# Patient Record
Sex: FEMALE | Race: WHITE | NOT HISPANIC OR LATINO | URBAN - METROPOLITAN AREA
[De-identification: names, ages, dates, MRNs, and addresses within clinical notes are randomized per-mention and may not be internally consistent; named-entity substitution may affect disease eponyms.]

---

## 2020-11-12 PROBLEM — Z00.00 ENCOUNTER FOR PREVENTIVE HEALTH EXAMINATION: Status: ACTIVE | Noted: 2020-11-12

## 2021-03-12 ENCOUNTER — EMERGENCY (EMERGENCY)
Facility: HOSPITAL | Age: 36
LOS: 1 days | Discharge: HOME | End: 2021-03-12
Attending: EMERGENCY MEDICINE | Admitting: EMERGENCY MEDICINE
Payer: MEDICAID

## 2021-03-12 VITALS
SYSTOLIC BLOOD PRESSURE: 136 MMHG | RESPIRATION RATE: 20 BRPM | OXYGEN SATURATION: 100 % | TEMPERATURE: 98 F | DIASTOLIC BLOOD PRESSURE: 90 MMHG | HEART RATE: 92 BPM

## 2021-03-12 VITALS
RESPIRATION RATE: 20 BRPM | HEART RATE: 78 BPM | DIASTOLIC BLOOD PRESSURE: 81 MMHG | OXYGEN SATURATION: 100 % | SYSTOLIC BLOOD PRESSURE: 122 MMHG | TEMPERATURE: 98 F

## 2021-03-12 LAB
ALBUMIN SERPL ELPH-MCNC: 5 G/DL — SIGNIFICANT CHANGE UP (ref 3.3–5)
ALP SERPL-CCNC: 53 U/L — SIGNIFICANT CHANGE UP (ref 40–120)
ALT FLD-CCNC: 14 U/L — SIGNIFICANT CHANGE UP (ref 4–33)
ANION GAP SERPL CALC-SCNC: 12 MMOL/L — SIGNIFICANT CHANGE UP (ref 7–14)
AST SERPL-CCNC: 20 U/L — SIGNIFICANT CHANGE UP (ref 4–32)
BASOPHILS # BLD AUTO: 0.03 K/UL — SIGNIFICANT CHANGE UP (ref 0–0.2)
BASOPHILS NFR BLD AUTO: 0.4 % — SIGNIFICANT CHANGE UP (ref 0–2)
BILIRUB SERPL-MCNC: 0.5 MG/DL — SIGNIFICANT CHANGE UP (ref 0.2–1.2)
BUN SERPL-MCNC: 16 MG/DL — SIGNIFICANT CHANGE UP (ref 7–23)
CALCIUM SERPL-MCNC: 9.2 MG/DL — SIGNIFICANT CHANGE UP (ref 8.4–10.5)
CHLORIDE SERPL-SCNC: 102 MMOL/L — SIGNIFICANT CHANGE UP (ref 98–107)
CO2 SERPL-SCNC: 27 MMOL/L — SIGNIFICANT CHANGE UP (ref 22–31)
CREAT SERPL-MCNC: 0.76 MG/DL — SIGNIFICANT CHANGE UP (ref 0.5–1.3)
EOSINOPHIL # BLD AUTO: 0.02 K/UL — SIGNIFICANT CHANGE UP (ref 0–0.5)
EOSINOPHIL NFR BLD AUTO: 0.2 % — SIGNIFICANT CHANGE UP (ref 0–6)
GLUCOSE SERPL-MCNC: 101 MG/DL — HIGH (ref 70–99)
HCT VFR BLD CALC: 37.6 % — SIGNIFICANT CHANGE UP (ref 34.5–45)
HGB BLD-MCNC: 12.2 G/DL — SIGNIFICANT CHANGE UP (ref 11.5–15.5)
IANC: 5.86 K/UL — SIGNIFICANT CHANGE UP (ref 1.5–8.5)
IMM GRANULOCYTES NFR BLD AUTO: 0.4 % — SIGNIFICANT CHANGE UP (ref 0–1.5)
LIDOCAIN IGE QN: 30 U/L — SIGNIFICANT CHANGE UP (ref 7–60)
LYMPHOCYTES # BLD AUTO: 2.12 K/UL — SIGNIFICANT CHANGE UP (ref 1–3.3)
LYMPHOCYTES # BLD AUTO: 25.5 % — SIGNIFICANT CHANGE UP (ref 13–44)
MCHC RBC-ENTMCNC: 29 PG — SIGNIFICANT CHANGE UP (ref 27–34)
MCHC RBC-ENTMCNC: 32.4 GM/DL — SIGNIFICANT CHANGE UP (ref 32–36)
MCV RBC AUTO: 89.5 FL — SIGNIFICANT CHANGE UP (ref 80–100)
MONOCYTES # BLD AUTO: 0.24 K/UL — SIGNIFICANT CHANGE UP (ref 0–0.9)
MONOCYTES NFR BLD AUTO: 2.9 % — SIGNIFICANT CHANGE UP (ref 2–14)
NEUTROPHILS # BLD AUTO: 5.86 K/UL — SIGNIFICANT CHANGE UP (ref 1.8–7.4)
NEUTROPHILS NFR BLD AUTO: 70.6 % — SIGNIFICANT CHANGE UP (ref 43–77)
NRBC # BLD: 0 /100 WBCS — SIGNIFICANT CHANGE UP
NRBC # FLD: 0 K/UL — SIGNIFICANT CHANGE UP
PLATELET # BLD AUTO: 350 K/UL — SIGNIFICANT CHANGE UP (ref 150–400)
POTASSIUM SERPL-MCNC: 4.3 MMOL/L — SIGNIFICANT CHANGE UP (ref 3.5–5.3)
POTASSIUM SERPL-SCNC: 4.3 MMOL/L — SIGNIFICANT CHANGE UP (ref 3.5–5.3)
PROT SERPL-MCNC: 7.9 G/DL — SIGNIFICANT CHANGE UP (ref 6–8.3)
RBC # BLD: 4.2 M/UL — SIGNIFICANT CHANGE UP (ref 3.8–5.2)
RBC # FLD: 11.5 % — SIGNIFICANT CHANGE UP (ref 10.3–14.5)
SODIUM SERPL-SCNC: 141 MMOL/L — SIGNIFICANT CHANGE UP (ref 135–145)
WBC # BLD: 8.3 K/UL — SIGNIFICANT CHANGE UP (ref 3.8–10.5)
WBC # FLD AUTO: 8.3 K/UL — SIGNIFICANT CHANGE UP (ref 3.8–10.5)

## 2021-03-12 PROCEDURE — 76830 TRANSVAGINAL US NON-OB: CPT | Mod: 26

## 2021-03-12 PROCEDURE — 99285 EMERGENCY DEPT VISIT HI MDM: CPT

## 2021-03-12 PROCEDURE — 93975 VASCULAR STUDY: CPT | Mod: 26

## 2021-03-12 RX ORDER — ONDANSETRON 8 MG/1
4 TABLET, FILM COATED ORAL ONCE
Refills: 0 | Status: COMPLETED | OUTPATIENT
Start: 2021-03-12 | End: 2021-03-12

## 2021-03-12 RX ORDER — ACETAMINOPHEN 500 MG
975 TABLET ORAL ONCE
Refills: 0 | Status: COMPLETED | OUTPATIENT
Start: 2021-03-12 | End: 2021-03-12

## 2021-03-12 RX ORDER — SODIUM CHLORIDE 9 MG/ML
1000 INJECTION INTRAMUSCULAR; INTRAVENOUS; SUBCUTANEOUS ONCE
Refills: 0 | Status: COMPLETED | OUTPATIENT
Start: 2021-03-12 | End: 2021-03-12

## 2021-03-12 RX ORDER — METOCLOPRAMIDE HCL 10 MG
10 TABLET ORAL ONCE
Refills: 0 | Status: COMPLETED | OUTPATIENT
Start: 2021-03-12 | End: 2021-03-12

## 2021-03-12 RX ADMIN — Medication 975 MILLIGRAM(S): at 21:39

## 2021-03-12 RX ADMIN — ONDANSETRON 4 MILLIGRAM(S): 8 TABLET, FILM COATED ORAL at 19:43

## 2021-03-12 RX ADMIN — SODIUM CHLORIDE 1000 MILLILITER(S): 9 INJECTION INTRAMUSCULAR; INTRAVENOUS; SUBCUTANEOUS at 21:39

## 2021-03-12 RX ADMIN — Medication 10 MILLIGRAM(S): at 22:59

## 2021-03-12 NOTE — ED PROVIDER NOTE - PROGRESS NOTE DETAILS
Bernard PGY2 - Reassessed pt., who is feeling improved.  Will dc w/ PCP f/u.  All other questions and concerns have been addressed. 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 stated she started having some migraines and abdominal pain.  Exam significant for LLQ tenderness.  States she had an ovarian cyst the size of a golf ball when she was 14.  Has not been sexually active for a few weeks.  Denies hx of STD/STI pregnancy.  Will obtain labs, TVUS.  Will administer analgesics. Bernard PGY2 - Reassessed pt., who is feeling improved.  Discussed all results w/ pt., who understands them.  All other questions and concerns have been addressed.

## 2021-03-12 NOTE — ED PROVIDER NOTE - NS ED ROS FT
General: denies fever, chills, weight loss/weight gain.  HENT: denies nasal congestion, sore throat, rhinorrhea, ear pain.  Eyes: denies visual changes, blurred vision, eye discharge, eye redness.  Neck: denies neck pain, neck swelling.  CV: denies chest pain, palpitations.  Resp: denies difficulty breathing, cough.  Abdominal: +nausea, vomiting; denies diarrhea, abdominal pain, blood in stool, dark stool.  MSK: denies muscle aches, bony pain, leg pain, leg swelling.  Neuro: denies headaches, numbness, tingling, dizziness, lightheadedness.  Skin: denies rashes, cuts, bruises.  Hematologic: denies unexplained bruises.

## 2021-03-12 NOTE — ED PROVIDER NOTE - OBJECTIVE STATEMENT
35F w/ no PMHx p/w vomiting after ingesting a [place]ak [place] bottle of unknown alcoholic beverage given to her by a co-worker.  Pt. had vomiting after and was sent by her employer to the ED.  States this happened once before.  Denies any concomitant smoking or drug ingestion.  Denies any CP, SOB, n/v/d/c, abd pain, f/c, sick contacts.  Denies prior hospitalization for etoh withdrawal.  Denies any SI/HI/AH/VH.  Pt. currently on her menstrual period.

## 2021-03-12 NOTE — ED ADULT TRIAGE NOTE - CHIEF COMPLAINT QUOTE
AS per EMS pt was at work and drink a mini bottle of alcohol while in the Br. pt started vomiting. pt is able to respond to questions, as per EMS this happen once before.

## 2021-03-12 NOTE — ED ADULT NURSE REASSESSMENT NOTE - NS ED NURSE REASSESS COMMENT FT1
received report from baldemar LYNNE. Pt is a/o x 3. Pt states she still feels slightly nauseous. no complaints of chest pain, headache, dizzniness, vomiting, SOB, fever, chills   verbalized.  Awaiting further orders. Will continue to monitor.

## 2021-03-12 NOTE — ED PROVIDER NOTE - PATIENT PORTAL LINK FT
You can access the FollowMyHealth Patient Portal offered by [facility] by registering at the following website: [email or web address]. By joining [email or web address]’s FollowMyHealth portal, you will also be able to view your health information using other applications (apps) compatible with our system. You can access the FollowMyHealth Patient Portal offered by [facility] by registering at the following website: [email or web address]. By joining [employer]’s FollowMyHealth portal, you will also be able to view your health information using other applications (apps) compatible with our system.

## 2021-03-12 NOTE — ED PROVIDER NOTE - ATTENDING CONTRIBUTION TO CARE
DR. BLOCH, ATTENDING MD-  I performed a face to face bedside interview with patient regarding history of present illness, review of symptoms and past medical history. I completed an independent physical exam.  I have discussed patient's plan of care with the resident.  Patient examined, well appearing, mild c/o nausea, heart s1s2, lungs clear, abd soft mild llq tenderness, extrem no edema, no rashes. pulses intact. neuro motor 5/5

## 2021-03-12 NOTE — ED PROVIDER NOTE - PHYSICAL EXAMINATION
GENERAL: Patient awake alert NAD.  HEENT: NC/AT, moist mucus membranes, no tongue fasciculations.  LUNGS: CTAB, no wheezes or crackles.  CARDIAC: RRR, no m/r/g.  ABDOMEN: Soft, NT, ND, No rebound, guarding.  EXT: No edema. No calf tenderness. CV 2+DP/PT bilaterally.  MSK: No pain with movement, no deformities.  NEURO: A&Ox3. Moving all extremities. CN 2-12 grossly intact, motor strength 5/5 in all four extremities, sensation intact.  SKIN: Warm and dry. No rash.  PSYCH: Normal affect.

## 2021-03-12 NOTE — ED ADULT NURSE NOTE - OBJECTIVE STATEMENT
patient received in exam room 26. alert and oriented x3, ambulates. Patient brought in by ems for etoh use and vomiting while at work. patient states a person brought in a bottle of clear alcohol for her which she states she drank in the bathroom. patient states her job sent her to the ER because this is the second "incident" where this has happened. Patient currently crying in exam room. Clothing removed and secured. Denies any pain at this time. will monitor.

## 2021-03-12 NOTE — ED PROVIDER NOTE - NSFOLLOWUPINSTRUCTIONS_ED_ALL_ED_FT
You were seen for nausea and vomiting.    This is likely due to consuming the alcoholic beverage.    You are stable enough to be discharged.  If you have any concerning symptoms, seek immediate medical attention.

## 2021-03-12 NOTE — ED ADULT NURSE NOTE - NSIMPLEMENTINTERV_GEN_ALL_ED
Implemented All Fall Risk Interventions:  [place] to call system. Call bell, personal items and telephone within reach. Instruct patient to call for assistance. Room bathroom lighting operational. Non-slip footwear when patient is off stretcher. Physically safe environment: no spills, clutter or unnecessary equipment. Stretcher in lowest position, wheels locked, appropriate side rails in place. Provide visual cue, wrist band, yellow gown, etc. Monitor gait and stability. Monitor for mental status changes and reorient to person, place, and time. Review medications for side effects contributing to fall risk. Reinforce activity limits and safety measures with patient and family.

## 2021-03-12 NOTE — ED PROVIDER NOTE - CLINICAL SUMMARY MEDICAL DECISION MAKING FREE TEXT BOX
35F p/w nausea and vomiting after ingestion an unknown alcoholic beverage.  Pt. appears well, slightly nauseated.  Ambulating well.  VSS.  No concern for etoh withdrawal.  Will provide symptomatic relief, PO challenge, reassess, and likely dc.

## 2021-07-27 ENCOUNTER — EMERGENCY (EMERGENCY)
Facility: HOSPITAL | Age: 36
LOS: 1 days | Discharge: HOME | End: 2021-07-27
Attending: STUDENT IN AN ORGANIZED HEALTH CARE EDUCATION/TRAINING PROGRAM
Payer: MEDICAID

## 2021-07-27 VITALS
WEIGHT: 130.07 LBS | OXYGEN SATURATION: 98 % | HEIGHT: 64 IN | RESPIRATION RATE: 15 BRPM | SYSTOLIC BLOOD PRESSURE: 132 MMHG | DIASTOLIC BLOOD PRESSURE: 100 MMHG | HEART RATE: 91 BPM | TEMPERATURE: 97 F

## 2021-07-27 LAB
ANION GAP SERPL CALC-SCNC: 13 MMOL/L — SIGNIFICANT CHANGE UP (ref 5–17)
APAP SERPL-MCNC: <15 UG/ML — SIGNIFICANT CHANGE UP (ref 10–30)
APPEARANCE UR: ABNORMAL
BACTERIA # UR AUTO: NEGATIVE — SIGNIFICANT CHANGE UP
BASOPHILS # BLD AUTO: 0.06 K/UL — SIGNIFICANT CHANGE UP (ref 0–0.2)
BASOPHILS NFR BLD AUTO: 0.6 % — SIGNIFICANT CHANGE UP (ref 0–2)
BILIRUB UR-MCNC: NEGATIVE — SIGNIFICANT CHANGE UP
BUN SERPL-MCNC: 14 MG/DL — SIGNIFICANT CHANGE UP (ref 7–23)
CALCIUM SERPL-MCNC: 10.3 MG/DL — SIGNIFICANT CHANGE UP (ref 8.4–10.5)
CHLORIDE SERPL-SCNC: 102 MMOL/L — SIGNIFICANT CHANGE UP (ref 96–108)
CO2 SERPL-SCNC: 23 MMOL/L — SIGNIFICANT CHANGE UP (ref 22–31)
COLOR SPEC: YELLOW — SIGNIFICANT CHANGE UP
COMMENT - URINE: SIGNIFICANT CHANGE UP
CREAT SERPL-MCNC: 0.91 MG/DL — SIGNIFICANT CHANGE UP (ref 0.5–1.3)
DIFF PNL FLD: ABNORMAL
EOSINOPHIL # BLD AUTO: 0.16 K/UL — SIGNIFICANT CHANGE UP (ref 0–0.5)
EOSINOPHIL NFR BLD AUTO: 1.6 % — SIGNIFICANT CHANGE UP (ref 0–6)
EPI CELLS # UR: 5 /HPF — SIGNIFICANT CHANGE UP
ETHANOL SERPL-MCNC: SIGNIFICANT CHANGE UP MG/DL (ref 0–10)
GLUCOSE SERPL-MCNC: 100 MG/DL — HIGH (ref 70–99)
GLUCOSE UR QL: NEGATIVE — SIGNIFICANT CHANGE UP
HCG SERPL-ACNC: <2 MIU/ML — SIGNIFICANT CHANGE UP
HCT VFR BLD CALC: 39.9 % — SIGNIFICANT CHANGE UP (ref 34.5–45)
HGB BLD-MCNC: 13.1 G/DL — SIGNIFICANT CHANGE UP (ref 11.5–15.5)
HYALINE CASTS # UR AUTO: 30 /LPF — HIGH (ref 0–2)
IMM GRANULOCYTES NFR BLD AUTO: 0.3 % — SIGNIFICANT CHANGE UP (ref 0–1.5)
KETONES UR-MCNC: NEGATIVE — SIGNIFICANT CHANGE UP
LEUKOCYTE ESTERASE UR-ACNC: ABNORMAL
LYMPHOCYTES # BLD AUTO: 3.09 K/UL — SIGNIFICANT CHANGE UP (ref 1–3.3)
LYMPHOCYTES # BLD AUTO: 30.5 % — SIGNIFICANT CHANGE UP (ref 13–44)
MCHC RBC-ENTMCNC: 28.7 PG — SIGNIFICANT CHANGE UP (ref 27–34)
MCHC RBC-ENTMCNC: 32.8 GM/DL — SIGNIFICANT CHANGE UP (ref 32–36)
MCV RBC AUTO: 87.3 FL — SIGNIFICANT CHANGE UP (ref 80–100)
MONOCYTES # BLD AUTO: 0.67 K/UL — SIGNIFICANT CHANGE UP (ref 0–0.9)
MONOCYTES NFR BLD AUTO: 6.6 % — SIGNIFICANT CHANGE UP (ref 2–14)
NEUTROPHILS # BLD AUTO: 6.13 K/UL — SIGNIFICANT CHANGE UP (ref 1.8–7.4)
NEUTROPHILS NFR BLD AUTO: 60.4 % — SIGNIFICANT CHANGE UP (ref 43–77)
NITRITE UR-MCNC: NEGATIVE — SIGNIFICANT CHANGE UP
NRBC # BLD: 0 /100 WBCS — SIGNIFICANT CHANGE UP (ref 0–0)
PH UR: 6 — SIGNIFICANT CHANGE UP (ref 5–8)
PLATELET # BLD AUTO: 349 K/UL — SIGNIFICANT CHANGE UP (ref 150–400)
POTASSIUM SERPL-MCNC: 4.3 MMOL/L — SIGNIFICANT CHANGE UP (ref 3.5–5.3)
POTASSIUM SERPL-SCNC: 4.3 MMOL/L — SIGNIFICANT CHANGE UP (ref 3.5–5.3)
PROT UR-MCNC: ABNORMAL
RBC # BLD: 4.57 M/UL — SIGNIFICANT CHANGE UP (ref 3.8–5.2)
RBC # FLD: 11.6 % — SIGNIFICANT CHANGE UP (ref 10.3–14.5)
RBC CASTS # UR COMP ASSIST: 1 /HPF — SIGNIFICANT CHANGE UP (ref 0–4)
SALICYLATES SERPL-MCNC: <2 MG/DL — LOW (ref 15–30)
SARS-COV-2 RNA SPEC QL NAA+PROBE: SIGNIFICANT CHANGE UP
SODIUM SERPL-SCNC: 138 MMOL/L — SIGNIFICANT CHANGE UP (ref 135–145)
SP GR SPEC: 1.03 — HIGH (ref 1.01–1.02)
UROBILINOGEN FLD QL: NEGATIVE — SIGNIFICANT CHANGE UP
WBC # BLD: 10.14 K/UL — SIGNIFICANT CHANGE UP (ref 3.8–10.5)
WBC # FLD AUTO: 10.14 K/UL — SIGNIFICANT CHANGE UP (ref 3.8–10.5)
WBC UR QL: 23 /HPF — HIGH (ref 0–5)

## 2021-07-27 PROCEDURE — 80307 DRUG TEST PRSMV CHEM ANLYZR: CPT

## 2021-07-27 PROCEDURE — 99285 EMERGENCY DEPT VISIT HI MDM: CPT | Mod: 25

## 2021-07-27 PROCEDURE — 80048 BASIC METABOLIC PNL TOTAL CA: CPT

## 2021-07-27 PROCEDURE — 84702 CHORIONIC GONADOTROPIN TEST: CPT

## 2021-07-27 PROCEDURE — 81001 URINALYSIS AUTO W/SCOPE: CPT

## 2021-07-27 PROCEDURE — 99285 EMERGENCY DEPT VISIT HI MDM: CPT

## 2021-07-27 PROCEDURE — 86769 SARS-COV-2 COVID-19 ANTIBODY: CPT

## 2021-07-27 PROCEDURE — U0003: CPT

## 2021-07-27 PROCEDURE — 93005 ELECTROCARDIOGRAM TRACING: CPT

## 2021-07-27 PROCEDURE — 85025 COMPLETE CBC W/AUTO DIFF WBC: CPT

## 2021-07-27 PROCEDURE — 93010 ELECTROCARDIOGRAM REPORT: CPT

## 2021-07-27 PROCEDURE — U0005: CPT

## 2021-07-27 RX ORDER — CEPHALEXIN 500 MG
500 CAPSULE ORAL ONCE
Refills: 0 | Status: COMPLETED | OUTPATIENT
Start: 2021-07-27 | End: 2021-07-27

## 2021-07-27 NOTE — ED BEHAVIORAL HEALTH ASSESSMENT NOTE - SAFETY PLAN ADDT'L DETAILS
Education provided regarding environmental safety / lethal means restriction/Provision of National Suicide Prevention Lifeline 8-794-759-TALK (0060)

## 2021-07-27 NOTE — ED ADULT NURSE REASSESSMENT NOTE - NS ED NURSE REASSESS COMMENT FT1
Patient handoff received from MAGED espinoza. Patient is AOx3, NAD at this time. safety maintained, awaiting results and dispo.

## 2021-07-27 NOTE — ED PROVIDER NOTE - NSFOLLOWUPINSTRUCTIONS_ED_ALL_ED_FT
You were seen for mental health evaluation.     Seek immediate medical assistance for any new or worsening symptoms.     Follow up with a psychiatrist.     Continue all prescribed medications.     Keflex was sent to your pharmacy for urinary tract infection.

## 2021-07-27 NOTE — ED ADULT NURSE NOTE - OBJECTIVE STATEMENT
36 y/o female coming to the ER BIBA with c/o anxiety. A&Ox4. Ambulatory. PMH anxiety and depression. Patient reports that her mom made her come in. Patient reports she stopped taking her antidepressants on her own about a month ago. Patient reports "feeling overwhelmed and anxious". Patient Denies SI, HI, self harm ideations. Patient states "I just need some help". Patient placed on 1:1 for safety. 34 y/o female coming to the ER BIBA with c/o anxiety. A&Ox4. Ambulatory. PMH anxiety and depression. Patient reports that her mom made her come in. Patient reports she stopped taking her antidepressants on her own about a month ago. Patient reports "feeling overwhelmed and anxious". Patient Denies SI, HI, self harm ideations. Patient states "I just need some help". Patient placed on 1:1 for safety. Belongings secured with security, phone and wallet in lock box in ER. Abdomen is soft, non distended, non tender. Capillary refill less than 2 seconds. Patient denies chest pain, SOB, fever, chills, n/v/d, urinary symptoms, abdominal pain. Safety measures maintained. Bed in the lowest position. MD at the bedside. No acute distress noted or further complaints at this time.

## 2021-07-27 NOTE — ED BEHAVIORAL HEALTH ASSESSMENT NOTE - REFERRAL / APPOINTMENT DETAILS
Patient given outpatient resources for outpatient mental health clinics including walk in [facility] Patient given outpatient resources for outpatient mental health clinics including  [facility]

## 2021-07-27 NOTE — ED BEHAVIORAL HEALTH ASSESSMENT NOTE - RISK ASSESSMENT
Low Acute Suicide Risk rf - no outpatient, noncompliance, anxiety, depression, decline in functioning,   pf - supportive family, future oriented, identifies reason for living, sobriety, no past SAs, denies si/hi, no past admissions, young, female, healthy    COVID Exposure Screen- Patient  1.	*Have you had a COVID-19 test in the last 90 days?  (  ) Yes   ( x ) No   (  ) Unknown- Reason: _____  IF YES PROCEED TO QUESTION #2. IF NO OR UNKNOWN, PLEASE SKIP TO QUESTION #3.  2.	Date of test(s) and result(s): ________  3.	*Have you tested positive for COVID-19 antibodies? (  ) Yes   (  ) No   (x  ) Unknown- Reason: _____  IF YES PROCEED TO QUESTION #4. IF NO or UNKNOWN, PLEASE SKIP TO QUESTION #5.  4.	Date of positive antibody test: ________  5.	*Have you received 2 doses of the COVID-19 vaccine? ( x ) Yes   ( ) No   (  ) Unknown- Reason: _____   IF YES PROCEED TO QUESTION #6. IF NO or UNKNOWN, PLEASE SKIP TO QUESTION #7.  6.	Date of second dose: ________  7.	*In the past 10 days, have you been around anyone with a positive COVID-19 test?* (  ) Yes   ( x ) No   ( ) Unknown- Reason: ____  IF YES PROCEED TO QUESTION #8. IF NO or UNKNOWN, PLEASE SKIP TO QUESTION #13.  8.	Were you within 6 feet of them for at least 15 minutes? (  ) Yes   (  ) No   (  ) Unknown- Reason: _____  9.	Have you provided care for them? (  ) Yes   (  ) No   (  ) Unknown- Reason: ______  10.	Have you had direct physical contact with them (touched, hugged, or kissed them)? (  ) Yes   (  ) No    (  ) Unknown- Reason: _____  11.	Have you shared eating or drinking utensils with them? (  ) Yes   (  ) No    (  ) Unknown- Reason: ____  12.	Have they sneezed, coughed, or somehow gotten respiratory droplets on you? (  ) Yes   (  ) No    (  ) Unknown- Reason: ______  13.	*Have you been out of [place] within the past 10 days?* (  ) Yes   ( x ) No   (  ) Unknown- Reason: _____  IF YES PLEASE ANSWER THE FOLLOWING QUESTIONS:  14.	Which state/country have you been to? ______  15.	Were you there over 24 hours? (  ) Yes   (  ) No    (  ) Unknown- Reason: ______  16.	Date of return to [facility]: ______

## 2021-07-27 NOTE — ED PROVIDER NOTE - PATIENT PORTAL LINK FT
You can access the FollowMyHealth Patient Portal offered by [facility] by registering at the following website: [email or web address]. By joining [email or web address]’s FollowMyHealth portal, you will also be able to view your health information using other applications (apps) compatible with our system.

## 2021-07-27 NOTE — ED BEHAVIORAL HEALTH ASSESSMENT NOTE - SUMMARY
Patient is a 34yo female, domiciled with mother, unemployed, with pph of depression, anxiety, no prior psych admissions, no current outpt tx, denies past SAs or self harm, no known hx of violence, denies drug or alcohol use, and no known pmh. She is BIBEMS from home activated by mother for worsening anxiety and depression.     Patient struggles with depression and anxiety that manifest as difficulty with decision making, poor sleep, decline in exercise, appetite, socialization. She attributes this to pandemic limitations and her baseline daily stressors. Her mother was concerned about pt's change in behavior but does not note any active SI/HI/AVH. Although patient appears anxious, her speech is spontaneous and she does not exhibit any acute s/sx of claudia, psychosis, or suicidality at this time. Mother does not express any other acute safety concerns and in agreement with discharge home with outpatient follow up. She is not interested in voluntary admission and does not require involuntary hospitalization at this time; patient given instructions for outpatient follow up and given resources.

## 2021-07-27 NOTE — ED BEHAVIORAL HEALTH ASSESSMENT NOTE - NSACTIVEVENT_PSY_ALL_CORE
Okay for 3 months with one refill since has appt
Â· Med Name & Dose: pantoprazole 40 mg  Â· Last refill was 8-13-18 Number of Refills sent: 0  Â· Quantity of medication given 90 day supply  Â· Last visit for that condition 8-24-18  Â· Date of next appt: 11/30/2018  Â· Date of any Lab results pertaining to medication:       Â· Med Name & Dose: gabapentin 400 mg  Â· Last refill was 8-13-18 Number of Refills sent: 0  Â· Quantity of medication given 90 day supply  Â· Last visit for that condition 8-24-18  Â· Date of next appt: 11/30/2018  Â· Date of any Lab results pertaining to medication:       Ok to refill?
Current or pending social isolation

## 2021-07-27 NOTE — ED PROVIDER NOTE - PROGRESS NOTE DETAILS
Vitor PGY3:  Discussed with mother Bria [phone].  Stopped taking meds for unknown period of time, she has been more withdrawn, unresponsive to questioning, and very tearful, lately.  Has not endorsed SI/HI to her but is not speaking to anyone at all so unclear. Sravanthi -psych cleared pt to go home. she is not a danger to self or others. will dc on ABx for UTI. Patient agrees to follow up with psychiatry.

## 2021-07-27 NOTE — ED PROVIDER NOTE - PHYSICAL EXAMINATION
General appearance: NAD, conversant, afebrile    Neck: Trachea midline; Full range of motion, supple   Pulm: CTA bl, normal respiratory effort and no intercostal retractions, normal work of breathing   CV: RRR, No murmurs, rubs, or gallops   Extremities: No peripheral edema    Skin: Dry, normal temperature, turgor and texture; no rash   Psych: Withdrawn, cooperative; alert and oriented to person, place and time, denies SI/HI, tearful General appearance: NAD, conversant, afebrile    Neck: Trachea midline; Full range of motion, supple   Pulm: CTA bl, normal respiratory effort and no intercostal retractions, normal work of breathing   CV: RRR, No murmurs, rubs, or gallops  Abdomen: Soft, NT/ND.    Extremities: No peripheral edema   Skin: Dry, normal temperature, turgor and texture; no rash   Psych: Withdrawn, cooperative; alert and oriented to person, place and time, denies SI/HI, tearful

## 2021-07-27 NOTE — ED PROVIDER NOTE - CLINICAL SUMMARY MEDICAL DECISION MAKING FREE TEXT BOX
36yo female with pmh depression bibems by mother's call to ems after for possible decompensating mental health.  Patient not forthcoming with information on exam and denies anything is wrong while she is actively crying.  Will get collateral from family and check psych screening labs, likely psych eval. 36yo female with pmh depression bibems by mother's call to ems after for possible decompensating mental health.  Patient not forthcoming with information on exam and denies anything is wrong while she is actively crying.  Will get collateral from family and check psych screening labs, likely psych eval.    Attending MD Garcia: Agree with above. Patient here tearful and reserved. Pleasant to speak with, but extremely reserved. States that "everyone goes through a hard time" but is unwilling to elaborate, and denies SI/HI. According to amie's mother, amie has been decompensating and not taking her meds. Will obtain labs and psych c/s as family concerned patient not caring for self.

## 2021-07-27 NOTE — ED BEHAVIORAL HEALTH ASSESSMENT NOTE - DETAILS
not indicated at this time; patient advised to return to ED or call 911 if symptoms worsen or thoughts to harm self or others occur. mother aware see hpi

## 2021-07-27 NOTE — ED PROVIDER NOTE - OBJECTIVE STATEMENT
34yo female with pmh depression bibems after mother called ems for patient being more withdrawn, crying.  Patient states she does not know why she is here and feels like her anxiety has been worsening lately.  She says her mom called ems because she was concerned about her but she has been doing well after stopping her meds over past 1-1.5 months but says over the past few days/ weeks she has been feeling worse and says "everyone goes through this."

## 2021-07-27 NOTE — ED BEHAVIORAL HEALTH ASSESSMENT NOTE - OTHER PAST PSYCHIATRIC HISTORY (INCLUDE DETAILS REGARDING ONSET, COURSE OF ILLNESS, INPATIENT/OUTPATIENT TREATMENT)
self reported hx of depression, anxiety, per psyckes dx of possible bipolar, neurocognitive disorders; no known past admissions, no current outpt tx, denies past SAs

## 2021-07-27 NOTE — ED BEHAVIORAL HEALTH ASSESSMENT NOTE - PAST PSYCHOTROPIC MEDICATION
celexa, wellbutrin, ativan, gabapentin, valium,  Vyvanse, lamotrigine celexa, wellbutrin, ativan, gabapentin, valium, Vyvanse, lamotrigine

## 2021-07-27 NOTE — ED PROVIDER NOTE - NSFOLLOWUPCLINICS_GEN_ALL_ED_FT
[facility]  Psychiatry  [id number] [place] [address]  Phone: [phone]  Fax:   Follow Up Time: Routine

## 2021-07-27 NOTE — ED BEHAVIORAL HEALTH ASSESSMENT NOTE - DESCRIPTION
denies see bh note    Vital Signs Last 24 Hrs  T(C): 36.6 (28 Jul 2021 00:31), Max: 36.6 (28 Jul 2021 00:31)  T(F): 97.9 (28 Jul 2021 00:31), Max: 97.9 (28 Jul 2021 00:31)  HR: 75 (28 Jul 2021 00:31) (75 - 91)  BP: 127/85 (28 Jul 2021 00:31) (127/85 - 132/100)  BP(mean): --  RR: 18 (28 Jul 2021 00:31) (15 - 18)  SpO2: 99% (28 Jul 2021 00:31) (98% - 99%) lives with parents; unemployed, no children

## 2021-07-27 NOTE — ED BEHAVIORAL HEALTH ASSESSMENT NOTE - HPI (INCLUDE ILLNESS QUALITY, SEVERITY, DURATION, TIMING, CONTEXT, MODIFYING FACTORS, ASSOCIATED SIGNS AND SYMPTOMS)
Patient is a 34yo female, domiciled with mother, unemployed, with pph of depression, anxiety, no prior psych admissions, no current outpt tx, denies past SAs or self harm, no known hx of violence, denies drug or alcohol use, Patient is a 34yo female, domiciled with mother, unemployed, with pph of depression, anxiety, no prior psych admissions, no current outpt tx, denies past SAs or self harm, no known hx of violence, denies drug or alcohol use, and no known pmh. She is BIBEMS from home activated by mother for worsening Patient is a 36yo female, domiciled with mother, unemployed, with pph of depression, anxiety, no prior psych admissions, no current outpt tx, denies past SAs or self harm, no known hx of violence, denies drug or alcohol use, and no known pmh. She is BIBEMS from home activated by mother for worsening anxiety and depression.     On exam, patient appears anxious, superficially cooperative, guarded at times, and linear process. She reports she has been stressed and anxious from her regular day to day issues. Patient is a 36yo female, domiciled with mother, unemployed, with pph of depression, anxiety, no prior psych admissions, no current outpt tx, denies past SAs or self harm, no known hx of violence, denies drug or alcohol use, and no known pmh. She is BIBEMS from home activated by mother for worsening anxiety and depression.     On exam, patient appears anxious, superficially cooperative, guarded at times, with spontaneous speech and linear process. She reports she has been stressed and anxious from her regular day to day issues. She states she isn't going out, eating, or exercising as much due to pandemic. She also notes feeling depressed, having difficulty with decision making, and trouble sleeping. Her mother is concerned because of her decline and called for EMS. She feels this was an overreaction and was reluctant to come to ER but is open to outpatient. She denies past or current SI/P/I, HI/P/I, current self harm. When asked about past SAs or self harm, patient is repeatedly vague, states she can't remember and not for many years, then later denies. Despite multiple attempts to explore any specific stressors, patient repeatedly indicates her normal daily stressors unchanged from her usual baseline. She reports taking meds in the recent past but not currently. Denies s/sx of psychosis or claudia including AVH, paranoia, delusions, elevated mood, increased energy, increased goal directed activities, decreased need for sleep, talkativeness, racing thoughts. Denies any substance abuse. She is eager for discharge and amenable to outpatient resources.     see  note for collateral info from mother obtained by [facility]

## 2021-07-28 VITALS
TEMPERATURE: 98 F | OXYGEN SATURATION: 99 % | DIASTOLIC BLOOD PRESSURE: 85 MMHG | HEART RATE: 75 BPM | SYSTOLIC BLOOD PRESSURE: 127 MMHG | RESPIRATION RATE: 18 BRPM

## 2021-07-28 DIAGNOSIS — F41.9 ANXIETY DISORDER, UNSPECIFIED: ICD-10-CM

## 2021-07-28 LAB
COVID-19 SPIKE DOMAIN AB INTERP: POSITIVE
COVID-19 SPIKE DOMAIN ANTIBODY RESULT: >250 U/ML — HIGH
SARS-COV-2 IGG+IGM SERPL QL IA: >250 U/ML — HIGH
SARS-COV-2 IGG+IGM SERPL QL IA: POSITIVE

## 2021-07-28 RX ORDER — CEPHALEXIN 500 MG
1 CAPSULE ORAL
Qty: 10 | Refills: 0
Start: 2021-07-28 | End: 2021-08-01

## 2021-07-28 NOTE — ED BEHAVIORAL HEALTH NOTE - BEHAVIORAL HEALTH NOTE
===================  PRE-HOSPITAL COURSE  ===================  SOURCE:  [facility] and secondhand ED documentation.  DETAILS:  Per chart, patient was BIB EMS activated by mother due to concern for patient being non-compliant with her medications, being more withdrawn, and worsening anxiety.    ============  ED COURSE   ============  SOURCE:  [facility] and secondhand ED documentation.  ARRIVAL:  Per chart and RN, patient arrived via EMS. Per RN, patient was calm upon arrival, and compliant with triage process.   BELONGINGS:  Per RN, patient arrived with clothing. All belongings were provided to hospital security, and patient is currently in a gown with a 1:1 staff member.  BEHAVIOR: RN described patient to be calm and cooperative, presenting with linear thought process and thought content WNL, is AAOx3, presenting with stable mood and appropriate affect, remains in good behavioral and impulse control, is not currently violent/aggressive. RN stated that the patient is denying SI/HI/A/VH. RN stated that there are no visible marks, bruises, or lacerations on the body. RN stated that the patient appears to be fairly-groomed, maintains good hygiene, and reports good ADLs, ambulates well and independently without assistance.   TREATMENT:  Per chart and RN, patient did not require PRN medications.  VISITORS:  None      ========================  FOR EACH COLLATERAL  ========================  NAME: Gulshan Delgado  NUMBER: [phone]  RELATIONSHIP: Mother  RELIABILITY: Collateral required re-direction from BTCM regarding interview, as she exhibited limited understanding of role of BTCM and the questions being asked.  COMMENTS:     ========================  PATIENT DEMOGRAPHICS: Patient is a 35F domiciled with mother and father, currently unemployed, single, without children, non-caregiver.  ========================    BTCM spoke with patient’s mother Gulshan to obtain third party collateral. Gulshan stated patient has a Dx/o bipolar disorder. Gulshan stated patient was found to be with blank affect, sitting still, unresponsive, and stated that she has not been taking her medications (Gulshan was unable to provide patient’s medication regiment). Gulshan stated that the patient appeared to be confused and was putting her head down into her lap, and continued to remain in that position for several hours, not responding to her questions. Gulshan stated that at baseline, patient appears to be depressed however is able to manage her symptoms independently. Collateral stated that the patient has also been presenting with poor recent memory recall, stated that the patient could not remember the password of her phone, and was paranoid that there was a microchip in her glasses. Collateral stated patient had been undergoing several changes in her medication regiment in a short period of time, though was unable to report any specific medications. Collateral stated patient has no legal hx, no access to firearms at home, no substance/ alcohol use, no family hx/o mental illness. Collateral denied patient expressed SI/HI/A/VH, reported one instance of cutting 2 yrs ago. Gulshan provided an unclear response regarding past psych admissions. [facility] informed Gulshan that patient will be discharged with outpatient resources, and inpatient admission is not recommended at this time. Gulshan did not express safety concerns of taking her home and stated she was in agreement with the plan. [facility] counseled Gulshan on safety, and instructed gulshan to contact EMS in the event patient shows signs of imminent safety concern.     COVID Exposure Screen- collateral (i.e. third-party, chart review, belongings, etc; include EMS and ED staff)  1.	*Has the patient had a COVID-19 test in the last 90 days?  (  ) Yes   ( x ) No   (  ) Unknown- Reason: _____  IF YES PROCEED TO QUESTION #2. IF NO OR UNKNOWN, PLEASE SKIP TO QUESTION #3.  2.	Date of test(s) and result(s): ________  3.	*Has the patient tested positive for COVID-19 antibodies? (  ) Yes   (  x) No   (  ) Unknown- Reason: _____  IF YES PROCEED TO QUESTION #4. IF NO or UNKNOWN, PLEASE SKIP TO QUESTION #5.  4.	Date of positive antibody test: ________  5.	*Has the patient received 2 doses of the COVID-19 vaccine? ( x ) Yes   (  ) No   (  ) Unknown- Reason: _____  IF YES PROCEED TO QUESTION #6. IF NO or UNKNOWN, PLEASE SKIP TO QUESTION #7.  6.	 Date of second dose: February 2021  7.	*In the past 10 days, has the patient been around anyone with a positive COVID-19 test?* (  ) Yes   (x ) No   (  ) Unknown- Reason: __  IF YES PROCEED TO QUESTION #8. IF NO or UNKNOWN, PLEASE SKIP TO QUESTION #13.  8.	Was the patient within 6 feet of them for at least 15 minutes? (  ) Yes   (  ) No   (  ) Unknown- Reason: _____  9.	Did the patient provide care for them? (  ) Yes   (  ) No   (  ) Unknown- Reason: ______  10.	Did the patient have direct physical contact with them (touched, hugged, or kissed them)? (  ) Yes   (  ) No    (  ) Unknown- Reason: __  11.	Did the patient share eating or drinking utensils with them? (  ) Yes   (  ) No    (  ) Unknown- Reason: ____  12.	Did they sneeze, cough, or somehow get respiratory droplets on the patient? (  ) Yes   (  ) No    (  ) Unknown- Reason: ______  13.	*Has the patient been out of [place] within the past 10 days?* (x  ) Yes   (  ) No   (  ) Unknown- Reason: _____  IF YES PLEASE ANSWER THE FOLLOWING QUESTIONS:  14.	Which state/country did they go to? [place]  15.	Were they there over 24 hours? (  ) Yes   (  x) No    (  ) Unknown- Reason: ______  16.	Date of return to [facility]:

## 2021-08-01 ENCOUNTER — OUTPATIENT (OUTPATIENT)
Dept: OUTPATIENT SERVICES | Facility: HOSPITAL | Age: 36
LOS: 1 days | End: 2021-08-01
Payer: MEDICAID

## 2021-08-02 ENCOUNTER — OUTPATIENT (OUTPATIENT)
Dept: OUTPATIENT SERVICES | Facility: HOSPITAL | Age: 36
LOS: 1 days | Discharge: STILL ADMITTED OR ADMINISTRATIVE | End: 2021-08-02
Payer: MEDICAID

## 2021-08-02 PROCEDURE — 99215 OFFICE O/P EST HI 40 MIN: CPT | Mod: 95

## 2021-08-26 ENCOUNTER — INPATIENT (INPATIENT)
Facility: HOSPITAL | Age: 36
LOS: 6 days | Discharge: PSYCHIATRIC OR SUBSTANCE USE FACILITY | DRG: 885 | End: 2021-09-02
Attending: PSYCHIATRY & NEUROLOGY | Admitting: PSYCHIATRY & NEUROLOGY
Payer: MEDICAID

## 2021-08-26 VITALS
DIASTOLIC BLOOD PRESSURE: 93 MMHG | HEART RATE: 89 BPM | OXYGEN SATURATION: 100 % | WEIGHT: 149.91 LBS | RESPIRATION RATE: 16 BRPM | SYSTOLIC BLOOD PRESSURE: 126 MMHG | TEMPERATURE: 99 F | HEIGHT: 64 IN

## 2021-08-26 DIAGNOSIS — F32.9 MAJOR DEPRESSIVE DISORDER, SINGLE EPISODE, UNSPECIFIED: ICD-10-CM

## 2021-08-26 LAB
ALBUMIN SERPL ELPH-MCNC: 4.9 G/DL — SIGNIFICANT CHANGE UP (ref 3.3–5)
ALP SERPL-CCNC: 60 U/L — SIGNIFICANT CHANGE UP (ref 40–120)
ALT FLD-CCNC: 10 U/L — SIGNIFICANT CHANGE UP (ref 10–45)
AMPHET UR-MCNC: NEGATIVE — SIGNIFICANT CHANGE UP
ANION GAP SERPL CALC-SCNC: 15 MMOL/L — SIGNIFICANT CHANGE UP (ref 5–17)
APAP SERPL-MCNC: <15 UG/ML — SIGNIFICANT CHANGE UP (ref 10–30)
APPEARANCE UR: ABNORMAL
AST SERPL-CCNC: 14 U/L — SIGNIFICANT CHANGE UP (ref 10–40)
BACTERIA # UR AUTO: ABNORMAL
BARBITURATES UR SCN-MCNC: NEGATIVE — SIGNIFICANT CHANGE UP
BASOPHILS # BLD AUTO: 0.06 K/UL — SIGNIFICANT CHANGE UP (ref 0–0.2)
BASOPHILS NFR BLD AUTO: 0.7 % — SIGNIFICANT CHANGE UP (ref 0–2)
BENZODIAZ UR-MCNC: POSITIVE
BILIRUB SERPL-MCNC: 0.8 MG/DL — SIGNIFICANT CHANGE UP (ref 0.2–1.2)
BILIRUB UR-MCNC: NEGATIVE — SIGNIFICANT CHANGE UP
BUN SERPL-MCNC: 10 MG/DL — SIGNIFICANT CHANGE UP (ref 7–23)
CALCIUM SERPL-MCNC: 10 MG/DL — SIGNIFICANT CHANGE UP (ref 8.4–10.5)
CHLORIDE SERPL-SCNC: 102 MMOL/L — SIGNIFICANT CHANGE UP (ref 96–108)
CO2 SERPL-SCNC: 22 MMOL/L — SIGNIFICANT CHANGE UP (ref 22–31)
COCAINE METAB.OTHER UR-MCNC: NEGATIVE — SIGNIFICANT CHANGE UP
COLOR SPEC: YELLOW — SIGNIFICANT CHANGE UP
CREAT SERPL-MCNC: 0.85 MG/DL — SIGNIFICANT CHANGE UP (ref 0.5–1.3)
DIFF PNL FLD: ABNORMAL
EOSINOPHIL # BLD AUTO: 0.15 K/UL — SIGNIFICANT CHANGE UP (ref 0–0.5)
EOSINOPHIL NFR BLD AUTO: 1.7 % — SIGNIFICANT CHANGE UP (ref 0–6)
EPI CELLS # UR: 27 /HPF — HIGH
ETHANOL SERPL-MCNC: SIGNIFICANT CHANGE UP MG/DL (ref 0–10)
GLUCOSE SERPL-MCNC: 101 MG/DL — HIGH (ref 70–99)
GLUCOSE UR QL: NEGATIVE — SIGNIFICANT CHANGE UP
HCG UR QL: NEGATIVE — SIGNIFICANT CHANGE UP
HCT VFR BLD CALC: 38.6 % — SIGNIFICANT CHANGE UP (ref 34.5–45)
HGB BLD-MCNC: 12.8 G/DL — SIGNIFICANT CHANGE UP (ref 11.5–15.5)
HYALINE CASTS # UR AUTO: 1 /LPF — SIGNIFICANT CHANGE UP (ref 0–7)
IMM GRANULOCYTES NFR BLD AUTO: 0.3 % — SIGNIFICANT CHANGE UP (ref 0–1.5)
KETONES UR-MCNC: NEGATIVE — SIGNIFICANT CHANGE UP
LEUKOCYTE ESTERASE UR-ACNC: ABNORMAL
LYMPHOCYTES # BLD AUTO: 2.39 K/UL — SIGNIFICANT CHANGE UP (ref 1–3.3)
LYMPHOCYTES # BLD AUTO: 27.7 % — SIGNIFICANT CHANGE UP (ref 13–44)
MCHC RBC-ENTMCNC: 28.5 PG — SIGNIFICANT CHANGE UP (ref 27–34)
MCHC RBC-ENTMCNC: 33.2 GM/DL — SIGNIFICANT CHANGE UP (ref 32–36)
MCV RBC AUTO: 86 FL — SIGNIFICANT CHANGE UP (ref 80–100)
METHADONE UR-MCNC: NEGATIVE — SIGNIFICANT CHANGE UP
MONOCYTES # BLD AUTO: 0.63 K/UL — SIGNIFICANT CHANGE UP (ref 0–0.9)
MONOCYTES NFR BLD AUTO: 7.3 % — SIGNIFICANT CHANGE UP (ref 2–14)
NEUTROPHILS # BLD AUTO: 5.37 K/UL — SIGNIFICANT CHANGE UP (ref 1.8–7.4)
NEUTROPHILS NFR BLD AUTO: 62.3 % — SIGNIFICANT CHANGE UP (ref 43–77)
NITRITE UR-MCNC: NEGATIVE — SIGNIFICANT CHANGE UP
NRBC # BLD: 0 /100 WBCS — SIGNIFICANT CHANGE UP (ref 0–0)
OPIATES UR-MCNC: NEGATIVE — SIGNIFICANT CHANGE UP
OXYCODONE UR-MCNC: NEGATIVE — SIGNIFICANT CHANGE UP
PCP SPEC-MCNC: SIGNIFICANT CHANGE UP
PCP UR-MCNC: NEGATIVE — SIGNIFICANT CHANGE UP
PH UR: 6.5 — SIGNIFICANT CHANGE UP (ref 5–8)
PLATELET # BLD AUTO: 392 K/UL — SIGNIFICANT CHANGE UP (ref 150–400)
POTASSIUM SERPL-MCNC: 4.1 MMOL/L — SIGNIFICANT CHANGE UP (ref 3.5–5.3)
POTASSIUM SERPL-SCNC: 4.1 MMOL/L — SIGNIFICANT CHANGE UP (ref 3.5–5.3)
PROT SERPL-MCNC: 7.9 G/DL — SIGNIFICANT CHANGE UP (ref 6–8.3)
PROT UR-MCNC: ABNORMAL
RBC # BLD: 4.49 M/UL — SIGNIFICANT CHANGE UP (ref 3.8–5.2)
RBC # FLD: 11.7 % — SIGNIFICANT CHANGE UP (ref 10.3–14.5)
RBC CASTS # UR COMP ASSIST: 12 /HPF — HIGH (ref 0–4)
SALICYLATES SERPL-MCNC: <2 MG/DL — LOW (ref 15–30)
SARS-COV-2 RNA SPEC QL NAA+PROBE: SIGNIFICANT CHANGE UP
SODIUM SERPL-SCNC: 139 MMOL/L — SIGNIFICANT CHANGE UP (ref 135–145)
SP GR SPEC: 1.03 — HIGH (ref 1.01–1.02)
THC UR QL: NEGATIVE — SIGNIFICANT CHANGE UP
UROBILINOGEN FLD QL: NEGATIVE — SIGNIFICANT CHANGE UP
WBC # BLD: 8.63 K/UL — SIGNIFICANT CHANGE UP (ref 3.8–10.5)
WBC # FLD AUTO: 8.63 K/UL — SIGNIFICANT CHANGE UP (ref 3.8–10.5)
WBC UR QL: 9 /HPF — HIGH (ref 0–5)

## 2021-08-26 PROCEDURE — 93010 ELECTROCARDIOGRAM REPORT: CPT | Mod: NC

## 2021-08-26 PROCEDURE — 99285 EMERGENCY DEPT VISIT HI MDM: CPT

## 2021-08-26 NOTE — ED ADULT NURSE NOTE - OBJECTIVE STATEMENT
35 year old female BIB EMS for psychiatric evaluation due to auditory hallucinations. Pt with history of Bipolar D/O, recentlt c/o hearing voices and seeing things. Pt did not give information events prior to arrival at the ER, pt 35 year old female BIB EMS for psychiatric evaluation due to auditory hallucinations. Pt with history of Bipolar D/O,  c/o hearing voices and seeing things. Pt did not give information events prior to arrival at the ER, pt is a thought blocked with disorganized thinking, giving irrelevant answers. . Pt is domiciled with mom and said that she is not on any medications at this time and has no psychiatrist. Pt is observed to have frequent intervals of thought blocking then a few minutes when she is able to engage well and answer questions appropriately. Pt denied acute medical complaints
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
## 2021-08-26 NOTE — H&P ADULT - ASSESSMENT
Assessment: 34 yo female with a PMHx of bipolar disorder (not taking medication) presents to the ED for psychiatric evaluation. Patient states she came to the ED for anxiety, depression, and confusion. She also states she is hearing things in her head that others cannot hear. Mother is concerned that the patient may be having seizures. She states she witnessed two events over the past two days. During these events the patient's eyes were "fluttering," which she further describes as eyelids closed but moving. The patient did not move or speak for at least 3-6 hours each time. The patient states she remembers both events, and states she was intensely frustrated during those times. Patient was evaluated by psychiatry in the ED, with consideration for inpatient psych admission once medically cleared. Admit to EMU for possible event capture and characterization.    Plan:  [] Admit to EMU.  [] vEEG.  [] No AEDs for now.  [] Seizure/Fall/Aspiration precautions.  [] Psychiatry following, f/u psych consult team recs.  [] c/w home dose Xanax PRN.  [] On Keflex for UTI (which can lower seizure threshold), will switch to Macrobid 100MG PO BID x5 days.  [] DVT PPx: Lovenox 40MG SC QD.  [] Diet: Regular.  [] Seizure Rescue: Ativan 1MG IVP x1 for convulsions/GTC lasting > 3 minutes. Vimpat 200MG IV x1 for convulsions/GTC refractory to Ativan.    Patient discussed with epilepsy fellow Dr. Hansen. Final recommendations regarding management to be confirmed upon review by epilepsy attending Dr. Esqueda. Assessment: 36 yo female with a PMHx of bipolar disorder (not taking medication) presents to the ED for psychiatric evaluation. Patient states she came to the ED for anxiety, depression, and confusion. She also states she is hearing things in her head that others cannot hear. Mother is concerned that the patient may be having seizures. She states she witnessed two events over the past two days. During these events the patient's eyes were "fluttering," which she further describes as eyelids closed but moving. The patient did not move or speak for at least 3-6 hours each time. The patient states she remembers both events, and states she was intensely frustrated during those times. Patient was evaluated by psychiatry in the ED, with consideration for inpatient psych admission once medically cleared. Admit to EMU for possible event capture and characterization.    Plan:  [] Admit to EMU.  [] 1:1.  [] vEEG.  [] No AEDs for now.  [] Seizure/Fall/Aspiration precautions.  [] Psychiatry following, f/u psych consult team recs.  [] c/w home dose Xanax PRN.  [] On Keflex for UTI (which can lower seizure threshold), will switch to Macrobid 100MG PO BID x5 days.  [] DVT PPx: Lovenox 40MG SC QD.  [] Diet: Regular.  [] Seizure Rescue: Ativan 1MG IVP x1 for convulsions/GTC lasting > 3 minutes. Vimpat 200MG IV x1 for convulsions/GTC refractory to Ativan.    Patient discussed with epilepsy fellow Dr. Hansen. Final recommendations regarding management to be confirmed upon review by epilepsy attending Dr. Esqueda. Assessment: 36 yo female with a PMHx of bipolar disorder (not taking medication) presents to the ED for psychiatric evaluation. Patient states she came to the ED for anxiety, depression, and confusion. She also states she is hearing things in her head that others cannot hear. Mother is concerned that the patient may be having seizures. She states she witnessed two events over the past two days. During these events the patient's eyes were "fluttering," which she further describes as eyelids closed but moving. The patient did not move or speak for at least 3-6 hours each time. The patient states she remembers both events, and states she was intensely frustrated during those times. Patient was evaluated by psychiatry in the ED, with consideration for inpatient psych admission once medically cleared. Admit to EMU for possible event capture and characterization.    Plan:  [] Admit to EMU.  [] 1:1.  [] vEEG.  [] No AEDs for now.  [] Seizure/Fall/Aspiration precautions.  [] Psychiatry following, f/u psych consult team recs.  [] c/w home dose Xanax PRN.  [] On Keflex for UTI (which can lower seizure threshold), will switch to Macrobid 100MG PO BID x5 days.  [x] UDS: +benzodiazepines.  [] DVT PPx: Lovenox 40MG SC QD.  [] Diet: Regular.  [] Seizure Rescue: Ativan 1MG IVP x1 for convulsions/GTC lasting > 3 minutes. Vimpat 200MG IV x1 for convulsions/GTC refractory to Ativan.    Patient discussed with epilepsy fellow Dr. Hansen. Final recommendations regarding management to be confirmed upon review by epilepsy attending Dr. Esqueda.

## 2021-08-26 NOTE — H&P ADULT - NSHPLABSRESULTS_GEN_ALL_CORE
LABS:                      12.8   8.63  )-----------( 392      ( 26 Aug 2021 16:51 )             38.6     08-26    139  |  102  |  10  ----------------------------<  101<H>  4.1   |  22  |  0.85    Ca    10.0      26 Aug 2021 16:51    TPro  7.9  /  Alb  4.9  /  TBili  0.8  /  DBili  x   /  AST  14  /  ALT  10  /  AlkPhos  60  08-26

## 2021-08-26 NOTE — ED BEHAVIORAL HEALTH ASSESSMENT NOTE - DIFFERENTIAL
anxiety, depression, bipolar disorder, substance intoxication or withdrawal, psychosis, neurocognitive disorder, seizures

## 2021-08-26 NOTE — H&P ADULT - NSHPPHYSICALEXAM_GEN_ALL_CORE
Vital Signs Last 24 Hrs  T(C): 36.7 (26 Aug 2021 20:27), Max: 37.1 (26 Aug 2021 15:47)  T(F): 98 (26 Aug 2021 20:27), Max: 98.7 (26 Aug 2021 15:47)  HR: 88 (26 Aug 2021 20:27) (87 - 89)  BP: 104/69 (26 Aug 2021 20:27) (104/69 - 126/93)  RR: 17 (26 Aug 2021 20:27) (16 - 17)  SpO2: 97% (26 Aug 2021 20:27) (97% - 100%)    General:  Constitutional: Sitting on bench, NAD but anxious-appearing.  Head: Normocephalic, atraumatic.  Extremities: No edema.    Neuro Exam:   MS: Alert, eyes open spontaneously. Oriented to self, age, location, month, year. Speech is fluent, not dysarthric. Latency of speech. Poor eye contact, appears to be interacting with internal stimuli. Follows commands.  CN: PERRL. VFF. EOMI, dysconjugate gaze with mild strabismus noted, especially with horizontal eye movements. V1-V3 intact. Face symmetric. Tongue midline.   Motor: All extremities antigravity without drift.   Sensory: Intact to LT throughout.  Reflexes: 2+ throughout.  Coordination: No dysmetria on FNF or on HTS bilaterally.  Gait: Normal gait, normal pivot.
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
## 2021-08-26 NOTE — ED BEHAVIORAL HEALTH ASSESSMENT NOTE - OTHER PAST PSYCHIATRIC HISTORY (INCLUDE DETAILS REGARDING ONSET, COURSE OF ILLNESS, INPATIENT/OUTPATIENT TREATMENT)
per [facility]:   Unspecified/Other Anxiety Disorder | Unspecified/Other Neurocognitve Disorders | Major Depressive Disorder | Unspecified/Other Bipolar | Alcohol related disorders | Generalized Anxiety Disorder | Tobacco  related disorder

## 2021-08-26 NOTE — ED PROVIDER NOTE - ATTENDING CONTRIBUTION TO CARE
------------ATTENDING NOTE------------   pt brought to ED by EMS after mother called concerned about pt's depression/activity, describing increased crying / hopelessness and describing vague auditory hallucinations, pt tearful on exam w/ depressed flat affect, no additional complaints, awaiting labs/imaging and close reassessments -->  - Garret Gatica MD   ---------------------------------------------- ------------ATTENDING NOTE------------   pt brought to ED by EMS after mother called concerned about pt's depression/activity, describing increased crying / hopelessness and describing vague auditory hallucinations, pt tearful on exam w/ depressed flat affect, no additional complaints, awaiting labs/imaging and close reassessments --> labs wnl, pt remained HD stable / nml VS, medically cleared, awaiting final Psych recs -- and Neuro consult per psych, no h/o seizure / abnml movements -- at ED Sign Out for disposition / treatment decision.  - Garret Gatica MD   ----------------------------------------------

## 2021-08-26 NOTE — ED BEHAVIORAL HEALTH ASSESSMENT NOTE - SUMMARY
The patient is a 35-year-old female; domiciled with mother; unemployed; hx of some college; PPHx of depression, anxiety, no prior psych admissions, hx SIB by cutting, no known hx of violence; denies drug or alcohol use; denies PMHx; BIB EMS activated by mother; psychiatry consulted for psychosis.  Pt seen ~1 month ago for similar symptoms.  Pt presents with inconsistent and vague responses, reporting anxiety, depression, poor functioning.  She is amenable to inpatient psych admission.  Pt's mother reports that pt has been paranoid about technology but she is primarily concerned about a medical condition as she feels pt appears to be having seizures.  Discussed case with ED provider to consider further medical/neuro evaluation.  Pt is NOT psychiatrically cleared for discharge at this time and will need psychiatry CL team to follow if admitted to medicine.

## 2021-08-26 NOTE — H&P ADULT - ATTENDING COMMENTS
patient who does not have capacity  the semiology of her events are not epileptic.    I rec: psychiatric evaluation and stabilization now and consider further neurological work up when stable.

## 2021-08-26 NOTE — ED ADULT NURSE NOTE - ED STAT RN HANDOFF DETAILS
pt is to be evaluated by psychiatry at [facility], pt is to be evaluated by psychiatry at [facility],. pt need urine tox and UA pt is to be evaluated by psychiatry at [facility],. pt need urine tox and UA to [facility]

## 2021-08-26 NOTE — ED BEHAVIORAL HEALTH ASSESSMENT NOTE - HPI (INCLUDE ILLNESS QUALITY, SEVERITY, DURATION, TIMING, CONTEXT, MODIFYING FACTORS, ASSOCIATED SIGNS AND SYMPTOMS)
The patient is a 35-year-old female; domiciled with mother; unemployed; hx of some college; PPHx of depression, anxiety, no prior psych admissions, hx SIB by cutting, no known hx of violence; denies drug or alcohol use; denies PMHx; BIB EMS activated by mother; psychiatry consulted for psychosis.  Pt reports having anxiety and depression "for a while."  She also reports that her mother sent her in due to "eye fluttering."  Pt complains of "a lot of confusion" and "my choices."  She states there are "certain things she doesn't know enough about" and was hoping to speak to a psychiatrist to alleviate difficulty focusing.  Pt states it is hard to get up in the morning, lies in bed throughout the day, and feels like symptoms are affecting her daily life.  She reports poor sleep, anhedonia, guilt, low energy, difficulty concentrating, decreased appetite.  Pt denies current SI/HI.  She initially denies AVH, then reports "a little, comes and goes" but describes them as her "own opinions/thoughts."  Pt reports paranoia but when asked to elaborate talks about anxiety about getting up and going outside.  Pt reports that she has been trying to take classes but it's "not working" due to confusion.  Pt reports she started therapy with someone in [place] named Arvin but is unable to provide additional details.    COVID Exposure Screen- Patient  1.	*Have you had a COVID-19 test in the last 90 days?  ( x ) Yes, reportedly negative, hx of testing positive ~1 year ago   (  ) No   (  ) Unknown- Reason: _____  2.	*Have you tested positive for COVID-19 antibodies? (  ) Yes   ( x ) No   (  ) Unknown- Reason: _____  3.	*Have you received 2 doses of the COVID-19 vaccine? ( x ) Yes, 2nd dose reportedly received ~4 months ago   (  ) No   (  ) Unknown- Reason: _____  4.	*In the past 10 days, have you been around anyone with a positive COVID-19 test?* (  ) Yes   ( x ) No   (  ) Unknown- Reason: ____  5.	*Have you been out of [place] within the past 10 days?* (  ) Yes   ( x ) No   (  ) Unknown- Reason: _____ The patient is a 35-year-old female; domiciled with mother; unemployed; hx of some college; PPHx of depression, anxiety, no prior psych admissions, hx SIB by cutting, no known hx of violence; denies drug or alcohol use; denies PMHx; BIB EMS activated by mother; psychiatry consulted for psychosis.  Pt reports having anxiety and depression "for a while."  She also reports that her mother sent her in due to "eye fluttering."  Pt complains of "a lot of confusion" and "my choices."  She states there are "certain things she doesn't know enough about" and was hoping to speak to a psychiatrist to alleviate difficulty focusing.  Pt states it is hard to get up in the morning, lies in bed throughout the day, and feels like symptoms are affecting her daily life.  She reports poor sleep, anhedonia, guilt, low energy, difficulty concentrating, decreased appetite.  Pt denies current SI/HI.  She initially denies AVH, then reports "a little, comes and goes" but describes them as her "own opinions/thoughts."  Pt reports paranoia but when asked to elaborate talks about anxiety about getting up and going outside.  Pt reports that she has been trying to take classes but it's "not working" due to confusion.  Pt reports she started therapy with someone in [place] named Arvin but is unable to provide additional details.    COVID Exposure Screen- Patient  1.	*Have you had a COVID-19 test in the last 90 days?  ( x ) Yes, reportedly negative, hx of testing positive ~1 year ago   (  ) No   (  ) Unknown- Reason: _____  2.	*Have you tested positive for COVID-19 antibodies? ( x ) Yes, pt denies but per chart review, positive Ab on 7/28/21   (  ) No   (  ) Unknown- Reason: _____  3.	*Have you received 2 doses of the COVID-19 vaccine? ( x ) Yes, 2nd dose reportedly received ~4 months ago   (  ) No   (  ) Unknown- Reason: _____  4.	*In the past 10 days, have you been around anyone with a positive COVID-19 test?* (  ) Yes   ( x ) No   (  ) Unknown- Reason: ____  5.	*Have you been out of [place] within the past 10 days?* (  ) Yes   ( x ) No   (  ) Unknown- Reason: _____

## 2021-08-26 NOTE — ED ADULT TRIAGE NOTE - BP NONINVASIVE SYSTOLIC (MM HG)
126 Breaths sounds equal and clear b/l. No increased WOB, tachypnea, hypoxia, or accessory mm use. Pt speaks in full sentences.

## 2021-08-26 NOTE — H&P ADULT - NSICDXFAMILYHX_GEN_ALL_CORE_FT
FAMILY HISTORY:  Grandparent  Still living? Unknown  Family history of epilepsy, Age at diagnosis: Age Unknown

## 2021-08-26 NOTE — ED BEHAVIORAL HEALTH ASSESSMENT NOTE - OTHER
mother normal - able to spell WORLD backwards external billing superficially cooperative paranoia; poverty of content as above unclear defer normal - immediate recall 3/3; delayed recall 3/3

## 2021-08-26 NOTE — ED PROVIDER NOTE - PHYSICAL EXAMINATION
GENERAL: no acute distress, non-toxic appearing  HEENT: normal conjunctiva, pupils 3mm bilaterally  CARD: regular rate/rhythm  PULM: no incr wob  GI: abdomen nondistended, soft, nontender  : no suprapubic tenderness  NEURO: alert and oriented x 3, moving all extremities without lateralization  MSK: no visible deformities, ambulatory with steady gait  SKIN: no visible rashes/lacs/abrasions/track marks, not diaphoretic or flushed  PSYCH: withdrawn, minimal eye contact, denies si/hi/hallucinations

## 2021-08-26 NOTE — ED BEHAVIORAL HEALTH ASSESSMENT NOTE - RISK ASSESSMENT
Low Acute Suicide Risk rf - psych dx, not engaged in outpatient psych med management, hx cutting, psychosis, unemployed    pf - supportive family, future oriented, denies past SAs, denies active SI/HI, no past admissions, female, denies access to guns/weapons

## 2021-08-26 NOTE — ED PROVIDER NOTE - PROGRESS NOTE DETAILS
Kimmie, PGY3: psych spoke with pt and got collateral from pts mother, pts mother states she's concerned that pts having seizure like activity at home with staring spells and noticed eyes fluttering and pt going limp. Psych recs for neuro to eval pt for seizures prior to any further psych eval  Spoke with neuro they will come see pt. Sonny PGY3: Spoke to neurology, Will admit to EMU under Dr. Gresham.
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
## 2021-08-26 NOTE — ED BEHAVIORAL HEALTH ASSESSMENT NOTE - PSYCHIATRIC ISSUES AND PLAN (INCLUDE STANDING AND PRN MEDICATION)
if medically admitted, psychiatry CL team to follow; consider inpatient psych admission once medically cleared if medically admitted, psychiatry CL team to follow; consider inpatient psych admission once medically cleared; for severe agitation: haldol 2.5 mg, ativan 1 mg PO/IM q8h PRN (monitor QTc and VS)

## 2021-08-27 DIAGNOSIS — F22 DELUSIONAL DISORDERS: ICD-10-CM

## 2021-08-27 DIAGNOSIS — F32.9 MAJOR DEPRESSIVE DISORDER, SINGLE EPISODE, UNSPECIFIED: ICD-10-CM

## 2021-08-27 DIAGNOSIS — F29 UNSPECIFIED PSYCHOSIS NOT DUE TO A SUBSTANCE OR KNOWN PHYSIOLOGICAL CONDITION: ICD-10-CM

## 2021-08-27 LAB
ANION GAP SERPL CALC-SCNC: 15 MMOL/L — SIGNIFICANT CHANGE UP (ref 5–17)
BUN SERPL-MCNC: 13 MG/DL — SIGNIFICANT CHANGE UP (ref 7–23)
CALCIUM SERPL-MCNC: 10.1 MG/DL — SIGNIFICANT CHANGE UP (ref 8.4–10.5)
CHLORIDE SERPL-SCNC: 102 MMOL/L — SIGNIFICANT CHANGE UP (ref 96–108)
CO2 SERPL-SCNC: 24 MMOL/L — SIGNIFICANT CHANGE UP (ref 22–31)
CREAT SERPL-MCNC: 0.81 MG/DL — SIGNIFICANT CHANGE UP (ref 0.5–1.3)
FOLATE SERPL-MCNC: 15 NG/ML — SIGNIFICANT CHANGE UP
GLUCOSE SERPL-MCNC: 94 MG/DL — SIGNIFICANT CHANGE UP (ref 70–99)
HCG SERPL-ACNC: <2 MIU/ML — SIGNIFICANT CHANGE UP
HCT VFR BLD CALC: 38.2 % — SIGNIFICANT CHANGE UP (ref 34.5–45)
HGB BLD-MCNC: 12.6 G/DL — SIGNIFICANT CHANGE UP (ref 11.5–15.5)
MAGNESIUM SERPL-MCNC: 2.3 MG/DL — SIGNIFICANT CHANGE UP (ref 1.6–2.6)
MCHC RBC-ENTMCNC: 28.8 PG — SIGNIFICANT CHANGE UP (ref 27–34)
MCHC RBC-ENTMCNC: 33 GM/DL — SIGNIFICANT CHANGE UP (ref 32–36)
MCV RBC AUTO: 87.4 FL — SIGNIFICANT CHANGE UP (ref 80–100)
NRBC # BLD: 0 /100 WBCS — SIGNIFICANT CHANGE UP (ref 0–0)
PHOSPHATE SERPL-MCNC: 3.7 MG/DL — SIGNIFICANT CHANGE UP (ref 2.5–4.5)
PLATELET # BLD AUTO: 390 K/UL — SIGNIFICANT CHANGE UP (ref 150–400)
POTASSIUM SERPL-MCNC: 4 MMOL/L — SIGNIFICANT CHANGE UP (ref 3.5–5.3)
POTASSIUM SERPL-SCNC: 4 MMOL/L — SIGNIFICANT CHANGE UP (ref 3.5–5.3)
RBC # BLD: 4.37 M/UL — SIGNIFICANT CHANGE UP (ref 3.8–5.2)
RBC # FLD: 11.9 % — SIGNIFICANT CHANGE UP (ref 10.3–14.5)
SODIUM SERPL-SCNC: 141 MMOL/L — SIGNIFICANT CHANGE UP (ref 135–145)
TSH SERPL-MCNC: 0.81 UIU/ML — SIGNIFICANT CHANGE UP (ref 0.27–4.2)
TSH SERPL-MCNC: 0.92 UIU/ML — SIGNIFICANT CHANGE UP (ref 0.27–4.2)
VIT B12 SERPL-MCNC: 778 PG/ML — SIGNIFICANT CHANGE UP (ref 232–1245)
WBC # BLD: 8.71 K/UL — SIGNIFICANT CHANGE UP (ref 3.8–10.5)
WBC # FLD AUTO: 8.71 K/UL — SIGNIFICANT CHANGE UP (ref 3.8–10.5)

## 2021-08-27 PROCEDURE — 99223 1ST HOSP IP/OBS HIGH 75: CPT

## 2021-08-27 PROCEDURE — 99233 SBSQ HOSP IP/OBS HIGH 50: CPT | Mod: GC

## 2021-08-27 RX ORDER — ALPRAZOLAM 0.25 MG
0.5 TABLET ORAL
Refills: 0 | Status: DISCONTINUED | OUTPATIENT
Start: 2021-08-27 | End: 2021-09-02

## 2021-08-27 RX ORDER — ENOXAPARIN SODIUM 100 MG/ML
40 INJECTION SUBCUTANEOUS DAILY
Refills: 0 | Status: DISCONTINUED | OUTPATIENT
Start: 2021-08-27 | End: 2021-08-30

## 2021-08-27 RX ORDER — LACOSAMIDE 50 MG/1
200 TABLET ORAL ONCE
Refills: 0 | Status: DISCONTINUED | OUTPATIENT
Start: 2021-08-27 | End: 2021-09-02

## 2021-08-27 RX ORDER — ACETAMINOPHEN 500 MG
650 TABLET ORAL EVERY 6 HOURS
Refills: 0 | Status: DISCONTINUED | OUTPATIENT
Start: 2021-08-27 | End: 2021-09-02

## 2021-08-27 RX ORDER — NITROFURANTOIN MACROCRYSTAL 50 MG
100 CAPSULE ORAL
Refills: 0 | Status: COMPLETED | OUTPATIENT
Start: 2021-08-27 | End: 2021-08-31

## 2021-08-27 RX ADMIN — Medication 0.5 MILLIGRAM(S): at 07:57

## 2021-08-27 RX ADMIN — Medication 100 MILLIGRAM(S): at 05:29

## 2021-08-27 RX ADMIN — Medication 0.5 MILLIGRAM(S): at 23:33

## 2021-08-27 RX ADMIN — ENOXAPARIN SODIUM 40 MILLIGRAM(S): 100 INJECTION SUBCUTANEOUS at 15:39

## 2021-08-27 RX ADMIN — Medication 100 MILLIGRAM(S): at 17:25

## 2021-08-27 NOTE — BH CONSULTATION LIAISON PROGRESS NOTE - NSBHFUPINTERVALHXFT_PSY_A_CORE
Patient states she has had a decline in "activities of daily living" over the last couple of months due to worsening anxiety. Patient would like to consider medication options but states her legs have also been tingling during this time. Patient noted to be thought blocking slightly during the assessment. Notes she has had some initial insomnia as well. Per primary team patient has been hesitant to obtain EEG or further neurological workup. Denies any SI/HI/AVH but has no plan or intent of self-harm.  Patient states she has had a decline in "activities of daily living" over the last couple of months due to worsening anxiety. Patient would like to consider medication options but states her legs have also been tingling during this time. Patient noted to be thought blocking slightly during the assessment. Notes she has had some initial insomnia as well. Per primary team patient has been hesitant to obtain EEG or further neurological workup. Denies any SI/HI/AVH but has no plan or intent of self-harm.     Patient gave permission to speak to her mother:  Per patient's mother patient has been concerned as patient has been experiencing periods of time where she is not responsive and seen staring off into space. Notes she has been depressed but she feels "it's more than that." Reports she has been on antidepressants in the past  (names unknown) but she is convinced something changed over the last 4 weeks. Mother speaks of patient's eyes fluttering and was unresponsive during these episodes. She had also shut off all the electronics because she was scared. Patient's mother does not think this is a psychiatric problem and does not want psychiatry to be involved and was very upset over the phone.  Patient states she has had a decline in "activities of daily living" over the last couple of months due to worsening anxiety. Patient would like to consider medication options but states her legs have also been tingling during this time. Patient noted to be thought blocking slightly during the assessment. Notes she has had some initial insomnia as well. Per primary team patient has been hesitant to obtain EEG or further neurological workup. Denies any SI/HI/AVH but has no plan or intent of self-harm.     Patient gave permission to speak to her mother:  Per patient's mother patient has been concerned as patient has been experiencing periods of time where she is not responsive and seen staring off into space. Notes she has been depressed but she feels "it's more than that." Reports she has been on antidepressants in the past  (names unknown) but she is convinced something changed over the last 4 weeks. Mother speaks of patient's eyes fluttering and was unresponsive during these episodes. She had also shut off all the electronics because she was scared. Patient's mother does not think this is a psychiatric problem and does not want psychiatry to be involved and was very upset over the phone.

## 2021-08-27 NOTE — BH CONSULTATION LIAISON PROGRESS NOTE - NSBHASSESSMENTFT_PSY_ALL_CORE
Patient is a 36yo female, domiciled with mother, unemployed, with pph of depression, anxiety, no prior psych admissions, no current outpt tx, denies past SAs or self harm, no known hx of violence, denies drug or alcohol use, and no known pmh. She is BIBEMS from home activated by mother for worsening anxiety and depression.     On exam, patient appears anxious, superficially cooperative, guarded at times, with spontaneous speech and linear process. She reports she has been stressed and anxious from her regular day to day issues. She states she isn't going out, eating, or exercising as much due to pandemic. She also notes feeling depressed, having difficulty with decision making, and trouble sleeping. Her mother is concerned because of her decline and called for EMS. She feels this was an overreaction and was reluctant to come to ER but is open to outpatient. She denies past or current SI/P/I, HI/P/I, current self harm. When asked about past SAs or self harm, patient is repeatedly vague, states she can't remember and not for many years, then later denies. Despite multiple attempts to explore any specific stressors, patient repeatedly indicates her normal daily stressors unchanged from her usual baseline. She reports taking meds in the recent past but not currently. Denies s/sx of psychosis or claudia including AVH, paranoia, delusions, elevated mood, increased energy, increased goal directed activities, decreased need for sleep, talkativeness, racing thoughts. Denies any substance abuse. She is eager for discharge and amenable to outpatient resources.      Patient is a 36yo female, domiciled with mother, unemployed, with pph of bipolar disorder, anxiety, no prior psych admissions, no current outpt tx, denies past SAs or self harm, no known hx of violence, denies drug or alcohol use, and no known pmh. She is BIBEMS from home activated by mother for worsening anxiety and depression.     On exam, patient appears anxious, superficially cooperative, guarded at times, with spontaneous speech and linear process. She reports she has been stressed and anxious from her regular day to day issues. She states she isn't going out, eating, or exercising as much due to pandemic. She also notes feeling depressed, having difficulty with decision making, and trouble sleeping. Her mother is concerned because of her decline and called for EMS. She denies past or current SI/P/I, HI/P/I, current self harm. When asked about past SAs or self harm, patient is repeatedly vague, states she can't remember and not for many years, then later denies. Despite multiple attempts to explore any specific stressors, patient repeatedly indicates her normal daily stressors unchanged from her usual baseline. She reports taking meds in the recent past but not currently.

## 2021-08-27 NOTE — BH CONSULTATION LIAISON PROGRESS NOTE - NSBHCONSULTRECOMMENDOTHER_PSY_A_CORE FT
PRN: Ativan 2mg PO/IV q6h PRN agitation/anxiety/catatonia    workup as per neuro    CL psych will f/u for further dispo
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
## 2021-08-28 PROCEDURE — 70551 MRI BRAIN STEM W/O DYE: CPT | Mod: 26

## 2021-08-28 PROCEDURE — 99233 SBSQ HOSP IP/OBS HIGH 50: CPT

## 2021-08-28 PROCEDURE — 95718 EEG PHYS/QHP 2-12 HR W/VEEG: CPT

## 2021-08-28 PROCEDURE — 99233 SBSQ HOSP IP/OBS HIGH 50: CPT | Mod: GC

## 2021-08-28 RX ORDER — ALPRAZOLAM 0.25 MG
0.5 TABLET ORAL ONCE
Refills: 0 | Status: DISCONTINUED | OUTPATIENT
Start: 2021-08-28 | End: 2021-08-28

## 2021-08-28 RX ADMIN — Medication 100 MILLIGRAM(S): at 21:02

## 2021-08-28 RX ADMIN — Medication 0.5 MILLIGRAM(S): at 21:02

## 2021-08-28 RX ADMIN — Medication 0.5 MILLIGRAM(S): at 11:11

## 2021-08-28 RX ADMIN — Medication 0.5 MILLIGRAM(S): at 03:31

## 2021-08-28 RX ADMIN — Medication 100 MILLIGRAM(S): at 05:06

## 2021-08-28 RX ADMIN — ENOXAPARIN SODIUM 40 MILLIGRAM(S): 100 INJECTION SUBCUTANEOUS at 21:02

## 2021-08-28 RX ADMIN — Medication 650 MILLIGRAM(S): at 14:17

## 2021-08-28 RX ADMIN — Medication 650 MILLIGRAM(S): at 13:47

## 2021-08-28 NOTE — BH CONSULTATION LIAISON PROGRESS NOTE - NSBHFUPINTERVALHXFT_PSY_A_CORE
Patient became amenable to video EEG last night and was started on monitoring. This morning, patient is fixated on the bandages on her head and repeatedly requesting to have them taken off. Patient intermittently speaks of needing psychiatric help for anxiety ("I feel like I'm being crushed") but also sometimes repeats, "I want to take of these bandages and refuse treatment and go home." Patient now is expressing interested in the head imaging she refused yesterday. Patient responds to most interview questions with, "Can you take my bandages off?" Patient noted to be thought blocking slightly during the assessment. Discussed with patient that we would like to admit her to inpatient psychiatry. Pt responded by staring off into distance in silence for a few minutes and then asked what was happening. Repeated explanation, pt appears mildly upset, wants to first discuss her medical workup with Neuro and call her mom. Also saying she needs a meal first and Xanax. Denies any SI/HI/AVH. Per neuro team, were planning on 24 hr video EEG, but can call techs to remove bandage and have attending read the video EEG and clear. Neuro team believes they have enough recording and currently have low suspicion for seizure activity. D/w neuro that if patient is amenable today she should get head MRI completed.

## 2021-08-28 NOTE — BH CONSULTATION LIAISON PROGRESS NOTE - NSBHCONSULTRECOMMENDOTHER_PSY_A_CORE FT
PRN: Ativan 2mg PO/IV q6h PRN agitation/anxiety/catatonia    workup as per neuro    CL psych will f/u for further dispo PRN: Ativan 2mg PO/IV q6h PRN agitation/anxiety. pt would benefit from standing antipsychotic once cleared.     workup as per neuro    CL psych will f/u for further dispo

## 2021-08-28 NOTE — BH CONSULTATION LIAISON PROGRESS NOTE - NSBHASSESSMENTFT_PSY_ALL_CORE
Patient is a 36yo female, domiciled with mother, unemployed, with PHH of bipolar disorder, anxiety, no prior psych admissions, no current outpt tx, denies past SAs or self harm, no known hx of violence, denies drug or alcohol use, and no known pmh. She is BIBEMS from home activated by mother for worsening anxiety and depression. On exam, patient appears anxious, superficially cooperative, guarded at times, with spontaneous speech and linear process. She reports she has been stressed and anxious from her regular day to day issues. She states she isn't going out, eating, or exercising as much due to pandemic. She also notes feeling depressed, having difficulty with decision making, and trouble sleeping. Her mother is concerned because of her decline and called for EMS. She denies past or current SI/P/I, HI/P/I, current self harm. When asked about past SAs or self harm, patient is repeatedly vague, states she can't remember and not for many years, then later denies. Despite multiple attempts to explore any specific stressors, patient repeatedly indicates her normal daily stressors unchanged from her usual baseline. She reports taking meds in the recent past but not currently. Today, pt is anxious and perseverating on video EEG bandages being too tight, starting to unwrap them during interview. Patient appears anxious and is requesting more Xanax. She is unable to express consistent wishes about her care, at times saying she needs psychiatric help, at times saying she needs further medical workup, at times refusing all workup and asking to go home. Denies any SI/HI/AVH. Per neuro team, were planning on 24 hr video EEG, but can call techs to remove bandage and have attending read the video EEG and clear. Neuro team believes they have enough recording and currently have low suspicion for seizure activity. D/w neuro team that pt should have MRI today if amenable to facilitate medical clearance for psych admission. Patient is unable to care for self at home and will benefit from psych admission after she is medically cleared.

## 2021-08-28 NOTE — BH CONSULTATION LIAISON PROGRESS NOTE - NSBHMSETHTPROC_PSY_A_CORE
Perseverative/Thought blocking/Impaired reasoning/Other Disorganized/Perseverative/Thought blocking/Impaired reasoning/Other

## 2021-08-28 NOTE — EEG REPORT - NS EEG TEXT BOX
EPILEPSY MONITORING UNIT REPORT   [facility]: [address], [place] [address], Ph#: [phone] [facility]: [phone] [place] [address], [place] [address], Ph#: [phone] Office: [address] [place] [address] Ph#: [phone]  Patient Name: Blanquita Delgado   Age: 35 year, : 1985 MRN #: MRN [id number], Starr: EMU 460A EMU 460A Referring Physician:     ER admit           Study Information:  EEG Recording Technique: The patient underwent continuous Video-EEG monitoring, using Telemetry System hardware on the XLTek Digital System. EEG and video data were stored on a computer hard drive with important events saved in digital archive files. The material was reviewed by a physician (electroencephalographer / epileptologist) on a daily basis. Artie and seizure detection algorithms were utilized and reviewed. An EEG Technician attended to the patient, and was available throughout daytime work hours.  The epilepsy center neurologist was available in person or on call 24-hours per day.  EEG Placement and Labeling of Electrodes: The EEG was performed utilizing 20 channel referential EEG connections (coronal over temporal over parasagittal montage) using all standard 10-20 electrode placements with EKG, with additional electrodes placed in the inferior temporal region using the modified 10-10 montage electrode placements for elective admissions, or if deemed necessary. Recording was at a sampling rate of 256 samples per second per channel. Time synchronized digital video recording was done simultaneously with EEG recording. A low light infrared camera was used for low light recording.   History: 34 yo F hx bipolar d/o p/w episodes of eye fluttering, non-responsiveness, auditory hallucinations.  Home Antiepileptic Medication and Device none  Interpretation:  Day 1 – 	Start: 	2021  03:51   	End: 	2021  08:00 	Duration: 3 hr  40 min  Daily EEG Visual Analysis  FINDINGS:  The background was continuous, spontaneously variable and reactive. During wakefulness, the posterior dominant rhythm consisted of symmetric, well-modulated 10 Hz activity, with amplitude to 30 uV, that attenuated to eye opening.  Low amplitude frontal beta was noted in wakefulness.  Background Slowing: No generalized background slowing was present.  Focal Slowing:  None were present.  Sleep Background: Drowsiness was characterized by fragmentation, attenuation, and slowing of the background activity.   Sleep was characterized by the presence of vertex waves, symmetric sleep spindles and K-complexes.  Other Non-Epileptiform Findings: Diffuse excess beta activity.  Activation Procedures:  Hyperventilation was not performed.   Photic stimulation was not performed.  Interictal Epileptiform Activity:  None were present.  Events: No events or seizures recorded.  Artifacts: Intermittent myogenic and movement artifacts were noted.  ECG: The heart rate on single channel ECG was predominantly between 80-90BPM.  AEDs:  none  EEG Summary:  Normal EEG in the awake, drowsy and asleep states.  Impression/Clinical Correlate:  This is a normal EEG record. No epileptiform pattern or seizures were seen. Excessive beta activity is typically associated with use of sedative medications.   Deven Hansen MD Fellow, [facility]    Carlyle Lowe MD, PhD Director, Epilepsy Division, [facility]   ------------------------------------ EEG Reading Room: [phone] On Call Service After Hours: [phone]       EPILEPSY MONITORING UNIT REPORT   [facility]: [address], [place] [address], Ph#: [phone] [facility]: [phone] [place] [address], [place] [address], Ph#: [phone] Office: [address] [place] [address] Ph#: [phone]  Patient Name: Blanquita Delgado   Age: 35 year, : 1985 MRN #: [id number] [id number], Starr: EMU 460A EMU 460A Referring Physician:     ER admit           Study Information:  EEG Recording Technique: The patient underwent continuous Video-EEG monitoring, using Telemetry System hardware on the XLTek Digital System. EEG and video data were stored on a computer hard drive with important events saved in digital archive files. The material was reviewed by a physician (electroencephalographer / epileptologist) on a daily basis. Artie and seizure detection algorithms were utilized and reviewed. An EEG Technician attended to the patient, and was available throughout daytime work hours.  The epilepsy center neurologist was available in person or on call 24-hours per day.  EEG Placement and Labeling of Electrodes: The EEG was performed utilizing 20 channel referential EEG connections (coronal over temporal over parasagittal montage) using all standard 10-20 electrode placements with EKG, with additional electrodes placed in the inferior temporal region using the modified 10-10 montage electrode placements for elective admissions, or if deemed necessary. Recording was at a sampling rate of 256 samples per second per channel. Time synchronized digital video recording was done simultaneously with EEG recording. A low light infrared camera was used for low light recording.   History: 36 yo F hx bipolar d/o p/w episodes of eye fluttering, non-responsiveness, auditory hallucinations.  Home Antiepileptic Medication and Device none  Interpretation:  Day 1 – 	Start: 	2021  03:51   	End: 	2021  10:27 	Duration: 6 hr  7 min  Daily EEG Visual Analysis  FINDINGS:  The background was continuous, spontaneously variable and reactive. During wakefulness, the posterior dominant rhythm consisted of symmetric, well-modulated 10 Hz activity, with amplitude to 30 uV, that attenuated to eye opening.  Low amplitude frontal beta was noted in wakefulness.  Background Slowing: No generalized background slowing was present.  Focal Slowing:  None were present.  Sleep Background: Drowsiness was characterized by fragmentation, attenuation, and slowing of the background activity.   Sleep was characterized by the presence of vertex waves, symmetric sleep spindles and K-complexes.  Other Non-Epileptiform Findings: Diffuse excess beta activity.  Activation Procedures:  Hyperventilation was not performed.   Photic stimulation was not performed.  Interictal Epileptiform Activity:  None were present.  Events: No events or seizures recorded.  Artifacts: Intermittent myogenic and movement artifacts were noted.  ECG: The heart rate on single channel ECG was predominantly between 80-90BPM.  AEDs:  none  EEG Summary:  Normal EEG in the awake, drowsy and asleep states.  Impression/Clinical Correlate:  This is a normal EEG record. No epileptiform pattern or seizures were seen. Excessive beta activity is typically associated with use of sedative medications.   Deven Hansen MD Fellow, [facility]    Carlyle Lowe MD, PhD Director, Epilepsy Division, [facility]   ------------------------------------ EEG Reading Room: [phone] On Call Service After Hours: [phone]

## 2021-08-28 NOTE — PROGRESS NOTE ADULT - SUBJECTIVE AND OBJECTIVE BOX
Interval History:  Patient seen and examined at the bedside. has not had feelings like this before. States shes here for depression. Refusing LP. Wants mother present for these decisions. Reports confusion, feeling a certain way then not. Reports it started 1 month ago.     MEDICATIONS  (STANDING):  enoxaparin Injectable 40 milliGRAM(s) SubCutaneous daily  nitrofurantoin monohydrate/macrocrystals (MACROBID) 100 milliGRAM(s) Oral two times a day    MEDICATIONS  (PRN):  acetaminophen   Tablet .. 650 milliGRAM(s) Oral every 6 hours PRN Temp greater or equal to 38C (100.4F), Mild Pain (1 - 3), Moderate Pain (4 - 6), Severe Pain (7 - 10)  ALPRAZolam 0.5 milliGRAM(s) Oral two times a day PRN Anxiety  lacosamide Injectable 200 milliGRAM(s) IV Push once PRN Seizure  LORazepam   Injectable 2 milliGRAM(s) IV Push once PRN Seizure    Allergies  No Known Allergies    Intolerances      ROS: Pertinent positives in HPI, all other ROS were reviewed and are negative.      Vital Signs Last 24 Hrs  T(C): 36.7 (28 Aug 2021 04:54), Max: 36.9 (27 Aug 2021 13:26)  T(F): 98 (28 Aug 2021 04:54), Max: 98.5 (27 Aug 2021 13:26)  HR: 68 (28 Aug 2021 04:54) (68 - 86)  BP: 111/72 (28 Aug 2021 04:54) (106/70 - 133/84)  BP(mean): --  RR: 18 (28 Aug 2021 04:54) (18 - 18)  SpO2: 98% (28 Aug 2021 04:54) (98% - 99%)    NEUROLOGICAL EXAM:  MS: AAOX3, fluent, attends b/l; normal attention, language and fund of knowledge.   CN: VFF, EOMI, PERRL, no LINDA, no APD,  V1-3 intact, no facial asymmetry,  Motor: Strength: antigravity 4x.  Sensation: Grossly intact   Gait: Deferred while on EEG      Labs:   cbc                      12.6   8.71  )-----------( 390      ( 27 Aug 2021 06:34 )             38.2     [id number]    141  |  102  |  13  ----------------------------<  94  4.0   |  24  |  0.81    Ca    10.1      27 Aug 2021 06:33  Phos  3.7       Mg     2.3         TPro  7.9  /  Alb  4.9  /  TBili  0.8  /  DBili  x   /  AST  14  /  ALT  10  /  AlkPhos  60      Coags  Lipids  A1C  CardiacMarkers    LFTsLIVER FUNCTIONS - ( 26 Aug 2021 16:51 )  Alb: 4.9 g/dL / Pro: 7.9 g/dL / ALK PHOS: 60 U/L / ALT: 10 U/L / AST: 14 U/L / GGT: x           UAUrinalysis Basic - ( 26 Aug 2021 19:26 )    Color: Yellow / Appearance: Slightly Turbid / S.027 / pH: x  Gluc: x / Ketone: Negative  / Bili: Negative / Urobili: Negative   Blood: x / Protein: 30 mg/dL / Nitrite: Negative   Leuk Esterase: Small / RBC: 12 /hpf / WBC 9 /HPF   Sq Epi: x / Non Sq Epi: 27 /hpf / Bacteria: Few

## 2021-08-28 NOTE — PROGRESS NOTE ADULT - ASSESSMENT
Assessment: 36 yo female with a PMHx of bipolar disorder (not taking medication) presents to the ED for psychiatric evaluation. Patient states she came to the ED for anxiety, depression, and confusion. She also states she is hearing things in her head that others cannot hear. Mother is concerned that the patient may be having seizures. She states she witnessed two events over the past two days. During these events the patient's eyes were "fluttering," which she further describes as eyelids closed but moving. The patient did not move or speak for at least 3-6 hours each time. The patient states she remembers both events, and states she was intensely frustrated during those times. Patient was evaluated by psychiatry in the ED, with consideration for inpatient psych admission once medically cleared. Admit to EMU for possible event capture and characterization. EEG overnight for 4hrs normal, will dc as patient unable to tolerate. Patient refusing LP. Psych recommending inpatient psych admission and per SW discussion, patient and mother seem amenable, likely Monday. Will attempt to r/o organic etiology w/ head imaging.     Plan:  [] MRI brain without contrast - scheduled for 3pm, patient agreeable   [] Continue 1:1.  [] vEEG normal in the 4 hrs attached. Can d/c  [] No AEDs for now.  [] Seizure/Fall/Aspiration precautions.  [] Psychiatry stating patient candidate for inpatient psych admission. Would like negative EEG report and MRI. [facility] does not accept admitted patients over the weekend, thus likely admission on Monday  [] c/w home dose Xanax PRN.  [] Continue Macrobid 100MG PO BID x5 days (end 9/1/21)  [x] UDS: +benzodiazepines.  [] DVT PPx: Lovenox 40MG SC QD.  [] Diet: Regular.  [] Seizure Rescue: Ativan 1MG IVP x1 for convulsions/GTC lasting > 3 minutes. Vimpat 200MG IV x1 for convulsions/GTC refractory to Ativan. Assessment: 36 yo female with a PMHx of bipolar disorder (not taking medication) presents to the ED for psychiatric evaluation. Patient states she came to the ED for anxiety, depression, and confusion. She also states she is hearing things in her head that others cannot hear. Mother is concerned that the patient may be having seizures. She states she witnessed two events over the past two days. During these events the patient's eyes were "fluttering," which she further describes as eyelids closed but moving. The patient did not move or speak for at least 3-6 hours each time. The patient states she remembers both events, and states she was intensely frustrated during those times. Patient was evaluated by psychiatry in the ED, with consideration for inpatient psych admission once medically cleared. Admit to EMU for possible event capture and characterization. EEG overnight for 4hrs normal, will dc as patient unable to tolerate. Patient refusing LP. Psych recommending inpatient psych admission and per SW discussion, patient and mother seem amenable, likely Monday. Will attempt to r/o organic etiology w/ head imaging. On discussion with mother at bedside it seems patient was functional and had 2 prior jobs at which she was able to perform well. Her last job is when she started having issues with confusion and difficulty focusing suggesting a more acute decline in the behavioral status.     Plan:  [] MRI brain without contrast - scheduled for 3pm, patient agreeable   [] LP - under anesthesia (Patient and mother refusing bedside) - Will need to be coordinated on monday  [] Transvaginal US to r/o teratoma   [] Autoimmune encephalitis panel in blood   [] Continue 1:1.  [] vEEG normal in the 4 hrs attached. Can d/c  [] No AEDs for now.  [] Seizure/Fall/Aspiration precautions.  [] Psychiatry stating patient candidate for inpatient psych admission. Would like negative EEG report and MRI. [facility] does not accept admitted patients over the weekend, thus likely admission on Monday  [] c/w home dose Xanax PRN.  [] Continue Macrobid 100MG PO BID x5 days (end 9/1/21)  [x] UDS: +benzodiazepines.  [] DVT PPx: Lovenox 40MG SC QD.  [] Diet: Regular.  [] Seizure Rescue: Ativan 1MG IVP x1 for convulsions/GTC lasting > 3 minutes. Vimpat 200MG IV x1 for convulsions/GTC refractory to Ativan.

## 2021-08-29 LAB
ANION GAP SERPL CALC-SCNC: 14 MMOL/L — SIGNIFICANT CHANGE UP (ref 5–17)
BUN SERPL-MCNC: 14 MG/DL — SIGNIFICANT CHANGE UP (ref 7–23)
CALCIUM SERPL-MCNC: 10 MG/DL — SIGNIFICANT CHANGE UP (ref 8.4–10.5)
CHLORIDE SERPL-SCNC: 104 MMOL/L — SIGNIFICANT CHANGE UP (ref 96–108)
CO2 SERPL-SCNC: 25 MMOL/L — SIGNIFICANT CHANGE UP (ref 22–31)
CREAT SERPL-MCNC: 0.91 MG/DL — SIGNIFICANT CHANGE UP (ref 0.5–1.3)
GLUCOSE SERPL-MCNC: 95 MG/DL — SIGNIFICANT CHANGE UP (ref 70–99)
HCT VFR BLD CALC: 39.9 % — SIGNIFICANT CHANGE UP (ref 34.5–45)
HGB BLD-MCNC: 13.2 G/DL — SIGNIFICANT CHANGE UP (ref 11.5–15.5)
MAGNESIUM SERPL-MCNC: 2.2 MG/DL — SIGNIFICANT CHANGE UP (ref 1.6–2.6)
MCHC RBC-ENTMCNC: 28.6 PG — SIGNIFICANT CHANGE UP (ref 27–34)
MCHC RBC-ENTMCNC: 33.1 GM/DL — SIGNIFICANT CHANGE UP (ref 32–36)
MCV RBC AUTO: 86.6 FL — SIGNIFICANT CHANGE UP (ref 80–100)
NRBC # BLD: 0 /100 WBCS — SIGNIFICANT CHANGE UP (ref 0–0)
PHOSPHATE SERPL-MCNC: 3.8 MG/DL — SIGNIFICANT CHANGE UP (ref 2.5–4.5)
PLATELET # BLD AUTO: 337 K/UL — SIGNIFICANT CHANGE UP (ref 150–400)
POTASSIUM SERPL-MCNC: 3.9 MMOL/L — SIGNIFICANT CHANGE UP (ref 3.5–5.3)
POTASSIUM SERPL-SCNC: 3.9 MMOL/L — SIGNIFICANT CHANGE UP (ref 3.5–5.3)
RBC # BLD: 4.61 M/UL — SIGNIFICANT CHANGE UP (ref 3.8–5.2)
RBC # FLD: 11.8 % — SIGNIFICANT CHANGE UP (ref 10.3–14.5)
SODIUM SERPL-SCNC: 143 MMOL/L — SIGNIFICANT CHANGE UP (ref 135–145)
WBC # BLD: 7.44 K/UL — SIGNIFICANT CHANGE UP (ref 3.8–10.5)
WBC # FLD AUTO: 7.44 K/UL — SIGNIFICANT CHANGE UP (ref 3.8–10.5)

## 2021-08-29 PROCEDURE — 99233 SBSQ HOSP IP/OBS HIGH 50: CPT | Mod: GC

## 2021-08-29 PROCEDURE — 99231 SBSQ HOSP IP/OBS SF/LOW 25: CPT

## 2021-08-29 RX ORDER — HYDROCORTISONE 1 %
1 OINTMENT (GRAM) TOPICAL
Refills: 0 | Status: DISCONTINUED | OUTPATIENT
Start: 2021-08-29 | End: 2021-09-02

## 2021-08-29 RX ADMIN — ENOXAPARIN SODIUM 40 MILLIGRAM(S): 100 INJECTION SUBCUTANEOUS at 11:43

## 2021-08-29 RX ADMIN — Medication 100 MILLIGRAM(S): at 06:10

## 2021-08-29 RX ADMIN — Medication 0.5 MILLIGRAM(S): at 16:58

## 2021-08-29 RX ADMIN — Medication 0.5 MILLIGRAM(S): at 22:52

## 2021-08-29 RX ADMIN — Medication 650 MILLIGRAM(S): at 21:18

## 2021-08-29 RX ADMIN — Medication 1 APPLICATION(S): at 17:21

## 2021-08-29 RX ADMIN — Medication 100 MILLIGRAM(S): at 17:21

## 2021-08-29 NOTE — BH CONSULTATION LIAISON PROGRESS NOTE - NSBHFUPINTERVALHXFT_PSY_A_CORE
MRI of head done - normal study. EEG completed - read as normal study. Transvaginal ultrasound ordered and pending. Patient has remained calm and did ot need any STAT IM/IVPs for agitation; uses PRN Xanax for anxiety.  MRI of head done - normal study. EEG completed - read as normal study. Transvaginal ultrasound ordered and pending. Patient has remained calm and did ot need any STAT IM/IVPs for agitation; uses PRN Xanax for anxiety. EXAM: lying in bed, awake, + latency of speech, +processing speed delay; staring ahead at times when asked questions taking some time to answer. Patient did not eat any breakfast, reports low appetite and that her body feels heavy and she needs to lie down. Later on asked if she could get up and walk around. Patient at the end states "I want to refuse medical care" because "it is not working." Not able to verbalize a logical thought process / weigh risks v benefits or articulate her medical issues and current treatment. NOTE: staff reported that Pt's mother was here very early at 8am and was disruptive on the Unit. Spent most time at the nurses station, behaved in an irritable manner with high emotional expressivity, and focused on non-care issues like where Patient's purse was.

## 2021-08-29 NOTE — PROGRESS NOTE ADULT - ATTENDING COMMENTS
patient does not have capacity, semiology of described events are not suggestive of epilepsy. However, history concerning for subacute decline - now significant confusion over last month.  Reasonable to r/o autoimmune encephalitis and then psychiatric stabilization pending return of [place]ara panels (if initial CSF study are unrevealing)    plan  1. transvaginal US  2. LP under sedation for [place] autoimmune encephalitis panel on CSF  3. [place] AB panel on serum should be sent.  4. off EEG

## 2021-08-29 NOTE — BH CONSULTATION LIAISON PROGRESS NOTE - NSBHCONSULTRECOMMENDOTHER_PSY_A_CORE FT
PRN: Ativan 2mg PO q6hrs PRN for anxiety; Ativan 2mg IVP q6hrs PRN for agitation. Pt would benefit from standing antipsychotic once cleared.   - trazodone 25mg PO qhs PRN for sleep  PRN: Ativan 2mg PO q6hrs PRN for anxiety; Ativan 2mg IVP q6hrs PRN for agitation. Pt would benefit from standing antipsychotic once cleared.   - trazodone 25mg PO qhs PRN for sleep   Patient at this time has NO capacity to refuse necessary medical treatment; CANNOT leave AMA

## 2021-08-29 NOTE — BH CONSULTATION LIAISON PROGRESS NOTE - NSBHASSESSMENTFT_PSY_ALL_CORE
Patient is a 34yo female, domiciled with mother, unemployed, with PHH of bipolar disorder, anxiety, no prior psych admissions, no current outpt tx, denies past SAs or self harm, no known hx of violence, denies drug or alcohol use, and no known pmh. She is BIBEMS from home activated by mother for worsening anxiety and depression. On exam, patient appears anxious, superficially cooperative, guarded at times, with spontaneous speech and linear process. She reports she has been stressed and anxious from her regular day to day issues. She states she isn't going out, eating, or exercising as much due to pandemic. She also notes feeling depressed, having difficulty with decision making, and trouble sleeping. Her mother is concerned because of her decline and called for EMS. She denies past or current SI/P/I, HI/P/I, current self harm. When asked about past SAs or self harm, patient is repeatedly vague, states she can't remember and not for many years, then later denies. Despite multiple attempts to explore any specific stressors, patient repeatedly indicates her normal daily stressors unchanged from her usual baseline. She reports taking meds in the recent past but not currently. Today, pt is anxious and perseverating on video EEG bandages being too tight, starting to unwrap them during interview. Patient appears anxious and is requesting more Xanax. She is unable to express consistent wishes about her care, at times saying she needs psychiatric help, at times saying she needs further medical workup, at times refusing all workup and asking to go home. Denies any SI/HI/AVH. MRI of head and EEG both normal studied. Patient at this time is not is unable to care for self at home and will benefit from psych admission after she is medically cleared.

## 2021-08-29 NOTE — PROGRESS NOTE ADULT - ASSESSMENT
Assessment: 36 yo female with a PMHx of bipolar disorder (not taking medication) presents to the ED for psychiatric evaluation. Patient states she came to the ED for anxiety, depression, and confusion. She also states she is hearing things in her head that others cannot hear. Mother is concerned that the patient may be having seizures. She states she witnessed two events over the past two days. During these events the patient's eyes were "fluttering," which she further describes as eyelids closed but moving. The patient did not move or speak for at least 3-6 hours each time. The patient states she remembers both events, and states she was intensely frustrated during those times. Patient was evaluated by psychiatry in the ED, with consideration for inpatient psych admission once medically cleared. Admit to EMU for possible event capture and characterization. EEG overnight for 4hrs normal, will dc as patient unable to tolerate. Patient refusing LP. Psych recommending inpatient psych admission and per SW discussion, patient and mother seem amenable, likely Monday. Will attempt to r/o organic etiology w/ head imaging. On discussion with mother at bedside it seems patient was functional and had 2 prior jobs at which she was able to perform well. Her last job is when she started having issues with confusion and difficulty focusing suggesting a more acute decline in the behavioral status.     Plan:  [] MRI brain without contrast - read pending  [] LP - under anesthesia (Patient and mother refusing bedside) - Will need to be coordinated on monday  [] Transvaginal US to r/o teratoma   [] Autoimmune encephalitis panel in blood   [] Continue 1:1.  [] vEEG normal in the 4 hrs attached. Can d/c  [] No AEDs for now.  [] Seizure/Fall/Aspiration precautions.  [] Psychiatry stating patient candidate for inpatient psych admission. Would like negative EEG report and MRI. [facility] does not accept admitted patients over the weekend, thus likely admission on Monday  [] c/w home dose Xanax PRN.  [] Continue Macrobid 100MG PO BID x5 days (end 9/1/21)  [x] UDS: +benzodiazepines.  [] DVT PPx: Lovenox 40MG SC QD.  [] Diet: Regular.  [] Seizure Rescue: Ativan 1MG IVP x1 for convulsions/GTC lasting > 3 minutes. Vimpat 200MG IV x1 for convulsions/GTC refractory to Ativan. Assessment: 36 yo female with a PMHx of bipolar disorder (not taking medication) presents to the ED for psychiatric evaluation. Patient states she came to the ED for anxiety, depression, and confusion. She also states she is hearing things in her head that others cannot hear. Mother is concerned that the patient may be having seizures. She states she witnessed two events over the past two days. During these events the patient's eyes were "fluttering," which she further describes as eyelids closed but moving. The patient did not move or speak for at least 3-6 hours each time. The patient states she remembers both events, and states she was intensely frustrated during those times. Patient was evaluated by psychiatry in the ED, with consideration for inpatient psych admission once medically cleared. Admit to EMU for possible event capture and characterization. EEG overnight for 4hrs normal, will dc as patient unable to tolerate. Patient refusing LP. Psych recommending inpatient psych admission and per SW discussion, patient and mother seem amenable, likely Monday. Will attempt to r/o organic etiology w/ head imaging. On discussion with mother at bedside it seems patient was functional and had 2 prior jobs at which she was able to perform well. Her last job is when she started having issues with confusion and difficulty focusing suggesting a more acute decline in the behavioral status.     Plan:  [] MRI brain without contrast - neg  [] LP - under anesthesia (Patient and mother refusing bedside) - Will need to be coordinated on monday. Patient refusing all treatment. Mother refusing to let patient go home.  [] Transvaginal US to r/o teratoma   [] Autoimmune encephalitis panel in blood   [] Continue 1:1.  [] vEEG normal in the 4 hrs attached. Can d/c  [] No AEDs for now.  [] Seizure/Fall/Aspiration precautions.  [] Psychiatry stating patient candidate for inpatient psych admission. Would like negative EEG report and MRI. [facility] does not accept admitted patients over the weekend, thus likely admission on Monday. Psych stating patient has no capacity and thus unable to leave AMA.   [] c/w home dose Xanax PRN.  [] Continue Macrobid 100MG PO BID x5 days (end 9/1/21)  [x] UDS: +benzodiazepines.  [] DVT PPx: Lovenox 40MG SC QD.  [] Diet: Regular.  [] Seizure Rescue: Ativan 1MG IVP x1 for convulsions/GTC lasting > 3 minutes. Vimpat 200MG IV x1 for convulsions/GTC refractory to Ativan.

## 2021-08-29 NOTE — PROGRESS NOTE ADULT - SUBJECTIVE AND OBJECTIVE BOX
Interval History:  Patient seen and examined at the bedside. No acute events overnight.     MEDICATIONS  (STANDING):  enoxaparin Injectable 40 milliGRAM(s) SubCutaneous daily  nitrofurantoin monohydrate/macrocrystals (MACROBID) 100 milliGRAM(s) Oral two times a day    MEDICATIONS  (PRN):  acetaminophen   Tablet .. 650 milliGRAM(s) Oral every 6 hours PRN Temp greater or equal to 38C (100.4F), Mild Pain (1 - 3), Moderate Pain (4 - 6), Severe Pain (7 - 10)  ALPRAZolam 0.5 milliGRAM(s) Oral two times a day PRN Anxiety  lacosamide Injectable 200 milliGRAM(s) IV Push once PRN Seizure  LORazepam   Injectable 2 milliGRAM(s) IV Push once PRN Seizure    Allergies  No Known Allergies    Intolerances      ROS: Pertinent positives in HPI, all other ROS were reviewed and are negative.      Vital Signs Last 24 Hrs  T(C): 36.8 (29 Aug 2021 04:23), Max: 37 (28 Aug 2021 15:45)  T(F): 98.2 (29 Aug 2021 04:23), Max: 98.6 (28 Aug 2021 15:45)  HR: 81 (29 Aug 2021 04:23) (77 - 82)  BP: 122/85 (29 Aug 2021 04:23) (105/70 - 122/85)  BP(mean): --  RR: 18 (29 Aug 2021 04:23) (18 - 18)  SpO2: 98% (29 Aug 2021 04:23) (98% - 98%)    NEUROLOGICAL EXAM:  MS: AAOX3, fluent, attends b/l; normal attention, language and fund of knowledge.   CN: VFF, EOMI, PERRL, no facial asymmetry,  Motor: Strength: antigravity 4x.  Sensation: Grossly intact   Gait: Deferred while on EEG      Labs:                         13.2   7.44  )-----------( 337      ( 29 Aug 2021 06:04 )             39.9       [id number] 08-29    143  |  104  |  14  ----------------------------<  95  3.9   |  25  |  0.91    Ca    10.0      29 Aug 2021 06:04  Phos  3.8     08-29  Mg     2.2     08-29       Interval History:  Patient seen and examined at the bedside. No acute events overnight. Patient refusing all treatment today and insisting to go home. Mother refusing to let patient go home.     MEDICATIONS  (STANDING):  enoxaparin Injectable 40 milliGRAM(s) SubCutaneous daily  nitrofurantoin monohydrate/macrocrystals (MACROBID) 100 milliGRAM(s) Oral two times a day    MEDICATIONS  (PRN):  acetaminophen   Tablet .. 650 milliGRAM(s) Oral every 6 hours PRN Temp greater or equal to 38C (100.4F), Mild Pain (1 - 3), Moderate Pain (4 - 6), Severe Pain (7 - 10)  ALPRAZolam 0.5 milliGRAM(s) Oral two times a day PRN Anxiety  lacosamide Injectable 200 milliGRAM(s) IV Push once PRN Seizure  LORazepam   Injectable 2 milliGRAM(s) IV Push once PRN Seizure    Allergies  No Known Allergies    Intolerances      ROS: Pertinent positives in HPI, all other ROS were reviewed and are negative.      Vital Signs Last 24 Hrs  T(C): 36.8 (29 Aug 2021 04:23), Max: 37 (28 Aug 2021 15:45)  T(F): 98.2 (29 Aug 2021 04:23), Max: 98.6 (28 Aug 2021 15:45)  HR: 81 (29 Aug 2021 04:23) (77 - 82)  BP: 122/85 (29 Aug 2021 04:23) (105/70 - 122/85)  BP(mean): --  RR: 18 (29 Aug 2021 04:23) (18 - 18)  SpO2: 98% (29 Aug 2021 04:23) (98% - 98%)    NEUROLOGICAL EXAM:  MS: AAOX3, fluent, attends b/l; normal attention, language  CN: VFF, EOMI, PERRL, no facial asymmetry,  Motor: Strength: antigravity 4x.  Sensation: Grossly intact   Gait: Deferred       Labs:                         13.2   7.44  )-----------( 337      ( 29 Aug 2021 06:04 )             39.9       [id number]    08-29    143  |  104  |  14  ----------------------------<  95  3.9   |  25  |  0.91    Ca    10.0      29 Aug 2021 06:04  Phos  3.8     08-29  Mg     2.2     08-29    MRI brain w/ w/o: Normal study

## 2021-08-29 NOTE — BH CONSULTATION LIAISON PROGRESS NOTE - NSBHMSETHTPROC_PSY_A_CORE
Disorganized/Perseverative/Thought blocking/Impaired reasoning/Other Perseverative/Impaired reasoning/Other

## 2021-08-29 NOTE — BH CONSULTATION LIAISON PROGRESS NOTE - OTHER
Responds to most questions with statements about EEG head wrapping low reactivity  maintains with slight effort  limited  low end of fair  deferred at this time  "I feel heavy"  solemn superficially cooperative  concrete

## 2021-08-30 LAB
ALBUMIN SERPL ELPH-MCNC: 4.4 G/DL — SIGNIFICANT CHANGE UP (ref 3.3–5)
ALP SERPL-CCNC: 58 U/L — SIGNIFICANT CHANGE UP (ref 40–120)
ALT FLD-CCNC: 8 U/L — LOW (ref 10–45)
ANION GAP SERPL CALC-SCNC: 11 MMOL/L — SIGNIFICANT CHANGE UP (ref 5–17)
APTT BLD: 38.3 SEC — HIGH (ref 27.5–35.5)
AST SERPL-CCNC: 12 U/L — SIGNIFICANT CHANGE UP (ref 10–40)
BASOPHILS # BLD AUTO: 0.05 K/UL — SIGNIFICANT CHANGE UP (ref 0–0.2)
BASOPHILS NFR BLD AUTO: 0.8 % — SIGNIFICANT CHANGE UP (ref 0–2)
BILIRUB SERPL-MCNC: 0.6 MG/DL — SIGNIFICANT CHANGE UP (ref 0.2–1.2)
BUN SERPL-MCNC: 13 MG/DL — SIGNIFICANT CHANGE UP (ref 7–23)
CALCIUM SERPL-MCNC: 9.5 MG/DL — SIGNIFICANT CHANGE UP (ref 8.4–10.5)
CHLORIDE SERPL-SCNC: 102 MMOL/L — SIGNIFICANT CHANGE UP (ref 96–108)
CO2 SERPL-SCNC: 26 MMOL/L — SIGNIFICANT CHANGE UP (ref 22–31)
CREAT SERPL-MCNC: 0.87 MG/DL — SIGNIFICANT CHANGE UP (ref 0.5–1.3)
EOSINOPHIL # BLD AUTO: 0.18 K/UL — SIGNIFICANT CHANGE UP (ref 0–0.5)
EOSINOPHIL NFR BLD AUTO: 2.8 % — SIGNIFICANT CHANGE UP (ref 0–6)
GLUCOSE SERPL-MCNC: 91 MG/DL — SIGNIFICANT CHANGE UP (ref 70–99)
HCT VFR BLD CALC: 37.8 % — SIGNIFICANT CHANGE UP (ref 34.5–45)
HGB BLD-MCNC: 12.6 G/DL — SIGNIFICANT CHANGE UP (ref 11.5–15.5)
IMM GRANULOCYTES NFR BLD AUTO: 0.3 % — SIGNIFICANT CHANGE UP (ref 0–1.5)
INR BLD: 1.05 RATIO — SIGNIFICANT CHANGE UP (ref 0.88–1.16)
LYMPHOCYTES # BLD AUTO: 2.68 K/UL — SIGNIFICANT CHANGE UP (ref 1–3.3)
LYMPHOCYTES # BLD AUTO: 41.2 % — SIGNIFICANT CHANGE UP (ref 13–44)
MCHC RBC-ENTMCNC: 28.9 PG — SIGNIFICANT CHANGE UP (ref 27–34)
MCHC RBC-ENTMCNC: 33.3 GM/DL — SIGNIFICANT CHANGE UP (ref 32–36)
MCV RBC AUTO: 86.7 FL — SIGNIFICANT CHANGE UP (ref 80–100)
MONOCYTES # BLD AUTO: 0.59 K/UL — SIGNIFICANT CHANGE UP (ref 0–0.9)
MONOCYTES NFR BLD AUTO: 9.1 % — SIGNIFICANT CHANGE UP (ref 2–14)
NEUTROPHILS # BLD AUTO: 2.98 K/UL — SIGNIFICANT CHANGE UP (ref 1.8–7.4)
NEUTROPHILS NFR BLD AUTO: 45.8 % — SIGNIFICANT CHANGE UP (ref 43–77)
NRBC # BLD: 0 /100 WBCS — SIGNIFICANT CHANGE UP (ref 0–0)
PLATELET # BLD AUTO: 316 K/UL — SIGNIFICANT CHANGE UP (ref 150–400)
POTASSIUM SERPL-MCNC: 4.1 MMOL/L — SIGNIFICANT CHANGE UP (ref 3.5–5.3)
POTASSIUM SERPL-SCNC: 4.1 MMOL/L — SIGNIFICANT CHANGE UP (ref 3.5–5.3)
PROT SERPL-MCNC: 7.5 G/DL — SIGNIFICANT CHANGE UP (ref 6–8.3)
PROTHROM AB SERPL-ACNC: 12.6 SEC — SIGNIFICANT CHANGE UP (ref 10.6–13.6)
RBC # BLD: 4.36 M/UL — SIGNIFICANT CHANGE UP (ref 3.8–5.2)
RBC # FLD: 11.7 % — SIGNIFICANT CHANGE UP (ref 10.3–14.5)
SODIUM SERPL-SCNC: 139 MMOL/L — SIGNIFICANT CHANGE UP (ref 135–145)
WBC # BLD: 6.5 K/UL — SIGNIFICANT CHANGE UP (ref 3.8–10.5)
WBC # FLD AUTO: 6.5 K/UL — SIGNIFICANT CHANGE UP (ref 3.8–10.5)

## 2021-08-30 PROCEDURE — 99232 SBSQ HOSP IP/OBS MODERATE 35: CPT | Mod: GC

## 2021-08-30 PROCEDURE — 99233 SBSQ HOSP IP/OBS HIGH 50: CPT

## 2021-08-30 RX ORDER — OLANZAPINE 15 MG/1
5 TABLET, FILM COATED ORAL DAILY
Refills: 0 | Status: DISCONTINUED | OUTPATIENT
Start: 2021-08-30 | End: 2021-09-02

## 2021-08-30 RX ADMIN — Medication 650 MILLIGRAM(S): at 15:35

## 2021-08-30 RX ADMIN — Medication 0.5 MILLIGRAM(S): at 18:31

## 2021-08-30 RX ADMIN — Medication 1 APPLICATION(S): at 05:42

## 2021-08-30 RX ADMIN — Medication 650 MILLIGRAM(S): at 15:04

## 2021-08-30 RX ADMIN — Medication 100 MILLIGRAM(S): at 17:00

## 2021-08-30 RX ADMIN — OLANZAPINE 5 MILLIGRAM(S): 15 TABLET, FILM COATED ORAL at 13:19

## 2021-08-30 RX ADMIN — Medication 100 MILLIGRAM(S): at 05:42

## 2021-08-30 RX ADMIN — Medication 1 APPLICATION(S): at 17:01

## 2021-08-30 NOTE — BH CONSULTATION LIAISON PROGRESS NOTE - OTHER
Poor  dysphoric "Ok" maintains with slight effort  superficially cooperative; very guarded limited  low reactivity  concrete

## 2021-08-30 NOTE — BH CONSULTATION LIAISON PROGRESS NOTE - NSBHCONSULTRECOMMENDOTHER_PSY_A_CORE FT
Consider initiation of  Ativan as shown below.   PRN: Ativan 2mg PO q6hrs PRN for anxiety; Ativan 2mg IVP q6hrs PRN for agitation. Pt would benefit from standing antipsychotic once cleared. Trazodone 25mg PO qhs PRN for sleep Once patient is medically cleared ( pending autoimmune encephalitis panel and LP per primary team), she will need ongoing psychiatric evaluation and an inpatient hospitalization. Consider initiation of  Ativan as shown below. Consider Zyprexa 5 mg PO HS for psychosis, if QtC < 500.   PRN: Ativan 2mg PO q6hrs PRN for anxiety; Ativan 2mg IVP q6hrs PRN for agitation. Pt would benefit from standing antipsychotic once cleared. Trazodone 25mg PO qhs PRN for sleep Once patient is medically cleared ( pending autoimmune encephalitis panel and LP per primary team), she will need ongoing psychiatric evaluation and an inpatient hospitalization. Consider initiation of  Ativan as shown below. Primary team initiated Zyprexa 5 mg PO HS for psychosis; please monitor if QtC < 500.   PRN: Ativan 2mg PO q6hrs PRN for anxiety; Ativan 2mg IVP q6hrs PRN for agitation. Pt would benefit from standing antipsychotic once cleared. Trazodone 25mg PO qhs PRN for sleep Once patient is medically cleared ( pending autoimmune encephalitis panel, US and LP per primary team), she will need ongoing psychiatric evaluation and an inpatient hospitalization.

## 2021-08-30 NOTE — BH CONSULTATION LIAISON PROGRESS NOTE - NSBHASSESSMENTFT_PSY_ALL_CORE
Patient is a 34yo female, domiciled with mother, unemployed, with PHH of bipolar disorder, anxiety, no prior psych admissions, no current outpt tx, denies past SAs or self harm, no known hx of violence, denies drug or alcohol use, and no known pmh. She is BIBEMS from home activated by mother for worsening anxiety and depression. On exam, patient appears anxious, superficially cooperative, guarded at times, with spontaneous speech and linear process. She reports she has been stressed and anxious from her regular day to day issues. She states she isn't going out, eating, or exercising as much due to pandemic. She also notes feeling depressed, having difficulty with decision making, and trouble sleeping. Her mother is concerned because of her decline and called for EMS. She denies past or current SI/P/I, HI/P/I, current self harm. When asked about past SAs or self harm, patient is repeatedly vague, states she can't remember and not for many years, then later denies. Despite multiple attempts to explore any specific stressors, patient repeatedly indicates her normal daily stressors unchanged from her usual baseline. She reports taking meds in the recent past but not currently. Denies any SI/HI/AVH. MRI of head and EEG both normal studied. Patient at this time is unable to care for self at home and will benefit from psych admission after she is medically cleared.   ---    Patient continues to exhibit speech latency, thought blocking and is very guarded during this assessment; no acute events reported overnight.

## 2021-08-30 NOTE — PROGRESS NOTE ADULT - ATTENDING COMMENTS
her MS continue to fluctuate  possibly organic cause of her symptoms    plan: Lp under sedation tomorrow  start Zyprexa 5 mg daily today    IV Solumedrol 1000 daily for 5 days after Lp for diagnostic reason  Pelvic US done and normal  brain MR8I normal

## 2021-08-30 NOTE — BH CONSULTATION LIAISON PROGRESS NOTE - OTHER
dysphoric superficially cooperative; very guarded limited  Poor  concrete  deferred at this time  low reactivity  maintains with slight effort  "Ok"

## 2021-08-30 NOTE — BH CONSULTATION LIAISON PROGRESS NOTE - NSBHFUPINTERVALHXFT_PSY_A_CORE
Patient seen at bedside and notes she "is the same" and reports intermittent episodes of anxiety. No acute events were reported overnight. Patient has been placed on Xanax PRN for anxiety and is s/p MRI & EEG which was noted to be unremarkable. Patient continues to display thought blocking and some latency of speech today and is extremely guarded. Patient denies any SI/HI but is unable to elaborate on presence of auditory/visual hallucinations.

## 2021-08-30 NOTE — PROGRESS NOTE ADULT - SUBJECTIVE AND OBJECTIVE BOX
Interval History:  Patient seen and examined at the bedside. No acute events overnight.     MEDICATIONS  (STANDING):  enoxaparin Injectable 40 milliGRAM(s) SubCutaneous daily  nitrofurantoin monohydrate/macrocrystals (MACROBID) 100 milliGRAM(s) Oral two times a day    MEDICATIONS  (PRN):  acetaminophen   Tablet .. 650 milliGRAM(s) Oral every 6 hours PRN Temp greater or equal to 38C (100.4F), Mild Pain (1 - 3), Moderate Pain (4 - 6), Severe Pain (7 - 10)  ALPRAZolam 0.5 milliGRAM(s) Oral two times a day PRN Anxiety  lacosamide Injectable 200 milliGRAM(s) IV Push once PRN Seizure  LORazepam   Injectable 2 milliGRAM(s) IV Push once PRN Seizure    Allergies  No Known Allergies    Intolerances      ROS: Pertinent positives in HPI, all other ROS were reviewed and are negative.      Vital Signs Last 24 Hrs  T(C): 36.8 (30 Aug 2021 07:24), Max: 37.1 (29 Aug 2021 16:30)  T(F): 98.3 (30 Aug 2021 07:24), Max: 98.7 (29 Aug 2021 16:30)  HR: 76 (30 Aug 2021 07:24) (76 - 84)  BP: 110/73 (30 Aug 2021 07:24) (102/84 - 119/75)  BP(mean): --  RR: 18 (30 Aug 2021 07:24) (18 - 18)  SpO2: 97% (30 Aug 2021 07:24) (96% - 98%)    NEUROLOGICAL EXAM:  MS: AAOX3, fluent, attends b/l; normal attention, language  CN: VFF, EOMI, PERRL, no facial asymmetry,  Motor: Strength: antigravity 4x.  Sensation: Grossly intact   Gait: Deferred       Labs:              CBC                        12.6   6.50  )-----------( 316      ( 30 Aug 2021 05:42 )             37.8         08-30    139  |  102  |  13  ----------------------------<  91  4.1   |  26  |  0.87    Ca    9.5      30 Aug 2021 05:42  Phos  3.8     08-29  Mg     2.2     08-29    TPro  7.5  /  Alb  4.4  /  TBili  0.6  /  DBili  x   /  AST  12  /  ALT  8<L>  /  AlkPhos  58  08-30      MRI brain w/ w/o: Normal study

## 2021-08-30 NOTE — PROGRESS NOTE ADULT - ASSESSMENT
Assessment: 36 yo female with a PMHx of bipolar disorder (not taking medication) presents to the ED for psychiatric evaluation. Patient states she came to the ED for anxiety, depression, and confusion. She also states she is hearing things in her head that others cannot hear. Mother is concerned that the patient may be having seizures. She states she witnessed two events over the past two days. During these events the patient's eyes were "fluttering," which she further describes as eyelids closed but moving. The patient did not move or speak for at least 3-6 hours each time. The patient states she remembers both events, and states she was intensely frustrated during those times. Patient was evaluated by psychiatry in the ED, with consideration for inpatient psych admission once medically cleared. Admit to EMU for possible event capture and characterization. EEG overnight for 4hrs normal, will dc as patient unable to tolerate. Patient refusing LP. Psych recommending inpatient psych admission and per SW discussion, patient and mother seem amenable, likely Monday. Will attempt to r/o organic etiology w/ head imaging. On discussion with mother at bedside it seems patient was functional and had 2 prior jobs at which she was able to perform well. Her last job is when she started having issues with confusion and difficulty focusing suggesting a more acute decline in the behavioral status.     Plan:  [] MRI brain without contrast - neg  [] LP - under anesthesia (Patient and mother refusing bedside) - Will need to be coordinated today. Patient refusing all treatment. Mother refusing to let patient go home.  [] Transvaginal US to r/o teratoma   [] Autoimmune encephalitis panel in blood   [] Continue 1:1.  [] vEEG normal in the 4 hrs attached. Can d/c  [] No AEDs for now.  [] Seizure/Fall/Aspiration precautions.  [] Psychiatry stating patient candidate for inpatient psych admission. Would like negative EEG report and MRI. [facility] does not accept admitted patients over the weekend, thus likely admission on Monday. Psych stating patient has no capacity and thus unable to leave AMA.   [] c/w home dose Xanax PRN.  [] Continue Macrobid 100MG PO BID x5 days (end 9/1/21)  [x] UDS: +benzodiazepines.  [] DVT PPx: Lovenox 40MG SC QD.  [] Diet: Regular.  [] Seizure Rescue: Ativan 1MG IVP x1 for convulsions/GTC lasting > 3 minutes. Vimpat 200MG IV x1 for convulsions/GTC refractory to Ativan. Assessment: 34 yo female with a PMHx of bipolar disorder (not taking medication) presents to the ED for psychiatric evaluation. Patient states she came to the ED for anxiety, depression, and confusion. She also states she is hearing things in her head that others cannot hear. Mother is concerned that the patient may be having seizures. She states she witnessed two events over the past two days. During these events the patient's eyes were "fluttering," which she further describes as eyelids closed but moving. The patient did not move or speak for at least 3-6 hours each time. The patient states she remembers both events, and states she was intensely frustrated during those times. Patient was evaluated by psychiatry in the ED, with consideration for inpatient psych admission once medically cleared. Admit to EMU for possible event capture and characterization. EEG overnight for 4hrs normal, will dc as patient unable to tolerate. Patient refusing LP. Psych recommending inpatient psych admission and per SW discussion, patient and mother seem amenable, likely Monday. Will attempt to r/o organic etiology w/ head imaging. On discussion with mother at bedside it seems patient was functional and had 2 prior jobs at which she was able to perform well. Her last job is when she started having issues with confusion and difficulty focusing suggesting a more acute decline in the behavioral status.     Plan:  [] MRI brain without contrast - neg  [] LP - under anesthesia (Patient and mother refusing bedside) - Will need to be coordinated today. Patient refusing all treatment. Mother refusing to let patient go home.  [] Transvaginal US to r/o teratoma   [] Autoimmune encephalitis panel in blood   [] Continue 1:1.  [] vEEG normal in the 4 hrs attached. Can d/c  [] No AEDs for now.  [] Seizure/Fall/Aspiration precautions.  [] Psychiatry stating patient candidate for inpatient psych admission. Would like negative EEG report and MRI. [facility] does not accept admitted patients over the weekend, thus likely admission on Monday. Psych stating patient has no capacity and thus unable to leave AMA.   [] c/w home dose Xanax PRN.  [] Continue Macrobid 100MG PO BID x5 days (end 9/1/21)  [x] UDS: +benzodiazepines.  [] DVT PPx: Lovenox 40MG SC QD.  [] Diet: Regular.  [] Seizure Rescue: Ativan 1MG IVP x1 for convulsions/GTC lasting > 3 minutes. Vimpat 200MG IV x1 for convulsions/GTC refractory to Ativan.      Case to be discussed with neurology attending Dr. Esqueda

## 2021-08-30 NOTE — PRE-ANESTHESIA EVALUATION ADULT - BSA (M2)
1.73 Additional Notes: Pt. self treats at home does not want any acne treatment at this time. Detail Level: Simple

## 2021-08-30 NOTE — BH CONSULTATION LIAISON PROGRESS NOTE - NSBHASSESSMENTFT_PSY_ALL_CORE
Patient is a 36yo female, domiciled with mother, unemployed, with PHH of bipolar disorder, anxiety, no prior psych admissions, no current outpt tx, denies past SAs or self harm, no known hx of violence, denies drug or alcohol use, and no known pmh. She is BIBEMS from home activated by mother for worsening anxiety and depression. On exam, patient appears anxious, superficially cooperative, guarded at times, with spontaneous speech and linear process. She reports she has been stressed and anxious from her regular day to day issues. She states she isn't going out, eating, or exercising as much due to pandemic. She also notes feeling depressed, having difficulty with decision making, and trouble sleeping. Her mother is concerned because of her decline and called for EMS. She denies past or current SI/P/I, HI/P/I, current self harm. When asked about past SAs or self harm, patient is repeatedly vague, states she can't remember and not for many years, then later denies. Despite multiple attempts to explore any specific stressors, patient repeatedly indicates her normal daily stressors unchanged from her usual baseline. She reports taking meds in the recent past but not currently. Denies any SI/HI/AVH. MRI of head and EEG both normal studied. Patient at this time is not is unable to care for self at home and will benefit from psych admission after she is medically cleared.   ---    Patient continues to exhibit speech latency, thought blocking and is very guarded during this assessment; no acute events reported overnight.

## 2021-08-30 NOTE — BH CONSULTATION LIAISON PROGRESS NOTE - ORIENTATION
continues to need cuing for exact situation 
continues to need cuing for exact situation 
needs cuing for exact situation

## 2021-08-30 NOTE — BH CONSULTATION LIAISON PROGRESS NOTE - NSBHCONSULTRECOMMENDOTHER_PSY_A_CORE FT
Consider initiation of  Ativan as shown below. Primary team initiated Zyprexa 5 mg PO HS for psychosis; please monitor if QtC < 500.   PRN: Ativan 2mg PO q6hrs PRN for anxiety; Ativan 2mg IVP q6hrs PRN for agitation. Pt would benefit from standing antipsychotic once cleared. Trazodone 25mg PO qhs PRN for sleep Once patient is medically cleared ( pending autoimmune encephalitis panel, US and LP per primary team), she will need ongoing psychiatric evaluation and an inpatient hospitalization.

## 2021-08-31 ENCOUNTER — RESULT REVIEW (OUTPATIENT)
Age: 36
End: 2021-08-31

## 2021-08-31 LAB
APPEARANCE CSF: CLEAR — SIGNIFICANT CHANGE UP
APPEARANCE SPUN FLD: COLORLESS — SIGNIFICANT CHANGE UP
COLOR CSF: SIGNIFICANT CHANGE UP
GLUCOSE CSF-MCNC: 62 MG/DL — SIGNIFICANT CHANGE UP (ref 40–70)
GRAM STN FLD: SIGNIFICANT CHANGE UP
LDH CSF L TO P-CCNC: <15 U/L — SIGNIFICANT CHANGE UP
LDH FLD-CCNC: <15 U/L — SIGNIFICANT CHANGE UP
NEUTROPHILS # CSF: SIGNIFICANT CHANGE UP (ref 0–6)
NIGHT BLUE STAIN TISS: SIGNIFICANT CHANGE UP
NRBC NFR CSF: <1 — SIGNIFICANT CHANGE UP (ref 0–5)
PROT CSF-MCNC: 25 MG/DL — SIGNIFICANT CHANGE UP (ref 15–45)
RBC # CSF: 4 /UL — HIGH (ref 0–0)
SPECIMEN SOURCE: SIGNIFICANT CHANGE UP
SPECIMEN SOURCE: SIGNIFICANT CHANGE UP
TUBE TYPE: SIGNIFICANT CHANGE UP

## 2021-08-31 PROCEDURE — 71260 CT THORAX DX C+: CPT | Mod: 26

## 2021-08-31 PROCEDURE — 99233 SBSQ HOSP IP/OBS HIGH 50: CPT

## 2021-08-31 PROCEDURE — 99232 SBSQ HOSP IP/OBS MODERATE 35: CPT | Mod: GC

## 2021-08-31 PROCEDURE — 88108 CYTOPATH CONCENTRATE TECH: CPT | Mod: 26

## 2021-08-31 PROCEDURE — 62328 DX LMBR SPI PNXR W/FLUOR/CT: CPT

## 2021-08-31 PROCEDURE — 74177 CT ABD & PELVIS W/CONTRAST: CPT | Mod: 26

## 2021-08-31 RX ORDER — PANTOPRAZOLE SODIUM 20 MG/1
40 TABLET, DELAYED RELEASE ORAL
Refills: 0 | Status: DISCONTINUED | OUTPATIENT
Start: 2021-08-31 | End: 2021-09-02

## 2021-08-31 RX ORDER — ACETAMINOPHEN 500 MG
650 TABLET ORAL EVERY 6 HOURS
Refills: 0 | Status: DISCONTINUED | OUTPATIENT
Start: 2021-08-31 | End: 2021-08-31

## 2021-08-31 RX ORDER — ONDANSETRON 8 MG/1
4 TABLET, FILM COATED ORAL ONCE
Refills: 0 | Status: COMPLETED | OUTPATIENT
Start: 2021-08-31 | End: 2021-08-31

## 2021-08-31 RX ORDER — HALOPERIDOL DECANOATE 100 MG/ML
5 INJECTION INTRAMUSCULAR ONCE
Refills: 0 | Status: DISCONTINUED | OUTPATIENT
Start: 2021-08-31 | End: 2021-09-02

## 2021-08-31 RX ADMIN — Medication 0.5 MILLIGRAM(S): at 22:22

## 2021-08-31 RX ADMIN — Medication 0.5 MILLIGRAM(S): at 18:00

## 2021-08-31 RX ADMIN — Medication 2 MILLIGRAM(S): at 14:46

## 2021-08-31 RX ADMIN — ONDANSETRON 4 MILLIGRAM(S): 8 TABLET, FILM COATED ORAL at 14:03

## 2021-08-31 RX ADMIN — Medication 650 MILLIGRAM(S): at 20:29

## 2021-08-31 RX ADMIN — Medication 650 MILLIGRAM(S): at 12:08

## 2021-08-31 RX ADMIN — Medication 58 MILLIGRAM(S): at 12:08

## 2021-08-31 RX ADMIN — Medication 100 MILLIGRAM(S): at 12:08

## 2021-08-31 RX ADMIN — Medication 650 MILLIGRAM(S): at 21:00

## 2021-08-31 RX ADMIN — Medication 100 MILLIGRAM(S): at 18:22

## 2021-08-31 RX ADMIN — OLANZAPINE 5 MILLIGRAM(S): 15 TABLET, FILM COATED ORAL at 12:08

## 2021-08-31 NOTE — PROGRESS NOTE ADULT - SUBJECTIVE AND OBJECTIVE BOX
Status post lumbar puncture at the L3/L4  level utilizing a 20g spinal needle.      25cc of clear  cerebral spinal fluid was obtained.    No immediate complications.

## 2021-08-31 NOTE — PROVIDER CONTACT NOTE (OTHER) - ASSESSMENT
Patient has rash on left hip. Patient states that rash is itchy. Vital signs are stable. MD assessed patient at bedside and assessed the rash
Pt is a&ox4, VSS. Pt is staring into space. Pt endorses anxiety about being in the hospital and wants to leave. Pt refusing to cooperate.
pt anxious

## 2021-08-31 NOTE — CHART NOTE - NSCHARTNOTEFT_GEN_A_CORE
Notified by the nurses that the patient was agitated and wanting to leave the hospital. Patient put on her clothes and sneakers and was attempting to leave via the elevator. Code Jorgito was called. Patient stating "I want to leave and I am going to go." She made multiple attempts to get to the elevator and leave the hospital. Patient was escorted back into her room and given 2 mg of Ativan IV for agitation. Patient has a history of bipolar disorder and was evaluated by psychiatry and determined she does not have capacity to leave AMA. Patient being evaluated in EMU for organic causes of her functional decline with a plan for inpatient psychiatry once medically cleared.
Patient evaluated at bedside as she is refusing IV and EEG. States she wants to leave. I explained and stressed the importance of the EEG to evaluate for seizures which can be life threatening. Patient states she would be prefer to wait untill the AM to speak to the Epilepsy team and psychiatry team. I spoke to overnight telepsych who stated that she will be seen in AM by psych team. Rec haldol 2.5 IV and 1mg ativan for severe agitation. She is not severely agitated at this time so will hold off.

## 2021-08-31 NOTE — PROGRESS NOTE ADULT - ASSESSMENT
Assessment: 34 yo female with a PMHx of bipolar disorder (not taking medication) presents to the ED for psychiatric evaluation. Patient states she came to the ED for anxiety, depression, and confusion. She also states she is hearing things in her head that others cannot hear. Mother is concerned that the patient may be having seizures. She states she witnessed two events over the past two days. During these events the patient's eyes were "fluttering," which she further describes as eyelids closed but moving. The patient did not move or speak for at least 3-6 hours each time. The patient states she remembers both events, and states she was intensely frustrated during those times. Patient was evaluated by psychiatry in the ED, with consideration for inpatient psych admission once medically cleared. Admit to EMU for possible event capture and characterization. EEG overnight for 4hrs normal, will dc as patient unable to tolerate. Patient refusing LP. Psych recommending inpatient psych admission and per SW discussion, patient and mother seem amenable, likely Monday. Will attempt to r/o organic etiology w/ head imaging. On discussion with mother at bedside it seems patient was functional and had 2 prior jobs at which she was able to perform well. Her last job is when she started having issues with confusion and difficulty focusing suggesting a more acute decline in the behavioral status.     Plan:  [] MRI brain without contrast - neg  [] S/p LP - under anesthesia.  [] Start Solumedrol today 1gm x 5days (8/31-9/4)  [] Transvaginal US to r/o teratoma   [] Autoimmune encephalitis panel in blood   [] Continue 1:1.  [] No AEDs for now.  [] Seizure/Fall/Aspiration precautions.  [] Psychiatry stating patient candidate for inpatient psych admission. Would like negative EEG report and MRI. [facility] does not accept admitted patients over the weekend, thus likely admission on Monday. Psych stating patient has no capacity and thus unable to leave AMA.   [] c/w home dose Xanax PRN.  [] Continue Macrobid 100MG PO BID x5 days (end 9/1/21)  [x] UDS: +benzodiazepines.  [] DVT PPx: Lovenox 40MG SC QD.  [] Diet: Regular.  [] Seizure Rescue: Ativan 1MG IVP x1 for convulsions/GTC lasting > 3 minutes. Vimpat 200MG IV x1 for convulsions/GTC refractory to Ativan.      Case to be discussed with neurology attending Dr. Esqueda

## 2021-08-31 NOTE — BH CONSULTATION LIAISON PROGRESS NOTE - NSBHASSESSMENTFT_PSY_ALL_CORE
Patient is a 34yo female, domiciled with mother, unemployed, with PHH of bipolar disorder, anxiety, no prior psych admissions, no current outpt tx, denies past SAs or self harm, no known hx of violence, denies drug or alcohol use, and no known pmh. She is BIBEMS from home activated by mother for worsening anxiety and depression. On exam, patient appears anxious, superficially cooperative, guarded at times, with spontaneous speech and linear process. She reports she has been stressed and anxious from her regular day to day issues. She states she isn't going out, eating, or exercising as much due to pandemic. She also notes feeling depressed, having difficulty with decision making, and trouble sleeping. Her mother is concerned because of her decline and called for EMS. She denies past or current SI/P/I, HI/P/I, current self harm. When asked about past SAs or self harm, patient is repeatedly vague, states she can't remember and not for many years, then later denies. Despite multiple attempts to explore any specific stressors, patient repeatedly indicates her normal daily stressors unchanged from her usual baseline. She reports taking meds in the recent past but not currently. Denies any SI/HI/AVH. MRI of head and EEG both normal studied. Patient at this time is unable to care for self at home and will benefit from psych admission after she is medically cleared.   ---    Patient is s/p LP; somnolent during this assessment.

## 2021-08-31 NOTE — PROGRESS NOTE ADULT - SUBJECTIVE AND OBJECTIVE BOX
Interval History:  Patient seen and examined at the bedside. No acute events overnight. Tolerated LP under anesthesia well this am.     MEDICATIONS  (STANDING):  enoxaparin Injectable 40 milliGRAM(s) SubCutaneous daily  nitrofurantoin monohydrate/macrocrystals (MACROBID) 100 milliGRAM(s) Oral two times a day    MEDICATIONS  (PRN):  acetaminophen   Tablet .. 650 milliGRAM(s) Oral every 6 hours PRN Temp greater or equal to 38C (100.4F), Mild Pain (1 - 3), Moderate Pain (4 - 6), Severe Pain (7 - 10)  ALPRAZolam 0.5 milliGRAM(s) Oral two times a day PRN Anxiety  lacosamide Injectable 200 milliGRAM(s) IV Push once PRN Seizure  LORazepam   Injectable 2 milliGRAM(s) IV Push once PRN Seizure    Allergies  No Known Allergies    Intolerances      ROS: Pertinent positives in HPI, all other ROS were reviewed and are negative.      Vital Signs Last 24 Hrs  T(C): 36.8 (31 Aug 2021 10:18), Max: 36.9 (30 Aug 2021 15:11)  T(F): 98.3 (31 Aug 2021 10:18), Max: 98.4 (30 Aug 2021 15:11)  HR: 78 (31 Aug 2021 10:18) (72 - 80)  BP: 111/77 (31 Aug 2021 10:18) (100/66 - 157/89)  BP(mean): --  RR: 18 (31 Aug 2021 10:18) (14 - 18)  SpO2: 98% (31 Aug 2021 10:18) (97% - 100%)    NEUROLOGICAL EXAM:  MS: AAOX3, fluent, attends b/l; normal attention, language  CN: VFF, EOMI, PERRL, no facial asymmetry,  Motor: Strength: antigravity 4x.  Sensation: Grossly intact   Gait: Deferred       Labs:              CBC                                   12.6   6.50  )-----------( 316      ( 30 Aug 2021 05:42 )             37.8       08-30    139  |  102  |  13  ----------------------------<  91  4.1   |  26  |  0.87    Ca    9.5      30 Aug 2021 05:42    TPro  7.5  /  Alb  4.4  /  TBili  0.6  /  DBili  x   /  AST  12  /  ALT  8<L>  /  AlkPhos  58  08-30      TPro  7.5  /  Alb  4.4  /  TBili  0.6  /  DBili  x   /  AST  12  /  ALT  8<L>  /  AlkPhos  58  08-30      MRI brain w/ w/o: Normal study

## 2021-08-31 NOTE — BH CONSULTATION LIAISON PROGRESS NOTE - NSBHCONSULTRECOMMENDOTHER_PSY_A_CORE FT
Primary team initiated Zyprexa 5 mg PO HS for psychosis; please monitor if QtC < 500.   PRN: Consider Ativan 2mg PO q6hrs PRN for anxiety; Ativan 2mg IVP q6hrs PRN for agitation. Trazodone 25mg PO qhs PRN for sleep Once patient is medically cleared ( pending  further diagnostic testing; US), she will need ongoing psychiatric evaluation and an inpatient hospitalization.

## 2021-08-31 NOTE — PROVIDER CONTACT NOTE (OTHER) - BACKGROUND
Pt was admitted for possible seizure activity.
awaiting to be transferred to [facility]
delusional disorder. Encephalitis work up

## 2021-08-31 NOTE — PROVIDER CONTACT NOTE (OTHER) - ACTION/TREATMENT ORDERED:
pt assisted back to room and continue to monitor.
Okay for pt to refuse IV at this time. Pt does not need to be connected to VEEG or sign paperwork at this time. Psych notified to come see pt at bedside.
Will apply hydrocortisone cream as ordered

## 2021-08-31 NOTE — PROVIDER CONTACT NOTE (OTHER) - SITUATION
Pt is refusing to sign paperwork, to be connected to VEEG, answer questions for pt profile, reufsing IV insertion and wants to leave the hospital.
Patient has rash on left hip that is itchy
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

## 2021-09-01 DIAGNOSIS — Z71.89 OTHER SPECIFIED COUNSELING: ICD-10-CM

## 2021-09-01 LAB
ALBUMIN CSF-MCNC: 14.8 MG/DL — SIGNIFICANT CHANGE UP (ref 14–25)
ALBUMIN SERPL ELPH-MCNC: 4974 MG/DL — SIGNIFICANT CHANGE UP (ref 3500–5200)
CSF PCR RESULT: SIGNIFICANT CHANGE UP
IGG CSF-MCNC: 1.9 MG/DL — SIGNIFICANT CHANGE UP
IGG FLD-MCNC: 1398 MG/DL — SIGNIFICANT CHANGE UP (ref 610–1660)
IGG SYNTH RATE SER+CSF CALC-MRATE: -5.3 MG/DAY — SIGNIFICANT CHANGE UP
IGG/ALB CLEAR SER+CSF-RTO: 0.5 — SIGNIFICANT CHANGE UP
IGG/ALB CSF: 0.13 RATIO — SIGNIFICANT CHANGE UP
IGG/ALB SER: 0.28 RATIO — SIGNIFICANT CHANGE UP
NON-GYNECOLOGICAL CYTOLOGY STUDY: SIGNIFICANT CHANGE UP
TM INTERPRETATION: SIGNIFICANT CHANGE UP

## 2021-09-01 PROCEDURE — 99233 SBSQ HOSP IP/OBS HIGH 50: CPT

## 2021-09-01 PROCEDURE — 99232 SBSQ HOSP IP/OBS MODERATE 35: CPT | Mod: GC

## 2021-09-01 RX ORDER — ENOXAPARIN SODIUM 100 MG/ML
40 INJECTION SUBCUTANEOUS DAILY
Refills: 0 | Status: DISCONTINUED | OUTPATIENT
Start: 2021-09-01 | End: 2021-09-02

## 2021-09-01 RX ORDER — LANOLIN ALCOHOL/MO/W.PET/CERES
5 CREAM (GRAM) TOPICAL ONCE
Refills: 0 | Status: COMPLETED | OUTPATIENT
Start: 2021-09-01 | End: 2021-09-01

## 2021-09-01 RX ADMIN — Medication 650 MILLIGRAM(S): at 11:00

## 2021-09-01 RX ADMIN — Medication 650 MILLIGRAM(S): at 10:18

## 2021-09-01 RX ADMIN — ENOXAPARIN SODIUM 40 MILLIGRAM(S): 100 INJECTION SUBCUTANEOUS at 17:58

## 2021-09-01 RX ADMIN — PANTOPRAZOLE SODIUM 40 MILLIGRAM(S): 20 TABLET, DELAYED RELEASE ORAL at 06:11

## 2021-09-01 RX ADMIN — Medication 0.5 MILLIGRAM(S): at 18:57

## 2021-09-01 RX ADMIN — Medication 0.5 MILLIGRAM(S): at 14:07

## 2021-09-01 RX ADMIN — OLANZAPINE 5 MILLIGRAM(S): 15 TABLET, FILM COATED ORAL at 12:18

## 2021-09-01 RX ADMIN — Medication 58 MILLIGRAM(S): at 12:18

## 2021-09-01 RX ADMIN — Medication 5 MILLIGRAM(S): at 22:19

## 2021-09-01 RX ADMIN — Medication 1 APPLICATION(S): at 17:58

## 2021-09-01 NOTE — BH CONSULTATION LIAISON PROGRESS NOTE - NSBHFUPINTERVALHXFT_PSY_A_CORE
Patient seen at bedside with mother who she wishes be present during this assessment. Per primary team patient was irritable in the evening yesterday and requesting to leave; required PRN Ativan. Patient notes she had just spoken to Neurology prior to this assessment and had requested psychoeducation about xanax and Zyprexa. Patient's mother did state SW has been in touch with them about disposition planning. Mood is noted as "ok" with no presence of SI/HI/AVH.

## 2021-09-01 NOTE — PROGRESS NOTE ADULT - SUBJECTIVE AND OBJECTIVE BOX
Interval History:  Patient seen and examined at the bedside. No acute events overnight.     MEDICATIONS  (STANDING):  enoxaparin Injectable 40 milliGRAM(s) SubCutaneous daily  nitrofurantoin monohydrate/macrocrystals (MACROBID) 100 milliGRAM(s) Oral two times a day    MEDICATIONS  (PRN):  acetaminophen   Tablet .. 650 milliGRAM(s) Oral every 6 hours PRN Temp greater or equal to 38C (100.4F), Mild Pain (1 - 3), Moderate Pain (4 - 6), Severe Pain (7 - 10)  ALPRAZolam 0.5 milliGRAM(s) Oral two times a day PRN Anxiety  lacosamide Injectable 200 milliGRAM(s) IV Push once PRN Seizure  LORazepam   Injectable 2 milliGRAM(s) IV Push once PRN Seizure    Allergies  No Known Allergies    Intolerances      ROS: Pertinent positives in HPI, all other ROS were reviewed and are negative.      Vital Signs Last 24 Hrs  T(C): 36.8 (31 Aug 2021 10:18), Max: 36.9 (30 Aug 2021 15:11)  T(F): 98.3 (31 Aug 2021 10:18), Max: 98.4 (30 Aug 2021 15:11)  HR: 78 (31 Aug 2021 10:18) (72 - 80)  BP: 111/77 (31 Aug 2021 10:18) (100/66 - 157/89)  BP(mean): --  RR: 18 (31 Aug 2021 10:18) (14 - 18)  SpO2: 98% (31 Aug 2021 10:18) (97% - 100%)    NEUROLOGICAL EXAM:  MS: AAOX3, fluent, attends b/l; normal attention, language  CN: VFF, EOMI, PERRL, no facial asymmetry,  Motor: Strength: antigravity 4x.  Sensation: Grossly intact   Gait: Deferred       Labs:                  TPro  x   /  Alb  [id number]  /  TBili  x   /  DBili  x   /  AST  x   /  ALT  x   /  AlkPhos  x   09-01        MRI brain w/ w/o: Normal study

## 2021-09-01 NOTE — BH CONSULTATION LIAISON PROGRESS NOTE - NSBHASSESSMENTFT_PSY_ALL_CORE
Patient is a 34yo female, domiciled with mother, unemployed, with PHH of bipolar disorder, anxiety, no prior psych admissions, no current outpt tx, denies past SAs or self harm, no known hx of violence, denies drug or alcohol use, and no known pmh. She is BIBEMS from home activated by mother for worsening anxiety and depression. On exam, patient appears anxious, superficially cooperative, guarded at times, with spontaneous speech and linear process. She reports she has been stressed and anxious from her regular day to day issues. She states she isn't going out, eating, or exercising as much due to pandemic. She also notes feeling depressed, having difficulty with decision making, and trouble sleeping. Her mother is concerned because of her decline and called for EMS. She denies past or current SI/P/I, HI/P/I, current self harm. When asked about past SAs or self harm, patient is repeatedly vague, states she can't remember and not for many years, then later denies. Despite multiple attempts to explore any specific stressors, patient repeatedly indicates her normal daily stressors unchanged from her usual baseline. She reports taking meds in the recent past but not currently. Denies any SI/HI/AVH. MRI of head and EEG both normal studied. Patient at this time is unable to care for self at home and will benefit from psych admission after she is medically cleared.   ---    Patient seen at bedside today has notes mood as "ok continues to have somewhat concrete thought process with some speech latency. Less thought blocking noted today.

## 2021-09-01 NOTE — BH CONSULTATION LIAISON PROGRESS NOTE - NSBHCONSULTRECOMMENDOTHER_PSY_A_CORE FT
Primary team initiated Zyprexa 5 mg PO HS for psychosis; please monitor if QtC < 500. Psychiatry will continue to follow. Needs psychiatric evaluation once medically cleared.

## 2021-09-01 NOTE — PROGRESS NOTE ADULT - ASSESSMENT
Assessment: 34 yo female with a PMHx of bipolar disorder (not taking medication) presents to the ED for psychiatric evaluation. Patient states she came to the ED for anxiety, depression, and confusion. She also states she is hearing things in her head that others cannot hear. Mother is concerned that the patient may be having seizures. She states she witnessed two events over the past two days. During these events the patient's eyes were "fluttering," which she further describes as eyelids closed but moving. The patient did not move or speak for at least 3-6 hours each time. The patient states she remembers both events, and states she was intensely frustrated during those times. Patient was evaluated by psychiatry in the ED, with consideration for inpatient psych admission once medically cleared. Admit to EMU for possible event capture and characterization. EEG overnight for 4hrs normal, will dc as patient unable to tolerate. Patient refusing LP. Psych recommending inpatient psych admission and per SW discussion, patient and mother seem amenable, likely Monday. Will attempt to r/o organic etiology w/ head imaging. On discussion with mother at bedside it seems patient was functional and had 2 prior jobs at which she was able to perform well. Her last job is when she started having issues with confusion and difficulty focusing suggesting a more acute decline in the behavioral status.     Plan:  [] MRI brain without contrast - neg  [] S/p LP - under anesthesia.  [] Start Solumedrol today 1gm x 5days (8/31-9/4)  [] Transvaginal US to r/o teratoma   [] Autoimmune encephalitis panel in blood   [] Continue 1:1.  [] No AEDs for now.  [] Haldol 5mg IV PRN for agitation  [] Seizure/Fall/Aspiration precautions.  [] Psychiatry stating patient candidate for inpatient psych admission.   [] c/w home dose Xanax PRN.  [] Continue Macrobid 100MG PO BID x5 days (end 9/1/21)  [x] UDS: +benzodiazepines.  [] DVT PPx: Lovenox 40MG SC QD.  [] Diet: Regular.  [] Anticipate transfer to psych inpatient after IV steroids completed  [] Seizure Rescue: Ativan 1MG IVP x1 for convulsions/GTC lasting > 3 minutes. Vimpat 200MG IV x1 for convulsions/GTC refractory to Ativan.      Case discussed with neurology attending Dr. Esqueda Assessment: 36 yo female with a PMHx of bipolar disorder (not taking medication) presents to the ED for psychiatric evaluation. Patient states she came to the ED for anxiety, depression, and confusion. She also states she is hearing things in her head that others cannot hear. Mother is concerned that the patient may be having seizures. She states she witnessed two events over the past two days. During these events the patient's eyes were "fluttering," which she further describes as eyelids closed but moving. The patient did not move or speak for at least 3-6 hours each time. The patient states she remembers both events, and states she was intensely frustrated during those times. Patient was evaluated by psychiatry in the ED, with consideration for inpatient psych admission once medically cleared. Admit to EMU for possible event capture and characterization. EEG overnight for 4hrs normal, will dc as patient unable to tolerate. Patient refusing LP. Psych recommending inpatient psych admission and per SW discussion, patient and mother seem amenable, likely Monday. Will attempt to r/o organic etiology w/ head imaging. On discussion with mother at bedside it seems patient was functional and had 2 prior jobs at which she was able to perform well. Her last job is when she started having issues with confusion and difficulty focusing suggesting a more acute decline in the behavioral status.     Plan:  [] MRI brain without contrast - neg  [] S/p LP - under anesthesia.  [] Start Solumedrol today 1gm x 3days (8/31-9/2)  [] Transvaginal US to r/o teratoma   [] Autoimmune encephalitis panel in blood   [] Continue 1:1.  [] No AEDs for now.  [] Haldol 5mg IV PRN for agitation  [] Seizure/Fall/Aspiration precautions.  [] Psychiatry stating patient candidate for inpatient psych admission.   [] c/w home dose Xanax PRN.  [] Continue Macrobid 100MG PO BID x5 days (end 9/1/21)  [x] UDS: +benzodiazepines.  [] DVT PPx: Lovenox 40MG SC QD.  [] Diet: Regular.  [] Anticipate transfer to psych inpatient after IV steroids 3 days completed  [] Seizure Rescue: Ativan 1MG IVP x1 for convulsions/GTC lasting > 3 minutes. Vimpat 200MG IV x1 for convulsions/GTC refractory to Ativan.      Case discussed with neurology attending Dr. Esqueda

## 2021-09-02 ENCOUNTER — TRANSCRIPTION ENCOUNTER (OUTPATIENT)
Age: 36
End: 2021-09-02

## 2021-09-02 ENCOUNTER — INPATIENT (INPATIENT)
Facility: HOSPITAL | Age: 36
LOS: 26 days | Discharge: HOME | End: 2021-09-29
Attending: STUDENT IN AN ORGANIZED HEALTH CARE EDUCATION/TRAINING PROGRAM | Admitting: PSYCHIATRY & NEUROLOGY
Payer: MEDICAID

## 2021-09-02 VITALS
OXYGEN SATURATION: 98 % | SYSTOLIC BLOOD PRESSURE: 101 MMHG | TEMPERATURE: 98 F | DIASTOLIC BLOOD PRESSURE: 62 MMHG | RESPIRATION RATE: 18 BRPM | HEART RATE: 85 BPM

## 2021-09-02 VITALS — HEIGHT: 64 IN | WEIGHT: 126.1 LBS | TEMPERATURE: 98 F

## 2021-09-02 DIAGNOSIS — F33.9 MAJOR DEPRESSIVE DISORDER, RECURRENT, UNSPECIFIED: ICD-10-CM

## 2021-09-02 PROBLEM — F31.9 BIPOLAR DISORDER, UNSPECIFIED: Chronic | Status: ACTIVE | Noted: 2021-08-26

## 2021-09-02 PROBLEM — F32.9 MAJOR DEPRESSIVE DISORDER, SINGLE EPISODE, UNSPECIFIED: Chronic | Status: ACTIVE | Noted: 2021-08-26

## 2021-09-02 PROBLEM — F41.9 ANXIETY DISORDER, UNSPECIFIED: Chronic | Status: ACTIVE | Noted: 2021-08-26

## 2021-09-02 LAB
PROT CSF-MCNC: 25 MG/DL — SIGNIFICANT CHANGE UP (ref 15–45)
SARS-COV-2 RNA SPEC QL NAA+PROBE: SIGNIFICANT CHANGE UP

## 2021-09-02 PROCEDURE — 95700 EEG CONT REC W/VID EEG TECH: CPT

## 2021-09-02 PROCEDURE — 70551 MRI BRAIN STEM W/O DYE: CPT

## 2021-09-02 PROCEDURE — 86341 ISLET CELL ANTIBODY: CPT

## 2021-09-02 PROCEDURE — 80053 COMPREHEN METABOLIC PANEL: CPT

## 2021-09-02 PROCEDURE — 83519 RIA NONANTIBODY: CPT

## 2021-09-02 PROCEDURE — 89051 BODY FLUID CELL COUNT: CPT

## 2021-09-02 PROCEDURE — 82945 GLUCOSE OTHER FLUID: CPT

## 2021-09-02 PROCEDURE — 85025 COMPLETE CBC W/AUTO DIFF WBC: CPT

## 2021-09-02 PROCEDURE — 95712 VEEG 2-12 HR INTMT MNTR: CPT

## 2021-09-02 PROCEDURE — 87483 CNS DNA AMP PROBE TYPE 12-25: CPT

## 2021-09-02 PROCEDURE — 84443 ASSAY THYROID STIM HORMONE: CPT

## 2021-09-02 PROCEDURE — 84100 ASSAY OF PHOSPHORUS: CPT

## 2021-09-02 PROCEDURE — 83615 LACTATE (LD) (LDH) ENZYME: CPT

## 2021-09-02 PROCEDURE — 85027 COMPLETE CBC AUTOMATED: CPT

## 2021-09-02 PROCEDURE — 81025 URINE PREGNANCY TEST: CPT

## 2021-09-02 PROCEDURE — 84157 ASSAY OF PROTEIN OTHER: CPT

## 2021-09-02 PROCEDURE — 36415 COLL VENOUS BLD VENIPUNCTURE: CPT

## 2021-09-02 PROCEDURE — 83916 OLIGOCLONAL BANDS: CPT

## 2021-09-02 PROCEDURE — 87205 SMEAR GRAM STAIN: CPT

## 2021-09-02 PROCEDURE — 86769 SARS-COV-2 COVID-19 ANTIBODY: CPT

## 2021-09-02 PROCEDURE — 84166 PROTEIN E-PHORESIS/URINE/CSF: CPT

## 2021-09-02 PROCEDURE — 84702 CHORIONIC GONADOTROPIN TEST: CPT

## 2021-09-02 PROCEDURE — 87116 MYCOBACTERIA CULTURE: CPT

## 2021-09-02 PROCEDURE — 71260 CT THORAX DX C+: CPT

## 2021-09-02 PROCEDURE — 80048 BASIC METABOLIC PNL TOTAL CA: CPT

## 2021-09-02 PROCEDURE — 74177 CT ABD & PELVIS W/CONTRAST: CPT

## 2021-09-02 PROCEDURE — 85610 PROTHROMBIN TIME: CPT

## 2021-09-02 PROCEDURE — 87070 CULTURE OTHR SPECIMN AEROBIC: CPT

## 2021-09-02 PROCEDURE — U0005: CPT

## 2021-09-02 PROCEDURE — 93005 ELECTROCARDIOGRAM TRACING: CPT

## 2021-09-02 PROCEDURE — 62328 DX LMBR SPI PNXR W/FLUOR/CT: CPT

## 2021-09-02 PROCEDURE — 83735 ASSAY OF MAGNESIUM: CPT

## 2021-09-02 PROCEDURE — 85730 THROMBOPLASTIN TIME PARTIAL: CPT

## 2021-09-02 PROCEDURE — 99232 SBSQ HOSP IP/OBS MODERATE 35: CPT | Mod: GC

## 2021-09-02 PROCEDURE — 99222 1ST HOSP IP/OBS MODERATE 55: CPT

## 2021-09-02 PROCEDURE — 99238 HOSP IP/OBS DSCHRG MGMT 30/<: CPT

## 2021-09-02 PROCEDURE — 80307 DRUG TEST PRSMV CHEM ANLYZR: CPT

## 2021-09-02 PROCEDURE — 82746 ASSAY OF FOLIC ACID SERUM: CPT

## 2021-09-02 PROCEDURE — 88108 CYTOPATH CONCENTRATE TECH: CPT

## 2021-09-02 PROCEDURE — 86255 FLUORESCENT ANTIBODY SCREEN: CPT

## 2021-09-02 PROCEDURE — 83873 ASSAY OF CSF PROTEIN: CPT

## 2021-09-02 PROCEDURE — 82607 VITAMIN B-12: CPT

## 2021-09-02 PROCEDURE — 99285 EMERGENCY DEPT VISIT HI MDM: CPT | Mod: 25

## 2021-09-02 PROCEDURE — 81001 URINALYSIS AUTO W/SCOPE: CPT

## 2021-09-02 PROCEDURE — U0003: CPT

## 2021-09-02 RX ORDER — OLANZAPINE 15 MG/1
2.5 TABLET, FILM COATED ORAL ONCE
Refills: 0 | Status: COMPLETED | OUTPATIENT
Start: 2021-09-02 | End: 2021-09-02

## 2021-09-02 RX ORDER — ALPRAZOLAM 0.25 MG
0.25 TABLET ORAL ONCE
Refills: 0 | Status: DISCONTINUED | OUTPATIENT
Start: 2021-09-02 | End: 2021-09-02

## 2021-09-02 RX ORDER — OLANZAPINE 15 MG/1
5 TABLET, FILM COATED ORAL ONCE
Refills: 0 | Status: DISCONTINUED | OUTPATIENT
Start: 2021-09-02 | End: 2021-09-03

## 2021-09-02 RX ORDER — OLANZAPINE 15 MG/1
7.5 TABLET, FILM COATED ORAL AT BEDTIME
Refills: 0 | Status: DISCONTINUED | OUTPATIENT
Start: 2021-09-02 | End: 2021-09-03

## 2021-09-02 RX ORDER — OLANZAPINE 15 MG/1
5 TABLET, FILM COATED ORAL EVERY 6 HOURS
Refills: 0 | Status: DISCONTINUED | OUTPATIENT
Start: 2021-09-02 | End: 2021-09-03

## 2021-09-02 RX ORDER — OLANZAPINE 15 MG/1
1 TABLET, FILM COATED ORAL
Qty: 0 | Refills: 0 | DISCHARGE
Start: 2021-09-02

## 2021-09-02 RX ADMIN — Medication 0.5 MILLIGRAM(S): at 21:38

## 2021-09-02 RX ADMIN — OLANZAPINE 7.5 MILLIGRAM(S): 15 TABLET, FILM COATED ORAL at 21:38

## 2021-09-02 RX ADMIN — Medication 0.5 MILLIGRAM(S): at 00:14

## 2021-09-02 RX ADMIN — Medication 0.25 MILLIGRAM(S): at 12:13

## 2021-09-02 RX ADMIN — OLANZAPINE 5 MILLIGRAM(S): 15 TABLET, FILM COATED ORAL at 18:25

## 2021-09-02 RX ADMIN — PANTOPRAZOLE SODIUM 40 MILLIGRAM(S): 20 TABLET, DELAYED RELEASE ORAL at 06:12

## 2021-09-02 RX ADMIN — ENOXAPARIN SODIUM 40 MILLIGRAM(S): 100 INJECTION SUBCUTANEOUS at 11:51

## 2021-09-02 RX ADMIN — OLANZAPINE 5 MILLIGRAM(S): 15 TABLET, FILM COATED ORAL at 11:51

## 2021-09-02 RX ADMIN — Medication 0.25 MILLIGRAM(S): at 10:05

## 2021-09-02 RX ADMIN — Medication 58 MILLIGRAM(S): at 06:13

## 2021-09-02 RX ADMIN — OLANZAPINE 2.5 MILLIGRAM(S): 15 TABLET, FILM COATED ORAL at 13:25

## 2021-09-02 RX ADMIN — OLANZAPINE 2.5 MILLIGRAM(S): 15 TABLET, FILM COATED ORAL at 14:00

## 2021-09-02 NOTE — BH CONSULTATION LIAISON PROGRESS NOTE - NSBHROSSYSTEMS_PSY_ALL_CORE
Psychiatric
Psychiatric
Constitutional Symptoms.../Eyes.../Ears/Nose/Throat/Mouth.../Cardiovascular.../Respiratory.../Gastrointestinal.../Genitourinary.../Musculoskeletal.../Skin.../Neurological.../Psychiatric/Endocrine.../Hematologic/Lymphatic.../Allergic/Immunologic...
Psychiatric

## 2021-09-02 NOTE — BH INPATIENT PSYCHIATRY ASSESSMENT NOTE - CURRENT MEDICATION
MEDICATIONS  (STANDING):  OLANZapine 7.5 milliGRAM(s) Oral at bedtime    MEDICATIONS  (PRN):  OLANZapine 5 milliGRAM(s) Oral every 6 hours PRN agitation  OLANZapine Injectable 5 milliGRAM(s) IntraMuscular once PRN agitation

## 2021-09-02 NOTE — BH CONSULTATION LIAISON PROGRESS NOTE - NSBHCHARTREVIEWVS_PSY_A_CORE FT
Vital Signs Last 24 Hrs  T(C): 36.9 (27 Aug 2021 13:26), Max: 37.1 (26 Aug 2021 15:47)  T(F): 98.5 (27 Aug 2021 13:26), Max: 98.7 (26 Aug 2021 15:47)  HR: 86 (27 Aug 2021 13:26) (77 - 89)  BP: 106/70 (27 Aug 2021 13:26) (104/69 - 131/91)  BP(mean): --  RR: 18 (27 Aug 2021 13:26) (16 - 20)  SpO2: 99% (27 Aug 2021 13:26) (97% - 100%)
Vital Signs Last 24 Hrs  T(C): 36.7 (28 Aug 2021 04:54), Max: 36.9 (27 Aug 2021 13:26)  T(F): 98 (28 Aug 2021 04:54), Max: 98.5 (27 Aug 2021 13:26)  HR: 68 (28 Aug 2021 04:54) (68 - 86)  BP: 111/72 (28 Aug 2021 04:54) (106/70 - 133/84)  BP(mean): --  RR: 18 (28 Aug 2021 04:54) (18 - 18)  SpO2: 98% (28 Aug 2021 04:54) (98% - 99%)
Vital Signs Last 24 Hrs  T(C): 37 (01 Sep 2021 09:00), Max: 37 (01 Sep 2021 09:00)  T(F): 98.6 (01 Sep 2021 09:00), Max: 98.6 (01 Sep 2021 09:00)  HR: 87 (01 Sep 2021 09:00) (74 - 109)  BP: 107/71 (01 Sep 2021 09:00) (105/70 - 119/77)  BP(mean): --  RR: 18 (01 Sep 2021 09:00) (18 - 18)  SpO2: 97% (01 Sep 2021 09:00) (96% - 98%)
Vital Signs Last 24 Hrs  T(C): 36.9 (30 Aug 2021 15:11), Max: 36.9 (30 Aug 2021 11:00)  T(F): 98.4 (30 Aug 2021 15:11), Max: 98.4 (30 Aug 2021 11:00)  HR: 78 (30 Aug 2021 15:11) (76 - 83)  BP: 107/70 (30 Aug 2021 15:11) (107/70 - 119/75)  BP(mean): --  RR: 18 (30 Aug 2021 15:11) (18 - 20)  SpO2: 97% (30 Aug 2021 15:11) (96% - 98%)
Vital Signs Last 24 Hrs  T(C): 36.8 (29 Aug 2021 04:23), Max: 37 (28 Aug 2021 15:45)  T(F): 98.2 (29 Aug 2021 04:23), Max: 98.6 (28 Aug 2021 15:45)  HR: 81 (29 Aug 2021 04:23) (77 - 82)  BP: 122/85 (29 Aug 2021 04:23) (105/70 - 122/85)  BP(mean): --  RR: 18 (29 Aug 2021 04:23) (18 - 18)  SpO2: 98% (29 Aug 2021 04:23) (98% - 98%)
Vital Signs Last 24 Hrs  T(C): 36.8 (31 Aug 2021 10:18), Max: 36.9 (30 Aug 2021 15:11)  T(F): 98.3 (31 Aug 2021 10:18), Max: 98.4 (30 Aug 2021 15:11)  HR: 78 (31 Aug 2021 10:18) (72 - 80)  BP: 111/77 (31 Aug 2021 10:18) (100/66 - 157/89)  BP(mean): --  RR: 18 (31 Aug 2021 10:18) (14 - 18)  SpO2: 98% (31 Aug 2021 10:18) (97% - 100%)
Vital Signs Last 24 Hrs  T(C): 36.8 (30 Aug 2021 07:24), Max: 37.1 (29 Aug 2021 16:30)  T(F): 98.3 (30 Aug 2021 07:24), Max: 98.7 (29 Aug 2021 16:30)  HR: 76 (30 Aug 2021 07:24) (76 - 84)  BP: 110/73 (30 Aug 2021 07:24) (102/84 - 119/75)  BP(mean): --  RR: 18 (30 Aug 2021 07:24) (18 - 18)  SpO2: 97% (30 Aug 2021 07:24) (96% - 98%)
Vital Signs Last 24 Hrs  T(C): 36.8 (02 Sep 2021 05:17), Max: 36.9 (01 Sep 2021 18:35)  T(F): 98.2 (02 Sep 2021 05:17), Max: 98.4 (01 Sep 2021 18:35)  HR: 87 (02 Sep 2021 05:17) (84 - 87)  BP: 105/70 (02 Sep 2021 05:17) (100/64 - 105/74)  BP(mean): --  RR: 18 (02 Sep 2021 05:17) (18 - 18)  SpO2: 97% (02 Sep 2021 05:17) (96% - 97%)

## 2021-09-02 NOTE — BH CONSULTATION LIAISON PROGRESS NOTE - CURRENT MEDICATION
MEDICATIONS  (STANDING):  enoxaparin Injectable 40 milliGRAM(s) SubCutaneous daily  hydrocortisone 1% Ointment 1 Application(s) Topical two times a day  nitrofurantoin monohydrate/macrocrystals (MACROBID) 100 milliGRAM(s) Oral two times a day    MEDICATIONS  (PRN):  acetaminophen   Tablet .. 650 milliGRAM(s) Oral every 6 hours PRN Temp greater or equal to 38C (100.4F), Mild Pain (1 - 3), Moderate Pain (4 - 6), Severe Pain (7 - 10)  ALPRAZolam 0.5 milliGRAM(s) Oral two times a day PRN Anxiety  lacosamide Injectable 200 milliGRAM(s) IV Push once PRN Seizure  LORazepam   Injectable 2 milliGRAM(s) IV Push once PRN Seizure  
MEDICATIONS  (STANDING):  enoxaparin Injectable 40 milliGRAM(s) SubCutaneous daily  nitrofurantoin monohydrate/macrocrystals (MACROBID) 100 milliGRAM(s) Oral two times a day    MEDICATIONS  (PRN):  acetaminophen   Tablet .. 650 milliGRAM(s) Oral every 6 hours PRN Temp greater or equal to 38C (100.4F), Mild Pain (1 - 3), Moderate Pain (4 - 6), Severe Pain (7 - 10)  ALPRAZolam 0.5 milliGRAM(s) Oral two times a day PRN Anxiety  lacosamide Injectable 200 milliGRAM(s) IV Push once PRN Seizure  LORazepam   Injectable 2 milliGRAM(s) IV Push once PRN Seizure  
MEDICATIONS  (STANDING):  enoxaparin Injectable 40 milliGRAM(s) SubCutaneous daily  nitrofurantoin monohydrate/macrocrystals (MACROBID) 100 milliGRAM(s) Oral two times a day    MEDICATIONS  (PRN):  acetaminophen   Tablet .. 650 milliGRAM(s) Oral every 6 hours PRN Temp greater or equal to 38C (100.4F), Mild Pain (1 - 3), Moderate Pain (4 - 6), Severe Pain (7 - 10)  ALPRAZolam 0.5 milliGRAM(s) Oral two times a day PRN Anxiety  lacosamide Injectable 200 milliGRAM(s) IV Push once PRN Seizure  LORazepam   Injectable 2 milliGRAM(s) IV Push once PRN Seizure  
MEDICATIONS  (STANDING):  hydrocortisone 1% Ointment 1 Application(s) Topical two times a day  nitrofurantoin monohydrate/macrocrystals (MACROBID) 100 milliGRAM(s) Oral two times a day  OLANZapine 5 milliGRAM(s) Oral daily    MEDICATIONS  (PRN):  acetaminophen   Tablet .. 650 milliGRAM(s) Oral every 6 hours PRN Temp greater or equal to 38C (100.4F), Mild Pain (1 - 3), Moderate Pain (4 - 6), Severe Pain (7 - 10)  ALPRAZolam 0.5 milliGRAM(s) Oral two times a day PRN Anxiety  lacosamide Injectable 200 milliGRAM(s) IV Push once PRN Seizure  LORazepam   Injectable 2 milliGRAM(s) IV Push once PRN Seizure  
MEDICATIONS  (STANDING):  enoxaparin Injectable 40 milliGRAM(s) SubCutaneous daily  nitrofurantoin monohydrate/macrocrystals (MACROBID) 100 milliGRAM(s) Oral two times a day    MEDICATIONS  (PRN):  ALPRAZolam 0.5 milliGRAM(s) Oral two times a day PRN Anxiety  lacosamide Injectable 200 milliGRAM(s) IV Push once PRN Seizure  LORazepam   Injectable 2 milliGRAM(s) IV Push once PRN Seizure  
MEDICATIONS  (STANDING):  hydrocortisone 1% Ointment 1 Application(s) Topical two times a day  methylPREDNISolone sodium succinate IVPB 1000 milliGRAM(s) IV Intermittent daily  nitrofurantoin monohydrate/macrocrystals (MACROBID) 100 milliGRAM(s) Oral two times a day  OLANZapine 5 milliGRAM(s) Oral daily  pantoprazole    Tablet 40 milliGRAM(s) Oral before breakfast    MEDICATIONS  (PRN):  acetaminophen   Tablet .. 650 milliGRAM(s) Oral every 6 hours PRN Temp greater or equal to 38C (100.4F), Mild Pain (1 - 3), Moderate Pain (4 - 6), Severe Pain (7 - 10)  ALPRAZolam 0.5 milliGRAM(s) Oral two times a day PRN Anxiety  lacosamide Injectable 200 milliGRAM(s) IV Push once PRN Seizure  LORazepam   Injectable 2 milliGRAM(s) IV Push once PRN Seizure  
MEDICATIONS  (STANDING):  hydrocortisone 1% Ointment 1 Application(s) Topical two times a day  methylPREDNISolone sodium succinate IVPB 1000 milliGRAM(s) IV Intermittent daily  OLANZapine 5 milliGRAM(s) Oral daily  pantoprazole    Tablet 40 milliGRAM(s) Oral before breakfast    MEDICATIONS  (PRN):  acetaminophen   Tablet .. 650 milliGRAM(s) Oral every 6 hours PRN Temp greater or equal to 38C (100.4F), Mild Pain (1 - 3), Moderate Pain (4 - 6), Severe Pain (7 - 10)  ALPRAZolam 0.5 milliGRAM(s) Oral two times a day PRN Anxiety  haloperidol    Injectable 5 milliGRAM(s) IV Push once PRN Agitation  lacosamide Injectable 200 milliGRAM(s) IV Push once PRN Seizure  
MEDICATIONS  (STANDING):  enoxaparin Injectable 40 milliGRAM(s) SubCutaneous daily  hydrocortisone 1% Ointment 1 Application(s) Topical two times a day  methylPREDNISolone sodium succinate IVPB 1000 milliGRAM(s) IV Intermittent <User Schedule>  OLANZapine 5 milliGRAM(s) Oral daily  pantoprazole    Tablet 40 milliGRAM(s) Oral before breakfast    MEDICATIONS  (PRN):  acetaminophen   Tablet .. 650 milliGRAM(s) Oral every 6 hours PRN Temp greater or equal to 38C (100.4F), Mild Pain (1 - 3), Moderate Pain (4 - 6), Severe Pain (7 - 10)  ALPRAZolam 0.5 milliGRAM(s) Oral two times a day PRN Anxiety  haloperidol    Injectable 5 milliGRAM(s) IV Push once PRN Agitation  lacosamide Injectable 200 milliGRAM(s) IV Push once PRN Seizure

## 2021-09-02 NOTE — PROGRESS NOTE ADULT - ASSESSMENT
Assessment: 36 yo female with a PMHx of bipolar disorder (not taking medication) presents to the ED for psychiatric evaluation. Patient states she came to the ED for anxiety, depression, and confusion. She also states she is hearing things in her head that others cannot hear. Mother is concerned that the patient may be having seizures. She states she witnessed two events over the past two days. During these events the patient's eyes were "fluttering," which she further describes as eyelids closed but moving. The patient did not move or speak for at least 3-6 hours each time. The patient states she remembers both events, and states she was intensely frustrated during those times. Patient was evaluated by psychiatry in the ED, with consideration for inpatient psych admission once medically cleared. Admit to EMU for possible event capture and characterization. EEG overnight for 4hrs normal, will dc as patient unable to tolerate. Patient refusing LP. Psych recommending inpatient psych admission and per SW discussion, patient and mother seem amenable, likely Monday. Will attempt to r/o organic etiology w/ head imaging. On discussion with mother at bedside it seems patient was functional and had 2 prior jobs at which she was able to perform well. Her last job is when she started having issues with confusion and difficulty focusing suggesting a more acute decline in the behavioral status.     Plan:  [] MRI brain without contrast - neg  [] S/p LP - under anesthesia.  [] Start Solumedrol today 1gm x 3days (8/31-9/2)  [] Transvaginal US to r/o teratoma   [] Autoimmune encephalitis panel in blood   [] Continue 1:1.  [] No AEDs for now.  [] Haldol 5mg IV PRN for agitation  [] Seizure/Fall/Aspiration precautions.  [] Psychiatry stating patient candidate for inpatient psych admission.   [] c/w home dose Xanax PRN.  [] Continue Macrobid 100MG PO BID x5 days (end 9/1/21)  [x] UDS: +benzodiazepines.  [] DVT PPx: Lovenox 40MG SC QD.  [] Diet: Regular.  [] Anticipate transfer to psych inpatient after IV steroids 3 days completed  [] Seizure Rescue: Ativan 1MG IVP x1 for convulsions/GTC lasting > 3 minutes. Vimpat 200MG IV x1 for convulsions/GTC refractory to Ativan.      Case to be discussed with neurology attending Dr. Esqueda

## 2021-09-02 NOTE — BH PATIENT PROFILE - HAS THE PATIENT EXPERIENCED ANY OF THE FOLLOWING WITHIN THE WEEK PRIOR TO ADMISSION?
Medical Necessity Information: It is in your best interest to select a reason for this procedure from the list below. All of these items fulfill various CMS LCD requirements except lesion extends to a margin. Include Z78.9 (Other Specified Conditions Influencing Health Status) As An Associated Diagnosis?: No Medical Necessity Clause: This procedure was medically necessary because the lesion that was treated was: Lab: [id number] Lab Facility: [id number] Body Location Override (Optional - Billing Will Still Be Based On Selected Body Map Location If Applicable): left upper posterior arm Surgeon (Optional): Dr. Yoon Size Of Lesion In Cm: 6 Size Of Margin In Cm: 0 Anesthesia Volume In Cc: 15 Excision Method: Elliptical Repair Type: Intermediate Suturegard Retention Suture: 2-0 Nylon Retention Suture Bite Size: 3 mm Length To Time In Minutes Device Was In Place: 10 Number Of Hemigard Strips Per Side: 1 Intermediate / Complex Repair - Final Wound Length In Cm: 3 Undermining Type: Entire Wound Debridement Text: The wound edges were debrided prior to proceeding with the closure to facilitate wound healing. Helical Rim Text: The closure involved the helical rim. Vermilion Border Text: The closure involved the vermilion border. Nostril Rim Text: The closure involved the nostril rim. Retention Suture Text: Retention sutures were placed to support the closure and prevent dehiscence. Epidermal Closure Graft Donor Site (Optional): simple interrupted Graft Donor Site Bandage (Optional-Leave Blank If You Don't Want In Note): Steri-strips and a pressure bandage were applied to the donor site. Detail Level: Detailed Excision Depth: adipose tissue Scalpel Size: 15 blade Anesthesia Type: 1% lidocaine with epinephrine Hemostasis: Electrocautery Estimated Blood Loss (Cc): minimal Deep Sutures: 5-0 Vicryl Epidermal Sutures: 5-0 Fast Absorbing Gut Epidermal Closure: running Wound Care: Mupirocin Dressing: dry sterile dressing Suturegard Intro: Intraoperative tissue expansion was performed, utilizing the SUTUREGARD device, in order to reduce wound tension. no Suturegard Body: The suture ends were repeatedly re-tightened and re-clamped to achieve the desired tissue expansion. Hemigard Intro: Due to skin fragility and wound tension, it was decided to use HEMIGARD adhesive retention suture devices to permit a linear closure. The skin was cleaned and dried for a 6cm distance away from the wound. Excessive hair, if present, was removed to allow for adhesion. Hemigard Postcare Instructions: The HEMIGARD strips are to remain completely dry for at least 5-7 days. Complex Repair Preamble Text (Leave Blank If You Do Not Want): Extensive wide undermining was performed. Intermediate Repair Preamble Text (Leave Blank If You Do Not Want): Undermining was performed with blunt dissection. Fusiform Excision Additional Text (Leave Blank If You Do Not Want): The margin was drawn around the clinically apparent lesion.  A fusiform shape was then drawn on the skin incorporating the lesion and margins.  Incisions were then made along these lines to the appropriate tissue plane and the lesion was extirpated. Eliptical Excision Additional Text (Leave Blank If You Do Not Want): The margin was drawn around the clinically apparent lesion.  An elliptical shape was then drawn on the skin incorporating the lesion and margins.  Incisions were then made along these lines to the appropriate tissue plane and the lesion was extirpated. Saucerization Excision Additional Text (Leave Blank If You Do Not Want): The margin was drawn around the clinically apparent lesion.  Incisions were then made along these lines, in a tangential fashion, to the appropriate tissue plane and the lesion was extirpated. Slit Excision Additional Text (Leave Blank If You Do Not Want): A linear line was drawn on the skin overlying the lesion. An incision was made slowly until the lesion was visualized.  Once visualized, the lesion was removed with blunt dissection. Excisional Biopsy Additional Text (Leave Blank If You Do Not Want): The margin was drawn around the clinically apparent lesion. An elliptical shape was then drawn on the skin incorporating the lesion and margins.  Incisions were then made along these lines to the appropriate tissue plane and the lesion was extirpated. Perilesional Excision Additional Text (Leave Blank If You Do Not Want): The margin was drawn around the clinically apparent lesion. Incisions were then made along these lines to the appropriate tissue plane and the lesion was extirpated. Repair Performed By Another Provider Text (Leave Blank If You Do Not Want): After the tissue was excised the defect was repaired by another provider. No Repair - Repaired With Adjacent Surgical Defect Text (Leave Blank If You Do Not Want): After the excision the defect was repaired concurrently with another surgical defect which was in close approximation. Advancement Flap (Single) Text: The defect edges were debeveled with a #15 scalpel blade.  Given the location of the defect and the proximity to free margins a single advancement flap was deemed most appropriate.  Using a sterile surgical marker, an appropriate advancement flap was drawn incorporating the defect and placing the expected incisions within the relaxed skin tension lines where possible.    The area thus outlined was incised deep to adipose tissue with a #15 scalpel blade.  The skin margins were undermined to an appropriate distance in all directions utilizing iris scissors. Advancement Flap (Double) Text: The defect edges were debeveled with a #15 scalpel blade.  Given the location of the defect and the proximity to free margins a double advancement flap was deemed most appropriate.  Using a sterile surgical marker, the appropriate advancement flaps were drawn incorporating the defect and placing the expected incisions within the relaxed skin tension lines where possible.    The area thus outlined was incised deep to adipose tissue with a #15 scalpel blade.  The skin margins were undermined to an appropriate distance in all directions utilizing iris scissors. Burow's Advancement Flap Text: The defect edges were debeveled with a #15 scalpel blade.  Given the location of the defect and the proximity to free margins a Burow's advancement flap was deemed most appropriate.  Using a sterile surgical marker, the appropriate advancement flap was drawn incorporating the defect and placing the expected incisions within the relaxed skin tension lines where possible.    The area thus outlined was incised deep to adipose tissue with a #15 scalpel blade.  The skin margins were undermined to an appropriate distance in all directions utilizing iris scissors. Chonodrocutaneous Helical Advancement Flap Text: The defect edges were debeveled with a #15 scalpel blade.  Given the location of the defect and the proximity to free margins a chondrocutaneous helical advancement flap was deemed most appropriate.  Using a sterile surgical marker, the appropriate advancement flap was drawn incorporating the defect and placing the expected incisions within the relaxed skin tension lines where possible.    The area thus outlined was incised deep to adipose tissue with a #15 scalpel blade.  The skin margins were undermined to an appropriate distance in all directions utilizing iris scissors. Crescentic Advancement Flap Text: The defect edges were debeveled with a #15 scalpel blade.  Given the location of the defect and the proximity to free margins a crescentic advancement flap was deemed most appropriate.  Using a sterile surgical marker, the appropriate advancement flap was drawn incorporating the defect and placing the expected incisions within the relaxed skin tension lines where possible.    The area thus outlined was incised deep to adipose tissue with a #15 scalpel blade.  The skin margins were undermined to an appropriate distance in all directions utilizing iris scissors. A-T Advancement Flap Text: The defect edges were debeveled with a #15 scalpel blade.  Given the location of the defect, shape of the defect and the proximity to free margins an A-T advancement flap was deemed most appropriate.  Using a sterile surgical marker, an appropriate advancement flap was drawn incorporating the defect and placing the expected incisions within the relaxed skin tension lines where possible.    The area thus outlined was incised deep to adipose tissue with a #15 scalpel blade.  The skin margins were undermined to an appropriate distance in all directions utilizing iris scissors. O-T Advancement Flap Text: The defect edges were debeveled with a #15 scalpel blade.  Given the location of the defect, shape of the defect and the proximity to free margins an O-T advancement flap was deemed most appropriate.  Using a sterile surgical marker, an appropriate advancement flap was drawn incorporating the defect and placing the expected incisions within the relaxed skin tension lines where possible.    The area thus outlined was incised deep to adipose tissue with a #15 scalpel blade.  The skin margins were undermined to an appropriate distance in all directions utilizing iris scissors. O-L Flap Text: The defect edges were debeveled with a #15 scalpel blade.  Given the location of the defect, shape of the defect and the proximity to free margins an O-L flap was deemed most appropriate.  Using a sterile surgical marker, an appropriate advancement flap was drawn incorporating the defect and placing the expected incisions within the relaxed skin tension lines where possible.    The area thus outlined was incised deep to adipose tissue with a #15 scalpel blade.  The skin margins were undermined to an appropriate distance in all directions utilizing iris scissors. O-Z Flap Text: The defect edges were debeveled with a #15 scalpel blade.  Given the location of the defect, shape of the defect and the proximity to free margins an O-Z flap was deemed most appropriate.  Using a sterile surgical marker, an appropriate transposition flap was drawn incorporating the defect and placing the expected incisions within the relaxed skin tension lines where possible. The area thus outlined was incised deep to adipose tissue with a #15 scalpel blade.  The skin margins were undermined to an appropriate distance in all directions utilizing iris scissors. Double O-Z Flap Text: The defect edges were debeveled with a #15 scalpel blade.  Given the location of the defect, shape of the defect and the proximity to free margins a Double O-Z flap was deemed most appropriate.  Using a sterile surgical marker, an appropriate transposition flap was drawn incorporating the defect and placing the expected incisions within the relaxed skin tension lines where possible. The area thus outlined was incised deep to adipose tissue with a #15 scalpel blade.  The skin margins were undermined to an appropriate distance in all directions utilizing iris scissors. V-Y Flap Text: The defect edges were debeveled with a #15 scalpel blade.  Given the location of the defect, shape of the defect and the proximity to free margins a V-Y flap was deemed most appropriate.  Using a sterile surgical marker, an appropriate advancement flap was drawn incorporating the defect and placing the expected incisions within the relaxed skin tension lines where possible.    The area thus outlined was incised deep to adipose tissue with a #15 scalpel blade.  The skin margins were undermined to an appropriate distance in all directions utilizing iris scissors. Mercedes Flap Text: The defect edges were debeveled with a #15 scalpel blade.  Given the location of the defect, shape of the defect and the proximity to free margins a Mercedes flap was deemed most appropriate.  Using a sterile surgical marker, an appropriate advancement flap was drawn incorporating the defect and placing the expected incisions within the relaxed skin tension lines where possible. The area thus outlined was incised deep to adipose tissue with a #15 scalpel blade.  The skin margins were undermined to an appropriate distance in all directions utilizing iris scissors. Modified Advancement Flap Text: The defect edges were debeveled with a #15 scalpel blade.  Given the location of the defect, shape of the defect and the proximity to free margins a modified advancement flap was deemed most appropriate.  Using a sterile surgical marker, an appropriate advancement flap was drawn incorporating the defect and placing the expected incisions within the relaxed skin tension lines where possible.    The area thus outlined was incised deep to adipose tissue with a #15 scalpel blade.  The skin margins were undermined to an appropriate distance in all directions utilizing iris scissors. Mucosal Advancement Flap Text: Given the location of the defect, shape of the defect and the proximity to free margins a mucosal advancement flap was deemed most appropriate. Incisions were made with a 15 blade scalpel in the appropriate fashion along the cutaneous vermillion border and the mucosal lip. The remaining actinically damaged mucosal tissue was excised.  The mucosal advancement flap was then elevated to the gingival sulcus with care taken to preserve the neurovascular structures and advanced into the primary defect. Care was taken to ensure that precise realignment of the vermilion border was achieved. Hatchet Flap Text: The defect edges were debeveled with a #15 scalpel blade.  Given the location of the defect, shape of the defect and the proximity to free margins a hatchet flap was deemed most appropriate.  Using a sterile surgical marker, an appropriate hatchet flap was drawn incorporating the defect and placing the expected incisions within the relaxed skin tension lines where possible.    The area thus outlined was incised deep to adipose tissue with a #15 scalpel blade.  The skin margins were undermined to an appropriate distance in all directions utilizing iris scissors. Rotation Flap Text: The defect edges were debeveled with a #15 scalpel blade.  Given the location of the defect, shape of the defect and the proximity to free margins a rotation flap was deemed most appropriate.  Using a sterile surgical marker, an appropriate rotation flap was drawn incorporating the defect and placing the expected incisions within the relaxed skin tension lines where possible.    The area thus outlined was incised deep to adipose tissue with a #15 scalpel blade.  The skin margins were undermined to an appropriate distance in all directions utilizing iris scissors. Spiral Flap Text: The defect edges were debeveled with a #15 scalpel blade.  Given the location of the defect, shape of the defect and the proximity to free margins a spiral flap was deemed most appropriate.  Using a sterile surgical marker, an appropriate rotation flap was drawn incorporating the defect and placing the expected incisions within the relaxed skin tension lines where possible. The area thus outlined was incised deep to adipose tissue with a #15 scalpel blade.  The skin margins were undermined to an appropriate distance in all directions utilizing iris scissors. Star Wedge Flap Text: The defect edges were debeveled with a #15 scalpel blade.  Given the location of the defect, shape of the defect and the proximity to free margins a star wedge flap was deemed most appropriate.  Using a sterile surgical marker, an appropriate rotation flap was drawn incorporating the defect and placing the expected incisions within the relaxed skin tension lines where possible. The area thus outlined was incised deep to adipose tissue with a #15 scalpel blade.  The skin margins were undermined to an appropriate distance in all directions utilizing iris scissors. Transposition Flap Text: The defect edges were debeveled with a #15 scalpel blade.  Given the location of the defect and the proximity to free margins a transposition flap was deemed most appropriate.  Using a sterile surgical marker, an appropriate transposition flap was drawn incorporating the defect.    The area thus outlined was incised deep to adipose tissue with a #15 scalpel blade.  The skin margins were undermined to an appropriate distance in all directions utilizing iris scissors. Muscle Hinge Flap Text: The defect edges were debeveled with a #15 scalpel blade.  Given the size, depth and location of the defect and the proximity to free margins a muscle hinge flap was deemed most appropriate.  Using a sterile surgical marker, an appropriate hinge flap was drawn incorporating the defect. The area thus outlined was incised with a #15 scalpel blade.  The skin margins were undermined to an appropriate distance in all directions utilizing iris scissors. Nasal Turnover Hinge Flap Text: The defect edges were debeveled with a #15 scalpel blade.  Given the size, depth, location of the defect and the defect being full thickness a nasal turnover hinge flap was deemed most appropriate.  Using a sterile surgical marker, an appropriate hinge flap was drawn incorporating the defect. The area thus outlined was incised with a #15 scalpel blade. The flap was designed to recreate the nasal mucosal lining and the alar rim. The skin margins were undermined to an appropriate distance in all directions utilizing iris scissors. Nasalis-Muscle-Based Myocutaneous Island Pedicle Flap Text: Using a #15 blade, an incision was made around the donor flap to the level of the nasalis muscle. Wide lateral undermining was then performed in both the subcutaneous plane above the nasalis muscle, and in a submuscular plane just above periosteum. This allowed the formation of a free nasalis muscle axial pedicle (based on the angular artery) which was still attached to the actual cutaneous flap, increasing its mobility and vascular viability. Hemostasis was obtained with pinpoint electrocoagulation. The flap was mobilized into position and the pivotal anchor points positioned and stabilized with buried interrupted sutures. Subcutaneous and dermal tissues were closed in a multilayered fashion with sutures. Tissue redundancies were excised, and the epidermal edges were apposed without significant tension and sutured with sutures. Orbicularis Oris Muscle Flap Text: The defect edges were debeveled with a #15 scalpel blade.  Given that the defect affected the competency of the oral sphincter an orbicularis oris muscle flap was deemed most appropriate to restore this competency and normal muscle function.  Using a sterile surgical marker, an appropriate flap was drawn incorporating the defect. The area thus outlined was incised with a #15 scalpel blade. Melolabial Transposition Flap Text: The defect edges were debeveled with a #15 scalpel blade.  Given the location of the defect and the proximity to free margins a melolabial flap was deemed most appropriate.  Using a sterile surgical marker, an appropriate melolabial transposition flap was drawn incorporating the defect.    The area thus outlined was incised deep to adipose tissue with a #15 scalpel blade.  The skin margins were undermined to an appropriate distance in all directions utilizing iris scissors. Rhombic Flap Text: The defect edges were debeveled with a #15 scalpel blade.  Given the location of the defect and the proximity to free margins a rhombic flap was deemed most appropriate.  Using a sterile surgical marker, an appropriate rhombic flap was drawn incorporating the defect.    The area thus outlined was incised deep to adipose tissue with a #15 scalpel blade.  The skin margins were undermined to an appropriate distance in all directions utilizing iris scissors. Rhomboid Transposition Flap Text: The defect edges were debeveled with a #15 scalpel blade.  Given the location of the defect and the proximity to free margins a rhomboid transposition flap was deemed most appropriate.  Using a sterile surgical marker, an appropriate rhomboid flap was drawn incorporating the defect.    The area thus outlined was incised deep to adipose tissue with a #15 scalpel blade.  The skin margins were undermined to an appropriate distance in all directions utilizing iris scissors. Bi-Rhombic Flap Text: The defect edges were debeveled with a #15 scalpel blade.  Given the location of the defect and the proximity to free margins a bi-rhombic flap was deemed most appropriate.  Using a sterile surgical marker, an appropriate rhombic flap was drawn incorporating the defect. The area thus outlined was incised deep to adipose tissue with a #15 scalpel blade.  The skin margins were undermined to an appropriate distance in all directions utilizing iris scissors. Helical Rim Advancement Flap Text: The defect edges were debeveled with a #15 blade scalpel.  Given the location of the defect and the proximity to free margins (helical rim) a double helical rim advancement flap was deemed most appropriate.  Using a sterile surgical marker, the appropriate advancement flaps were drawn incorporating the defect and placing the expected incisions between the helical rim and antihelix where possible.  The area thus outlined was incised through and through with a #15 scalpel blade.  With a skin hook and iris scissors, the flaps were gently and sharply undermined and freed up. Bilateral Helical Rim Advancement Flap Text: The defect edges were debeveled with a #15 blade scalpel.  Given the location of the defect and the proximity to free margins (helical rim) a bilateral helical rim advancement flap was deemed most appropriate.  Using a sterile surgical marker, the appropriate advancement flaps were drawn incorporating the defect and placing the expected incisions between the helical rim and antihelix where possible.  The area thus outlined was incised through and through with a #15 scalpel blade.  With a skin hook and iris scissors, the flaps were gently and sharply undermined and freed up. Ear Star Wedge Flap Text: The defect edges were debeveled with a #15 blade scalpel.  Given the location of the defect and the proximity to free margins (helical rim) an ear star wedge flap was deemed most appropriate.  Using a sterile surgical marker, the appropriate flap was drawn incorporating the defect and placing the expected incisions between the helical rim and antihelix where possible.  The area thus outlined was incised through and through with a #15 scalpel blade. Banner Transposition Flap Text: The defect edges were debeveled with a #15 scalpel blade.  Given the location of the defect and the proximity to free margins a Banner transposition flap was deemed most appropriate.  Using a sterile surgical marker, an appropriate flap drawn around the defect. The area thus outlined was incised deep to adipose tissue with a #15 scalpel blade.  The skin margins were undermined to an appropriate distance in all directions utilizing iris scissors. Bilobed Flap Text: The defect edges were debeveled with a #15 scalpel blade.  Given the location of the defect and the proximity to free margins a bilobe flap was deemed most appropriate.  Using a sterile surgical marker, an appropriate bilobe flap drawn around the defect.    The area thus outlined was incised deep to adipose tissue with a #15 scalpel blade.  The skin margins were undermined to an appropriate distance in all directions utilizing iris scissors. Bilobed Transposition Flap Text: The defect edges were debeveled with a #15 scalpel blade.  Given the location of the defect and the proximity to free margins a bilobed transposition flap was deemed most appropriate.  Using a sterile surgical marker, an appropriate bilobe flap drawn around the defect.    The area thus outlined was incised deep to adipose tissue with a #15 scalpel blade.  The skin margins were undermined to an appropriate distance in all directions utilizing iris scissors. Trilobed Flap Text: The defect edges were debeveled with a #15 scalpel blade.  Given the location of the defect and the proximity to free margins a trilobed flap was deemed most appropriate.  Using a sterile surgical marker, an appropriate trilobed flap drawn around the defect.    The area thus outlined was incised deep to adipose tissue with a #15 scalpel blade.  The skin margins were undermined to an appropriate distance in all directions utilizing iris scissors. Dorsal Nasal Flap Text: The defect edges were debeveled with a #15 scalpel blade.  Given the location of the defect and the proximity to free margins a dorsal nasal flap was deemed most appropriate.  Using a sterile surgical marker, an appropriate dorsal nasal flap was drawn around the defect.    The area thus outlined was incised deep to adipose tissue with a #15 scalpel blade.  The skin margins were undermined to an appropriate distance in all directions utilizing iris scissors. Island Pedicle Flap Text: The defect edges were debeveled with a #15 scalpel blade.  Given the location of the defect, shape of the defect and the proximity to free margins an island pedicle advancement flap was deemed most appropriate.  Using a sterile surgical marker, an appropriate advancement flap was drawn incorporating the defect, outlining the appropriate donor tissue and placing the expected incisions within the relaxed skin tension lines where possible.    The area thus outlined was incised deep to adipose tissue with a #15 scalpel blade.  The skin margins were undermined to an appropriate distance in all directions around the primary defect and laterally outward around the island pedicle utilizing iris scissors.  There was minimal undermining beneath the pedicle flap. Island Pedicle Flap With Canthal Suspension Text: The defect edges were debeveled with a #15 scalpel blade.  Given the location of the defect, shape of the defect and the proximity to free margins an island pedicle advancement flap was deemed most appropriate.  Using a sterile surgical marker, an appropriate advancement flap was drawn incorporating the defect, outlining the appropriate donor tissue and placing the expected incisions within the relaxed skin tension lines where possible. The area thus outlined was incised deep to adipose tissue with a #15 scalpel blade.  The skin margins were undermined to an appropriate distance in all directions around the primary defect and laterally outward around the island pedicle utilizing iris scissors.  There was minimal undermining beneath the pedicle flap. A suspension suture was placed in the canthal tendon to prevent tension and prevent ectropion. Alar Island Pedicle Flap Text: The defect edges were debeveled with a #15 scalpel blade.  Given the location of the defect, shape of the defect and the proximity to the alar rim an island pedicle advancement flap was deemed most appropriate.  Using a sterile surgical marker, an appropriate advancement flap was drawn incorporating the defect, outlining the appropriate donor tissue and placing the expected incisions within the nasal ala running parallel to the alar rim. The area thus outlined was incised with a #15 scalpel blade.  The skin margins were undermined minimally to an appropriate distance in all directions around the primary defect and laterally outward around the island pedicle utilizing iris scissors.  There was minimal undermining beneath the pedicle flap. Double Island Pedicle Flap Text: The defect edges were debeveled with a #15 scalpel blade.  Given the location of the defect, shape of the defect and the proximity to free margins a double island pedicle advancement flap was deemed most appropriate.  Using a sterile surgical marker, an appropriate advancement flap was drawn incorporating the defect, outlining the appropriate donor tissue and placing the expected incisions within the relaxed skin tension lines where possible.    The area thus outlined was incised deep to adipose tissue with a #15 scalpel blade.  The skin margins were undermined to an appropriate distance in all directions around the primary defect and laterally outward around the island pedicle utilizing iris scissors.  There was minimal undermining beneath the pedicle flap. Island Pedicle Flap-Requiring Vessel Identification Text: The defect edges were debeveled with a #15 scalpel blade.  Given the location of the defect, shape of the defect and the proximity to free margins an island pedicle advancement flap was deemed most appropriate.  Using a sterile surgical marker, an appropriate advancement flap was drawn, based on the axial vessel mentioned above, incorporating the defect, outlining the appropriate donor tissue and placing the expected incisions within the relaxed skin tension lines where possible.    The area thus outlined was incised deep to adipose tissue with a #15 scalpel blade.  The skin margins were undermined to an appropriate distance in all directions around the primary defect and laterally outward around the island pedicle utilizing iris scissors.  There was minimal undermining beneath the pedicle flap. Keystone Flap Text: The defect edges were debeveled with a #15 scalpel blade.  Given the location of the defect, shape of the defect a keystone flap was deemed most appropriate.  Using a sterile surgical marker, an appropriate keystone flap was drawn incorporating the defect, outlining the appropriate donor tissue and placing the expected incisions within the relaxed skin tension lines where possible. The area thus outlined was incised deep to adipose tissue with a #15 scalpel blade.  The skin margins were undermined to an appropriate distance in all directions around the primary defect and laterally outward around the flap utilizing iris scissors. O-T Plasty Text: The defect edges were debeveled with a #15 scalpel blade.  Given the location of the defect, shape of the defect and the proximity to free margins an O-T plasty was deemed most appropriate.  Using a sterile surgical marker, an appropriate O-T plasty was drawn incorporating the defect and placing the expected incisions within the relaxed skin tension lines where possible.    The area thus outlined was incised deep to adipose tissue with a #15 scalpel blade.  The skin margins were undermined to an appropriate distance in all directions utilizing iris scissors. O-Z Plasty Text: The defect edges were debeveled with a #15 scalpel blade.  Given the location of the defect, shape of the defect and the proximity to free margins an O-Z plasty (double transposition flap) was deemed most appropriate.  Using a sterile surgical marker, the appropriate transposition flaps were drawn incorporating the defect and placing the expected incisions within the relaxed skin tension lines where possible.    The area thus outlined was incised deep to adipose tissue with a #15 scalpel blade.  The skin margins were undermined to an appropriate distance in all directions utilizing iris scissors.  Hemostasis was achieved with electrocautery.  The flaps were then transposed into place, one clockwise and the other counterclockwise, and anchored with interrupted buried subcutaneous sutures. Double O-Z Plasty Text: The defect edges were debeveled with a #15 scalpel blade.  Given the location of the defect, shape of the defect and the proximity to free margins a Double O-Z plasty (double transposition flap) was deemed most appropriate.  Using a sterile surgical marker, the appropriate transposition flaps were drawn incorporating the defect and placing the expected incisions within the relaxed skin tension lines where possible. The area thus outlined was incised deep to adipose tissue with a #15 scalpel blade.  The skin margins were undermined to an appropriate distance in all directions utilizing iris scissors.  Hemostasis was achieved with electrocautery.  The flaps were then transposed into place, one clockwise and the other counterclockwise, and anchored with interrupted buried subcutaneous sutures. V-Y Plasty Text: The defect edges were debeveled with a #15 scalpel blade.  Given the location of the defect, shape of the defect and the proximity to free margins an V-Y advancement flap was deemed most appropriate.  Using a sterile surgical marker, an appropriate advancement flap was drawn incorporating the defect and placing the expected incisions within the relaxed skin tension lines where possible.    The area thus outlined was incised deep to adipose tissue with a #15 scalpel blade.  The skin margins were undermined to an appropriate distance in all directions utilizing iris scissors. H Plasty Text: Given the location of the defect, shape of the defect and the proximity to free margins a H-plasty was deemed most appropriate for repair.  Using a sterile surgical marker, the appropriate advancement arms of the H-plasty were drawn incorporating the defect and placing the expected incisions within the relaxed skin tension lines where possible. The area thus outlined was incised deep to adipose tissue with a #15 scalpel blade. The skin margins were undermined to an appropriate distance in all directions utilizing iris scissors.  The opposing advancement arms were then advanced into place in opposite direction and anchored with interrupted buried subcutaneous sutures. W Plasty Text: The lesion was extirpated to the level of the fat with a #15 scalpel blade.  Given the location of the defect, shape of the defect and the proximity to free margins a W-plasty was deemed most appropriate for repair.  Using a sterile surgical marker, the appropriate transposition arms of the W-plasty were drawn incorporating the defect and placing the expected incisions within the relaxed skin tension lines where possible.    The area thus outlined was incised deep to adipose tissue with a #15 scalpel blade.  The skin margins were undermined to an appropriate distance in all directions utilizing iris scissors.  The opposing transposition arms were then transposed into place in opposite direction and anchored with interrupted buried subcutaneous sutures. Z Plasty Text: The lesion was extirpated to the level of the fat with a #15 scalpel blade.  Given the location of the defect, shape of the defect and the proximity to free margins a Z-plasty was deemed most appropriate for repair.  Using a sterile surgical marker, the appropriate transposition arms of the Z-plasty were drawn incorporating the defect and placing the expected incisions within the relaxed skin tension lines where possible.    The area thus outlined was incised deep to adipose tissue with a #15 scalpel blade.  The skin margins were undermined to an appropriate distance in all directions utilizing iris scissors.  The opposing transposition arms were then transposed into place in opposite direction and anchored with interrupted buried subcutaneous sutures. Zygomaticofacial Flap Text: Given the location of the defect, shape of the defect and the proximity to free margins a zygomaticofacial flap was deemed most appropriate for repair.  Using a sterile surgical marker, the appropriate flap was drawn incorporating the defect and placing the expected incisions within the relaxed skin tension lines where possible. The area thus outlined was incised deep to adipose tissue with a #15 scalpel blade with preservation of a vascular pedicle.  The skin margins were undermined to an appropriate distance in all directions utilizing iris scissors.  The flap was then placed into the defect and anchored with interrupted buried subcutaneous sutures. Cheek Interpolation Flap Text: A decision was made to reconstruct the defect utilizing an interpolation axial flap and a staged reconstruction.  A telfa template was made of the defect.  This telfa template was then used to outline the Cheek Interpolation flap.  The donor area for the pedicle flap was then injected with anesthesia.  The flap was excised through the skin and subcutaneous tissue down to the layer of the underlying musculature.  The interpolation flap was carefully excised within this deep plane to maintain its blood supply.  The edges of the donor site were undermined.   The donor site was closed in a primary fashion.  The pedicle was then rotated into position and sutured.  Once the tube was sutured into place, adequate blood supply was confirmed with blanching and refill.  The pedicle was then wrapped with xeroform gauze and dressed appropriately with a telfa and gauze bandage to ensure continued blood supply and protect the attached pedicle. Cheek-To-Nose Interpolation Flap Text: A decision was made to reconstruct the defect utilizing an interpolation axial flap and a staged reconstruction.  A telfa template was made of the defect.  This telfa template was then used to outline the Cheek-To-Nose Interpolation flap.  The donor area for the pedicle flap was then injected with anesthesia.  The flap was excised through the skin and subcutaneous tissue down to the layer of the underlying musculature.  The interpolation flap was carefully excised within this deep plane to maintain its blood supply.  The edges of the donor site were undermined.   The donor site was closed in a primary fashion.  The pedicle was then rotated into position and sutured.  Once the tube was sutured into place, adequate blood supply was confirmed with blanching and refill.  The pedicle was then wrapped with xeroform gauze and dressed appropriately with a telfa and gauze bandage to ensure continued blood supply and protect the attached pedicle. Interpolation Flap Text: A decision was made to reconstruct the defect utilizing an interpolation axial flap and a staged reconstruction.  A telfa template was made of the defect.  This telfa template was then used to outline the interpolation flap.  The donor area for the pedicle flap was then injected with anesthesia.  The flap was excised through the skin and subcutaneous tissue down to the layer of the underlying musculature.  The interpolation flap was carefully excised within this deep plane to maintain its blood supply.  The edges of the donor site were undermined.   The donor site was closed in a primary fashion.  The pedicle was then rotated into position and sutured.  Once the tube was sutured into place, adequate blood supply was confirmed with blanching and refill.  The pedicle was then wrapped with xeroform gauze and dressed appropriately with a telfa and gauze bandage to ensure continued blood supply and protect the attached pedicle. Melolabial Interpolation Flap Text: A decision was made to reconstruct the defect utilizing an interpolation axial flap and a staged reconstruction.  A telfa template was made of the defect.  This telfa template was then used to outline the melolabial interpolation flap.  The donor area for the pedicle flap was then injected with anesthesia.  The flap was excised through the skin and subcutaneous tissue down to the layer of the underlying musculature.  The pedicle flap was carefully excised within this deep plane to maintain its blood supply.  The edges of the donor site were undermined.   The donor site was closed in a primary fashion.  The pedicle was then rotated into position and sutured.  Once the tube was sutured into place, adequate blood supply was confirmed with blanching and refill.  The pedicle was then wrapped with xeroform gauze and dressed appropriately with a telfa and gauze bandage to ensure continued blood supply and protect the attached pedicle. Mastoid Interpolation Flap Text: A decision was made to reconstruct the defect utilizing an interpolation axial flap and a staged reconstruction.  A telfa template was made of the defect.  This telfa template was then used to outline the mastoid interpolation flap.  The donor area for the pedicle flap was then injected with anesthesia.  The flap was excised through the skin and subcutaneous tissue down to the layer of the underlying musculature.  The pedicle flap was carefully excised within this deep plane to maintain its blood supply.  The edges of the donor site were undermined.   The donor site was closed in a primary fashion.  The pedicle was then rotated into position and sutured.  Once the tube was sutured into place, adequate blood supply was confirmed with blanching and refill.  The pedicle was then wrapped with xeroform gauze and dressed appropriately with a telfa and gauze bandage to ensure continued blood supply and protect the attached pedicle. Posterior Auricular Interpolation Flap Text: A decision was made to reconstruct the defect utilizing an interpolation axial flap and a staged reconstruction.  A telfa template was made of the defect.  This telfa template was then used to outline the posterior auricular interpolation flap.  The donor area for the pedicle flap was then injected with anesthesia.  The flap was excised through the skin and subcutaneous tissue down to the layer of the underlying musculature.  The pedicle flap was carefully excised within this deep plane to maintain its blood supply.  The edges of the donor site were undermined.   The donor site was closed in a primary fashion.  The pedicle was then rotated into position and sutured.  Once the tube was sutured into place, adequate blood supply was confirmed with blanching and refill.  The pedicle was then wrapped with xeroform gauze and dressed appropriately with a telfa and gauze bandage to ensure continued blood supply and protect the attached pedicle. Paramedian Forehead Flap Text: A decision was made to reconstruct the defect utilizing an interpolation axial flap and a staged reconstruction.  A telfa template was made of the defect.  This telfa template was then used to outline the paramedian forehead pedicle flap.  The donor area for the pedicle flap was then injected with anesthesia.  The flap was excised through the skin and subcutaneous tissue down to the layer of the underlying musculature.  The pedicle flap was carefully excised within this deep plane to maintain its blood supply.  The edges of the donor site were undermined.   The donor site was closed in a primary fashion.  The pedicle was then rotated into position and sutured.  Once the tube was sutured into place, adequate blood supply was confirmed with blanching and refill.  The pedicle was then wrapped with xeroform gauze and dressed appropriately with a telfa and gauze bandage to ensure continued blood supply and protect the attached pedicle. Lip Wedge Excision Repair Text: Given the location of the defect and the proximity to free margins a full thickness wedge repair was deemed most appropriate.  Using a sterile surgical marker, the appropriate repair was drawn incorporating the defect and placing the expected incisions perpendicular to the vermilion border.  The vermilion border was also meticulously outlined to ensure appropriate reapproximation during the repair.  The area thus outlined was incised through and through with a #15 scalpel blade.  The muscularis and dermis were reaproximated with deep sutures following hemostasis. Care was taken to realign the vermilion border before proceeding with the superficial closure.  Once the vermilion was realigned the superfical and mucosal closure was finished. Ftsg Text: The defect edges were debeveled with a #15 scalpel blade.  Given the location of the defect, shape of the defect and the proximity to free margins a full thickness skin graft was deemed most appropriate.  Using a sterile surgical marker, the primary defect shape was transferred to the donor site. The area thus outlined was incised deep to adipose tissue with a #15 scalpel blade.  The harvested graft was then trimmed of adipose tissue until only dermis and epidermis was left.  The skin margins of the secondary defect were undermined to an appropriate distance in all directions utilizing iris scissors.  The secondary defect was closed with interrupted buried subcutaneous sutures.  The skin edges were then re-apposed with running  sutures.  The skin graft was then placed in the primary defect and oriented appropriately. Split-Thickness Skin Graft Text: The defect edges were debeveled with a #15 scalpel blade.  Given the location of the defect, shape of the defect and the proximity to free margins a split thickness skin graft was deemed most appropriate.  Using a sterile surgical marker, the primary defect shape was transferred to the donor site. The split thickness graft was then harvested.  The skin graft was then placed in the primary defect and oriented appropriately. Burow's Graft Text: The defect edges were debeveled with a #15 scalpel blade.  Given the location of the defect, shape of the defect, the proximity to free margins and the presence of a standing cone deformity a Burow's skin graft was deemed most appropriate. The standing cone was removed and this tissue was then trimmed to the shape of the primary defect. The adipose tissue was also removed until only dermis and epidermis were left.  The skin margins of the secondary defect were undermined to an appropriate distance in all directions utilizing iris scissors.  The secondary defect was closed with interrupted buried subcutaneous sutures.  The skin edges were then re-apposed with running  sutures.  The skin graft was then placed in the primary defect and oriented appropriately. Cartilage Graft Text: The defect edges were debeveled with a #15 scalpel blade.  Given the location of the defect, shape of the defect, the fact the defect involved a full thickness cartilage defect a cartilage graft was deemed most appropriate.  An appropriate donor site was identified, cleansed, and anesthetized. The cartilage graft was then harvested and transferred to the recipient site, oriented appropriately and then sutured into place.  The secondary defect was then repaired using a primary closure. Composite Graft Text: The defect edges were debeveled with a #15 scalpel blade.  Given the location of the defect, shape of the defect, the proximity to free margins and the fact the defect was full thickness a composite graft was deemed most appropriate.  The defect was outline and then transferred to the donor site.  A full thickness graft was then excised from the donor site. The graft was then placed in the primary defect, oriented appropriately and then sutured into place.  The secondary defect was then repaired using a primary closure. Epidermal Autograft Text: The defect edges were debeveled with a #15 scalpel blade.  Given the location of the defect, shape of the defect and the proximity to free margins an epidermal autograft was deemed most appropriate.  Using a sterile surgical marker, the primary defect shape was transferred to the donor site. The epidermal graft was then harvested.  The skin graft was then placed in the primary defect and oriented appropriately. Dermal Autograft Text: The defect edges were debeveled with a #15 scalpel blade.  Given the location of the defect, shape of the defect and the proximity to free margins a dermal autograft was deemed most appropriate.  Using a sterile surgical marker, the primary defect shape was transferred to the donor site. The area thus outlined was incised deep to adipose tissue with a #15 scalpel blade.  The harvested graft was then trimmed of adipose and epidermal tissue until only dermis was left.  The skin graft was then placed in the primary defect and oriented appropriately. Skin Substitute Text: The defect edges were debeveled with a #15 scalpel blade.  Given the location of the defect, shape of the defect and the proximity to free margins a skin substitute graft was deemed most appropriate.  The graft material was trimmed to fit the size of the defect. The graft was then placed in the primary defect and oriented appropriately. Tissue Cultured Epidermal Autograft Text: The defect edges were debeveled with a #15 scalpel blade.  Given the location of the defect, shape of the defect and the proximity to free margins a tissue cultured epidermal autograft was deemed most appropriate.  The graft was then trimmed to fit the size of the defect.  The graft was then placed in the primary defect and oriented appropriately. Xenograft Text: The defect edges were debeveled with a #15 scalpel blade.  Given the location of the defect, shape of the defect and the proximity to free margins a xenograft was deemed most appropriate.  The graft was then trimmed to fit the size of the defect.  The graft was then placed in the primary defect and oriented appropriately. Purse String (Intermediate) Text: Given the location of the defect and the characteristics of the surrounding skin a purse string intermediate closure was deemed most appropriate.  Undermining was performed circumferentially around the surgical defect.  A purse string suture was then placed and tightened. Purse String (Simple) Text: Given the location of the defect and the characteristics of the surrounding skin a purse string simple closure was deemed most appropriate.  Undermining was performed circumferentially around the surgical defect.  A purse string suture was then placed and tightened. Complex Repair And Single Advancement Flap Text: The defect edges were debeveled with a #15 scalpel blade.  The primary defect was closed partially with a complex linear closure.  Given the location of the remaining defect, shape of the defect and the proximity to free margins a single advancement flap was deemed most appropriate for complete closure of the defect.  Using a sterile surgical marker, an appropriate advancement flap was drawn incorporating the defect and placing the expected incisions within the relaxed skin tension lines where possible.    The area thus outlined was incised deep to adipose tissue with a #15 scalpel blade.  The skin margins were undermined to an appropriate distance in all directions utilizing iris scissors. Complex Repair And Double Advancement Flap Text: The defect edges were debeveled with a #15 scalpel blade.  The primary defect was closed partially with a complex linear closure.  Given the location of the remaining defect, shape of the defect and the proximity to free margins a double advancement flap was deemed most appropriate for complete closure of the defect.  Using a sterile surgical marker, an appropriate advancement flap was drawn incorporating the defect and placing the expected incisions within the relaxed skin tension lines where possible.    The area thus outlined was incised deep to adipose tissue with a #15 scalpel blade.  The skin margins were undermined to an appropriate distance in all directions utilizing iris scissors. Complex Repair And Modified Advancement Flap Text: The defect edges were debeveled with a #15 scalpel blade.  The primary defect was closed partially with a complex linear closure.  Given the location of the remaining defect, shape of the defect and the proximity to free margins a modified advancement flap was deemed most appropriate for complete closure of the defect.  Using a sterile surgical marker, an appropriate advancement flap was drawn incorporating the defect and placing the expected incisions within the relaxed skin tension lines where possible.    The area thus outlined was incised deep to adipose tissue with a #15 scalpel blade.  The skin margins were undermined to an appropriate distance in all directions utilizing iris scissors. Complex Repair And A-T Advancement Flap Text: The defect edges were debeveled with a #15 scalpel blade.  The primary defect was closed partially with a complex linear closure.  Given the location of the remaining defect, shape of the defect and the proximity to free margins an A-T advancement flap was deemed most appropriate for complete closure of the defect.  Using a sterile surgical marker, an appropriate advancement flap was drawn incorporating the defect and placing the expected incisions within the relaxed skin tension lines where possible.    The area thus outlined was incised deep to adipose tissue with a #15 scalpel blade.  The skin margins were undermined to an appropriate distance in all directions utilizing iris scissors. Complex Repair And O-T Advancement Flap Text: The defect edges were debeveled with a #15 scalpel blade.  The primary defect was closed partially with a complex linear closure.  Given the location of the remaining defect, shape of the defect and the proximity to free margins an O-T advancement flap was deemed most appropriate for complete closure of the defect.  Using a sterile surgical marker, an appropriate advancement flap was drawn incorporating the defect and placing the expected incisions within the relaxed skin tension lines where possible.    The area thus outlined was incised deep to adipose tissue with a #15 scalpel blade.  The skin margins were undermined to an appropriate distance in all directions utilizing iris scissors. Complex Repair And O-L Flap Text: The defect edges were debeveled with a #15 scalpel blade.  The primary defect was closed partially with a complex linear closure.  Given the location of the remaining defect, shape of the defect and the proximity to free margins an O-L flap was deemed most appropriate for complete closure of the defect.  Using a sterile surgical marker, an appropriate flap was drawn incorporating the defect and placing the expected incisions within the relaxed skin tension lines where possible.    The area thus outlined was incised deep to adipose tissue with a #15 scalpel blade.  The skin margins were undermined to an appropriate distance in all directions utilizing iris scissors. Complex Repair And Bilobe Flap Text: The defect edges were debeveled with a #15 scalpel blade.  The primary defect was closed partially with a complex linear closure.  Given the location of the remaining defect, shape of the defect and the proximity to free margins a bilobe flap was deemed most appropriate for complete closure of the defect.  Using a sterile surgical marker, an appropriate advancement flap was drawn incorporating the defect and placing the expected incisions within the relaxed skin tension lines where possible.    The area thus outlined was incised deep to adipose tissue with a #15 scalpel blade.  The skin margins were undermined to an appropriate distance in all directions utilizing iris scissors. Complex Repair And Melolabial Flap Text: The defect edges were debeveled with a #15 scalpel blade.  The primary defect was closed partially with a complex linear closure.  Given the location of the remaining defect, shape of the defect and the proximity to free margins a melolabial flap was deemed most appropriate for complete closure of the defect.  Using a sterile surgical marker, an appropriate advancement flap was drawn incorporating the defect and placing the expected incisions within the relaxed skin tension lines where possible.    The area thus outlined was incised deep to adipose tissue with a #15 scalpel blade.  The skin margins were undermined to an appropriate distance in all directions utilizing iris scissors. Complex Repair And Rotation Flap Text: The defect edges were debeveled with a #15 scalpel blade.  The primary defect was closed partially with a complex linear closure.  Given the location of the remaining defect, shape of the defect and the proximity to free margins a rotation flap was deemed most appropriate for complete closure of the defect.  Using a sterile surgical marker, an appropriate advancement flap was drawn incorporating the defect and placing the expected incisions within the relaxed skin tension lines where possible.    The area thus outlined was incised deep to adipose tissue with a #15 scalpel blade.  The skin margins were undermined to an appropriate distance in all directions utilizing iris scissors. Complex Repair And Rhombic Flap Text: The defect edges were debeveled with a #15 scalpel blade.  The primary defect was closed partially with a complex linear closure.  Given the location of the remaining defect, shape of the defect and the proximity to free margins a rhombic flap was deemed most appropriate for complete closure of the defect.  Using a sterile surgical marker, an appropriate advancement flap was drawn incorporating the defect and placing the expected incisions within the relaxed skin tension lines where possible.    The area thus outlined was incised deep to adipose tissue with a #15 scalpel blade.  The skin margins were undermined to an appropriate distance in all directions utilizing iris scissors. Complex Repair And Transposition Flap Text: The defect edges were debeveled with a #15 scalpel blade.  The primary defect was closed partially with a complex linear closure.  Given the location of the remaining defect, shape of the defect and the proximity to free margins a transposition flap was deemed most appropriate for complete closure of the defect.  Using a sterile surgical marker, an appropriate advancement flap was drawn incorporating the defect and placing the expected incisions within the relaxed skin tension lines where possible.    The area thus outlined was incised deep to adipose tissue with a #15 scalpel blade.  The skin margins were undermined to an appropriate distance in all directions utilizing iris scissors. Complex Repair And V-Y Plasty Text: The defect edges were debeveled with a #15 scalpel blade.  The primary defect was closed partially with a complex linear closure.  Given the location of the remaining defect, shape of the defect and the proximity to free margins a V-Y plasty was deemed most appropriate for complete closure of the defect.  Using a sterile surgical marker, an appropriate advancement flap was drawn incorporating the defect and placing the expected incisions within the relaxed skin tension lines where possible.    The area thus outlined was incised deep to adipose tissue with a #15 scalpel blade.  The skin margins were undermined to an appropriate distance in all directions utilizing iris scissors. Complex Repair And M Plasty Text: The defect edges were debeveled with a #15 scalpel blade.  The primary defect was closed partially with a complex linear closure.  Given the location of the remaining defect, shape of the defect and the proximity to free margins an M plasty was deemed most appropriate for complete closure of the defect.  Using a sterile surgical marker, an appropriate advancement flap was drawn incorporating the defect and placing the expected incisions within the relaxed skin tension lines where possible.    The area thus outlined was incised deep to adipose tissue with a #15 scalpel blade.  The skin margins were undermined to an appropriate distance in all directions utilizing iris scissors. Complex Repair And Double M Plasty Text: The defect edges were debeveled with a #15 scalpel blade.  The primary defect was closed partially with a complex linear closure.  Given the location of the remaining defect, shape of the defect and the proximity to free margins a double M plasty was deemed most appropriate for complete closure of the defect.  Using a sterile surgical marker, an appropriate advancement flap was drawn incorporating the defect and placing the expected incisions within the relaxed skin tension lines where possible.    The area thus outlined was incised deep to adipose tissue with a #15 scalpel blade.  The skin margins were undermined to an appropriate distance in all directions utilizing iris scissors. Complex Repair And W Plasty Text: The defect edges were debeveled with a #15 scalpel blade.  The primary defect was closed partially with a complex linear closure.  Given the location of the remaining defect, shape of the defect and the proximity to free margins a W plasty was deemed most appropriate for complete closure of the defect.  Using a sterile surgical marker, an appropriate advancement flap was drawn incorporating the defect and placing the expected incisions within the relaxed skin tension lines where possible.    The area thus outlined was incised deep to adipose tissue with a #15 scalpel blade.  The skin margins were undermined to an appropriate distance in all directions utilizing iris scissors. Complex Repair And Z Plasty Text: The defect edges were debeveled with a #15 scalpel blade.  The primary defect was closed partially with a complex linear closure.  Given the location of the remaining defect, shape of the defect and the proximity to free margins a Z plasty was deemed most appropriate for complete closure of the defect.  Using a sterile surgical marker, an appropriate advancement flap was drawn incorporating the defect and placing the expected incisions within the relaxed skin tension lines where possible.    The area thus outlined was incised deep to adipose tissue with a #15 scalpel blade.  The skin margins were undermined to an appropriate distance in all directions utilizing iris scissors. Complex Repair And Dorsal Nasal Flap Text: The defect edges were debeveled with a #15 scalpel blade.  The primary defect was closed partially with a complex linear closure.  Given the location of the remaining defect, shape of the defect and the proximity to free margins a dorsal nasal flap was deemed most appropriate for complete closure of the defect.  Using a sterile surgical marker, an appropriate flap was drawn incorporating the defect and placing the expected incisions within the relaxed skin tension lines where possible.    The area thus outlined was incised deep to adipose tissue with a #15 scalpel blade.  The skin margins were undermined to an appropriate distance in all directions utilizing iris scissors. Complex Repair And Ftsg Text: The defect edges were debeveled with a #15 scalpel blade.  The primary defect was closed partially with a complex linear closure.  Given the location of the defect, shape of the defect and the proximity to free margins a full thickness skin graft was deemed most appropriate to repair the remaining defect.  The graft was trimmed to fit the size of the remaining defect.  The graft was then placed in the primary defect, oriented appropriately, and sutured into place. Complex Repair And Burow's Graft Text: The defect edges were debeveled with a #15 scalpel blade.  The primary defect was closed partially with a complex linear closure.  Given the location of the defect, shape of the defect, the proximity to free margins and the presence of a standing cone deformity a Burow's graft was deemed most appropriate to repair the remaining defect.  The graft was trimmed to fit the size of the remaining defect.  The graft was then placed in the primary defect, oriented appropriately, and sutured into place. Complex Repair And Split-Thickness Skin Graft Text: The defect edges were debeveled with a #15 scalpel blade.  The primary defect was closed partially with a complex linear closure.  Given the location of the defect, shape of the defect and the proximity to free margins a split thickness skin graft was deemed most appropriate to repair the remaining defect.  The graft was trimmed to fit the size of the remaining defect.  The graft was then placed in the primary defect, oriented appropriately, and sutured into place. Complex Repair And Epidermal Autograft Text: The defect edges were debeveled with a #15 scalpel blade.  The primary defect was closed partially with a complex linear closure.  Given the location of the defect, shape of the defect and the proximity to free margins an epidermal autograft was deemed most appropriate to repair the remaining defect.  The graft was trimmed to fit the size of the remaining defect.  The graft was then placed in the primary defect, oriented appropriately, and sutured into place. Complex Repair And Dermal Autograft Text: The defect edges were debeveled with a #15 scalpel blade.  The primary defect was closed partially with a complex linear closure.  Given the location of the defect, shape of the defect and the proximity to free margins an dermal autograft was deemed most appropriate to repair the remaining defect.  The graft was trimmed to fit the size of the remaining defect.  The graft was then placed in the primary defect, oriented appropriately, and sutured into place. Complex Repair And Tissue Cultured Epidermal Autograft Text: The defect edges were debeveled with a #15 scalpel blade.  The primary defect was closed partially with a complex linear closure.  Given the location of the defect, shape of the defect and the proximity to free margins an tissue cultured epidermal autograft was deemed most appropriate to repair the remaining defect.  The graft was trimmed to fit the size of the remaining defect.  The graft was then placed in the primary defect, oriented appropriately, and sutured into place. Complex Repair And Xenograft Text: The defect edges were debeveled with a #15 scalpel blade.  The primary defect was closed partially with a complex linear closure.  Given the location of the defect, shape of the defect and the proximity to free margins a xenograft was deemed most appropriate to repair the remaining defect.  The graft was trimmed to fit the size of the remaining defect.  The graft was then placed in the primary defect, oriented appropriately, and sutured into place. Complex Repair And Skin Substitute Graft Text: The defect edges were debeveled with a #15 scalpel blade.  The primary defect was closed partially with a complex linear closure.  Given the location of the remaining defect, shape of the defect and the proximity to free margins a skin substitute graft was deemed most appropriate to repair the remaining defect.  The graft was trimmed to fit the size of the remaining defect.  The graft was then placed in the primary defect, oriented appropriately, and sutured into place. Consent was obtained from the patient. The risks and benefits to therapy were discussed in detail. Specifically, the risks of infection, scarring, bleeding, prolonged wound healing, incomplete removal, allergy to anesthesia, nerve injury and recurrence were addressed. Prior to the procedure, the treatment site was clearly identified and confirmed by the patient. All components of Universal Protocol/PAUSE Rule completed. Render Post-Care Instructions In Note?: yes Post-Care Instructions: I reviewed with the patient in detail post-care instructions. Patient is not to engage in any heavy lifting, exercise, or swimming for the next 14 days. Should the patient develop any fevers, chills, bleeding, severe pain patient will contact the office immediately. Home Suture Removal Text: Patient was provided a home suture removal kit and will remove their sutures at home.  If they have any questions or difficulties they will call the office. Where Do You Want The Question To Include Opioid Counseling Located?: Case Summary Tab Billing Type: Third-Party Bill Information: Selecting Yes will display possible errors in your note based on the variables you have selected. This validation is only offered as a suggestion for you. PLEASE NOTE THAT THE VALIDATION TEXT WILL BE REMOVED WHEN YOU FINALIZE YOUR NOTE. IF YOU WANT TO FAX A PRELIMINARY NOTE YOU WILL NEED TO TOGGLE THIS TO 'NO' IF YOU DO NOT WANT IT IN YOUR FAXED NOTE. Body Location Override (Optional - Billing Will Still Be Based On Selected Body Map Location If Applicable): left mid arm Size Of Lesion In Cm: 2 Anesthesia Volume In Cc: 6.7 Intermediate / Complex Repair - Final Wound Length In Cm: 1.5 Dressing: pressure dressing with telfa

## 2021-09-02 NOTE — PROGRESS NOTE ADULT - REASON FOR ADMISSION
r/o Seizures

## 2021-09-02 NOTE — DISCHARGE NOTE NURSING/CASE MANAGEMENT/SOCIAL WORK - PATIENT PORTAL LINK FT
You can access the FollowMyHealth Patient Portal offered by [facility] by registering at the following website: [email or web address]. By joining [employer]’s FollowMyHealth portal, you will also be able to view your health information using other applications (apps) compatible with our system.

## 2021-09-02 NOTE — BH CONSULTATION LIAISON PROGRESS NOTE - NSBHFUPINTERVALHXFT_PSY_A_CORE
Patient seen at bedside with attending physician, Dr. Ferguson. Patient notes she was quite anxious last night and her legs felt "weird." Patient hesitant to discuss presence of auditory hallucinations but states mood is "anxious and paranoid" alluding to " the unexpected" when discussing about the plan for ongoing inpatient psychiatric hospitalization. Spoke to patient about the plan moving forward and given her severe deterioration in function with heightened paranoia and anxiety Psychiatry recommends ongoing evaluation. Patient denies any SI/HI today and does not have any plan/intent of self-harm.      Patient seen at bedside with attending physician, Dr. Ferguson. Patient notes she was quite anxious last night and her legs felt "weird." Patient hesitant to discuss presence of auditory hallucinations but states mood is "anxious and paranoid" alluding to " the unexpected" when discussing about the plan for ongoing inpatient psychiatric hospitalization. Spoke to patient about the plan moving forward and given her severe deterioration in function with heightened paranoia and anxiety as well as impulsive behavior (code gray this week for attempted elopement).  Patient denies SI/HI.
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
## 2021-09-02 NOTE — BH INPATIENT PSYCHIATRY ASSESSMENT NOTE - NSBHCHARTREVIEWLAB_PSY_A_CORE FT
TPro  x   /  Alb  4974  /  TBili  x   /  DBili  x   /  AST  x   /  ALT  x   /  AlkPhos  x   09-01    Complete Blood Count + Automated Diff in AM (08.30.21 @ 05:42)   WBC Count: 6.50 K/uL   RBC Count: 4.36 M/uL   Hemoglobin: 12.6 g/dL   Hematocrit: 37.8 %   Mean Cell Volume: 86.7 fl   Mean Cell Hemoglobin: 28.9 pg   Mean Cell Hemoglobin Conc: 33.3 gm/dL   Red Cell Distrib Width: 11.7 %   Platelet Count - Automated: 316 K/uL   Auto Neutrophil #: 2.98 K/uL   Auto Lymphocyte #: 2.68 K/uL   Auto Monocyte #: 0.59 K/uL   Auto Eosinophil #: 0.18 K/uL   Auto Basophil #: 0.05 K/uL   Auto Neutrophil %: 45.8: Differential percentages must be correlated with absolute numbers for   clinical significance. %   Auto Lymphocyte %: 41.2 %   Auto Monocyte %: 9.1 %   Auto Eosinophil %: 2.8 %   Auto Basophil %: 0.8 %   Auto Immature Granulocyte %: 0.3: (Includes meta, myelo and promyelocytes) %   Nucleated RBC: 0 /100 WBCs Comprehensive Metabolic Panel in AM (08.30.21 @ 05:42)   Sodium, Serum: 139 mmol/L   Potassium, Serum: 4.1 mmol/L   Chloride, Serum: 102 mmol/L   Carbon Dioxide, Serum: 26 mmol/L   Anion Gap, Serum: 11 mmol/L   Blood Urea Nitrogen, Serum: 13 mg/dL   Creatinine, Serum: 0.87 mg/dL   Glucose, Serum: 91 mg/dL   Calcium, Total Serum: 9.5 mg/dL   Protein Total, Serum: 7.5 g/dL   Albumin, Serum: 4.4 g/dL   Bilirubin Total, Serum: 0.6 mg/dL   Alkaline Phosphatase, Serum: 58 U/L   Aspartate Aminotransferase (AST/SGOT): 12 U/L   Alanine Aminotransferase (ALT/SGPT): 8 U/L Thyroid Stimulating Hormone, Serum: 0.81 uIU/mL (08.27.21 @ 09:57) Thyroid Stimulating Hormone, Serum: 0.81 uIU/mL (08.27.21 @ 09:57)   Thyroid Stimulating Hormone, Serum: 0.92 uIU/mL (08.27.21 @ 00:24)

## 2021-09-02 NOTE — BH INPATIENT PSYCHIATRY ASSESSMENT NOTE - NSICDXBHTERTIARYDX_PSY_ALL_CORE
R/O Bipolar I disorder   F31.9  R/O Catatonia   F06.1  R/O MDD (major depressive disorder), recurrent, severe, with psychosis   F33.3

## 2021-09-02 NOTE — BH INPATIENT PSYCHIATRY ASSESSMENT NOTE - RISK ASSESSMENT
rf - psych dx, not engaged in outpatient psych med management, hx cutting, psychosis, unemployed    pf - supportive family, future oriented, denies past SAs, denies active SI/HI, no past admissions, female, denies access to guns/weapons

## 2021-09-02 NOTE — PROGRESS NOTE ADULT - SUBJECTIVE AND OBJECTIVE BOX
Interval History:  Patient seen and examined at the bedside. No acute events overnight.     MEDICATIONS  (STANDING):  enoxaparin Injectable 40 milliGRAM(s) SubCutaneous daily  nitrofurantoin monohydrate/macrocrystals (MACROBID) 100 milliGRAM(s) Oral two times a day    MEDICATIONS  (PRN):  acetaminophen   Tablet .. 650 milliGRAM(s) Oral every 6 hours PRN Temp greater or equal to 38C (100.4F), Mild Pain (1 - 3), Moderate Pain (4 - 6), Severe Pain (7 - 10)  ALPRAZolam 0.5 milliGRAM(s) Oral two times a day PRN Anxiety  lacosamide Injectable 200 milliGRAM(s) IV Push once PRN Seizure  LORazepam   Injectable 2 milliGRAM(s) IV Push once PRN Seizure    Allergies  No Known Allergies    Intolerances      ROS: Pertinent positives in HPI, all other ROS were reviewed and are negative.      Vital Signs Last 24 Hrs  T(C): 36.8 (02 Sep 2021 05:17), Max: 36.9 (01 Sep 2021 18:35)  T(F): 98.2 (02 Sep 2021 05:17), Max: 98.4 (01 Sep 2021 18:35)  HR: 87 (02 Sep 2021 05:17) (84 - 87)  BP: 105/70 (02 Sep 2021 05:17) (100/64 - 105/74)  BP(mean): --  RR: 18 (02 Sep 2021 05:17) (18 - 18)  SpO2: 97% (02 Sep 2021 05:17) (96% - 97%)    NEUROLOGICAL EXAM:  MS: AAOX3, fluent, attends b/l; normal attention, language  CN: VFF, EOMI, PERRL, no facial asymmetry,  Motor: Strength: antigravity 4x.  Sensation: Grossly intact   Gait: Deferred       Labs:                  TPro  x   /  Alb  4974  /  TBili  x   /  DBili  x   /  AST  x   /  ALT  x   /  AlkPhos  x   09-01        MRI brain w/ w/o: Normal study

## 2021-09-02 NOTE — BH PATIENT PROFILE - HOME MEDICATIONS
predniSONE 10 mg oral tablet , Steroid Taper:  Starting 9/3: Take 6 tablets once daily for 7 days  Starting 9/10: Take 4 tablets once daily for 7 days  Starting 9/17: Take 2 tablets once daily for 7 days  Starting 9/24: Take 1 tablet once daily for 7   days    Last dose 9/30  OLANZapine 5 mg oral tablet , 1 tab(s) orally once a day  Xanax 0.5 mg oral tablet , 1 tab(s) orally 2 times a day, As Needed

## 2021-09-02 NOTE — BH CONSULTATION LIAISON PROGRESS NOTE - NSBHMSEKNOWHOW_PSY_ALL_CORE
Current Events

## 2021-09-02 NOTE — BH INPATIENT PSYCHIATRY ASSESSMENT NOTE - NSBHCHARTREVIEWVS_PSY_A_CORE FT
Vital Signs Last 24 Hrs  T(C): 36.9 (09-02-21 @ 14:49), Max: 36.9 (09-01-21 @ 18:35)  T(F): 98.5 (09-02-21 @ 14:49), Max: 98.5 (09-02-21 @ 14:49)  HR: 85 (09-02-21 @ 11:59) (84 - 87)  BP: 101/62 (09-02-21 @ 11:59) (100/64 - 105/70)  BP(mean): --  RR: 18 (09-02-21 @ 11:59) (18 - 18)  SpO2: 98% (09-02-21 @ 11:59) (96% - 98%)    Orthostatic VS  09-02-21 @ 14:49  Lying BP: --/-- HR: --  Sitting BP: 112/80 HR: 85  Standing BP: 113/75 HR: 85  Site: --  Mode: --
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
## 2021-09-02 NOTE — DISCHARGE NOTE PROVIDER - NSDCCPCAREPLAN_GEN_ALL_CORE_FT
PRINCIPAL DISCHARGE DIAGNOSIS  Diagnosis: Bipolar 1 disorder  Assessment and Plan of Treatment: Continue taking all medications.  Will have inpatient workup at [facility].      SECONDARY DISCHARGE DIAGNOSES  Diagnosis: Depression  Assessment and Plan of Treatment:

## 2021-09-02 NOTE — DISCHARGE NOTE PROVIDER - CARE PROVIDER_API CALL
Carlyle Lowe (MD)  Clinical Neurophysiology; EEGEpilepsy; Neurology  [address]  [place] [address]  Phone: [phone]  Fax: [phone]  Follow Up Time: Routine

## 2021-09-02 NOTE — BH INPATIENT PSYCHIATRY ASSESSMENT NOTE - NSBHASSESSSUMMFT_PSY_ALL_CORE
35-year-old female; domiciled with mother; unemployed; highest level of education some college; PPHx of depression, anxiety, prior bipolar diagnosis per records and per patient, 1 prior psych admission she thinks at [facility] vs stay overnight in [facility] (unsure) for depression, hx SIB by cutting, no known hx of violence; denies alcohol use; past use of marijuana and cocaine (last 6mos ago); denies PMHx; admitted from [facility] after medical admission.  BIB EMS activated by mother for what mother believed was seizure-like activity.  Neuro cleared patient.  Episode sounds like it could have been episode of catatonia from description of being frozen and ambivalent about moving.  Diagnosis remains unclear, MDD with psychosis vs bipolar disorder vs less likely primary psychotic disorder.  She is guarded and has FTD with very vague speech, concrete thought process, tangential.      927 status, routine checks  olanzapine 7.5mg qHS  olanzapine 5mg PO/IM PRN for agitation  Ativan 0.5mg BID for anxiety

## 2021-09-02 NOTE — BH CONSULTATION LIAISON PROGRESS NOTE - NSBHASSESSMENTFT_PSY_ALL_CORE
Patient is a 36yo female, domiciled with mother, unemployed, with PHH of bipolar disorder, anxiety, no prior psych admissions, no current outpt tx, denies past SAs or self harm, no known hx of violence, denies drug or alcohol use, and no known pmh. She is BIBEMS from home activated by mother for worsening anxiety and depression. On exam, patient appears anxious, superficially cooperative, guarded at times, with spontaneous speech and linear process. She reports she has been stressed and anxious from her regular day to day issues. She states she isn't going out, eating, or exercising as much due to pandemic. She also notes feeling depressed, having difficulty with decision making, and trouble sleeping. Her mother is concerned because of her decline and called for EMS. She denies past or current SI/P/I, HI/P/I, current self harm. When asked about past SAs or self harm, patient is repeatedly vague, states she can't remember and not for many years, then later denies. Despite multiple attempts to explore any specific stressors, patient repeatedly indicates her normal daily stressors unchanged from her usual baseline. She reports taking meds in the recent past but not currently. Denies any SI/HI/AVH. MRI of head and EEG both normal studied. Patient at this time is unable to care for self at home and will benefit from psych admission after she is medically cleared.   ---  Patient notably anxious and paranoid during this assessment. Plan discussed about ongoing psychiatric hospitalization. Patient continues to exhibit speech latency and periods of thought blocking today.
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
## 2021-09-02 NOTE — BH CONSULTATION LIAISON PROGRESS NOTE - NSBHINDICATION_PSY_ALL_CORE
safety/psychosis

## 2021-09-02 NOTE — BH CONSULTATION LIAISON PROGRESS NOTE - NSBHCONSULTRECOMMENDOTHER_PSY_A_CORE FT
Continue Zyprexa 5 mg PO HS for psychosis, not to be administered in conjunction with PRN ativan/xanax; please monitor if Qtc < 500. Spoke to SW, primary team and patient about plan to transfer her to inpatient psychiatry; due to severe deterioration in function and psychosis. 2 PC in the chart.

## 2021-09-02 NOTE — BH CONSULTATION LIAISON PROGRESS NOTE - NSBHCHARTREVIEWINVESTIGATE_PSY_A_CORE FT
MR Head No Cont (08.28.21) Cortical volume and white matter signal are preserved. Medial temporal lobes are symmetric in volume and signal. There is no evidence of acute infarct or previous parenchymal hemorrhage. The orbital and sellar contents and cerebellar tonsils are within normal limits.        
Cerebrospinal Fluid Cell Count-1 (08.31.21 @ 09:40)   Tube Type: Tube 4   RBC Count - Spinal Fluid: 4 /uL   CSF Color: No Color   CSF Appearance: Clear   CSF Segmented Neutrophils: See Note: Mononuclear cells only present on review of cytospin.   Appearance Spun: Colorless   Total Nucleated Cell Count, CSF: <1 Glucose, CSF: 62 mg/dL Protein, CSF: 25 mg/dL (08.31.21 @ 09:41)   Protein, CSF: 25 mg/dL (08.31.21 @ 09:41)   Glucose, CSF: 62 mg/dL (08.31.21 @ 09:41)   Lactate Dehydrogenase, CSF: <15: TEST REPEATED.         MR Head No Cont (08.28.21) Cortical volume and white matter signal are preserved. Medial temporal lobes are symmetric in volume and signal. There is no evidence of acute infarct or previous parenchymal hemorrhage. The orbital and sellar contents and cerebellar tonsils are within normal limits.    EEG ( 8/28/21)  This is a normal EEG record. No epileptiform pattern or seizures were seen. Excessive beta activity is typically associated with use of sedative medications.      
MR Head No Cont (08.28.21) Cortical volume and white matter signal are preserved. Medial temporal lobes are symmetric in volume and signal. There is no evidence of acute infarct or previous parenchymal hemorrhage. The orbital and sellar contents and cerebellar tonsils are within normal limits.    EEG ( 8/28/21)  This is a normal EEG record. No epileptiform pattern or seizures were seen. Excessive beta activity is typically associated with use of sedative medications.        
Cerebrospinal Fluid Cell Count-1 (08.31.21 @ 09:40)   Tube Type: Tube 4   RBC Count - Spinal Fluid: 4 /uL   CSF Color: No Color   CSF Appearance: Clear   CSF Segmented Neutrophils: See Note: Mononuclear cells only present on review of cytospin.   Appearance Spun: Colorless   Total Nucleated Cell Count, CSF: <1 Glucose, CSF: 62 mg/dL Protein, CSF: 25 mg/dL (08.31.21 @ 09:41)   Protein, CSF: 25 mg/dL (08.31.21 @ 09:41)   Glucose, CSF: 62 mg/dL (08.31.21 @ 09:41)   Lactate Dehydrogenase, CSF: <15: TEST REPEATED.         MR Head No Cont (08.28.21) Cortical volume and white matter signal are preserved. Medial temporal lobes are symmetric in volume and signal. There is no evidence of acute infarct or previous parenchymal hemorrhage. The orbital and sellar contents and cerebellar tonsils are within normal limits.    EEG ( 8/28/21)  This is a normal EEG record. No epileptiform pattern or seizures were seen. Excessive beta activity is typically associated with use of sedative medications.      
MR Head No Cont (08.28.21) Cortical volume and white matter signal are preserved. Medial temporal lobes are symmetric in volume and signal. There is no evidence of acute infarct or previous parenchymal hemorrhage. The orbital and sellar contents and cerebellar tonsils are within normal limits.    EEG ( 8/28/21)  This is a normal EEG record. No epileptiform pattern or seizures were seen. Excessive beta activity is typically associated with use of sedative medications.        
Cerebrospinal Fluid Cell Count-1 (08.31.21 @ 09:40)   Tube Type: Tube 4   RBC Count - Spinal Fluid: 4 /uL   CSF Color: No Color   CSF Appearance: Clear   CSF Segmented Neutrophils: See Note: Mononuclear cells only present on review of cytospin.   Appearance Spun: Colorless   Total Nucleated Cell Count, CSF: <1 Glucose, CSF: 62 mg/dL Protein, CSF: 25 mg/dL (08.31.21 @ 09:41)   Protein, CSF: 25 mg/dL (08.31.21 @ 09:41)   Glucose, CSF: 62 mg/dL (08.31.21 @ 09:41)   Lactate Dehydrogenase, CSF: <15: TEST REPEATED.         MR Head No Cont (08.28.21) Cortical volume and white matter signal are preserved. Medial temporal lobes are symmetric in volume and signal. There is no evidence of acute infarct or previous parenchymal hemorrhage. The orbital and sellar contents and cerebellar tonsils are within normal limits.    EEG ( 8/28/21)  This is a normal EEG record. No epileptiform pattern or seizures were seen. Excessive beta activity is typically associated with use of sedative medications.      
< from: 12 Lead ECG (08.26.21 @ 16:01) >      Ventricular Rate 80 BPM    Atrial Rate 80 BPM    P-R Interval 130 ms    QRS Duration 86 ms    Q-T Interval 380 ms    QTC Calculation(Bazett) 438 ms    P Axis 82 degrees    R Axis 88 degrees    T Axis 86 degrees    Diagnosis Line NORMAL SINUS RHYTHM  NORMAL ECG  WHEN COMPARED WITH ECG OF 27-JUL-2021 18:35,  NO SIGNIFICANT CHANGE WAS FOUND  Confirmed by MD ANTHONY, CHRISTOPHER ([id number]) on 8/27/2021 4:35:45 PM    < end of copied text >    
< from: 12 Lead ECG (08.26.21 @ 16:01) >      Ventricular Rate 80 BPM    Atrial Rate 80 BPM    P-R Interval 130 ms    QRS Duration 86 ms    Q-T Interval 380 ms    QTC Calculation(Bazett) 438 ms    P Axis 82 degrees    R Axis 88 degrees    T Axis 86 degrees    Diagnosis Line NORMAL SINUS RHYTHM  NORMAL ECG  WHEN COMPARED WITH ECG OF 27-JUL-2021 18:35,  NO SIGNIFICANT CHANGE WAS FOUND  Confirmed by MD ANTHONY, CHRISTOPHER ([id number]) on 8/27/2021 4:35:45 PM    < end of copied text >

## 2021-09-02 NOTE — BH CONSULTATION LIAISON PROGRESS NOTE - MODIFICATIONS
Modifications as above and below
see below

## 2021-09-02 NOTE — BH CONSULTATION LIAISON PROGRESS NOTE - PRN MEDS
PO PRN Xanax 
PO PRN Xanax 
MEDICATIONS  (PRN):  acetaminophen   Tablet .. 650 milliGRAM(s) Oral every 6 hours PRN Temp greater or equal to 38C (100.4F), Mild Pain (1 - 3), Moderate Pain (4 - 6), Severe Pain (7 - 10)  ALPRAZolam 0.5 milliGRAM(s) Oral two times a day PRN Anxiety  haloperidol    Injectable 5 milliGRAM(s) IV Push once PRN Agitation  lacosamide Injectable 200 milliGRAM(s) IV Push once PRN Seizure    
MEDICATIONS  (PRN):  acetaminophen   Tablet .. 650 milliGRAM(s) Oral every 6 hours PRN Temp greater or equal to 38C (100.4F), Mild Pain (1 - 3), Moderate Pain (4 - 6), Severe Pain (7 - 10)  ALPRAZolam 0.5 milliGRAM(s) Oral two times a day PRN Anxiety  lacosamide Injectable 200 milliGRAM(s) IV Push once PRN Seizure  LORazepam   Injectable 2 milliGRAM(s) IV Push once PRN Seizure  
PO PRN Xanax 
MEDICATIONS  (PRN):  acetaminophen   Tablet .. 650 milliGRAM(s) Oral every 6 hours PRN Temp greater or equal to 38C (100.4F), Mild Pain (1 - 3), Moderate Pain (4 - 6), Severe Pain (7 - 10)  ALPRAZolam 0.5 milliGRAM(s) Oral two times a day PRN Anxiety  lacosamide Injectable 200 milliGRAM(s) IV Push once PRN Seizure  LORazepam   Injectable 2 milliGRAM(s) IV Push once PRN Seizure  
Xanax 0.5mg at 3:30AM

## 2021-09-02 NOTE — BH CONSULTATION LIAISON PROGRESS NOTE - NSBHCONSULTPRIMARYDISCUSSYES_PSY_A_CORE FT
Case d/w neuro resident [place] [id number]
Spoke to Neurology resident with above plan 
case d/w Dr. Esqueda and neurology resident [id number]

## 2021-09-02 NOTE — BH CONSULTATION LIAISON PROGRESS NOTE - NSBHPTASSESSDT_PSY_A_CORE
31-Aug-2021 11:16
27-Aug-2021 13:56
30-Aug-2021 10:40
01-Sep-2021 12:19
29-Aug-2021 08:52
28-Aug-2021 09:55
30-Aug-2021 16:51
02-Sep-2021 09:10

## 2021-09-02 NOTE — BH CONSULTATION LIAISON PROGRESS NOTE - NSBHATTESTSEENBY_PSY_A_CORE
Attending Psychiatrist supervising NP/Trainee, meeting pt...
attending Psychiatrist without NP/Trainee
Attending Psychiatrist supervising NP/Trainee, meeting pt...

## 2021-09-02 NOTE — BH INPATIENT PSYCHIATRY ASSESSMENT NOTE - MSE UNSTRUCTURED FT
Woman appearing stated age with good eye contact, cooperative, good hygiene and grooming, dressed in scrub pant and sweatshirt.  Guarded.  No PMA/R.  Oddly related.  Speech fluent with normal rate and volume.  Mood is "I'm not sure".  Affect is neutral, blunted, inappropriate lack of reaction at times.  Thought process is marked by vagueness, tangentiality, looseness of association, concreteness.  Thought content notable for vague suspicions but no overt delusions endorsed.  Denies AH, not observed responding to internal stimuli.  Insight and judgment are poor.  Impulse control appropriate to setting in hospital but she tried to elope from [facility] before being transported here and required medication.  She is alert and oriented in all spheres but did not know location of this hospital.  Attention is grossly intact.

## 2021-09-02 NOTE — BH CONSULTATION LIAISON PROGRESS NOTE - NSBHCHARTREVIEWLAB_PSY_A_CORE FT
TPro  x   /  Alb  4974  /  TBili  x   /  DBili  x   /  AST  x   /  ALT  x   /  AlkPhos  x   09-01  
                 12.6   8.71  )-----------( 390      ( 27 Aug 2021 06:34 )             38.2     08-27    141  |  102  |  13  ----------------------------<  94  4.0   |  24  |  0.81    Ca    10.1      27 Aug 2021 06:33  Phos  3.7     08-27  Mg     2.3     08-27    TPro  7.9  /  Alb  4.9  /  TBili  0.8  /  DBili  x   /  AST  14  /  ALT  10  /  AlkPhos  60  08-26  
                      12.6   6.50  )-----------( 316      ( 30 Aug 2021 05:42 )             37.8     08-30    139  |  102  |  13  ----------------------------<  91  4.1   |  26  |  0.87    Ca    9.5      30 Aug 2021 05:42  Phos  3.8     08-29  Mg     2.2     08-29    TPro  7.5  /  Alb  4.4  /  TBili  0.6  /  DBili  x   /  AST  12  /  ALT  8<L>  /  AlkPhos  58  08-30    
TPro  x   /  Alb  4974  /  TBili  x   /  DBili  x   /  AST  x   /  ALT  x   /  AlkPhos  x   09-01  
                      12.6   6.50  )-----------( 316      ( 30 Aug 2021 05:42 )             37.8     08-30    139  |  102  |  13  ----------------------------<  91  4.1   |  26  |  0.87    Ca    9.5      30 Aug 2021 05:42    TPro  7.5  /  Alb  4.4  /  TBili  0.6  /  DBili  x   /  AST  12  /  ALT  8<L>  /  AlkPhos  58  08-30      
                      12.6   8.71  )-----------( 390      ( 27 Aug 2021 06:34 )             38.2   08-27    141  |  102  |  13  ----------------------------<  94  4.0   |  24  |  0.81    Ca    10.1      27 Aug 2021 06:33  Phos  3.7     08-27  Mg     2.3     08-27    TPro  7.9  /  Alb  4.9  /  TBili  0.8  /  DBili  x   /  AST  14  /  ALT  10  /  AlkPhos  60  08-26  
    08-29    143  |  104  |  14  ----------------------------<  95  3.9   |  25  |  0.91    Ca    10.0      29 Aug 2021 06:04  Phos  3.8     08-29  Mg     2.2     08-29    
                      12.6   6.50  )-----------( 316      ( 30 Aug 2021 05:42 )             37.8     08-30    139  |  102  |  13  ----------------------------<  91  4.1   |  26  |  0.87    Ca    9.5      30 Aug 2021 05:42  Phos  3.8     08-29  Mg     2.2     08-29    TPro  7.5  /  Alb  4.4  /  TBili  0.6  /  DBili  x   /  AST  12  /  ALT  8<L>  /  AlkPhos  58  08-30

## 2021-09-02 NOTE — BH CONSULTATION LIAISON PROGRESS NOTE - NSBHCONSFOLLOWNEEDS_PSY_ALL_CORE
Needs further psych safety assessment prior to discharge

## 2021-09-02 NOTE — DISCHARGE NOTE PROVIDER - NSFOLLOWUPCLINICS_GEN_ALL_ED_FT
[facility]  Psychiatry  [address] [place] [address]  Phone: [phone]  Fax:

## 2021-09-02 NOTE — BH INPATIENT PSYCHIATRY ASSESSMENT NOTE - NSBHMETABOLIC_PSY_ALL_CORE_FT
BMI: BMI (kg/m2): 25.7 (09-02-21 @ 14:49)  HbA1c:   Glucose: POCT Blood Glucose.: 102 mg/dL (03-12-21 @ 20:02)    BP: --  Lipid Panel:

## 2021-09-02 NOTE — DISCHARGE NOTE PROVIDER - NSDCMRMEDTOKEN_GEN_ALL_CORE_FT
OLANZapine 5 mg oral tablet: 1 tab(s) orally once a day  Xanax 0.5 mg oral tablet: 1 tab(s) orally 2 times a day, As Needed   OLANZapine 5 mg oral tablet: 1 tab(s) orally once a day  predniSONE 10 mg oral tablet: Steroid Taper:  Starting 9/3: Take 6 tablets once daily for 7 days  Starting 9/10: Take 4 tablets once daily for 7 days  Starting 9/17: Take 2 tablets once daily for 7 days  Starting 9/24: Take 1 tablet once daily for 7   days    Last dose 9/30  Xanax 0.5 mg oral tablet: 1 tab(s) orally 2 times a day, As Needed

## 2021-09-02 NOTE — DISCHARGE NOTE NURSING/CASE MANAGEMENT/SOCIAL WORK - NSDCPEFALRISK_GEN_ALL_CORE
For information on Fall & injury Prevention, visit [email or web address]

## 2021-09-03 LAB
CULTURE RESULTS: NO GROWTH — SIGNIFICANT CHANGE UP
MBP CSF-MCNC: 1.9 NG/ML — SIGNIFICANT CHANGE UP (ref 0–3.7)
SPECIMEN SOURCE: SIGNIFICANT CHANGE UP

## 2021-09-03 PROCEDURE — 99223 1ST HOSP IP/OBS HIGH 75: CPT

## 2021-09-03 PROCEDURE — 99232 SBSQ HOSP IP/OBS MODERATE 35: CPT

## 2021-09-03 RX ORDER — OLANZAPINE 15 MG/1
7.5 TABLET, FILM COATED ORAL ONCE
Refills: 0 | Status: DISCONTINUED | OUTPATIENT
Start: 2021-09-03 | End: 2021-09-29

## 2021-09-03 RX ORDER — OLANZAPINE 15 MG/1
7.5 TABLET, FILM COATED ORAL EVERY 6 HOURS
Refills: 0 | Status: DISCONTINUED | OUTPATIENT
Start: 2021-09-03 | End: 2021-09-29

## 2021-09-03 RX ORDER — ACETAMINOPHEN 500 MG
650 TABLET ORAL ONCE
Refills: 0 | Status: COMPLETED | OUTPATIENT
Start: 2021-09-03 | End: 2021-09-03

## 2021-09-03 RX ORDER — OLANZAPINE 15 MG/1
10 TABLET, FILM COATED ORAL AT BEDTIME
Refills: 0 | Status: DISCONTINUED | OUTPATIENT
Start: 2021-09-03 | End: 2021-09-06

## 2021-09-03 RX ADMIN — Medication 0.5 MILLIGRAM(S): at 09:29

## 2021-09-03 RX ADMIN — OLANZAPINE 10 MILLIGRAM(S): 15 TABLET, FILM COATED ORAL at 21:13

## 2021-09-03 RX ADMIN — Medication 0.5 MILLIGRAM(S): at 21:14

## 2021-09-03 RX ADMIN — Medication 60 MILLIGRAM(S): at 09:28

## 2021-09-03 RX ADMIN — Medication 650 MILLIGRAM(S): at 23:15

## 2021-09-03 NOTE — BH INPATIENT PSYCHIATRY PROGRESS NOTE - NSBHFUPINTERVALHXFT_PSY_A_CORE
Overnight patient became agitated during visitation from mother, requiring PRN PO medications. No other overnight events. Patient seen and evaluated, case discussed with treatment team. Patient continues to be reluctant to answer questions, repeatedly responding "I decline all treatment and would like to go home" and  "These are things I would like to discuss with ." Patient was again provided with mental health hygiene contact number and resources. Patient is vague with responses, frequently saying "I don't know," "I don't remember," or "I can't speak for her." She reports that she was social, elevated, and "manic" starting 4 months ago. At that time she stopped hanging out with friends. She also reports she started hearing voices at that time telling her to do things, like taking her sunglasses off and on. She does not see any connection between these experiences and social withdrawal. She mentioned COVID regarding social withdrawal, asking for a mask, but then immediately taking it off and not using it throughout the interview. She mentioned the police and the FBI, and when asked why she says "I don't know... well actually I know everything" but would not elaborate further. Mid interview she asks the treatment team "so is this the treatment?" She reports "ok" sleep and appetite, referring to bed as "seat" and sleeping as "sitting not moving."     Patient's mother contacted treatment team to provided collateral information. Patient did not meredith consent for communication. Patient's mother reiterated hospital course with no information provided nor confirmation of hospitalization of patient.  Overnight patient became agitated during visitation from mother, requiring PRN PO medications. No other overnight events. Patient seen and evaluated, case discussed with treatment team. Patient continues to be reluctant to answer questions, repeatedly responding "I decline all treatment and would like to go home" and  "These are things I would like to discuss with ." Patient was again provided with mental health hygiene contact number and resources. Patient is vague with responses, frequently saying "I don't know," "I don't remember," or "I can't speak for her." She reports that she was social, elevated, and "manic" starting 4 months ago. At that time she stopped hanging out with friends. She also reports she started hearing voices at that time telling her to do things, like taking her sunglasses off and on. She does not see any connection between these experiences and social withdrawal. She mentioned COVID regarding social withdrawal, asking for a mask, but then immediately taking it off and not using it throughout the interview. She mentioned the police and the FBI, and when asked why she says "I don't know... well actually I know everything" but would not elaborate further. Mid interview she asks the treatment team "so is this the treatment?" She reports "ok" sleep and appetite, referring to bed as "seat" and sleeping as "sitting not moving."     Patient's mother contacted treatment team to provide collateral information. Patient did not meredith consent for communication. Patient's mother reiterated hospital course and we provided no information nor confirmation of hospitalization of patient.

## 2021-09-03 NOTE — BH INPATIENT PSYCHIATRY PROGRESS NOTE - NSBHASSESSSUMMFT_PSY_ALL_CORE
35-year-old female; domiciled with mother; unemployed; highest level of education some college; PPHx of depression, anxiety, prior bipolar diagnosis per records and per patient, 1 prior psych admission she thinks at [facility] vs stay overnight in [facility] (unsure) for depression, hx SIB by cutting, no known hx of violence; denies alcohol use; past use of marijuana and cocaine (last 6mos ago); denies PMHx; admitted from [facility] after medical admission.  BIB EMS activated by mother for what mother believed was seizure-like activity.  Neuro cleared patient.  Episode sounds like it could have been episode of catatonia from description of being frozen and ambivalent about moving.      Diagnosis remains unclear, MDD with psychosis vs bipolar disorder vs primary psychotic disorder.  She is guarded and has FTD with very vague speech, concrete thought process, loosening of associations. She refuses any contact with mother for collateral information. Currently refusing all treatment, continues to be guarded.     Legal: 927 status  Safety: routine checks  Psychiatric:  - olanzapine 7.5mg qHS  - olanzapine 7.5mg PO/IM PRN for agitation  - Ativan 0.5mg BID for anxiety  Medical:  - continue steroid taper  - f/u autoimmune encephalitis panel  Dispo: likely home, pending improvement 35-year-old female; domiciled with mother; unemployed; highest level of education some college; PPHx of depression, anxiety, prior bipolar diagnosis per records and per patient, 1 prior psych admission she thinks at [facility] vs stay overnight in [facility] (unsure) for depression, hx SIB by cutting, no known hx of violence; denies alcohol use; past use of marijuana and cocaine (last 6mos ago); denies PMHx; admitted from [facility] after medical admission.  BIB EMS activated by mother for what mother believed was seizure-like activity.  Neuro cleared patient.  Episode sounds like it could have been episode of catatonia from description of being frozen and ambivalent about moving.      Diagnosis remains unclear, MDD with psychosis vs bipolar disorder vs primary psychotic disorder.  She is guarded and has FTD with very vague speech, concrete thought process, loosening of associations. She refuses any contact with mother for collateral information. Currently refusing all treatment, continues to be guarded.     Legal: 927 status  Safety: routine checks  Psychiatric:  - olanzapine 10mg qHS  - olanzapine 7.5mg PO/IM PRN for agitation  - Ativan 0.5mg BID for anxiety  Medical:  - continue steroid taper  - f/u autoimmune encephalitis panel  Dispo: likely home, pending improvement

## 2021-09-03 NOTE — CHART NOTE - NSCHARTNOTEFT_GEN_A_CORE
Nutrition note:  Consult ordered d/t patient request.  Patient admitted to [facility] with depression, anxiety, paranoia, decrease in functioning level.   Diet: Regular  Medications include Zyprexa  Labs: 8/30 Cbc and Cmp wnl.  Ht: 64" Wt: 126# BMI: 21.6  Patient laying in bed at time of visit. Patient appeared disorganized. States appetite is "not good". Breakfast tray on table next to patient with 100% completion. Unable to give weight hx. Denied any GI distress. Unable to verbalize food preferences.  Plan: Maintain diet as ordered.  Encourage po intake.  RDN remains available prn.  Lexii Moffett, MS RDN  Pager #[phone]

## 2021-09-03 NOTE — BH INPATIENT PSYCHIATRY PROGRESS NOTE - MSE UNSTRUCTURED FT
Woman appearing stated age with good eye contact, cooperative, good hygiene and grooming, dressed in scrubs.  Guarded.  No PMA/R.  Oddly related.  Speech fluent with normal rate and volume.  Mood is "I would like to decline treatment".  Affect is neutral, blunted, mildly constrictive but stable.  Thought process is marked by vagueness, tangentiality, looseness of association, concreteness. She has semantic paraphasic speech. Thought content notable for likely delusions of paranoia, but no concrete delusions elcited.  Denies AH, not observed responding to internal stimuli.  Insight and judgment are poor.  Impulse control appropriate to setting in interview but has multiple elopement and agitated episodes. Attention is grossly intact.   Woman appearing stated age with good eye contact, cooperative, good hygiene and grooming, dressed in scrubs.  Guarded.  No PMA/R.  Oddly related.  Speech fluent with normal rate and volume.  Mood is "I would like to decline treatment".  Affect is neutral, blunted, mildly constrictive but stable.  Thought process is marked by vagueness, tangentiality, looseness of association, concreteness. She has semantic paraphasic speech. Thought content notable for likely delusions of paranoia, but no concrete delusions elicited.  Denies AH, not observed responding to internal stimuli.  Insight and judgment are poor.  Impulse control appropriate to setting in interview but has multiple elopement and agitated episodes. Attention is grossly intact.

## 2021-09-03 NOTE — CONSULT NOTE ADULT - SUBJECTIVE AND OBJECTIVE BOX
HPI:     The patient is a 34 yo woman with PMH of bipolar disorder presented to [facility] ED on 8/26 for psychiatric evaluation, pt had anxiety, depression, confusion. The patient suffered from worsening paranoia at home and possible seizure activity. She was admitted to the Epilepsy Monitoring Unit at [facility]. During hospital course organic workup negative including normal TSH, B12, and folate. CSF negative. MR brain with normal study.     PAST MEDICAL & SURGICAL HISTORY:  Bipolar disorder    Depression    Anxiety    No significant past surgical history        Review of Systems:   CONSTITUTIONAL: No fever, weight loss, or fatigue  EYES: No eye pain, visual disturbances, or discharge  ENMT:  No difficulty hearing, tinnitus, vertigo; No sinus or throat pain  NECK: No pain or stiffness  RESPIRATORY: No cough, wheezing, chills or hemoptysis; No shortness of breath  CARDIOVASCULAR: No chest pain, palpitations, dizziness, or leg swelling  GASTROINTESTINAL: No abdominal or epigastric pain. No nausea, vomiting, or hematemesis; No diarrhea or constipation. No melena or hematochezia.  GENITOURINARY: No dysuria, frequency, hematuria, or incontinence  NEUROLOGICAL: No headaches, memory loss, loss of strength, numbness, or tremors  SKIN: No itching, burning, rashes, or lesions   LYMPH NODES: No enlarged glands  ENDOCRINE: No heat or cold intolerance; No hair loss  MUSCULOSKELETAL: No joint pain or swelling; No muscle, , or extremity pain, + back pain  HEME/LYMPH: No easy bruising, or bleeding gums  ALLERY AND IMMUNOLOGIC: No hives or eczema    Allergies    No Known Allergies    Intolerances        Social History: No hx of smoking, EtoH use    FAMILY HISTORY:  Family history of epilepsy (Grandparent)        MEDICATIONS  (STANDING):  LORazepam     Tablet 0.5 milliGRAM(s) Oral two times a day  OLANZapine 7.5 milliGRAM(s) Oral at bedtime  predniSONE   Tablet 60 milliGRAM(s) Oral daily    MEDICATIONS  (PRN):  OLANZapine 7.5 milliGRAM(s) Oral every 6 hours PRN agitation  OLANZapine Injectable 7.5 milliGRAM(s) IntraMuscular once PRN agitation      Vital Signs Last 24 Hrs  T(C): 37.2 (03 Sep 2021 08:38), Max: 37.2 (03 Sep 2021 08:38)  T(F): 99 (03 Sep 2021 08:38), Max: 99 (03 Sep 2021 08:38)  HR: --  BP: --  BP(mean): --  RR: --  SpO2: --  CAPILLARY BLOOD GLUCOSE            PHYSICAL EXAM:  GENERAL: NAD, well-developed  HEAD:  Atraumatic, Normocephalic  EYES: EOMI, conjunctiva and sclera clear  NECK: Supple, No JVD  CHEST/LUNG: Clear to auscultation bilaterally; No wheeze  HEART: Regular rate and rhythm; No murmurs, rubs, or gallops  ABDOMEN: Soft, Nontender, Nondistended; Bowel sounds present  EXTREMITIES:  2+ Peripheral Pulses, No clubbing, cyanosis, or edema  NEUROLOGY: non-focal  SKIN: No rashes or lesions    LABS:      Complete Blood Count + Automated Diff in AM (08.30.21 @ 05:42)    WBC Count: 6.50 K/uL    RBC Count: 4.36 M/uL    Hemoglobin: 12.6 g/dL    Hematocrit: 37.8 %    Mean Cell Volume: 86.7 fl    Mean Cell Hemoglobin: 28.9 pg    Mean Cell Hemoglobin Conc: 33.3 gm/dL    Red Cell Distrib Width: 11.7 %    Platelet Count - Automated: 316 K/uL    Auto Neutrophil #: 2.98 K/uL    Auto Lymphocyte #: 2.68 K/uL    Auto Monocyte #: 0.59 K/uL    Auto Eosinophil #: 0.18 K/uL    Auto Basophil #: 0.05 K/uL    Auto Neutrophil %: 45.8: Differential percentages must be correlated with absolute numbers for  clinical significance. %    Auto Lymphocyte %: 41.2 %    Auto Monocyte %: 9.1 %    Auto Eosinophil %: 2.8 %    Auto Basophil %: 0.8 %    Auto Immature Granulocyte %: 0.3: (Includes meta, myelo and promyelocytes) %    Nucleated RBC: 0 /100 WBCs                  EKG(personally reviewed):    RADIOLOGY & ADDITIONAL TESTS:  Comprehensive Metabolic Panel in AM (08.30.21 @ 05:42)    Sodium, Serum: 139 mmol/L    Potassium, Serum: 4.1 mmol/L    Chloride, Serum: 102 mmol/L    Carbon Dioxide, Serum: 26 mmol/L    Anion Gap, Serum: 11 mmol/L    Blood Urea Nitrogen, Serum: 13 mg/dL    Creatinine, Serum: 0.87 mg/dL    Glucose, Serum: 91 mg/dL    Calcium, Total Serum: 9.5 mg/dL    Protein Total, Serum: 7.5 g/dL    Albumin, Serum: 4.4 g/dL    Bilirubin Total, Serum: 0.6 mg/dL    Alkaline Phosphatase, Serum: 58 U/L    Aspartate Aminotransferase (AST/SGOT): 12 U/L    Alanine Aminotransferase (ALT/SGPT): 8 U/L    eGFR if Non : 86: Interpretative comment  The units for eGFR are mL/min/1.73M2 (normalized body surface area). The  eGFR is calculated from a serum creatinine using the CKD-EPI equation.  Other variables required for calculation are race, age and sex. Among  patients with chronic kidney disease (CKD), the eGFR is useful in  determining the stage of disease according to KDOQI CKD classification.  All eGFR results are reported numerically with the following  interpretation.          GFR                    With                 Without     (ml/min/1.73 m2)    Kidney Damage       Kidney Damage        >= 90                    Stage 1                     Normal        60-89                    Stage 2                     Decreased GFR        30-59     Stage 3                     Stage 3        15-29                    Stage 4                     Stage 4        < 15                      Stage 5                     Stage 5  Each stage of CKD assumes that the associated GFR level has been in  effect for at least 3 months. Determination of stages one and two (with  eGFR > 59 ml/min/m2) requires estimation of kidney damage for at least 3  months as defined by structural or functional abnormalities.  Limitations: All estimates of GFR will be less accurate for patients at  extremes of muscle mass (including but not limited to frail elderly,  critically ill, or cancer patients), those with unusual diets, and those  with conditions associated with reduced secretion or extrarenal  elimination of creatinine. The eGFR equation is not recommended for use  in patients with unstable creatinine levels. mL/min/1.73M2    eGFR if African American: 99 mL/min/1.73M2      Imaging Personally Reviewed:    Consultant(s) Notes Reviewed:      Care Discussed with Consultants/Other Providers:

## 2021-09-03 NOTE — CONSULT NOTE ADULT - ASSESSMENT
36 yo woman with PMH of bipolar disorder presented to [facility] ED on 8/26 for psychiatric evaluation, pt had anxiety, depression, confusion 2/2 primary psychosis vs autoimmune encephalitis currently on prednisone taper.    #Encephalopathy  - unclear etiology, less likely toxic/metabolic origin  - MR brain and EEG negative  - started on prednsione taper per neurology after receiving solumedrol pulse x 3 days, Prednisone steroid taper starting 9/3 (Starting 9/3 60 mg x 7 days, Starting 9/10 40 mg x 7 days, starting 9/17 20 mg x 7days, starting 9/24 10 mg  x7 days). Unclear if indication for taper is a result of pulse dose steroids or treatment, would f/u w/ neurology as patients current presentation not consistent w/ autoimmune encephalitis  - will need to follow up w/ result of autoimmune encephalopathy panel, currently specimen received    #Pyschosis  - started on zyprexa/ativan c/w at this time

## 2021-09-03 NOTE — PSYCHIATRIC REHAB INITIAL EVALUATION - NSBHPRRECOMMEND_PSY_ALL_CORE
Writer met with patient to orient to unit and introduce self, psychiatric rehabilitation staff and department functions. Patient was provided a copy of the unit schedule. On interview, patient was polite and cooperative although irritable and discharge focused. Patient presented with fair ADL’s and eye contact. Patient demonstrated poor insight and judgement into her symptoms and treatment at this time. Writer encouraged patient to attend psychiatric rehabilitation groups and engage in treatment. Writer and patient were able to establish a collaborative psychiatric rehabilitation goal. Psychiatric Rehabilitation staff will continue to engage patient daily in order to develop therapeutic rapport.

## 2021-09-03 NOTE — BH INPATIENT PSYCHIATRY PROGRESS NOTE - PRN MEDS
MEDICATIONS  (PRN):  OLANZapine 5 milliGRAM(s) Oral every 6 hours PRN agitation  OLANZapine Injectable 5 milliGRAM(s) IntraMuscular once PRN agitation  OLANZapine Injectable 5 milliGRAM(s) IntraMuscular once PRN agitation   MEDICATIONS  (PRN):  OLANZapine 7.5 milliGRAM(s) Oral every 6 hours PRN agitation  OLANZapine Injectable 7.5 milliGRAM(s) IntraMuscular once PRN agitation

## 2021-09-03 NOTE — BH INPATIENT PSYCHIATRY PROGRESS NOTE - CURRENT MEDICATION
MEDICATIONS  (STANDING):  LORazepam     Tablet 0.5 milliGRAM(s) Oral two times a day  OLANZapine 7.5 milliGRAM(s) Oral at bedtime  predniSONE   Tablet 60 milliGRAM(s) Oral daily    MEDICATIONS  (PRN):  OLANZapine 5 milliGRAM(s) Oral every 6 hours PRN agitation  OLANZapine Injectable 5 milliGRAM(s) IntraMuscular once PRN agitation  OLANZapine Injectable 5 milliGRAM(s) IntraMuscular once PRN agitation   MEDICATIONS  (STANDING):  LORazepam     Tablet 0.5 milliGRAM(s) Oral two times a day  OLANZapine 7.5 milliGRAM(s) Oral at bedtime  predniSONE   Tablet 60 milliGRAM(s) Oral daily    MEDICATIONS  (PRN):  OLANZapine 7.5 milliGRAM(s) Oral every 6 hours PRN agitation  OLANZapine Injectable 7.5 milliGRAM(s) IntraMuscular once PRN agitation

## 2021-09-03 NOTE — BH INPATIENT PSYCHIATRY PROGRESS NOTE - NSBHCHARTREVIEWVS_PSY_A_CORE FT
Vital Signs Last 24 Hrs  T(C): 37.2 (09-03-21 @ 08:38), Max: 37.2 (09-03-21 @ 08:38)  T(F): 99 (09-03-21 @ 08:38), Max: 99 (09-03-21 @ 08:38)  HR: --  BP: --  BP(mean): --  RR: --  SpO2: --    Orthostatic VS  09-03-21 @ 08:38  Lying BP: --/-- HR: --  Sitting BP: 116/71 HR: 78  Standing BP: 101/63 HR: 86  Site: upper right arm  Mode: electronic  Orthostatic VS  09-02-21 @ 14:49  Lying BP: --/-- HR: --  Sitting BP: 112/80 HR: 85  Standing BP: 113/75 HR: 85  Site: --  Mode: --

## 2021-09-04 PROCEDURE — 99232 SBSQ HOSP IP/OBS MODERATE 35: CPT

## 2021-09-04 RX ORDER — HYDROXYZINE HCL 10 MG
25 TABLET ORAL EVERY 6 HOURS
Refills: 0 | Status: DISCONTINUED | OUTPATIENT
Start: 2021-09-04 | End: 2021-09-29

## 2021-09-04 RX ORDER — LANOLIN ALCOHOL/MO/W.PET/CERES
3 CREAM (GRAM) TOPICAL AT BEDTIME
Refills: 0 | Status: DISCONTINUED | OUTPATIENT
Start: 2021-09-04 | End: 2021-09-29

## 2021-09-04 RX ORDER — LANOLIN ALCOHOL/MO/W.PET/CERES
3 CREAM (GRAM) TOPICAL ONCE
Refills: 0 | Status: DISCONTINUED | OUTPATIENT
Start: 2021-09-04 | End: 2021-09-08

## 2021-09-04 RX ADMIN — Medication 650 MILLIGRAM(S): at 00:47

## 2021-09-04 RX ADMIN — Medication 60 MILLIGRAM(S): at 08:52

## 2021-09-04 RX ADMIN — Medication 3 MILLIGRAM(S): at 21:10

## 2021-09-04 RX ADMIN — Medication 0.5 MILLIGRAM(S): at 21:10

## 2021-09-04 RX ADMIN — Medication 0.5 MILLIGRAM(S): at 08:52

## 2021-09-04 RX ADMIN — OLANZAPINE 10 MILLIGRAM(S): 15 TABLET, FILM COATED ORAL at 21:10

## 2021-09-04 NOTE — BH INPATIENT PSYCHIATRY PROGRESS NOTE - CURRENT MEDICATION
MEDICATIONS  (STANDING):  LORazepam     Tablet 0.5 milliGRAM(s) Oral two times a day  melatonin. 3 milliGRAM(s) Oral once  OLANZapine 10 milliGRAM(s) Oral at bedtime  predniSONE   Tablet 60 milliGRAM(s) Oral daily    MEDICATIONS  (PRN):  hydrOXYzine hydrochloride 25 milliGRAM(s) Oral every 6 hours PRN Anxiety  melatonin. 3 milliGRAM(s) Oral at bedtime PRN Insomnia  OLANZapine 7.5 milliGRAM(s) Oral every 6 hours PRN agitation  OLANZapine Injectable 7.5 milliGRAM(s) IntraMuscular once PRN agitation

## 2021-09-04 NOTE — BH INPATIENT PSYCHIATRY PROGRESS NOTE - NSBHCHARTREVIEWVS_PSY_A_CORE FT
Vital Signs Last 24 Hrs  T(C): 36.9 (09-04-21 @ 07:42), Max: 36.9 (09-04-21 @ 07:42)  T(F): 98.5 (09-04-21 @ 07:42), Max: 98.5 (09-04-21 @ 07:42)  HR: --  BP: --  BP(mean): --  RR: --  SpO2: --    Orthostatic VS  09-04-21 @ 07:42  Lying BP: --/-- HR: --  Sitting BP: 130/87 HR: 92  Standing BP: 126/78 HR: 100  Site: upper right arm  Mode: electronic  Orthostatic VS  09-03-21 @ 19:57  Lying BP: --/-- HR: --  Sitting BP: 114/73 HR: 72  Standing BP: 104/71 HR: 81  Site: upper right arm  Mode: electronic  Orthostatic VS  09-03-21 @ 08:38  Lying BP: --/-- HR: --  Sitting BP: 116/71 HR: 78  Standing BP: 101/63 HR: 86  Site: upper right arm  Mode: electronic  Orthostatic VS  09-02-21 @ 14:49  Lying BP: --/-- HR: --  Sitting BP: 112/80 HR: 85  Standing BP: 113/75 HR: 85  Site: --  Mode: --

## 2021-09-04 NOTE — BH INPATIENT PSYCHIATRY PROGRESS NOTE - PRN MEDS
MEDICATIONS  (PRN):  hydrOXYzine hydrochloride 25 milliGRAM(s) Oral every 6 hours PRN Anxiety  melatonin. 3 milliGRAM(s) Oral at bedtime PRN Insomnia  OLANZapine 7.5 milliGRAM(s) Oral every 6 hours PRN agitation  OLANZapine Injectable 7.5 milliGRAM(s) IntraMuscular once PRN agitation

## 2021-09-04 NOTE — BH INPATIENT PSYCHIATRY PROGRESS NOTE - NSICDXBHPRIMARYDX_PSY_ALL_CORE
84 Jennie Ramos Patient Status:  Inpatient   Age/Gender 67year old male MRN [id number]   [facility] 6NE-A Attending Jasson Browning MD   Hosp Day # 0 PCP Black Foy MD       Anesthesia Post-op Note    Procedure(s):  SHERIF Psychosis   F29

## 2021-09-04 NOTE — BH INPATIENT PSYCHIATRY PROGRESS NOTE - NSBHFUPINTERVALHXFT_PSY_A_CORE
Chart reviewed and case discussed with treatment team. No events reported overnight. Sleep and appetite is fair.  Patient is compliant with medications, no adverse effects reported.   Chart reviewed and case discussed with treatment team. No events reported overnight. Sleep and appetite is fair. Patient stated she feels "down" and was tearful. She also appears internally preoccupied and paranoia suspected. She denies any AH. Patient is guarded and minimizing her symptoms. Patient is compliant with medications, no adverse effects reported.

## 2021-09-05 PROCEDURE — 99232 SBSQ HOSP IP/OBS MODERATE 35: CPT

## 2021-09-05 RX ORDER — ACETAMINOPHEN 500 MG
650 TABLET ORAL EVERY 6 HOURS
Refills: 0 | Status: DISCONTINUED | OUTPATIENT
Start: 2021-09-05 | End: 2021-09-29

## 2021-09-05 RX ADMIN — Medication 3 MILLIGRAM(S): at 22:40

## 2021-09-05 RX ADMIN — Medication 650 MILLIGRAM(S): at 18:31

## 2021-09-05 RX ADMIN — OLANZAPINE 10 MILLIGRAM(S): 15 TABLET, FILM COATED ORAL at 20:50

## 2021-09-05 RX ADMIN — Medication 60 MILLIGRAM(S): at 08:37

## 2021-09-05 RX ADMIN — Medication 0.5 MILLIGRAM(S): at 20:50

## 2021-09-05 RX ADMIN — Medication 25 MILLIGRAM(S): at 11:54

## 2021-09-05 RX ADMIN — Medication 0.5 MILLIGRAM(S): at 08:37

## 2021-09-05 NOTE — BH INPATIENT PSYCHIATRY PROGRESS NOTE - NSBHASSESSSUMMFT_PSY_ALL_CORE
Patient is guarded and minimizing her symptoms, paranoia suspected. She denies any AH or SI.    c/w Zyprexa titration

## 2021-09-05 NOTE — BH INPATIENT PSYCHIATRY PROGRESS NOTE - NSBHCHARTREVIEWVS_PSY_A_CORE FT
Vital Signs Last 24 Hrs  T(C): 36.9 (09-05-21 @ 08:12), Max: 36.9 (09-05-21 @ 08:12)  T(F): 98.5 (09-05-21 @ 08:12), Max: 98.5 (09-05-21 @ 08:12)  HR: --  BP: --  BP(mean): --  RR: --  SpO2: --    Orthostatic VS  09-05-21 @ 08:12  Lying BP: --/-- HR: --  Sitting BP: 130/91 HR: 83  Standing BP: --/-- HR: --  Site: --  Mode: --  Orthostatic VS  09-04-21 @ 19:25  Lying BP: --/-- HR: --  Sitting BP: 122/91 HR: 76  Standing BP: 123/90 HR: 79  Site: --  Mode: --  Orthostatic VS  09-04-21 @ 07:42  Lying BP: --/-- HR: --  Sitting BP: 130/87 HR: 92  Standing BP: 126/78 HR: 100  Site: upper right arm  Mode: electronic  Orthostatic VS  09-03-21 @ 19:57  Lying BP: --/-- HR: --  Sitting BP: 114/73 HR: 72  Standing BP: 104/71 HR: 81  Site: upper right arm  Mode: electronic

## 2021-09-05 NOTE — BH INPATIENT PSYCHIATRY PROGRESS NOTE - NSBHFUPINTERVALHXFT_PSY_A_CORE
Chart reviewed and case discussed with treatment team. No events reported overnight. Sleep and appetite is fair. Patient remains guarded and is keeping to herself but is out on the unit. She denies any SI or AVH. Patient is compliant with medications, no adverse effects reported.

## 2021-09-06 PROCEDURE — 99232 SBSQ HOSP IP/OBS MODERATE 35: CPT

## 2021-09-06 RX ORDER — OLANZAPINE 15 MG/1
15 TABLET, FILM COATED ORAL AT BEDTIME
Refills: 0 | Status: DISCONTINUED | OUTPATIENT
Start: 2021-09-06 | End: 2021-09-08

## 2021-09-06 RX ADMIN — OLANZAPINE 7.5 MILLIGRAM(S): 15 TABLET, FILM COATED ORAL at 10:10

## 2021-09-06 RX ADMIN — Medication 0.5 MILLIGRAM(S): at 20:14

## 2021-09-06 RX ADMIN — Medication 25 MILLIGRAM(S): at 16:55

## 2021-09-06 RX ADMIN — Medication 3 MILLIGRAM(S): at 23:45

## 2021-09-06 RX ADMIN — OLANZAPINE 15 MILLIGRAM(S): 15 TABLET, FILM COATED ORAL at 20:15

## 2021-09-06 RX ADMIN — Medication 25 MILLIGRAM(S): at 22:45

## 2021-09-06 RX ADMIN — Medication 3 MILLIGRAM(S): at 21:35

## 2021-09-06 RX ADMIN — Medication 25 MILLIGRAM(S): at 10:10

## 2021-09-06 RX ADMIN — Medication 60 MILLIGRAM(S): at 08:49

## 2021-09-06 RX ADMIN — Medication 650 MILLIGRAM(S): at 20:16

## 2021-09-06 RX ADMIN — Medication 0.5 MILLIGRAM(S): at 08:49

## 2021-09-06 RX ADMIN — Medication 650 MILLIGRAM(S): at 21:10

## 2021-09-06 NOTE — BH INPATIENT PSYCHIATRY PROGRESS NOTE - PRN MEDS
MEDICATIONS  (PRN):  acetaminophen   Tablet .. 650 milliGRAM(s) Oral every 6 hours PRN Mild Pain (1 - 3), Moderate Pain (4 - 6), Severe Pain (7 - 10)  hydrOXYzine hydrochloride 25 milliGRAM(s) Oral every 6 hours PRN Anxiety  melatonin. 3 milliGRAM(s) Oral at bedtime PRN Insomnia  OLANZapine 7.5 milliGRAM(s) Oral every 6 hours PRN agitation  OLANZapine Injectable 7.5 milliGRAM(s) IntraMuscular once PRN agitation

## 2021-09-06 NOTE — BH INPATIENT PSYCHIATRY PROGRESS NOTE - NSBHCHARTREVIEWVS_PSY_A_CORE FT
Vital Signs Last 24 Hrs  T(C): 35.9 (09-06-21 @ 07:02), Max: 37 (09-05-21 @ 17:08)  T(F): 96.6 (09-06-21 @ 07:02), Max: 98.6 (09-05-21 @ 17:08)  HR: --  BP: --  BP(mean): --  RR: --  SpO2: --    Orthostatic VS  09-06-21 @ 07:02  Lying BP: --/-- HR: --  Sitting BP: 122/67 HR: 84  Standing BP: --/-- HR: --  Site: --  Mode: --  Orthostatic VS  09-05-21 @ 08:12  Lying BP: --/-- HR: --  Sitting BP: 130/91 HR: 83  Standing BP: --/-- HR: --  Site: --  Mode: --  Orthostatic VS  09-04-21 @ 19:25  Lying BP: --/-- HR: --  Sitting BP: 122/91 HR: 76  Standing BP: 123/90 HR: 79  Site: --  Mode: --

## 2021-09-06 NOTE — BH INPATIENT PSYCHIATRY PROGRESS NOTE - CURRENT MEDICATION
MEDICATIONS  (STANDING):  LORazepam     Tablet 0.5 milliGRAM(s) Oral two times a day  melatonin. 3 milliGRAM(s) Oral once  OLANZapine 15 milliGRAM(s) Oral at bedtime    MEDICATIONS  (PRN):  acetaminophen   Tablet .. 650 milliGRAM(s) Oral every 6 hours PRN Mild Pain (1 - 3), Moderate Pain (4 - 6), Severe Pain (7 - 10)  hydrOXYzine hydrochloride 25 milliGRAM(s) Oral every 6 hours PRN Anxiety  melatonin. 3 milliGRAM(s) Oral at bedtime PRN Insomnia  OLANZapine 7.5 milliGRAM(s) Oral every 6 hours PRN agitation  OLANZapine Injectable 7.5 milliGRAM(s) IntraMuscular once PRN agitation

## 2021-09-06 NOTE — BH INPATIENT PSYCHIATRY PROGRESS NOTE - NSBHFUPINTERVALHXFT_PSY_A_CORE
Chart reviewed and case discussed with treatment team. No events reported overnight. Sleep and appetite is fair. Patient remains guarded and is keeping to herself. Minimal interaction noted with peers though she is out on the unit and was outside for fresh air. She appears paranoid, hesitant to share information. She stated her thoughts are racing and is tearful at times but would not further elaborate. She denies any SI or AVH. Patient is compliant with medications, no adverse effects reported.

## 2021-09-07 LAB
OLIGOCLONAL BANDS CSF ELPH-IMP: SIGNIFICANT CHANGE UP

## 2021-09-07 PROCEDURE — 99231 SBSQ HOSP IP/OBS SF/LOW 25: CPT

## 2021-09-07 RX ORDER — HYDROXYZINE HCL 10 MG
25 TABLET ORAL ONCE
Refills: 0 | Status: COMPLETED | OUTPATIENT
Start: 2021-09-07 | End: 2021-09-07

## 2021-09-07 RX ORDER — DIPHENHYDRAMINE HCL 50 MG
50 CAPSULE ORAL ONCE
Refills: 0 | Status: COMPLETED | OUTPATIENT
Start: 2021-09-07 | End: 2021-09-07

## 2021-09-07 RX ADMIN — Medication 3 MILLIGRAM(S): at 21:17

## 2021-09-07 RX ADMIN — Medication 25 MILLIGRAM(S): at 22:50

## 2021-09-07 RX ADMIN — OLANZAPINE 7.5 MILLIGRAM(S): 15 TABLET, FILM COATED ORAL at 19:08

## 2021-09-07 RX ADMIN — Medication 60 MILLIGRAM(S): at 09:33

## 2021-09-07 RX ADMIN — Medication 1 MILLIGRAM(S): at 18:51

## 2021-09-07 RX ADMIN — Medication 25 MILLIGRAM(S): at 17:36

## 2021-09-07 RX ADMIN — Medication 0.5 MILLIGRAM(S): at 09:31

## 2021-09-07 RX ADMIN — Medication 25 MILLIGRAM(S): at 13:15

## 2021-09-07 RX ADMIN — OLANZAPINE 15 MILLIGRAM(S): 15 TABLET, FILM COATED ORAL at 18:51

## 2021-09-07 RX ADMIN — Medication 50 MILLIGRAM(S): at 21:40

## 2021-09-07 NOTE — BH INPATIENT PSYCHIATRY PROGRESS NOTE - NSBHASSESSSUMMFT_PSY_ALL_CORE
June 18, 2021       Rahel Toribio MD  [address] [facility] [address]  Via In Basket      Patient: Ella Rawls   YOB: 1961   Date of Visit: 6/17/2021       Dear Dr. Toribio:    I saw your patient, Ella Rawls, for an evaluation. Below are my notes for this visit with him.    If you have questions, please do not hesitate to call me.      Sincerely,        Osbaldo Loyd MD        CC: No Recipients  Osbaldo Loyd MD  6/18/2021  3:34 PM  Signed  .  DATE: 11/19/2020  PATIENT: Ella Rawls  Referred By: Rahel Toribio MD      Diagnosis: Clear cell renal cancer  Stage: III  pT3a NX M0       CHIEF COMPLAINT:  Ella Rawls is here in follow up for the management of kidney cancer    HISTORY OF PRESENT ILLNESS:    59 year old male who was diagnosed with Stage pT3a NX M0 right clear-cell renal cell carcinoma.  The patient had a right radical nephrectomy performed on August 6, 2019 that showed invasion into the renal pelvic sinus but the ureteral, hilar vascular and perinephric fat surgical resection margins were negative.  I recommended that since he has negative resection margins at the time of radical nephrectomy that no additional therapy was necessary.     When he followed up on 11/20/2020 he had undergone a CT chest abd pelvis on 11/14/2020 which showed new small lung nodules as well as growth of some pre-existing nodules, one of which increased from 1 cm to 2.1 cm.  He was recommended to have a biopsy of a lung lesion but the patient was never called to schedule the appointment.  He ultimately underwent a biopsy of the lung lesion last week which revealed evidence for metastatic clear-cell carcinoma of the kidney.     Because he was felt to have good risk disease he was recommended treatment with pembrolizumab and axitinib.  He has completed 4 cycles and is tolerating it very well.  He only complains of some joint aches.  When he returned for his fourth cycle his blood pressure  was significantly elevated.  Treatment was not given.  Axitinib was held and he returned back to his primary care physician for blood pressure medication adjustment.  His treatment was resumed 3 weeks ago.  His blood pressure has been better controlled especially since his nifedipine was increased to 90 mg.    Patient's medications, allergies, past medical, surgical, social and family histories were reviewed and updated as appropriate.        Pain Scale: 0 of 10  Current Pain Management:n/a        REVIEW OF SYSTEMS:    Joint aches    A ten point review of systems was reviewed and otherwise negative unless stated above.         Vitals:  .  Visit Vitals  /76 (BP Location: LUE - Left upper extremity, Patient Position: Sitting, Cuff Size: Large Adult)   Pulse 74   Temp 98 °F (36.7 °C) (Temporal)   Resp 16   Wt (!) 136.4 kg (300 lb 9.6 oz)   SpO2 98%   BMI 40.77 kg/m²         ECOG Performance Status: 0      Physical Exam:  .Physical Exam  HENT:      Head: Normocephalic and atraumatic.   Eyes:      Pupils: Pupils are equal, round, and reactive to light.   Cardiovascular:      Rate and Rhythm: Normal rate and regular rhythm.   Pulmonary:      Effort: Pulmonary effort is normal.      Breath sounds: Normal breath sounds.   Abdominal:      General: Bowel sounds are normal.      Palpations: Abdomen is soft.   Musculoskeletal:         General: Normal range of motion.      Cervical back: Normal range of motion and neck supple.   Neurological:      Mental Status: He is alert and oriented to person, place, and time.           Psychosocial assessment was obtained. Intervention was not necessary.    LABS:          RADIOLOGY:  EXAMINATION: CT CHEST ABDOMEN PELVIS WO CONTRAST     CLINICAL INDICATION: Cancer of kidney.     COMPARISON: CT scan of the chest from 08/12/2020.  CT scan of the abdomen and pelvis from 05/16/2020.     TECHNIQUE: Multiple contiguous axial sections of the chest, abdomen and pelvis were acquired without the  use of intravenous contrast.  Coronal and sagittal reformatted images were also provided for interpretation.     RADIATION MODULATION: Adjustment of the mA and/or kV was done according to the patient's size.     FINDINGS:    The central airways are patent.  0.6 cm irregular nodule in the right middle lobe is stable.  Previously seen 0.2 cm nodule in the right middle lobe has increased in size and now measures 0.4 cm (series 4, image 77).  Newly conspicuous adjacent   micronodule in the right middle lobe.  One or two additional micronodules in the right middle lobe are overall stable.  Small calcified nodule in the right upper lobe.  Stable subpleural linear opacity in the lateral right lower lobe.  Interval increase   in size of the pleural-based nodule in the superior segment of the left lower lobe.  This measures 2.1 cm compared with 1.0 cm.  An adjacent micronodule is stable.  In the medial left lower lobe, fairly stable additional subpleural nodule.  An additional   new slightly irregular nodule is seen at the medial left lung base and measures 0.7 cm (series 4, image 110).  In the lateral left upper lobe, there is a new subpleural nodule which measures 0.4 cm (series 4, image 43).  No pleural effusion or   pneumothorax.        Heart size is normal.  No pericardial effusion.  The thoracic aorta and main pulmonary artery are stable.  Stable probable lymph node in the anterior mediastinum adjacent to the main pulmonary artery.  A hypodense probable lymph node in the left   infrahilar region measures approximately 1.5 cm in short axis and is stable (series 4, image 80).  No enlarged axillary lymph nodes.  Evaluation for hilar lymph node enlargement is limited by the lack of intravenous contrast.  Hypodense nodule in the   right thyroid lobe containing internal calcifications is again seen.  Advise correlation with prior thyroid ultrasound.  No fluid collection or mass is seen in the chest wall.      The liver is  normal in size.  Slightly hypodense appearance of the liver suggests hepatic steatosis.  A couple of subcentimeter hypodensities in the liver are too small to accurately characterize.  The gallbladder is unremarkable.  No biliary ductal   dilatation.  Stable mild thickening and nodularity of the adrenal glands.  The spleen and pancreas are unremarkable.      The right kidney is surgically absent.  No residual or recurrent soft tissue in the right renal fossa is seen.  Small stone in the upper pole of the left kidney is stable.  Nonspecific left perinephric fat stranding.  Parapelvic cysts versus caliectasis   in the left kidney.  No convincing left hydronephrosis or hydroureter.  The urinary bladder is unremarkable.  The prostate gland is present.     Enteric contrast was administered.  No bowel obstruction.  Normal appendix.  No pneumatosis or free air.      No ascites.  No enlarged abdominal or pelvic lymph nodes.  The abdominal aorta is nonaneurysmal.  Atherosclerosis.  Small bilateral fat-containing inguinal hernias.  The abdominal wall is unremarkable.     Degenerative thoracolumbar spondylosis.  No destructive bone lesion.     IMPRESSION:     Enlarging pulmonary nodules are again concerning for metastatic disease.  Abdomen and pelvis study is stable without evidence for metastatic disease.  Additional stable an incidental findings are detailed above.     PATHOLOGY:    Cytology, Fine Needle Aspirate: [id number]  Order: [id number]  Collected:  1/26/2021 09:58 Status:  Final result   Visible to patient:  No (not released) Dx:  Secondary malignancy of left lower lo...  Component  Resulting Agency   Cytologic Interpretation   Left lower lobe of lung, biopsy:   -Positive for malignancy  -Metastatic clear-cell renal cell carcinoma    Note: AE1/3 and PAX 8 stains are positive, and TTF-1/Napsin is negative, supporting the interpretation.  Case subjected to pathology staff review.         Electronically signed by  Arely Santiago MD on 1/28/2021 at 1205   Clinical Information  [facility]   C78.02 - Secondary malignancy of left lower lobe of lung (CMS/HCC) [ICD-10-CM]                     Gross Description  Nena VALDEZ  Two air dried slides are received and stained with Diff-Quik stain labeled A1. Core biopsy tissue fragments are received in formalin and consist of multiple pieces of yellow-red to red-tan elongated soft tissue restriction formalin.  The additional specimens measures from 0.1 cm to 0.2 cm in greatest dimension and measuring in aggregate 2.7 x 2.5 x 0.1 cm.  The additional specimens are wrapped in lens paper and divided into 2 cassettes and submitted labeled A2 and A3.     Sam Roy 01/26/21 12:41 PM               ASSESSMENT/PLAN:     Renal clear cell carcinoma, Stage III, now recurrent with lung metastases.  - s/p nephrectomy with no adjuvant therapy.  Recent CT 11/14/2020 showed growth of some nodules, the largest which increased from 1 cm to 2.1 cm, as well as development of new small nodules.  He was recommended to undergo IR guided biopsy, however this was only performed last week.  Pathology reveals metastatic clear-cell renal cell adenocarcinoma.  Patient has good risk disease since this recurrence has occurred more than 1 year after his original diagnosis.  He does not have anemia, hypercalcemia, poor performance status.  He will have an LDH drawn today.  Assuming he has good risk disease he was recommended to be treated treated with pembrolizumab every 3 weeks and axitinib twice daily per NCCN guidelines.  He has tolerated it very well with exception of mild joint aches.  When he returned for cycle 4 his blood pressure was significantly elevated.  I sent him to his primary care physician for medication adjustment.  Once his blood pressure was better controlled he resumed treatment.  He returns today for cycle #6.  His diastolic blood pressure is elevated.  I plan to send him to the treatment  area for treatment if his diastolic blood pressure is lower than 100 using a large blood pressure cuff.  If his blood pressure remains elevated his dose of nifedipine will be increased and he will return tomorrow.      Plan:   1.  Proceed with cycle #7 of treatment with pembrolizumab 200 mg IV every 3 weeks and axitinib 5 mg p.o. twice daily   2. I recommended he try Tylenol as needed for joint aches  3. CT scan will be repeated after this cycle.    FOLLOW UP:  3 weeks    Thank you for allowing me to participate in your patient's healthcare management. Please feel free to contact me with any questions.    Sincerely,    Osbaldo Loyd MD                  35-year-old female; domiciled with mother; unemployed; highest level of education some college; PPHx of depression, anxiety, prior bipolar diagnosis per records and per patient, 1 prior psych admission she thinks at [facility] vs stay overnight in [facility] (unsure) for depression, hx SIB by cutting, no known hx of violence; denies alcohol use; past use of marijuana and cocaine (last 6mos ago); denies PMHx; admitted from [facility] after medical admission.  BIB EMS activated by mother for what mother believed was seizure-like activity.  Neuro cleared patient.  Episode sounds like it could have been episode of catatonia from description of being frozen and ambivalent about moving.      Diagnosis remains unclear, MDD with psychosis vs bipolar disorder vs primary psychotic disorder.  She is guarded and has FTD with vague speech, concrete thought process, loosening of associations. She refuses any contact with mother for collateral information. She is compliant with medications and in behavioral control. She continues to be anxious and guarded with blunted/constricted affect.     Legal: 927 status  Safety: routine checks  Psychiatric:  - olanzapine 10mg qHS  - olanzapine 7.5mg PO/IM PRN for agitation  - Ativan 0.5mg BID for anxiety  Medical:  - continue steroid taper  - f/u autoimmune encephalitis panel  Dispo: likely home, pending improvement 35-year-old female; domiciled with mother; unemployed; highest level of education some college; PPHx of depression, anxiety, prior bipolar diagnosis per records and per patient, 1 prior psych admission she thinks at [facility] vs stay overnight in [facility] (unsure) for depression, hx SIB by cutting, no known hx of violence; denies alcohol use; past use of marijuana and cocaine (last 6mos ago); denies PMHx; admitted from [facility] after medical admission.  BIB EMS activated by mother for what mother believed was seizure-like activity.  Neuro cleared patient.  Episode sounds like it could have been episode of catatonia from description of being frozen and ambivalent about moving.      Diagnosis remains unclear, MDD with psychosis vs bipolar disorder vs primary psychotic disorder.  She is guarded and has FTD with vague speech, concrete thought process, loosening of associations. She refuses any contact with mother for collateral information. She is compliant with medications and in behavioral control. She continues to be anxious and guarded with blunted/constricted affect.     Legal: 927 status  Safety: routine checks  Psychiatric:  - olanzapine 15mg qHS  - olanzapine 7.5mg PO/IM PRN for agitation  - Ativan 0.5mg BID for anxiety  Medical:  - continue steroid taper  - f/u autoimmune encephalitis panel  Dispo: likely home, pending improvement

## 2021-09-07 NOTE — BH INPATIENT PSYCHIATRY PROGRESS NOTE - CURRENT MEDICATION
MEDICATIONS  (STANDING):  LORazepam     Tablet 0.5 milliGRAM(s) Oral two times a day  melatonin. 3 milliGRAM(s) Oral once  OLANZapine 15 milliGRAM(s) Oral at bedtime  predniSONE   Tablet 60 milliGRAM(s) Oral <User Schedule>    MEDICATIONS  (PRN):  acetaminophen   Tablet .. 650 milliGRAM(s) Oral every 6 hours PRN Mild Pain (1 - 3), Moderate Pain (4 - 6), Severe Pain (7 - 10)  hydrOXYzine hydrochloride 25 milliGRAM(s) Oral every 6 hours PRN Anxiety  melatonin. 3 milliGRAM(s) Oral at bedtime PRN Insomnia  OLANZapine 7.5 milliGRAM(s) Oral every 6 hours PRN agitation  OLANZapine Injectable 7.5 milliGRAM(s) IntraMuscular once PRN agitation

## 2021-09-07 NOTE — BH INPATIENT PSYCHIATRY PROGRESS NOTE - NSBHFUPINTERVALHXFT_PSY_A_CORE
No overnight events. Patient seen and evaluated, case discussed with treatment team. Per staff patient has continued to be anxious throughout the weekend. the patient reports participating in groups and feeling more comfortable in the hospital. She denies any safety concerns at this time. She reports that the medications "may be helping," but that it was too soon to tell. She says that she needs help with her mood being "unstable," noting that it has always been this way. She reports a history of hearing voices, but denies any currently. She denies any SI/HI at this time. She reports improved sleep and appetite. She notes that talking about emotions is triggering for her, as well as getting out of bed.  No overnight events. Patient seen and evaluated, case discussed with treatment team. Per staff patient has continued to be anxious throughout the weekend. the patient reports participating in groups and feeling more comfortable in the hospital. She denies any safety concerns at this time. She reports that the medications "may be helping," but that it was too soon to tell. Reports trouble sleeping last night, but overall sleep improved.  Felt restless.  Has chronic restlessness but reports it was worse then usual.  She says that she needs help with her mood being "unstable," noting that it has always been this way. She reports a history of hearing voices, but denies any currently. She denies any SI/HI at this time. She reports improved appetite. She notes that talking about emotions is triggering for her, as well as getting out of bed. Discussed possible plan to attend PHP after d/c.

## 2021-09-07 NOTE — BH INPATIENT PSYCHIATRY PROGRESS NOTE - NSBHCHARTREVIEWVS_PSY_A_CORE FT
Vital Signs Last 24 Hrs  T(C): 36.5 (09-07-21 @ 06:40), Max: 36.8 (09-06-21 @ 16:56)  T(F): 97.7 (09-07-21 @ 06:40), Max: 98.2 (09-06-21 @ 16:56)  HR: 74 (09-07-21 @ 06:40) (74 - 74)  BP: 126/81 (09-07-21 @ 06:40) (126/81 - 126/81)  BP(mean): --  RR: --  SpO2: --    Orthostatic VS  09-06-21 @ 07:02  Lying BP: --/-- HR: --  Sitting BP: 122/67 HR: 84  Standing BP: --/-- HR: --  Site: --  Mode: --

## 2021-09-07 NOTE — BH INPATIENT PSYCHIATRY PROGRESS NOTE - MSE UNSTRUCTURED FT
Woman appearing stated age with good eye contact, cooperative, good hygiene and grooming, dressed in scrubs.  Guarded.  No PMA/R.  Oddly related.  Speech fluent with normal rate and volume.  Mood is "alright".  Affect is neutral, blunted, mildly constrictive but stable.  Thought process is marked by less vagueness, but still with tangentiality, looseness of association, concreteness. She has had semantic paraphasic speech, but none on this interview. Thought content notable for likely delusions of paranoia, but no concrete delusions elicited.  Denies current AH, not observed responding to internal stimuli.  Insight and judgment are poor.  Impulse control appropriate to setting in interview but has multiple elopement and agitated episodes. Attention is grossly intact.   Woman appearing stated age with good eye contact, cooperative, good hygiene and grooming, dressed in scrubs.  Guarded.  No PMA/R.  Oddly related.  Speech fluent with normal rate and volume.  Mood is "alright".  Affect is neutral, blunted, mildly constrictive but stable.  Thought process is marked by less vagueness, but still with tangentiality, looseness of association, concreteness. She has had semantic paraphasic errors in speech and perseveration on the word "partial". Thought content notable for likely delusions of paranoia, but no concrete delusions elicited.  Denies current AH, not observed responding to internal stimuli.  Insight and judgment are poor.  Impulse control appropriate to setting in interview but has multiple elopement and agitated episodes. Attention is grossly intact.

## 2021-09-08 DIAGNOSIS — F41.9 ANXIETY DISORDER, UNSPECIFIED: ICD-10-CM

## 2021-09-08 PROCEDURE — 99233 SBSQ HOSP IP/OBS HIGH 50: CPT

## 2021-09-08 RX ORDER — VENLAFAXINE HCL 75 MG
37.5 CAPSULE, EXT RELEASE 24 HR ORAL DAILY
Refills: 0 | Status: DISCONTINUED | OUTPATIENT
Start: 2021-09-09 | End: 2021-09-09

## 2021-09-08 RX ORDER — LURASIDONE HYDROCHLORIDE 40 MG/1
20 TABLET ORAL
Refills: 0 | Status: DISCONTINUED | OUTPATIENT
Start: 2021-09-08 | End: 2021-09-09

## 2021-09-08 RX ORDER — TRAZODONE HCL 50 MG
50 TABLET ORAL AT BEDTIME
Refills: 0 | Status: DISCONTINUED | OUTPATIENT
Start: 2021-09-08 | End: 2021-09-29

## 2021-09-08 RX ADMIN — Medication 1 MILLIGRAM(S): at 20:17

## 2021-09-08 RX ADMIN — LURASIDONE HYDROCHLORIDE 20 MILLIGRAM(S): 40 TABLET ORAL at 17:02

## 2021-09-08 RX ADMIN — Medication 0.5 MILLIGRAM(S): at 08:51

## 2021-09-08 RX ADMIN — Medication 50 MILLIGRAM(S): at 21:37

## 2021-09-08 RX ADMIN — Medication 60 MILLIGRAM(S): at 08:52

## 2021-09-08 NOTE — BH INPATIENT PSYCHIATRY PROGRESS NOTE - NSBHASSESSSUMMFT_PSY_ALL_CORE
35-year-old female; domiciled with mother; unemployed; highest level of education some college; PPHx of depression, anxiety, prior bipolar diagnosis per records and per patient, 1 prior psych admission she thinks at [facility] vs stay overnight in [facility] (unsure) for depression, hx SIB by cutting, no known hx of violence; denies alcohol use; past use of marijuana and cocaine (last 6mos ago); denies PMHx; admitted from [facility] after medical admission.  BIB EMS activated by mother for what mother believed was seizure-like activity.  Neuro cleared patient.  Episode sounds like it could have been episode of catatonia from description of being frozen and ambivalent about moving.      Diagnosis remains unclear, MDD with psychosis vs bipolar disorder vs primary psychotic disorder. Patient's reported sx today with associated history point towards MDD w/ psychosis vs bipolar depression w/ psychosis as possible etiology. She is guarded and has FTD with vague speech, concrete thought process, loosening of associations. She refuses any contact with mother for collateral information. She is compliant with medications and in behavioral control. She continues to be anxious and guarded with blunted/constricted affect. Requests change of medication from zyprexa given intolerable side effects, requesting antidepressants. Given that patient meets criteria for depressed mood episode with suspicion of MDD w/ psychosis vs BP depression, will initiate antidepressant treatment.     Legal: 927 status  Safety: routine checks  Psychiatric:  - d/c zyprexa  - olanzapine 7.5mg PO/IM PRN for agitation  - start effexor 37.5mg daily  - Ativan 0.5mg BID for anxiety  Medical:  - d/c steroid taper  - f/u autoimmune encephalitis panel  Dispo: likely home, pending improvement 35-year-old female; domiciled with mother; unemployed; highest level of education some college; PPHx of depression, anxiety, prior bipolar diagnosis per records and per patient, 1 prior psych admission she thinks at [facility] vs stay overnight in [facility] (unsure) for depression, hx SIB by cutting, no known hx of violence; denies alcohol use; past use of marijuana and cocaine (last 6mos ago); denies PMHx; admitted from [facility] after medical admission.  BIB EMS activated by mother for what mother believed was seizure-like activity.  Neuro cleared patient.  Episode sounds like it could have been episode of catatonia from description of being frozen and ambivalent about moving.      Diagnosis remains unclear, MDD with psychosis vs bipolar disorder vs primary psychotic disorder. Patient's reported sx today with associated history point towards MDD w/ psychosis vs bipolar depression w/ psychosis as possible etiology. She is guarded and has FTD with vague speech, concrete thought process, loosening of associations. She refuses any contact with mother for collateral information. She is compliant with medications and in behavioral control. She continues to be anxious and guarded with blunted/constricted affect. Requests change of medication from zyprexa given intolerable side effects, requesting antidepressants. Will switch antipsychotic to Latuda. Given that patient meets criteria for depressed mood episode with suspicion of MDD w/ psychosis vs BP depression, will initiate antidepressant treatment.    Legal: 927 status  Safety: routine checks  Psychiatric:  - d/c zyprexa  - start Latuda 20mg nightly w/ meal  - start effexor 37.5mg daily  - Ativan 0.5mg BID for anxiety  Medical:  - d/c steroid taper  - f/u autoimmune encephalitis panel  Dispo: likely home, pending improvement 35-year-old female; domiciled with mother; unemployed; highest level of education some college; PPHx of depression, anxiety, prior bipolar diagnosis per records and per patient, 1 prior psych admission she thinks at [facility] vs stay overnight in [facility] (unsure) for depression, hx SIB by cutting, no known hx of violence; denies alcohol use; past use of marijuana and cocaine (last 6mos ago); denies PMHx; admitted from [facility] after medical admission.  BIB EMS activated by mother for what mother believed was seizure-like activity.  Neuro cleared patient.  Episode sounds like it could have been episode of catatonia from description of being frozen and ambivalent about moving.      Diagnosis remains unclear, MDD with psychosis vs bipolar disorder vs primary psychotic disorder. Patient's reported sx today with associated history point towards MDD w/ psychosis vs bipolar depression w/ psychosis as possible etiology. She is guarded and has FTD with vague speech, concrete thought process, loosening of associations. She refuses any contact with mother for collateral information. She is compliant with medications and in behavioral control. She continues to be anxious and guarded with blunted/constricted affect. Requests change of medication from zyprexa given intolerable side effects, requesting antidepressants. Will switch antipsychotic to Latuda. Given that patient meets criteria for depressed mood episode with suspicion of MDD w/ psychosis vs BP depression, will initiate antidepressant treatment.    Legal: 927 status  Safety: routine checks  Psychiatric:  - d/c zyprexa  - start Latuda 20mg nightly w/ meal  - start effexor 37.5mg daily  - Ativan 0.5mg daily and 1mg qHS for anxiety  - trazodone 50mg qHS PRN for insomnia  Medical:  - d/c steroid taper  - f/u autoimmune encephalitis panel  Dispo: likely home, pending improvement

## 2021-09-08 NOTE — BH INPATIENT PSYCHIATRY PROGRESS NOTE - PRN MEDS
MEDICATIONS  (PRN):  acetaminophen   Tablet .. 650 milliGRAM(s) Oral every 6 hours PRN Mild Pain (1 - 3), Moderate Pain (4 - 6), Severe Pain (7 - 10)  hydrOXYzine hydrochloride 25 milliGRAM(s) Oral every 6 hours PRN Anxiety  melatonin. 3 milliGRAM(s) Oral at bedtime PRN Insomnia  OLANZapine 7.5 milliGRAM(s) Oral every 6 hours PRN agitation  OLANZapine Injectable 7.5 milliGRAM(s) IntraMuscular once PRN agitation   MEDICATIONS  (PRN):  acetaminophen   Tablet .. 650 milliGRAM(s) Oral every 6 hours PRN Mild Pain (1 - 3), Moderate Pain (4 - 6), Severe Pain (7 - 10)  hydrOXYzine hydrochloride 25 milliGRAM(s) Oral every 6 hours PRN Anxiety  melatonin. 3 milliGRAM(s) Oral at bedtime PRN Insomnia  OLANZapine 7.5 milliGRAM(s) Oral every 6 hours PRN agitation  OLANZapine Injectable 7.5 milliGRAM(s) IntraMuscular once PRN agitation  traZODone 50 milliGRAM(s) Oral at bedtime PRN insomnia

## 2021-09-08 NOTE — BH INPATIENT PSYCHIATRY PROGRESS NOTE - CURRENT MEDICATION
MEDICATIONS  (STANDING):  LORazepam     Tablet 0.5 milliGRAM(s) Oral daily  LORazepam     Tablet 1 milliGRAM(s) Oral at bedtime  melatonin. 3 milliGRAM(s) Oral once  OLANZapine 15 milliGRAM(s) Oral at bedtime  predniSONE   Tablet 60 milliGRAM(s) Oral <User Schedule>    MEDICATIONS  (PRN):  acetaminophen   Tablet .. 650 milliGRAM(s) Oral every 6 hours PRN Mild Pain (1 - 3), Moderate Pain (4 - 6), Severe Pain (7 - 10)  hydrOXYzine hydrochloride 25 milliGRAM(s) Oral every 6 hours PRN Anxiety  melatonin. 3 milliGRAM(s) Oral at bedtime PRN Insomnia  OLANZapine 7.5 milliGRAM(s) Oral every 6 hours PRN agitation  OLANZapine Injectable 7.5 milliGRAM(s) IntraMuscular once PRN agitation   MEDICATIONS  (STANDING):  LORazepam     Tablet 0.5 milliGRAM(s) Oral daily  LORazepam     Tablet 1 milliGRAM(s) Oral at bedtime  lurasidone 20 milliGRAM(s) Oral <User Schedule>    MEDICATIONS  (PRN):  acetaminophen   Tablet .. 650 milliGRAM(s) Oral every 6 hours PRN Mild Pain (1 - 3), Moderate Pain (4 - 6), Severe Pain (7 - 10)  hydrOXYzine hydrochloride 25 milliGRAM(s) Oral every 6 hours PRN Anxiety  melatonin. 3 milliGRAM(s) Oral at bedtime PRN Insomnia  OLANZapine 7.5 milliGRAM(s) Oral every 6 hours PRN agitation  OLANZapine Injectable 7.5 milliGRAM(s) IntraMuscular once PRN agitation   MEDICATIONS  (STANDING):  LORazepam     Tablet 0.5 milliGRAM(s) Oral daily  LORazepam     Tablet 1 milliGRAM(s) Oral at bedtime  lurasidone 20 milliGRAM(s) Oral <User Schedule>    MEDICATIONS  (PRN):  acetaminophen   Tablet .. 650 milliGRAM(s) Oral every 6 hours PRN Mild Pain (1 - 3), Moderate Pain (4 - 6), Severe Pain (7 - 10)  hydrOXYzine hydrochloride 25 milliGRAM(s) Oral every 6 hours PRN Anxiety  melatonin. 3 milliGRAM(s) Oral at bedtime PRN Insomnia  OLANZapine 7.5 milliGRAM(s) Oral every 6 hours PRN agitation  OLANZapine Injectable 7.5 milliGRAM(s) IntraMuscular once PRN agitation  traZODone 50 milliGRAM(s) Oral at bedtime PRN insomnia

## 2021-09-08 NOTE — BH INPATIENT PSYCHIATRY PROGRESS NOTE - NSBHFUPINTERVALHXFT_PSY_A_CORE
Overnight the patient had episode of attempted head-banging following visit from mother prompting early administration of evening meds.  Patient seen and evaluated, case discussed with treatment team. Per staff patient continues to be anxious. The patient gave more clarity regarding her PPHx and symptoms. She reports episodes of depression in the past for which she was treated with effexor and celexa. She also has a history of bulimia/anorexia. The patient reports 4 months ago she had an acute worsening of depression sx including low mood, social withdrawal, PMR, poor sleep, poor appetite, SI, and low energy. She also says she started having confusing experiences such as CAH and paranoid thoughts. It is unclear whether mood or psychotic symptoms came first. She reports poor tolerance to medications currently in the form of abnormally large appetite, anxiety, SI, and restlessness. She reports ongoing intermittent SI with no plan or intent, but is uncomfortable discussing the subject as the thoughts of suicide are upsetting to her.  She attributes some of this to her steroid regimen but requests medication change from Zyprexa. She refuses consent to speak with mother or other contacts at this time, but may be open to a group discussion at a later time.  Discussed case with Neurology. Agreed in regards to stopping patient steroid regimen; no concerns for safety at this time.

## 2021-09-08 NOTE — BH INPATIENT PSYCHIATRY PROGRESS NOTE - NSBHCHARTREVIEWVS_PSY_A_CORE FT
Vital Signs Last 24 Hrs  T(C): 37.2 (09-08-21 @ 07:55), Max: 37.2 (09-08-21 @ 07:55)  T(F): 99 (09-08-21 @ 07:55), Max: 99 (09-08-21 @ 07:55)  HR: 89 (09-08-21 @ 07:55) (89 - 89)  BP: 123/71 (09-08-21 @ 07:55) (123/71 - 123/71)  BP(mean): --  RR: --  SpO2: --     Vital Signs Last 24 Hrs  T(C): 36.3 (09-08-21 @ 17:49), Max: 37.2 (09-08-21 @ 07:55)  T(F): 97.4 (09-08-21 @ 17:49), Max: 99 (09-08-21 @ 07:55)  HR: 89 (09-08-21 @ 07:55) (89 - 89)  BP: 123/71 (09-08-21 @ 07:55) (123/71 - 123/71)  BP(mean): --  RR: --  SpO2: --

## 2021-09-08 NOTE — BH INPATIENT PSYCHIATRY PROGRESS NOTE - MSE UNSTRUCTURED FT
Woman appearing stated age with good eye contact, cooperative, good hygiene and grooming, dressed in scrubs.  Guarded.  No PMA/R.  Oddly related.  Speech fluent with normal rate and volume.  Mood is "anxious".  Affect is neutral, blunted, mildly constrictive, more unstable/tearful at times.  Thought process is marked by less vagueness, but still with tangentiality, looseness of association, concreteness. She has had semantic paraphasic errors in speech with some neologisms, using the word "plug" to mean thought. Thought content notable for  delusions of paranoia, reported today in the form of people listening through headphones.  Denies current AH, reports hx of CAH yesterday, not observed responding to internal stimuli.  Insight and judgment are poor.  Impulse control appropriate to setting in interview but has multiple elopement and agitated episodes. Attention is grossly intact.   Woman appearing stated age with good eye contact, cooperative, good hygiene and grooming, dressed in scrubs.  Guarded.  No PMA/R.  Oddly related.  Speech fluent with normal rate and volume.  Mood is "anxious".  Affect is neutral, blunted, mildly constrictive, more unstable/tearful at times.  Thought process is marked by less vagueness, but still with tangentiality, looseness of association, concreteness. She has had semantic paraphasic errors in speech with some neologisms, using the word "plug" to mean thought. Thought content notable for  paranoid delusions, reported today in the form of people listening to her through headphones.  Denies current AH, reports hx of CAH yesterday, not observed responding to internal stimuli.  Insight and judgment are poor.  Impulse control appropriate to setting in interview but has multiple elopement and agitated episodes. Attention is grossly intact.

## 2021-09-09 PROCEDURE — 99232 SBSQ HOSP IP/OBS MODERATE 35: CPT

## 2021-09-09 RX ORDER — VENLAFAXINE HCL 75 MG
75 CAPSULE, EXT RELEASE 24 HR ORAL DAILY
Refills: 0 | Status: DISCONTINUED | OUTPATIENT
Start: 2021-09-10 | End: 2021-09-10

## 2021-09-09 RX ORDER — LURASIDONE HYDROCHLORIDE 40 MG/1
40 TABLET ORAL
Refills: 0 | Status: DISCONTINUED | OUTPATIENT
Start: 2021-09-09 | End: 2021-09-10

## 2021-09-09 RX ADMIN — LURASIDONE HYDROCHLORIDE 40 MILLIGRAM(S): 40 TABLET ORAL at 17:07

## 2021-09-09 RX ADMIN — Medication 1 MILLIGRAM(S): at 15:24

## 2021-09-09 RX ADMIN — Medication 0.5 MILLIGRAM(S): at 09:01

## 2021-09-09 RX ADMIN — Medication 1 MILLIGRAM(S): at 20:16

## 2021-09-09 RX ADMIN — Medication 37.5 MILLIGRAM(S): at 09:01

## 2021-09-09 RX ADMIN — Medication 30 MILLILITER(S): at 22:02

## 2021-09-09 RX ADMIN — Medication 50 MILLIGRAM(S): at 21:10

## 2021-09-09 NOTE — BH INPATIENT PSYCHIATRY PROGRESS NOTE - NSBHFUPINTERVALHXFT_PSY_A_CORE
Overnight the patient reports less anxiety and good sleep. She had no visitors overnight.  Patient seen and evaluated, case discussed with treatment team.  The patient reports better tolerance to latuda, with decreased anxiety, SI, and restlessness. She reports more normal appetite today, decreased overeating. She reports no SI since conversation yesterday. She has now given consent to speak with mother and previous providers. Patient still responds vaguely at times.

## 2021-09-09 NOTE — BH INPATIENT PSYCHIATRY PROGRESS NOTE - PRN MEDS
MEDICATIONS  (PRN):  acetaminophen   Tablet .. 650 milliGRAM(s) Oral every 6 hours PRN Mild Pain (1 - 3), Moderate Pain (4 - 6), Severe Pain (7 - 10)  hydrOXYzine hydrochloride 25 milliGRAM(s) Oral every 6 hours PRN Anxiety  melatonin. 3 milliGRAM(s) Oral at bedtime PRN Insomnia  OLANZapine 7.5 milliGRAM(s) Oral every 6 hours PRN agitation  OLANZapine Injectable 7.5 milliGRAM(s) IntraMuscular once PRN agitation  traZODone 50 milliGRAM(s) Oral at bedtime PRN insomnia   MEDICATIONS  (PRN):  acetaminophen   Tablet .. 650 milliGRAM(s) Oral every 6 hours PRN Mild Pain (1 - 3), Moderate Pain (4 - 6), Severe Pain (7 - 10)  hydrOXYzine hydrochloride 25 milliGRAM(s) Oral every 6 hours PRN Anxiety  LORazepam     Tablet 1 milliGRAM(s) Oral every 6 hours PRN anxiety  melatonin. 3 milliGRAM(s) Oral at bedtime PRN Insomnia  OLANZapine 7.5 milliGRAM(s) Oral every 6 hours PRN agitation  OLANZapine Injectable 7.5 milliGRAM(s) IntraMuscular once PRN agitation  traZODone 50 milliGRAM(s) Oral at bedtime PRN insomnia

## 2021-09-09 NOTE — BH INPATIENT PSYCHIATRY PROGRESS NOTE - MSE UNSTRUCTURED FT
Woman appearing stated age with good eye contact, cooperative, good hygiene and grooming, dressed in scrubs.  Guarded.  No PMA/R.  Oddly related.  Speech fluent with normal rate and volume.  Mood is "ok".  Affect is neutral, blunted, mildly constrictive, less tearful.  Thought process is marked by less vagueness, but still with tangentiality, looseness of association, concreteness. She has demonstrated semantic paraphasic errors in speech with some neologisms, less so today. Thought content notable for paranoid delusions, less evident today.  Denies current AH, reports hx of CAH yesterday, not observed responding to internal stimuli.  Insight and judgment are poor.  Impulse control appropriate to setting in interview but has multiple elopement and agitated episodes. Attention is grossly intact.

## 2021-09-09 NOTE — BH INPATIENT PSYCHIATRY PROGRESS NOTE - NSBHCHARTREVIEWVS_PSY_A_CORE FT
Vital Signs Last 24 Hrs  T(C): 36.2 (09-09-21 @ 08:16), Max: 36.3 (09-08-21 @ 17:49)  T(F): 97.1 (09-09-21 @ 08:16), Max: 97.4 (09-08-21 @ 17:49)  HR: 77 (09-09-21 @ 08:16) (77 - 77)  BP: 112/68 (09-09-21 @ 08:16) (112/68 - 112/68)  BP(mean): --  RR: --  SpO2: --

## 2021-09-09 NOTE — BH INPATIENT PSYCHIATRY PROGRESS NOTE - NSBHASSESSSUMMFT_PSY_ALL_CORE
35-year-old female; domiciled with mother; unemployed; highest level of education some college; PPHx of depression, anxiety, prior bipolar diagnosis per records and per patient, 1 prior psych admission she thinks at [facility] vs stay overnight in [facility] (unsure) for depression, hx SIB by cutting, no known hx of violence; denies alcohol use; past use of marijuana and cocaine (last 6mos ago); denies PMHx; admitted from [facility] after medical admission.  BIB EMS activated by mother for what mother believed was seizure-like activity.  Neuro cleared patient.  Episode sounds like it could have been episode of catatonia from description of being frozen and ambivalent about moving.      Diagnosis remains unclear, MDD with psychosis vs bipolar disorder vs primary psychotic disorder. Patient's reported sx with associated history point towards MDD w/ psychosis vs bipolar depression w/ psychosis as possible etiology. She is guarded and has FTD with vague speech, concrete thought process, loosening of associations. She is now giving consent to contact with mother for collateral information. She is compliant with medications and in behavioral control. She continues to be anxious and guarded with blunted/constricted affect. Tolerating latuda and effexor, will continue to uptitrate.    Legal: 927 status  Safety: routine checks  Psychiatric:  - increase Latuda to 40mg nightly w/ meal  - increase effexor to 75mg daily  - Ativan 0.5mg daily and 1mg qHS for anxiety  - trazodone 50mg qHS PRN for insomnia  Medical:  - d/c steroid taper  - f/u autoimmune encephalitis panel  Dispo: likely home, pending improvement 35-year-old female; domiciled with mother; unemployed; highest level of education some college; PPHx of depression, anxiety, prior bipolar diagnosis per records and per patient, 1 prior psych admission she thinks at [facility] vs stay overnight in [facility] (unsure) for depression, hx SIB by cutting, no known hx of violence; denies alcohol use; past use of marijuana and cocaine (last 6mos ago); denies PMHx; admitted from [facility] after medical admission.  BIB EMS activated by mother for what mother believed was seizure-like activity.  Neuro cleared patient.  Episode sounds like it could have been episode of catatonia from description of being frozen and ambivalent about moving.      Diagnosis remains unclear: Patient's reported sx with associated history point towards MDD w/ psychosis vs bipolar depression w/ psychosis as possible etiology. She is guarded and has FTD with vague speech, concrete thought process, loosening of associations. She is now giving consent to contact with mother for collateral information. She is compliant with medications and in behavioral control. She continues to be anxious and guarded with blunted/constricted affect. Tolerating latuda and effexor, will continue to uptitrate.    Legal: 927 status  Safety: routine checks  Psychiatric:  - increase Latuda to 40mg nightly w/ meal  - increase effexor to 75mg daily  - Ativan 0.5mg daily and 1mg q6h for anxiety and qHS for sleep  - trazodone 50mg qHS PRN for insomnia  Medical:  - d/c steroid taper  - f/u autoimmune encephalitis panel  Dispo: likely home, pending improvement

## 2021-09-09 NOTE — BH INPATIENT PSYCHIATRY PROGRESS NOTE - CURRENT MEDICATION
MEDICATIONS  (STANDING):  LORazepam     Tablet 0.5 milliGRAM(s) Oral daily  LORazepam     Tablet 1 milliGRAM(s) Oral at bedtime  lurasidone 40 milliGRAM(s) Oral <User Schedule>    MEDICATIONS  (PRN):  acetaminophen   Tablet .. 650 milliGRAM(s) Oral every 6 hours PRN Mild Pain (1 - 3), Moderate Pain (4 - 6), Severe Pain (7 - 10)  hydrOXYzine hydrochloride 25 milliGRAM(s) Oral every 6 hours PRN Anxiety  melatonin. 3 milliGRAM(s) Oral at bedtime PRN Insomnia  OLANZapine 7.5 milliGRAM(s) Oral every 6 hours PRN agitation  OLANZapine Injectable 7.5 milliGRAM(s) IntraMuscular once PRN agitation  traZODone 50 milliGRAM(s) Oral at bedtime PRN insomnia   MEDICATIONS  (STANDING):  LORazepam     Tablet 0.5 milliGRAM(s) Oral daily  LORazepam     Tablet 1 milliGRAM(s) Oral at bedtime  lurasidone 40 milliGRAM(s) Oral <User Schedule>    MEDICATIONS  (PRN):  acetaminophen   Tablet .. 650 milliGRAM(s) Oral every 6 hours PRN Mild Pain (1 - 3), Moderate Pain (4 - 6), Severe Pain (7 - 10)  hydrOXYzine hydrochloride 25 milliGRAM(s) Oral every 6 hours PRN Anxiety  LORazepam     Tablet 1 milliGRAM(s) Oral every 6 hours PRN anxiety  melatonin. 3 milliGRAM(s) Oral at bedtime PRN Insomnia  OLANZapine 7.5 milliGRAM(s) Oral every 6 hours PRN agitation  OLANZapine Injectable 7.5 milliGRAM(s) IntraMuscular once PRN agitation  traZODone 50 milliGRAM(s) Oral at bedtime PRN insomnia

## 2021-09-10 PROCEDURE — 99232 SBSQ HOSP IP/OBS MODERATE 35: CPT

## 2021-09-10 RX ORDER — VENLAFAXINE HCL 75 MG
150 CAPSULE, EXT RELEASE 24 HR ORAL DAILY
Refills: 0 | Status: DISCONTINUED | OUTPATIENT
Start: 2021-09-11 | End: 2021-09-13

## 2021-09-10 RX ORDER — LURASIDONE HYDROCHLORIDE 40 MG/1
60 TABLET ORAL
Refills: 0 | Status: DISCONTINUED | OUTPATIENT
Start: 2021-09-10 | End: 2021-09-13

## 2021-09-10 RX ADMIN — Medication 50 MILLIGRAM(S): at 21:37

## 2021-09-10 RX ADMIN — Medication 1 MILLIGRAM(S): at 12:39

## 2021-09-10 RX ADMIN — Medication 75 MILLIGRAM(S): at 08:45

## 2021-09-10 RX ADMIN — Medication 3 MILLIGRAM(S): at 21:38

## 2021-09-10 RX ADMIN — Medication 1 MILLIGRAM(S): at 21:25

## 2021-09-10 RX ADMIN — LURASIDONE HYDROCHLORIDE 60 MILLIGRAM(S): 40 TABLET ORAL at 16:49

## 2021-09-10 RX ADMIN — Medication 0.5 MILLIGRAM(S): at 08:45

## 2021-09-10 NOTE — BH INPATIENT PSYCHIATRY PROGRESS NOTE - CURRENT MEDICATION
MEDICATIONS  (STANDING):  LORazepam     Tablet 0.5 milliGRAM(s) Oral daily  LORazepam     Tablet 1 milliGRAM(s) Oral at bedtime  lurasidone 60 milliGRAM(s) Oral <User Schedule>  venlafaxine XR 75 milliGRAM(s) Oral daily    MEDICATIONS  (PRN):  acetaminophen   Tablet .. 650 milliGRAM(s) Oral every 6 hours PRN Mild Pain (1 - 3), Moderate Pain (4 - 6), Severe Pain (7 - 10)  hydrOXYzine hydrochloride 25 milliGRAM(s) Oral every 6 hours PRN Anxiety  LORazepam     Tablet 1 milliGRAM(s) Oral every 6 hours PRN anxiety  melatonin. 3 milliGRAM(s) Oral at bedtime PRN Insomnia  OLANZapine 7.5 milliGRAM(s) Oral every 6 hours PRN agitation  OLANZapine Injectable 7.5 milliGRAM(s) IntraMuscular once PRN agitation  traZODone 50 milliGRAM(s) Oral at bedtime PRN insomnia   MEDICATIONS  (STANDING):  LORazepam     Tablet 0.5 milliGRAM(s) Oral daily  LORazepam     Tablet 1 milliGRAM(s) Oral at bedtime  lurasidone 60 milliGRAM(s) Oral <User Schedule>    MEDICATIONS  (PRN):  acetaminophen   Tablet .. 650 milliGRAM(s) Oral every 6 hours PRN Mild Pain (1 - 3), Moderate Pain (4 - 6), Severe Pain (7 - 10)  hydrOXYzine hydrochloride 25 milliGRAM(s) Oral every 6 hours PRN Anxiety  LORazepam     Tablet 1 milliGRAM(s) Oral every 6 hours PRN anxiety  melatonin. 3 milliGRAM(s) Oral at bedtime PRN Insomnia  OLANZapine 7.5 milliGRAM(s) Oral every 6 hours PRN agitation  OLANZapine Injectable 7.5 milliGRAM(s) IntraMuscular once PRN agitation  traZODone 50 milliGRAM(s) Oral at bedtime PRN insomnia

## 2021-09-10 NOTE — BH INPATIENT PSYCHIATRY PROGRESS NOTE - PRN MEDS
MEDICATIONS  (PRN):  acetaminophen   Tablet .. 650 milliGRAM(s) Oral every 6 hours PRN Mild Pain (1 - 3), Moderate Pain (4 - 6), Severe Pain (7 - 10)  hydrOXYzine hydrochloride 25 milliGRAM(s) Oral every 6 hours PRN Anxiety  LORazepam     Tablet 1 milliGRAM(s) Oral every 6 hours PRN anxiety  melatonin. 3 milliGRAM(s) Oral at bedtime PRN Insomnia  OLANZapine 7.5 milliGRAM(s) Oral every 6 hours PRN agitation  OLANZapine Injectable 7.5 milliGRAM(s) IntraMuscular once PRN agitation  traZODone 50 milliGRAM(s) Oral at bedtime PRN insomnia

## 2021-09-10 NOTE — BH INPATIENT PSYCHIATRY PROGRESS NOTE - MSE UNSTRUCTURED FT
Woman appearing stated age with good eye contact, cooperative, good hygiene and grooming, dressed in scrubs.  Guarded.  No PMA/R.  Oddly related.  Speech fluent with normal rate and volume.  Mood is "better".  Affect is neutral, decreased blunting and constriction, less tearful.  Thought process is marked by less vagueness, but still with tangentiality, looseness of association, concreteness. She has demonstrated semantic paraphasic errors in speech with some neologisms, less so today. Thought content notable for paranoid delusions.  Reports ongoing CAH, not observed responding to internal stimuli.  Insight and judgment are poor.  Impulse control appropriate to setting in interview but has multiple elopement and agitated episodes. Attention is grossly intact.

## 2021-09-10 NOTE — BH INPATIENT PSYCHIATRY PROGRESS NOTE - NSBHASSESSSUMMFT_PSY_ALL_CORE
35-year-old female; domiciled with mother; unemployed; highest level of education some college; PPHx of depression, anxiety, prior bipolar diagnosis per records and per patient, 1 prior psych admission she thinks at [facility] vs stay overnight in [facility] (unsure) for depression, hx SIB by cutting, no known hx of violence; denies alcohol use; past use of marijuana and cocaine (last 6mos ago); denies PMHx; admitted from [facility] after medical admission.  BIB EMS activated by mother for what mother believed was seizure-like activity.  Neuro cleared patient.  Episode sounds like it could have been episode of catatonia from description of being frozen and ambivalent about moving.      Diagnosis remains unclear: Patient's reported sx with associated history point towards MDD w/ psychosis vs bipolar depression w/ psychosis as possible etiology. She is guarded and has FTD with vague speech, concrete thought process, loosening of associations. She is now giving consent to contact with mother for collateral information. She is compliant with medications and in behavioral control. She continues to be anxious and but less guarded with improving blunted/constricted affect. Tolerating latuda and effexor, will continue to uptitrate.    Legal: 927 status  Safety: routine checks  Psychiatric:  - increase Latuda to 60mg nightly w/ meal  - increase effexor to 150mg daily  - Ativan 0.5mg daily and 1mg q6h for anxiety and qHS for sleep  - trazodone 50mg qHS PRN for insomnia  Medical:  - d/c steroid taper  - f/u autoimmune encephalitis panel  Dispo: likely home, pending improvement 35-year-old female; domiciled with mother; unemployed; highest level of education some college; PPHx of depression, anxiety, prior bipolar diagnosis per records and per patient, 1 prior psych admission she thinks at [facility] vs stay overnight in [facility] (unsure) for depression, hx SIB by cutting, no known hx of violence; denies alcohol use; past use of marijuana and cocaine (last 6mos ago); denies PMHx; admitted from [facility] after medical admission.  BIB EMS activated by mother for what mother believed was seizure-like activity.  Neuro cleared patient.  Episode sounds like it could have been episode of catatonia from description of being frozen and ambivalent about moving.      Diagnosis remains unclear: Patient's reported sx with associated history point towards MDD w/ psychosis vs bipolar depression w/ psychosis as possible etiology. She is guarded and has FTD with vague speech, concrete thought process, loosening of associations. She is now giving consent to contact with mother for collateral information. She is compliant with medications and in behavioral control. She continues to be anxious and but less guarded with improving blunted/constricted affect. Tolerating latuda and effexor, will continue to uptitrate.    Legal: 927 status  Safety: routine checks  Psychiatric:  - increase Latuda to 60mg nightly w/ meal  - increase effexor to 150mg daily  - Ativan 0.5mg daily and 1mg q6h for anxiety and qHS for sleep  - trazodone 50mg qHS PRN for insomnia  Medical:  -  steroid taper d/c'd  - f/u autoimmune encephalitis panel  Dispo: likely home, pending improvement

## 2021-09-10 NOTE — BH INPATIENT PSYCHIATRY PROGRESS NOTE - NSBHFUPINTERVALHXFT_PSY_A_CORE
No acute events overnight. Patient seen and evaluated, case discussed with treatment team. Patient reports both worsening episodes of anxiety overnight with simultaneous overall improvement of mood and anxiety. She reports feeling less SI, and restlessness, with her last episode of SI yesterday as a fleeting negative thought. She has become more open in regards to discussing symptoms, reporting CAH that she has difficulty explaining or conceptualizing. She discusses the voices telling her to do things, notably laughing when realizing "how silly it sounds." She reports she last heard voices this morning, telling her to put on her glasses at 6:45pm. She reports fear surrounding the voices, not knowing whether listening or ignoring them would result in less harm. She reports more normal appetite still. Continues to deny any active SI/HI.

## 2021-09-10 NOTE — BH INPATIENT PSYCHIATRY PROGRESS NOTE - NSBHCHARTREVIEWVS_PSY_A_CORE FT
Vital Signs Last 24 Hrs  T(C): 36.5 (09-10-21 @ 07:40), Max: 36.5 (09-10-21 @ 07:40)  T(F): 97.7 (09-10-21 @ 07:40), Max: 97.7 (09-10-21 @ 07:40)  HR: 86 (09-10-21 @ 07:40) (86 - 86)  BP: 117/73 (09-10-21 @ 07:40) (117/73 - 117/73)  BP(mean): --  RR: --  SpO2: --

## 2021-09-11 RX ADMIN — Medication 1 MILLIGRAM(S): at 20:26

## 2021-09-11 RX ADMIN — Medication 1 MILLIGRAM(S): at 12:43

## 2021-09-11 RX ADMIN — Medication 150 MILLIGRAM(S): at 08:29

## 2021-09-11 RX ADMIN — Medication 0.5 MILLIGRAM(S): at 08:29

## 2021-09-11 RX ADMIN — LURASIDONE HYDROCHLORIDE 60 MILLIGRAM(S): 40 TABLET ORAL at 17:00

## 2021-09-12 RX ADMIN — Medication 150 MILLIGRAM(S): at 09:21

## 2021-09-12 RX ADMIN — Medication 0.5 MILLIGRAM(S): at 09:20

## 2021-09-12 RX ADMIN — Medication 1 MILLIGRAM(S): at 21:31

## 2021-09-12 RX ADMIN — Medication 50 MILLIGRAM(S): at 02:43

## 2021-09-12 RX ADMIN — LURASIDONE HYDROCHLORIDE 60 MILLIGRAM(S): 40 TABLET ORAL at 16:45

## 2021-09-12 RX ADMIN — Medication 1 MILLIGRAM(S): at 11:32

## 2021-09-13 LAB
AMPA-R AB CBA, CSF: NEGATIVE — SIGNIFICANT CHANGE UP
AMPHIPHYSIN AB TITR CSF: NEGATIVE TITER — SIGNIFICANT CHANGE UP
CASPR2-IGG CBA, CSF: NEGATIVE — SIGNIFICANT CHANGE UP
CV2 IGG TITR CSF: NEGATIVE TITER — SIGNIFICANT CHANGE UP
DPPX ANTIBODY IFA, CSF: NEGATIVE — SIGNIFICANT CHANGE UP
GABA-B-R AB CBA, CSF: NEGATIVE — SIGNIFICANT CHANGE UP
GAD65 AB CSF-SCNC: 0 NMOL/L — SIGNIFICANT CHANGE UP
GFAP IFA, CSF: NEGATIVE — SIGNIFICANT CHANGE UP
GLIAL NUC TYPE 1 AB TITR CSF: NEGATIVE TITER — SIGNIFICANT CHANGE UP
HU1 AB TITR CSF IF: NEGATIVE TITER — SIGNIFICANT CHANGE UP
HU2 AB TITR CSF IF: NEGATIVE TITER — SIGNIFICANT CHANGE UP
HU3 AB TITR CSF: NEGATIVE TITER — SIGNIFICANT CHANGE UP
IGLON5 IFA, CSF: NEGATIVE — SIGNIFICANT CHANGE UP
IMMUNOLOGIST REVIEW: SIGNIFICANT CHANGE UP
LGI1-IGG CBA, CSF: NEGATIVE — SIGNIFICANT CHANGE UP
MGLUR1 AB IFA, CSF: NEGATIVE — SIGNIFICANT CHANGE UP
NIF IFA, CSF: NEGATIVE — SIGNIFICANT CHANGE UP
NMDA-R AB CBA, CSF: NEGATIVE — SIGNIFICANT CHANGE UP
PCA-TR AB TITR CSF: NEGATIVE TITER — SIGNIFICANT CHANGE UP
PURKINJE CELL CYTOPLASMIC AB TYPE 2: NEGATIVE TITER — SIGNIFICANT CHANGE UP
PURKINJE CELLS AB TITR CSF IF: NEGATIVE TITER — SIGNIFICANT CHANGE UP
REFLEX ADDED: SIGNIFICANT CHANGE UP

## 2021-09-13 PROCEDURE — 99232 SBSQ HOSP IP/OBS MODERATE 35: CPT

## 2021-09-13 RX ORDER — LURASIDONE HYDROCHLORIDE 40 MG/1
80 TABLET ORAL
Refills: 0 | Status: DISCONTINUED | OUTPATIENT
Start: 2021-09-13 | End: 2021-09-15

## 2021-09-13 RX ORDER — VENLAFAXINE HCL 75 MG
225 CAPSULE, EXT RELEASE 24 HR ORAL DAILY
Refills: 0 | Status: DISCONTINUED | OUTPATIENT
Start: 2021-09-13 | End: 2021-09-16

## 2021-09-13 RX ADMIN — LURASIDONE HYDROCHLORIDE 80 MILLIGRAM(S): 40 TABLET ORAL at 16:37

## 2021-09-13 RX ADMIN — OLANZAPINE 7.5 MILLIGRAM(S): 15 TABLET, FILM COATED ORAL at 13:26

## 2021-09-13 RX ADMIN — Medication 1 MILLIGRAM(S): at 14:34

## 2021-09-13 RX ADMIN — Medication 1 MILLIGRAM(S): at 10:27

## 2021-09-13 RX ADMIN — Medication 150 MILLIGRAM(S): at 10:01

## 2021-09-13 RX ADMIN — Medication 0.5 MILLIGRAM(S): at 10:01

## 2021-09-13 RX ADMIN — Medication 1 MILLIGRAM(S): at 22:04

## 2021-09-13 RX ADMIN — Medication 1 MILLIGRAM(S): at 16:37

## 2021-09-13 NOTE — BH INPATIENT PSYCHIATRY PROGRESS NOTE - NSBHFUPINTERVALHXFT_PSY_A_CORE
No acute events overnight. Patient seen and evaluated, case discussed with treatment team. Patient continues to report overall improvement of mood and anxiety. She reports no more feelings of SI, with her last episode of SI before the weekend. She reports ongoing CAH telling her to put her glasses on and phone, with the last episode occurring yesterday. She reports being able to ignore the commands. She reports more normal appetite still, with poor sleep the past few days. Continues to deny any active SI/HI.

## 2021-09-13 NOTE — BH INPATIENT PSYCHIATRY PROGRESS NOTE - NSBHCHARTREVIEWVS_PSY_A_CORE FT
Vital Signs Last 24 Hrs  T(C): 36.9 (09-13-21 @ 06:32), Max: 37 (09-12-21 @ 17:20)  T(F): 98.5 (09-13-21 @ 06:32), Max: 98.6 (09-12-21 @ 17:20)  HR: --  BP: --  BP(mean): --  RR: --  SpO2: --    Orthostatic VS  09-13-21 @ 06:32  Lying BP: --/-- HR: --  Sitting BP: 132/85 HR: 89  Standing BP: --/-- HR: --  Site: --  Mode: --

## 2021-09-13 NOTE — BH INPATIENT PSYCHIATRY PROGRESS NOTE - MSE UNSTRUCTURED FT
Woman appearing stated age with good eye contact, cooperative, good hygiene and grooming, dressed in scrubs.  Guarded.  No PMA/R.  Oddly related.  Speech fluent with normal rate and volume.  Mood is "better".  Affect is neutral, decreased blunting and constriction.  Thought process is marked by less vagueness, but still with tangentiality, looseness of association, concreteness at times. She has had less semantic paraphasic errors in speech. Thought content notable for paranoid delusions yet not over today.  Reports ongoing CAH, not observed responding to internal stimuli.  Insight and judgment are poor.  Impulse control appropriate to setting in interview but has multiple elopement and agitated episodes. Attention is grossly intact.

## 2021-09-13 NOTE — BH INPATIENT PSYCHIATRY PROGRESS NOTE - NSBHASSESSSUMMFT_PSY_ALL_CORE
35-year-old female; domiciled with mother; unemployed; highest level of education some college; PPHx of depression, anxiety, prior bipolar diagnosis per records and per patient, 1 prior psych admission she thinks at [facility] vs stay overnight in [facility] (unsure) for depression, hx SIB by cutting, no known hx of violence; denies alcohol use; past use of marijuana and cocaine (last 6mos ago); denies PMHx; admitted from [facility] after medical admission.  BIB EMS activated by mother for what mother believed was seizure-like activity.  Neuro cleared patient.  Episode sounds like it could have been episode of catatonia from description of being frozen and ambivalent about moving.      Diagnosis remains unclear: Patient's reported sx with associated history point towards MDD w/ psychosis vs bipolar depression w/ psychosis as possible etiology. She is guarded and has FTD with vague speech, concrete thought process, loosening of associations, although improving with medications. She is compliant with medications and in behavioral control. She continues to be anxious but less guarded with improving blunted/constricted affect. Tolerating latuda and effexor, will continue to uptitrate.    Legal: 927 status  Safety: routine checks  Psychiatric:  - increase Latuda to80mg nightly w/ meal  - increase effexor to 225mg daily  - Ativan 0.5mg daily and 1mg q6h for anxiety and qHS for sleep  - trazodone 50mg qHS PRN for insomnia  Medical:  -  steroid taper d/c'd  - f/u autoimmune encephalitis panel  Dispo: likely home, pending improvement

## 2021-09-13 NOTE — BH INPATIENT PSYCHIATRY PROGRESS NOTE - CURRENT MEDICATION
MEDICATIONS  (STANDING):  LORazepam     Tablet 0.5 milliGRAM(s) Oral daily  LORazepam     Tablet 1 milliGRAM(s) Oral at bedtime  lurasidone 80 milliGRAM(s) Oral <User Schedule>  venlafaxine  milliGRAM(s) Oral daily    MEDICATIONS  (PRN):  acetaminophen   Tablet .. 650 milliGRAM(s) Oral every 6 hours PRN Mild Pain (1 - 3), Moderate Pain (4 - 6), Severe Pain (7 - 10)  hydrOXYzine hydrochloride 25 milliGRAM(s) Oral every 6 hours PRN Anxiety  LORazepam     Tablet 1 milliGRAM(s) Oral every 6 hours PRN anxiety  melatonin. 3 milliGRAM(s) Oral at bedtime PRN Insomnia  OLANZapine 7.5 milliGRAM(s) Oral every 6 hours PRN agitation  OLANZapine Injectable 7.5 milliGRAM(s) IntraMuscular once PRN agitation  traZODone 50 milliGRAM(s) Oral at bedtime PRN insomnia

## 2021-09-14 PROCEDURE — 99232 SBSQ HOSP IP/OBS MODERATE 35: CPT

## 2021-09-14 RX ADMIN — Medication 1 MILLIGRAM(S): at 11:14

## 2021-09-14 RX ADMIN — LURASIDONE HYDROCHLORIDE 80 MILLIGRAM(S): 40 TABLET ORAL at 17:11

## 2021-09-14 RX ADMIN — Medication 225 MILLIGRAM(S): at 08:27

## 2021-09-14 RX ADMIN — Medication 0.5 MILLIGRAM(S): at 08:27

## 2021-09-14 RX ADMIN — Medication 1 MILLIGRAM(S): at 15:34

## 2021-09-14 NOTE — BH INPATIENT PSYCHIATRY PROGRESS NOTE - NSBHASSESSSUMMFT_PSY_ALL_CORE
35-year-old female; domiciled with mother; unemployed; highest level of education some college; PPHx of depression, anxiety, prior bipolar diagnosis per records and per patient, 1 prior psych admission she thinks at [facility] vs stay overnight in [facility] (unsure) for depression, hx SIB by cutting, no known hx of violence; denies alcohol use; past use of marijuana and cocaine (last 6mos ago); denies PMHx; admitted from [facility] after medical admission.  BIB EMS activated by mother for what mother believed was seizure-like activity.  Neuro cleared patient.  Episode sounds like it could have been episode of catatonia from description of being frozen and ambivalent about moving.      Patient's reported sx with associated history point towards MDD w/ psychosis vs bipolar depression w/ psychosis as possible etiology. She was initially guarded with FTD, vague speech, concrete thought process, loosening of associations. She is now improving with medications. She is compliant with medications and in behavioral control. She continues to be anxious but less guarded with improving blunted/constricted affect. Tolerating latuda and effexor, will continue to uptitrate.    Legal: 927 status  Safety: routine checks  Psychiatric:  - Latuda 80mg nightly w/ meal  - effexor 225mg daily  - Ativan 0.5mg daily and 1mg q6h for anxiety  - trazodone 50mg qHS PRN for insomnia  Medical:  -  steroid taper d/c'd  - f/u autoimmune encephalitis panel  Dispo: likely home, pending improvement

## 2021-09-14 NOTE — BH INPATIENT PSYCHIATRY PROGRESS NOTE - NSBHCHARTREVIEWVS_PSY_A_CORE FT
Vital Signs Last 24 Hrs  T(C): 36.6 (09-14-21 @ 06:53), Max: 36.9 (09-13-21 @ 17:14)  T(F): 97.8 (09-14-21 @ 06:53), Max: 98.4 (09-13-21 @ 17:14)  HR: 101 (09-14-21 @ 06:53) (101 - 101)  BP: 140/98 (09-14-21 @ 06:53) (140/98 - 140/98)  BP(mean): --  RR: --  SpO2: --    Orthostatic VS  09-13-21 @ 06:32  Lying BP: --/-- HR: --  Sitting BP: 132/85 HR: 89  Standing BP: --/-- HR: --  Site: --  Mode: --

## 2021-09-14 NOTE — BH INPATIENT PSYCHIATRY PROGRESS NOTE - MSE UNSTRUCTURED FT
Woman appearing stated age with good eye contact, cooperative, good hygiene and grooming, dressed in scrubs.  Less guarded.  No PMA/R.  More well related.  Speech fluent with normal rate and volume.  Mood is "much better".  Affect is euthymic, improved blunting and constriction.  Thought process is marked by less vagueness, tangentiality, looseness of associations. She has had no more semantic paraphasic errors in speech. Thought content; no delusions elicited, no SOI/HI.  Patient noted CAH, last reported two days ago and she has not been observed responding to internal stimuli.  Insight and judgment are improving.  Impulse control appropriate at this time. Attention is grossly intact.

## 2021-09-14 NOTE — BH INPATIENT PSYCHIATRY PROGRESS NOTE - NSBHFUPINTERVALHXFT_PSY_A_CORE
No acute events overnight. Patient seen and evaluated, case discussed with treatment team. Patient reports continued improvement of mood and anxiety. She says that she has breakthrough anxiety in the afternoons that cause distress, and requests discontinuing her nighttime ativan as she doesn't need it for sleep. She thinks the medications are working well and feels like she is returning to her "normal self." She reports diminished CAH, with her last experience of CAH telling her to put her glasses on occurring yesterday. She reports still ignoring the commands when they occur. She gave more insight into intial presentation and describes severe ambivalence on arrival to [facility], with inability to communicate or carry out ADLs. For example, she was afraid to shower of flush because she was preoccupied with the premise that someone else would have to come turn the faucets back when she was finished. She has been able to carry out ADLs, now making her bed and showering independently.   She reports normal appetite and good sleep. Continues to deny any active SI/HI.

## 2021-09-14 NOTE — BH INPATIENT PSYCHIATRY PROGRESS NOTE - CURRENT MEDICATION
MEDICATIONS  (STANDING):  lurasidone 80 milliGRAM(s) Oral <User Schedule>  venlafaxine  milliGRAM(s) Oral daily    MEDICATIONS  (PRN):  acetaminophen   Tablet .. 650 milliGRAM(s) Oral every 6 hours PRN Mild Pain (1 - 3), Moderate Pain (4 - 6), Severe Pain (7 - 10)  hydrOXYzine hydrochloride 25 milliGRAM(s) Oral every 6 hours PRN Anxiety  LORazepam     Tablet 1 milliGRAM(s) Oral every 6 hours PRN anxiety  melatonin. 3 milliGRAM(s) Oral at bedtime PRN Insomnia  OLANZapine 7.5 milliGRAM(s) Oral every 6 hours PRN agitation  OLANZapine Injectable 7.5 milliGRAM(s) IntraMuscular once PRN agitation  traZODone 50 milliGRAM(s) Oral at bedtime PRN insomnia

## 2021-09-15 PROCEDURE — 99231 SBSQ HOSP IP/OBS SF/LOW 25: CPT

## 2021-09-15 RX ORDER — LURASIDONE HYDROCHLORIDE 40 MG/1
80 TABLET ORAL ONCE
Refills: 0 | Status: DISCONTINUED | OUTPATIENT
Start: 2021-09-15 | End: 2021-09-15

## 2021-09-15 RX ORDER — LURASIDONE HYDROCHLORIDE 40 MG/1
80 TABLET ORAL
Refills: 0 | Status: DISCONTINUED | OUTPATIENT
Start: 2021-09-15 | End: 2021-09-16

## 2021-09-15 RX ORDER — CLONAZEPAM 1 MG
0.5 TABLET ORAL DAILY
Refills: 0 | Status: DISCONTINUED | OUTPATIENT
Start: 2021-09-15 | End: 2021-09-16

## 2021-09-15 RX ADMIN — Medication 0.5 MILLIGRAM(S): at 09:59

## 2021-09-15 RX ADMIN — LURASIDONE HYDROCHLORIDE 80 MILLIGRAM(S): 40 TABLET ORAL at 14:44

## 2021-09-15 RX ADMIN — Medication 0.5 MILLIGRAM(S): at 09:06

## 2021-09-15 RX ADMIN — Medication 1 MILLIGRAM(S): at 12:28

## 2021-09-15 RX ADMIN — Medication 1 MILLIGRAM(S): at 15:58

## 2021-09-15 RX ADMIN — Medication 1 MILLIGRAM(S): at 05:06

## 2021-09-15 RX ADMIN — Medication 225 MILLIGRAM(S): at 08:49

## 2021-09-15 NOTE — BH INPATIENT PSYCHIATRY PROGRESS NOTE - NSBHASSESSSUMMFT_PSY_ALL_CORE
35-year-old female; domiciled with mother; unemployed; highest level of education some college; PPHx of depression, anxiety, prior bipolar diagnosis per records and per patient, 1 prior psych admission she thinks at [facility] vs stay overnight in [facility] (unsure) for depression, hx SIB by cutting, no known hx of violence; denies alcohol use; past use of marijuana and cocaine (last 6mos ago); denies PMHx; admitted from [facility] after medical admission.  BIB EMS activated by mother for what mother believed was seizure-like activity.  Neuro cleared patient.  Episode sounds like it could have been episode of catatonia from description of being frozen and ambivalent about moving.      Patient's reported sx with associated history point towards MDD w/ psychosis vs bipolar depression w/ psychosis as possible etiology. She was initially guarded with FTD, vague speech, concrete thought process, loosening of associations. She is now improving with medications. She is compliant with medications and in behavioral control. She continues to be anxious but less guarded with improving blunted/constricted affect. Tolerating latuda and effexor, will continue to uptitrate. Given her utilization of PRN ativan throughout the morning and afternoon, will switch to daily Klonopin for increased coverage throughout the morning/afternoon in an attempt to decrease ativan use.     Legal: 927 status  Safety: routine checks  Psychiatric:  - Latuda 80mg nightly w/ meal  - effexor 225mg daily  - Klonopin 0.5mg daily  - ativan 1mg q6h for anxiety   - trazodone 50mg qHS PRN for insomnia  Medical:  -  steroid taper d/c'd  - f/u autoimmune encephalitis panel  Dispo: likely home, pending improvement 35-year-old female; domiciled with mother; unemployed; highest level of education some college; PPHx of depression, anxiety, prior bipolar diagnosis per records and per patient, 1 prior psych admission she thinks at [facility] vs stay overnight in [facility] (unsure) for depression, hx SIB by cutting, no known hx of violence; denies alcohol use; past use of marijuana and cocaine (last 6mos ago); denies PMHx; admitted from [facility] after medical admission.  BIB EMS activated by mother for what mother believed was seizure-like activity.  Neuro cleared patient.  Episode sounds like it could have been episode of catatonia from description of being frozen and ambivalent about moving.      Patient's reported sx with associated history point towards MDD w/ psychosis vs bipolar depression w/ psychosis as possible etiology. She was initially guarded with FTD, vague speech, concrete thought process, loosening of associations. She is now improving with medications. She is compliant with medications and in behavioral control. She continues to be anxious but less guarded with improving blunted/constricted affect. Tolerating latuda and effexor, will continue to uptitrate. Given her utilization of PRN ativan throughout the morning and afternoon, will switch to daily Klonopin for increased coverage throughout the morning/afternoon in an attempt to decrease ativan use.     Legal: 927 status  Safety: routine checks  Psychiatric:  - Latuda 80mg nightly w/ meal  - effexor 225mg daily  - Klonopin 0.5mg daily  - ativan 1mg q6h for anxiety   - trazodone 50mg qHS PRN for insomnia  Medical:  - f/u autoimmune encephalitis panel  Dispo: likely home, pending improvement

## 2021-09-15 NOTE — BH INPATIENT PSYCHIATRY PROGRESS NOTE - NSBHFUPINTERVALHXFT_PSY_A_CORE
No acute events overnight. Patient seen and evaluated, case discussed with treatment team.   Patient reports continued improvement of mood and anxiety, continuing to report worsening of anxiety in the afternoons.  She reports good tolerance of medications and thinks they are working well. She reports diminished CAH, with her last experience of CAH telling her to put her glasses on occurring two days ago. She continues to give more insight into initial presentation and describes severe ambivalence on arrival to [facility], now describing her fears of using showers or toilets as secondary to CAH of a possibly sexual nature, making her feel as though she were being watched at all times. She has been able to carry out ADLs without issue here.  She reports normal appetite and good sleep. Continues to deny any active SI/HI.

## 2021-09-15 NOTE — BH INPATIENT PSYCHIATRY PROGRESS NOTE - NSBHCHARTREVIEWVS_PSY_A_CORE FT
Vital Signs Last 24 Hrs  T(C): 36.9 (09-15-21 @ 07:37), Max: 37.1 (09-14-21 @ 17:19)  T(F): 98.5 (09-15-21 @ 07:37), Max: 98.7 (09-14-21 @ 17:19)  HR: 92 (09-15-21 @ 07:37) (92 - 92)  BP: 133/91 (09-15-21 @ 07:37) (133/91 - 133/91)  BP(mean): --  RR: --  SpO2: --

## 2021-09-15 NOTE — BH INPATIENT PSYCHIATRY PROGRESS NOTE - MSE UNSTRUCTURED FT
Woman appearing stated age with good eye contact, cooperative, good hygiene and grooming, dressed in scrubs.  Less guarded.  No PMA/R.  More well related.  Speech fluent with normal rate and volume.  Mood is "getting better".  Affect is euthymic, improved blunting and constriction.  Thought process is marked by less vagueness, tangentiality, looseness of associations. She has had no more semantic paraphasic errors in speech. Thought content with no active delusions elicited, no SOI/HI.  Patient noted CAH, last reported two days ago and she has not been observed responding to internal stimuli.  Insight and judgment are improving.  Impulse control appropriate at this time. Attention is grossly intact.

## 2021-09-15 NOTE — BH INPATIENT PSYCHIATRY PROGRESS NOTE - CURRENT MEDICATION
MEDICATIONS  (STANDING):  clonazePAM  Tablet 0.5 milliGRAM(s) Oral daily  lurasidone 80 milliGRAM(s) Oral <User Schedule>  venlafaxine  milliGRAM(s) Oral daily    MEDICATIONS  (PRN):  acetaminophen   Tablet .. 650 milliGRAM(s) Oral every 6 hours PRN Mild Pain (1 - 3), Moderate Pain (4 - 6), Severe Pain (7 - 10)  hydrOXYzine hydrochloride 25 milliGRAM(s) Oral every 6 hours PRN Anxiety  LORazepam     Tablet 1 milliGRAM(s) Oral every 6 hours PRN anxiety  melatonin. 3 milliGRAM(s) Oral at bedtime PRN Insomnia  OLANZapine 7.5 milliGRAM(s) Oral every 6 hours PRN agitation  OLANZapine Injectable 7.5 milliGRAM(s) IntraMuscular once PRN agitation  traZODone 50 milliGRAM(s) Oral at bedtime PRN insomnia

## 2021-09-16 PROCEDURE — 90853 GROUP PSYCHOTHERAPY: CPT

## 2021-09-16 PROCEDURE — 99231 SBSQ HOSP IP/OBS SF/LOW 25: CPT | Mod: 25

## 2021-09-16 RX ORDER — CLONAZEPAM 1 MG
1 TABLET ORAL DAILY
Refills: 0 | Status: DISCONTINUED | OUTPATIENT
Start: 2021-09-16 | End: 2021-09-21

## 2021-09-16 RX ORDER — VENLAFAXINE HCL 75 MG
300 CAPSULE, EXT RELEASE 24 HR ORAL DAILY
Refills: 0 | Status: DISCONTINUED | OUTPATIENT
Start: 2021-09-16 | End: 2021-09-29

## 2021-09-16 RX ORDER — LURASIDONE HYDROCHLORIDE 40 MG/1
80 TABLET ORAL ONCE
Refills: 0 | Status: COMPLETED | OUTPATIENT
Start: 2021-09-16 | End: 2021-09-16

## 2021-09-16 RX ADMIN — Medication 650 MILLIGRAM(S): at 02:06

## 2021-09-16 RX ADMIN — Medication 1 MILLIGRAM(S): at 00:43

## 2021-09-16 RX ADMIN — LURASIDONE HYDROCHLORIDE 80 MILLIGRAM(S): 40 TABLET ORAL at 15:15

## 2021-09-16 RX ADMIN — Medication 1 MILLIGRAM(S): at 12:00

## 2021-09-16 RX ADMIN — Medication 1 MILLIGRAM(S): at 08:58

## 2021-09-16 RX ADMIN — Medication 50 MILLIGRAM(S): at 22:34

## 2021-09-16 RX ADMIN — Medication 3 MILLIGRAM(S): at 21:31

## 2021-09-16 RX ADMIN — Medication 1 MILLIGRAM(S): at 18:47

## 2021-09-16 RX ADMIN — Medication 225 MILLIGRAM(S): at 08:58

## 2021-09-16 NOTE — BH INPATIENT PSYCHIATRY PROGRESS NOTE - NSBHASSESSSUMMFT_PSY_ALL_CORE
35-year-old female; domiciled with mother; unemployed; highest level of education some college; PPHx of depression, anxiety, prior bipolar diagnosis per records and per patient, 1 prior psych admission she thinks at [facility] vs stay overnight in [facility] (unsure) for depression, hx SIB by cutting, no known hx of violence; denies alcohol use; past use of marijuana and cocaine (last 6mos ago); denies PMHx; admitted from [facility] after medical admission.  BIB EMS activated by mother for what mother believed was seizure-like activity.  Neuro cleared patient.  Episode sounds like it could have been episode of catatonia from description of being frozen and ambivalent about moving.      Patient's reported sx with associated history point towards MDD w/ psychosis vs bipolar depression w/ psychosis as possible etiology. She was initially guarded with FTD, vague speech, concrete thought process, loosening of associations. She is now improving with medications. She is compliant with medications and in behavioral control. She continues to be anxious but less guarded with improving blunted/constricted affect. Tolerating latuda and effexor, will continue to uptitrate. Given her utilization of PRN ativan throughout the morning and afternoon, will switch to daily Klonopin for increased coverage throughout the morning/afternoon in an attempt to decrease ativan use. Will reevaluate patient anxiety after increased Klonopin dosing and utilization of coping skills.     Legal: 927 status  Safety: routine checks  Psychiatric:  - Latuda 80mg nightly w/ meal  - effexor 225mg daily  - Klonopin 1mg daily  - ativan 1mg q6h for anxiety   - trazodone 50mg qHS PRN for insomnia  Medical:  - f/u autoimmune encephalitis panel  Dispo: likely home, pending improvement

## 2021-09-16 NOTE — BH INPATIENT PSYCHIATRY PROGRESS NOTE - MSE UNSTRUCTURED FT
Woman appearing stated age with good eye contact, cooperative, good hygiene and grooming, dressed in scrubs.  Less guarded.  No PMA/R.  More well related.  Speech fluent with normal rate and volume.  Mood is "a little anxious today".  Affect is anxious, improved blunting and constriction.  Thought process is marked by less vagueness, tangentiality, looseness of associations. She has had no more semantic paraphasic errors in speech. Thought content with no active delusions elicited, no SOI/HI.  Patient noted CAH, last reported three days ago and she has not been observed responding to internal stimuli.  Insight and judgment are improving.  Impulse control appropriate at this time. Attention is grossly intact.   Woman appearing stated age with good eye contact, cooperative, good hygiene and grooming, dressed in scrub pants.  Less guarded.  No PMA/R.  More well related.  Speech fluent with normal rate and volume.  Mood is "a little anxious today".  Affect is anxious, improved blunting and constriction.  Thought process is marked by less vagueness, tangentiality, looseness of associations. She has had no more semantic paraphasic errors in speech. Thought content with no active delusions elicited, no SOI/HI.  Patient noted CAH, last reported three days ago and she has not been observed responding to internal stimuli.  Insight and judgment are improving.  Impulse control appropriate at this time. Attention is grossly intact.

## 2021-09-16 NOTE — BH INPATIENT PSYCHIATRY PROGRESS NOTE - CURRENT MEDICATION
MEDICATIONS  (STANDING):  clonazePAM  Tablet 1 milliGRAM(s) Oral daily  lurasidone 80 milliGRAM(s) Oral <User Schedule>  venlafaxine  milliGRAM(s) Oral daily    MEDICATIONS  (PRN):  acetaminophen   Tablet .. 650 milliGRAM(s) Oral every 6 hours PRN Mild Pain (1 - 3), Moderate Pain (4 - 6), Severe Pain (7 - 10)  hydrOXYzine hydrochloride 25 milliGRAM(s) Oral every 6 hours PRN Anxiety  LORazepam     Tablet 1 milliGRAM(s) Oral every 6 hours PRN anxiety  melatonin. 3 milliGRAM(s) Oral at bedtime PRN Insomnia  OLANZapine 7.5 milliGRAM(s) Oral every 6 hours PRN agitation  OLANZapine Injectable 7.5 milliGRAM(s) IntraMuscular once PRN agitation  propranolol 10 milliGRAM(s) Oral every 8 hours PRN restlessness/anxiety  traZODone 50 milliGRAM(s) Oral at bedtime PRN insomnia   MEDICATIONS  (STANDING):  clonazePAM  Tablet 1 milliGRAM(s) Oral daily  venlafaxine  milliGRAM(s) Oral daily    MEDICATIONS  (PRN):  acetaminophen   Tablet .. 650 milliGRAM(s) Oral every 6 hours PRN Mild Pain (1 - 3), Moderate Pain (4 - 6), Severe Pain (7 - 10)  hydrOXYzine hydrochloride 25 milliGRAM(s) Oral every 6 hours PRN Anxiety  LORazepam     Tablet 1 milliGRAM(s) Oral every 6 hours PRN anxiety  melatonin. 3 milliGRAM(s) Oral at bedtime PRN Insomnia  OLANZapine 7.5 milliGRAM(s) Oral every 6 hours PRN agitation  OLANZapine Injectable 7.5 milliGRAM(s) IntraMuscular once PRN agitation  propranolol 10 milliGRAM(s) Oral every 8 hours PRN restlessness/anxiety  traZODone 50 milliGRAM(s) Oral at bedtime PRN insomnia

## 2021-09-16 NOTE — BH INPATIENT PSYCHIATRY PROGRESS NOTE - NSBHCHARTREVIEWVS_PSY_A_CORE FT
Vital Signs Last 24 Hrs  T(C): 37.5 (09-16-21 @ 07:19), Max: 37.5 (09-16-21 @ 07:19)  T(F): 99.5 (09-16-21 @ 07:19), Max: 99.5 (09-16-21 @ 07:19)  HR: 93 (09-16-21 @ 07:19) (93 - 93)  BP: 138/91 (09-16-21 @ 07:19) (138/91 - 138/91)  BP(mean): --  RR: --  SpO2: --

## 2021-09-16 NOTE — BH INPATIENT PSYCHIATRY PROGRESS NOTE - PRN MEDS
MEDICATIONS  (PRN):  acetaminophen   Tablet .. 650 milliGRAM(s) Oral every 6 hours PRN Mild Pain (1 - 3), Moderate Pain (4 - 6), Severe Pain (7 - 10)  hydrOXYzine hydrochloride 25 milliGRAM(s) Oral every 6 hours PRN Anxiety  LORazepam     Tablet 1 milliGRAM(s) Oral every 6 hours PRN anxiety  melatonin. 3 milliGRAM(s) Oral at bedtime PRN Insomnia  OLANZapine 7.5 milliGRAM(s) Oral every 6 hours PRN agitation  OLANZapine Injectable 7.5 milliGRAM(s) IntraMuscular once PRN agitation  propranolol 10 milliGRAM(s) Oral every 8 hours PRN restlessness/anxiety  traZODone 50 milliGRAM(s) Oral at bedtime PRN insomnia

## 2021-09-16 NOTE — BH INPATIENT PSYCHIATRY PROGRESS NOTE - NSBHFUPINTERVALHXFT_PSY_A_CORE
Patient seen and evaluated, case discussed with treatment team.   Yesterday the patient was increasingly anxious, requesting multiple PRN medications and unable to utilize coping skills. She reports increased anxiety in the context of her mother visiting that night, and it escalated to the point of asking her not to come.   Today the patient is feeling well, reporting some mild residual anxiety. She reports good tolerance of medications. She reports diminished CAH, with her last experience being three days ago.  She continues to carry out ADLs without issue here.  She reports normal appetite and good sleep, although required PRN for sleep last night. Continues to deny any active SI/HI.

## 2021-09-17 RX ORDER — LURASIDONE HYDROCHLORIDE 40 MG/1
60 TABLET ORAL
Refills: 0 | Status: DISCONTINUED | OUTPATIENT
Start: 2021-09-17 | End: 2021-09-22

## 2021-09-17 RX ORDER — LURASIDONE HYDROCHLORIDE 40 MG/1
80 TABLET ORAL
Refills: 0 | Status: DISCONTINUED | OUTPATIENT
Start: 2021-09-17 | End: 2021-09-17

## 2021-09-17 RX ORDER — LURASIDONE HYDROCHLORIDE 40 MG/1
40 TABLET ORAL
Refills: 0 | Status: DISCONTINUED | OUTPATIENT
Start: 2021-09-17 | End: 2021-09-20

## 2021-09-17 RX ADMIN — Medication 650 MILLIGRAM(S): at 18:41

## 2021-09-17 RX ADMIN — Medication 1 MILLIGRAM(S): at 18:15

## 2021-09-17 RX ADMIN — LURASIDONE HYDROCHLORIDE 60 MILLIGRAM(S): 40 TABLET ORAL at 16:53

## 2021-09-17 RX ADMIN — LURASIDONE HYDROCHLORIDE 40 MILLIGRAM(S): 40 TABLET ORAL at 14:07

## 2021-09-17 RX ADMIN — Medication 1 MILLIGRAM(S): at 10:25

## 2021-09-17 RX ADMIN — Medication 650 MILLIGRAM(S): at 06:33

## 2021-09-17 RX ADMIN — Medication 300 MILLIGRAM(S): at 10:26

## 2021-09-17 RX ADMIN — Medication 1 MILLIGRAM(S): at 12:15

## 2021-09-17 RX ADMIN — Medication 50 MILLIGRAM(S): at 20:13

## 2021-09-17 NOTE — BH INPATIENT PSYCHIATRY PROGRESS NOTE - NSBHFUPINTERVALHXFT_PSY_A_CORE
Patient seen and evaluated, case discussed with treatment team.   Yesterday the patient was again increasingly anxious, requesting multiple PRN medications and unable to utilize coping skills. She met with psychology this morning and was reportedly superficially cooperative, agreeing to try coping skills but immediately leaving the meeting to request morning medications.   Today the patient is feeling "ok," again reporting continued anxiety. She reports good tolerance of medications and diminished CAH, with her last experience being several days ago.  She continues to carry out ADLs without issue here.  She reports normal appetite and good sleep, although required PRN for sleep again last night. Continues to deny any active SI/HI.   She became tearful and nervous/guarded when discussing paranoia. She revealed that she believes there is illegal activity happening at a garage across the street, and her apartment is involved regardless of whether she wants it to or not. She says she starting feeling this way 1 year ago. She refused to tell us who informed her. However she notes that the voices she hears in her apartment talk about this frequently.

## 2021-09-17 NOTE — BH INPATIENT PSYCHIATRY PROGRESS NOTE - CURRENT MEDICATION
MEDICATIONS  (STANDING):  clonazePAM  Tablet 1 milliGRAM(s) Oral daily  lurasidone 40 milliGRAM(s) Oral <User Schedule>  lurasidone 60 milliGRAM(s) Oral <User Schedule>  venlafaxine  milliGRAM(s) Oral daily    MEDICATIONS  (PRN):  acetaminophen   Tablet .. 650 milliGRAM(s) Oral every 6 hours PRN Mild Pain (1 - 3), Moderate Pain (4 - 6), Severe Pain (7 - 10)  hydrOXYzine hydrochloride 25 milliGRAM(s) Oral every 6 hours PRN Anxiety  LORazepam     Tablet 1 milliGRAM(s) Oral every 6 hours PRN anxiety  melatonin. 3 milliGRAM(s) Oral at bedtime PRN Insomnia  OLANZapine 7.5 milliGRAM(s) Oral every 6 hours PRN agitation  OLANZapine Injectable 7.5 milliGRAM(s) IntraMuscular once PRN agitation  propranolol 10 milliGRAM(s) Oral every 8 hours PRN restlessness/anxiety  traZODone 50 milliGRAM(s) Oral at bedtime PRN insomnia

## 2021-09-17 NOTE — BH INPATIENT PSYCHIATRY PROGRESS NOTE - MSE UNSTRUCTURED FT
Woman appearing stated age with good eye contact, cooperative, good hygiene and grooming, dressed in scrub pants.  Less guarded.  No PMA/R.  fairly well related.  Speech fluent with normal rate and volume.  Mood is "anxious".  Affect is anxious, improved blunting, again with constriction.  Thought process is marked by less vagueness, tangentiality, looseness of associations. She has had no more semantic paraphasic errors in speech. Thought content with active paranoia, no SI/HI.  Patient noted CAH, last reported several days ago and she has not been observed responding to internal stimuli.  Insight and judgment are declining again.  Impulse control appropriate at this time. Attention is grossly intact.   Woman appearing stated age with good eye contact, superficially cooperative, good hygiene and grooming, dressed in scrub pants. guarded.  No PMA/R.  fairly well related.  Speech fluent with normal rate and volume.  Mood is "anxious".  Affect is anxious tearful at times, again with constriction and blunting.  Thought process is marked by less vagueness, tangentiality, looseness of associations. She has had no more semantic paraphasic errors in speech. Thought content with active paranoia, no SI/HI.  Patient noted CAH, last reported several days ago and she has not been observed responding to internal stimuli.  Insight and judgment are declining again.  Impulse control appropriate at this time. Attention is grossly intact.   Woman appearing stated age with good eye contact, superficially cooperative, good hygiene and grooming, dressed in scrub pants. guarded.  No PMA/R.  fairly well related.  Speech fluent with normal rate and volume.  Mood is "anxious".  Affect is anxious tearful at times, again with constriction and blunting.  Thought process is marked by less vagueness, tangentiality, looseness of associations. She has had no more semantic paraphasic errors in speech. Thought content with active paranoia/delusions, no SI/HI.  Patient noted CAH, last reported several days ago and she has not been observed responding to internal stimuli.  Insight and judgment are declining again.  Impulse control appropriate at this time. Attention is grossly intact.

## 2021-09-17 NOTE — BH INPATIENT PSYCHIATRY PROGRESS NOTE - NSBHCHARTREVIEWVS_PSY_A_CORE FT
Vital Signs Last 24 Hrs  T(C): 36.9 (09-17-21 @ 08:15), Max: 36.9 (09-17-21 @ 08:15)  T(F): 98.5 (09-17-21 @ 08:15), Max: 98.5 (09-17-21 @ 08:15)  HR: 93 (09-17-21 @ 08:15) (93 - 93)  BP: 129/90 (09-17-21 @ 08:15) (129/90 - 129/90)  BP(mean): --  RR: --  SpO2: --

## 2021-09-17 NOTE — BH INPATIENT PSYCHIATRY PROGRESS NOTE - NSBHASSESSSUMMFT_PSY_ALL_CORE
35-year-old female; domiciled with mother; unemployed; highest level of education some college; PPHx of depression, anxiety, prior bipolar diagnosis per records and per patient, 1 prior psych admission she thinks at [facility] vs stay overnight in [facility] (unsure) for depression, hx SIB by cutting, no known hx of violence; denies alcohol use; past use of marijuana and cocaine (last 6mos ago); denies PMHx; admitted from [facility] after medical admission.  BIB EMS activated by mother for what mother believed was seizure-like activity.  Neuro cleared patient.  Episode sounds like it could have been episode of catatonia from description of being frozen and ambivalent about moving.      Patient's reported sx with associated history point towards MDD w/ psychosis vs bipolar depression w/ psychosis as possible etiology. She was initially guarded with FTD, vague speech, concrete thought process, loosening of associations. She was improving with medications, but now reveals ongoing paranoid delusions regarding her home, with more conviction and less flexibility than she had approached prior hallucinations with. She is compliant with medications and in behavioral control. She continues to be anxious and now is again guarded. Her affect decompensates towards constriction and blunting with further discussion of paranoia. Tolerating latuda and effexor, will continue to uptitrate. Given her utilization of PRN ativan throughout the morning and afternoon, will continue Klonopin for increased coverage throughout the morning/afternoon.    Legal: 927 status  Safety: routine checks  Psychiatric:  - Latuda 60mg nightly w/ meal  - Latuda 40mg at lunch w/ meal  - effexor 225mg daily  - Klonopin 1mg daily  - ativan 1mg q6h for anxiety   - trazodone 50mg qHS PRN for insomnia  Medical:  - f/u autoimmune encephalitis panel  Dispo: likely home, pending improvement

## 2021-09-18 RX ADMIN — Medication 650 MILLIGRAM(S): at 18:11

## 2021-09-18 RX ADMIN — Medication 1 MILLIGRAM(S): at 09:16

## 2021-09-18 RX ADMIN — Medication 1 MILLIGRAM(S): at 16:58

## 2021-09-18 RX ADMIN — Medication 1 MILLIGRAM(S): at 11:20

## 2021-09-18 RX ADMIN — LURASIDONE HYDROCHLORIDE 40 MILLIGRAM(S): 40 TABLET ORAL at 12:26

## 2021-09-18 RX ADMIN — Medication 650 MILLIGRAM(S): at 09:50

## 2021-09-18 RX ADMIN — Medication 650 MILLIGRAM(S): at 16:57

## 2021-09-18 RX ADMIN — Medication 3 MILLIGRAM(S): at 03:05

## 2021-09-18 RX ADMIN — Medication 50 MILLIGRAM(S): at 20:37

## 2021-09-18 RX ADMIN — LURASIDONE HYDROCHLORIDE 60 MILLIGRAM(S): 40 TABLET ORAL at 16:57

## 2021-09-18 RX ADMIN — Medication 650 MILLIGRAM(S): at 09:16

## 2021-09-18 RX ADMIN — Medication 1 MILLIGRAM(S): at 00:58

## 2021-09-18 RX ADMIN — Medication 300 MILLIGRAM(S): at 09:42

## 2021-09-19 RX ORDER — IBUPROFEN 200 MG
400 TABLET ORAL ONCE
Refills: 0 | Status: COMPLETED | OUTPATIENT
Start: 2021-09-19 | End: 2021-09-19

## 2021-09-19 RX ADMIN — Medication 400 MILLIGRAM(S): at 14:33

## 2021-09-19 RX ADMIN — Medication 400 MILLIGRAM(S): at 13:52

## 2021-09-19 RX ADMIN — LURASIDONE HYDROCHLORIDE 40 MILLIGRAM(S): 40 TABLET ORAL at 12:58

## 2021-09-19 RX ADMIN — Medication 1 MILLIGRAM(S): at 11:36

## 2021-09-19 RX ADMIN — Medication 1 MILLIGRAM(S): at 05:33

## 2021-09-19 RX ADMIN — Medication 1 MILLIGRAM(S): at 17:48

## 2021-09-19 RX ADMIN — Medication 300 MILLIGRAM(S): at 09:14

## 2021-09-19 RX ADMIN — LURASIDONE HYDROCHLORIDE 60 MILLIGRAM(S): 40 TABLET ORAL at 16:42

## 2021-09-19 RX ADMIN — Medication 650 MILLIGRAM(S): at 05:33

## 2021-09-19 RX ADMIN — Medication 1 MILLIGRAM(S): at 09:14

## 2021-09-20 PROCEDURE — 99232 SBSQ HOSP IP/OBS MODERATE 35: CPT

## 2021-09-20 RX ORDER — ARIPIPRAZOLE 15 MG/1
5 TABLET ORAL DAILY
Refills: 0 | Status: DISCONTINUED | OUTPATIENT
Start: 2021-09-20 | End: 2021-09-21

## 2021-09-20 RX ADMIN — Medication 650 MILLIGRAM(S): at 02:23

## 2021-09-20 RX ADMIN — Medication 50 MILLIGRAM(S): at 20:31

## 2021-09-20 RX ADMIN — Medication 1 MILLIGRAM(S): at 09:08

## 2021-09-20 RX ADMIN — Medication 650 MILLIGRAM(S): at 16:18

## 2021-09-20 RX ADMIN — LURASIDONE HYDROCHLORIDE 60 MILLIGRAM(S): 40 TABLET ORAL at 16:19

## 2021-09-20 RX ADMIN — ARIPIPRAZOLE 5 MILLIGRAM(S): 15 TABLET ORAL at 14:39

## 2021-09-20 RX ADMIN — Medication 1 MILLIGRAM(S): at 16:20

## 2021-09-20 RX ADMIN — Medication 300 MILLIGRAM(S): at 09:08

## 2021-09-20 RX ADMIN — Medication 650 MILLIGRAM(S): at 16:30

## 2021-09-20 RX ADMIN — Medication 1 MILLIGRAM(S): at 02:23

## 2021-09-20 RX ADMIN — Medication 1 MILLIGRAM(S): at 10:24

## 2021-09-20 NOTE — BH INPATIENT PSYCHIATRY PROGRESS NOTE - MSE UNSTRUCTURED FT
Woman appearing stated age with good eye contact, superficially cooperative, good hygiene and grooming, dressed in scrub pants. guarded.  No PMA/R.  fairly well related.  Speech fluent with normal rate and volume.  Mood is "alright.  Affect is anxious, again with constriction and blunting.  Thought process is marked by continued vagueness, tangentiality, looseness of associations, although improved. No more semantic paraphasic errors in speech. Thought content with active paranoia/delusions, no SI/HI.  Patient noted CAH, last reported several days ago and she has not been observed responding to internal stimuli.  Insight and judgment are declining again.  Impulse control appropriate at this time. Attention is grossly intact.

## 2021-09-20 NOTE — BH INPATIENT PSYCHIATRY PROGRESS NOTE - NSBHCHARTREVIEWVS_PSY_A_CORE FT
Vital Signs Last 24 Hrs  T(C): 37 (09-20-21 @ 06:40), Max: 37 (09-20-21 @ 06:40)  T(F): 98.6 (09-20-21 @ 06:40), Max: 98.6 (09-20-21 @ 06:40)  HR: --  BP: --  BP(mean): --  RR: --  SpO2: --    Orthostatic VS  09-20-21 @ 06:40  Lying BP: --/-- HR: --  Sitting BP: 131/85 HR: 92  Standing BP: --/-- HR: --  Site: --  Mode: --  Orthostatic VS  09-19-21 @ 07:04  Lying BP: --/-- HR: --  Sitting BP: 116/79 HR: 91  Standing BP: --/-- HR: --  Site: --  Mode: --

## 2021-09-20 NOTE — BH INPATIENT PSYCHIATRY PROGRESS NOTE - CURRENT MEDICATION
MEDICATIONS  (STANDING):  ARIPiprazole 5 milliGRAM(s) Oral daily  clonazePAM  Tablet 1 milliGRAM(s) Oral daily  lurasidone 60 milliGRAM(s) Oral <User Schedule>  venlafaxine  milliGRAM(s) Oral daily    MEDICATIONS  (PRN):  acetaminophen   Tablet .. 650 milliGRAM(s) Oral every 6 hours PRN Mild Pain (1 - 3), Moderate Pain (4 - 6), Severe Pain (7 - 10)  hydrOXYzine hydrochloride 25 milliGRAM(s) Oral every 6 hours PRN Anxiety  LORazepam     Tablet 1 milliGRAM(s) Oral every 6 hours PRN anxiety  melatonin. 3 milliGRAM(s) Oral at bedtime PRN Insomnia  OLANZapine 7.5 milliGRAM(s) Oral every 6 hours PRN agitation  OLANZapine Injectable 7.5 milliGRAM(s) IntraMuscular once PRN agitation  propranolol 10 milliGRAM(s) Oral every 8 hours PRN restlessness/anxiety  traZODone 50 milliGRAM(s) Oral at bedtime PRN insomnia

## 2021-09-20 NOTE — BH INPATIENT PSYCHIATRY PROGRESS NOTE - NSBHASSESSSUMMFT_PSY_ALL_CORE
35-year-old female; domiciled with mother; unemployed; highest level of education some college; PPHx of depression, anxiety, prior bipolar diagnosis per records and per patient, 1 prior psych admission she thinks at [facility] vs stay overnight in [facility] (unsure) for depression, hx SIB by cutting, no known hx of violence; denies alcohol use; past use of marijuana and cocaine (last 6mos ago); denies PMHx; admitted from [facility] after medical admission.  BIB EMS activated by mother for what mother believed was seizure-like activity.  Neuro cleared patient.  Episode sounds like it could have been episode of catatonia from description of being frozen and ambivalent about moving.      Patient's reported sx with associated history point towards MDD w/ psychosis vs bipolar depression w/ psychosis or schizoaffective as possible etiology. She was initially guarded with FTD, vague speech, concrete thought process, loosening of associations. She was improving with medications, but now reveals ongoing paranoid delusions regarding her home, with more conviction and less flexibility than she had approached prior hallucinations with. She is compliant with medications and in behavioral control. She continues to be anxious and now is again guarded. Her affect decompensates towards constriction and blunting with further discussion of paranoia. Tolerating effexor, will continue to uptitrate. Patient request change from latuda to abilify; will cross titrate. Given her utilization of PRN ativan throughout the morning and afternoon, will continue Klonopin for increased coverage throughout the morning/afternoon.    Legal: 927 status  Safety: routine checks  Psychiatric:  - Latuda 60mg nightly w/ meal  - Abilify 5mg daily  - effexor 300mg daily  - Klonopin 1mg daily  - ativan 1mg q6h for anxiety   - trazodone 50mg qHS PRN for insomnia  Medical:  - f/u autoimmune encephalitis panel  Dispo: likely home, pending improvement 35-year-old female; domiciled with mother; unemployed; highest level of education some college; PPHx of depression, anxiety, prior bipolar diagnosis per records and per patient, 1 prior psych admission she thinks at [facility] vs stay overnight in [facility] (unsure) for depression, hx SIB by cutting, no known hx of violence; denies alcohol use; past use of marijuana and cocaine (last 6mos ago); denies PMHx; admitted from [facility] after medical admission.  BIB EMS activated by mother for what mother believed was seizure-like activity.  Neuro cleared patient.  Episode sounds like it could have been episode of catatonia from description of being frozen and ambivalent about moving.      Patient's reported sx with associated history point towards MDD w/ psychosis vs bipolar depression w/ psychosis or schizoaffective as possible etiology. She was initially guarded with FTD, vague speech, concrete thought process, loosening of associations. She was improving with medications, but now reveals ongoing paranoid delusions regarding her home, with more conviction and less flexibility than she had approached prior hallucinations with. She is compliant with medications and in behavioral control. She continues to be anxious and now is again guarded. Her affect decompensates towards constriction and blunting with further discussion of paranoia. Tolerating effexor, will continue to uptitrate. Patient request change from latuda to abilify; will cross titrate. Given her utilization of PRN ativan throughout the morning and afternoon, will continue Klonopin for increased coverage throughout the morning/afternoon.    Legal: 927 status  Safety: routine checks  Psychiatric:  - down titrating Latuda to 60mg nightly w/ meal  - Abilify 5mg daily  - effexor 300mg daily  - Klonopin 1mg daily  - ativan 1mg q6h for anxiety   - trazodone 50mg qHS PRN for insomnia  Medical:  - f/u autoimmune encephalitis panel  Dispo: likely home, pending improvement

## 2021-09-20 NOTE — BH INPATIENT PSYCHIATRY PROGRESS NOTE - NSBHFUPINTERVALHXFT_PSY_A_CORE
Patient seen and evaluated, case discussed with treatment team.   Today the patient is feeling "alright," again reporting continued anxiety. She requests multiple medication changes secondary to somnolence and anxiety.  She continues to carry out ADLs without issue. She discusses voices at home bringing up police involvement but could not elaborate further. She became guarded when brought up the subject of discussing with her mother. She reports normal appetite and poor sleep, but has not been utilizing trazodone for sleep. Continues to deny any active SI/HI. She is concerned that her reporting symptoms will hold up her discharge.

## 2021-09-21 PROCEDURE — 99232 SBSQ HOSP IP/OBS MODERATE 35: CPT

## 2021-09-21 RX ORDER — CLONAZEPAM 1 MG
1 TABLET ORAL DAILY
Refills: 0 | Status: DISCONTINUED | OUTPATIENT
Start: 2021-09-21 | End: 2021-09-28

## 2021-09-21 RX ORDER — ARIPIPRAZOLE 15 MG/1
10 TABLET ORAL DAILY
Refills: 0 | Status: DISCONTINUED | OUTPATIENT
Start: 2021-09-22 | End: 2021-09-22

## 2021-09-21 RX ADMIN — Medication 300 MILLIGRAM(S): at 07:57

## 2021-09-21 RX ADMIN — Medication 1 MILLIGRAM(S): at 15:17

## 2021-09-21 RX ADMIN — Medication 1 MILLIGRAM(S): at 07:57

## 2021-09-21 RX ADMIN — LURASIDONE HYDROCHLORIDE 60 MILLIGRAM(S): 40 TABLET ORAL at 18:10

## 2021-09-21 RX ADMIN — Medication 650 MILLIGRAM(S): at 13:30

## 2021-09-21 RX ADMIN — Medication 1 MILLIGRAM(S): at 09:28

## 2021-09-21 RX ADMIN — ARIPIPRAZOLE 5 MILLIGRAM(S): 15 TABLET ORAL at 07:57

## 2021-09-21 RX ADMIN — Medication 650 MILLIGRAM(S): at 13:19

## 2021-09-21 RX ADMIN — Medication 50 MILLIGRAM(S): at 21:21

## 2021-09-21 RX ADMIN — Medication 1 MILLIGRAM(S): at 21:21

## 2021-09-21 RX ADMIN — Medication 1 MILLIGRAM(S): at 03:06

## 2021-09-21 NOTE — BH INPATIENT PSYCHIATRY PROGRESS NOTE - MSE UNSTRUCTURED FT
Woman appearing stated age with good eye contact, superficially cooperative at first, good hygiene and grooming, dressed in scrub pants. guarded.  No PMA/R.  fairly well related.  Speech fluent with normal rate and volume.  Mood is "anxious."  Affect is anxious and tearful with constriction and blunting.  Thought process is marked by continued vagueness, tangentiality, looseness of associations, although improved. Thought content with active paranoia/delusions, no SI/HI.  Patient noted CAH, last reported several days ago and she has not been observed responding to internal stimuli.  Insight and judgment are poor.  Impulse control appropriate at this time. Attention is grossly intact.   Woman appearing stated age with good eye contact, superficially cooperative at first, good hygiene and grooming, dressed in scrub pants. guarded.  No PMA/R.  fairly well related.  Speech fluent with normal rate and volume.  Mood is "anxious."  Affect is anxious and tearful with constriction and blunting.  Thought process linear today especially while discussing superficial content. Thought content vague about active paranoia/delusions, no SI/HI.  AH last reported several days ago and she has not been observed responding to internal stimuli.  Insight and judgment are poor.  Impulse control appropriate at this time. Attention is grossly intact.

## 2021-09-21 NOTE — BH INPATIENT PSYCHIATRY PROGRESS NOTE - NSBHFUPINTERVALHXFT_PSY_A_CORE
Patient seen and evaluated, case discussed with treatment team.   Patient was seen outdoors at the picnic table. The patient is feeling "anxious," again reporting continued anxiety regarding peers on the unit, but refuses to go into detail. She continues to request more benzodiazepine medications. She continues to carry out ADLs without issue. She reports normal appetite and poor sleep, requesting PRN ativan around 2am. Continues to deny any active SI/HI.

## 2021-09-21 NOTE — BH INPATIENT PSYCHIATRY PROGRESS NOTE - CURRENT MEDICATION
MEDICATIONS  (STANDING):  ARIPiprazole 5 milliGRAM(s) Oral daily  clonazePAM  Tablet 1 milliGRAM(s) Oral daily  lurasidone 60 milliGRAM(s) Oral <User Schedule>  venlafaxine  milliGRAM(s) Oral daily    MEDICATIONS  (PRN):  acetaminophen   Tablet .. 650 milliGRAM(s) Oral every 6 hours PRN Mild Pain (1 - 3), Moderate Pain (4 - 6), Severe Pain (7 - 10)  hydrOXYzine hydrochloride 25 milliGRAM(s) Oral every 6 hours PRN Anxiety  LORazepam     Tablet 1 milliGRAM(s) Oral every 6 hours PRN anxiety  melatonin. 3 milliGRAM(s) Oral at bedtime PRN Insomnia  OLANZapine 7.5 milliGRAM(s) Oral every 6 hours PRN agitation  OLANZapine Injectable 7.5 milliGRAM(s) IntraMuscular once PRN agitation  propranolol 10 milliGRAM(s) Oral every 8 hours PRN restlessness/anxiety  traZODone 50 milliGRAM(s) Oral at bedtime PRN insomnia   MEDICATIONS  (STANDING):  clonazePAM  Tablet 1 milliGRAM(s) Oral daily  lurasidone 60 milliGRAM(s) Oral <User Schedule>  venlafaxine  milliGRAM(s) Oral daily    MEDICATIONS  (PRN):  acetaminophen   Tablet .. 650 milliGRAM(s) Oral every 6 hours PRN Mild Pain (1 - 3), Moderate Pain (4 - 6), Severe Pain (7 - 10)  hydrOXYzine hydrochloride 25 milliGRAM(s) Oral every 6 hours PRN Anxiety  LORazepam     Tablet 1 milliGRAM(s) Oral every 6 hours PRN anxiety  melatonin. 3 milliGRAM(s) Oral at bedtime PRN Insomnia  OLANZapine 7.5 milliGRAM(s) Oral every 6 hours PRN agitation  OLANZapine Injectable 7.5 milliGRAM(s) IntraMuscular once PRN agitation  propranolol 10 milliGRAM(s) Oral every 8 hours PRN restlessness/anxiety  traZODone 50 milliGRAM(s) Oral at bedtime PRN insomnia

## 2021-09-21 NOTE — BH INPATIENT PSYCHIATRY PROGRESS NOTE - NSBHCHARTREVIEWVS_PSY_A_CORE FT
Vital Signs Last 24 Hrs  T(C): 36.8 (09-21-21 @ 06:41), Max: 36.8 (09-21-21 @ 06:41)  T(F): 98.2 (09-21-21 @ 06:41), Max: 98.2 (09-21-21 @ 06:41)  HR: --  BP: --  BP(mean): --  RR: --  SpO2: --    Orthostatic VS  09-21-21 @ 06:41  Lying BP: --/-- HR: --  Sitting BP: 142/87 HR: 97  Standing BP: --/-- HR: --  Site: --  Mode: --  Orthostatic VS  09-20-21 @ 06:40  Lying BP: --/-- HR: --  Sitting BP: 131/85 HR: 92  Standing BP: --/-- HR: --  Site: --  Mode: --

## 2021-09-21 NOTE — BH INPATIENT PSYCHIATRY PROGRESS NOTE - NSBHASSESSSUMMFT_PSY_ALL_CORE
35-year-old female; domiciled with mother; unemployed; highest level of education some college; PPHx of depression, anxiety, prior bipolar diagnosis per records and per patient, 1 prior psych admission she thinks at [facility] vs stay overnight in [facility] (unsure) for depression, hx SIB by cutting, no known hx of violence; denies alcohol use; past use of marijuana and cocaine (last 6mos ago); denies PMHx; admitted from [facility] after medical admission.  BIB EMS activated by mother for what mother believed was seizure-like activity.  Neuro cleared patient.  Episode sounds like it could have been episode of catatonia from description of being frozen and ambivalent about moving.      Patient's reported sx with associated history point towards MDD w/ psychosis vs bipolar depression w/ psychosis or schizoaffective as possible etiology. She was initially guarded with FTD, vague speech, concrete thought process, loosening of associations. She was improving with medications, but now reveals ongoing paranoid delusions regarding her home, with more conviction and less flexibility than she had approached prior hallucinations with. She is compliant with medications and in behavioral control  She is bubbly and superficially happy in the morning but her  affect decompensates towards constriction and blunting with anxiety as the morning proceeds. Has difficulty discussing stressors beyond surface level; begins to show more FTD. Tolerating effexor, denies side effects. Patient request change from latuda to abilify; will cross titrate. Given her utilization of PRN ativan throughout the morning and afternoon, will continue Klonopin for increased coverage throughout the morning/afternoon.    Legal: 927 status  Safety: routine checks  Psychiatric:  - down titrating Latuda to 40mg nightly w/ meal  - Abilify 10mg daily  - effexor 300mg daily  - Klonopin 1mg daily  - ativan 1mg q6h for anxiety   - trazodone 50mg qHS PRN for insomnia  Medical:  - f/u autoimmune encephalitis panel  Dispo: likely home, pending improvement

## 2021-09-22 PROCEDURE — 99231 SBSQ HOSP IP/OBS SF/LOW 25: CPT | Mod: 25

## 2021-09-22 PROCEDURE — 90853 GROUP PSYCHOTHERAPY: CPT

## 2021-09-22 RX ORDER — ARIPIPRAZOLE 15 MG/1
15 TABLET ORAL DAILY
Refills: 0 | Status: DISCONTINUED | OUTPATIENT
Start: 2021-09-22 | End: 2021-09-29

## 2021-09-22 RX ORDER — LURASIDONE HYDROCHLORIDE 40 MG/1
20 TABLET ORAL
Refills: 0 | Status: DISCONTINUED | OUTPATIENT
Start: 2021-09-22 | End: 2021-09-23

## 2021-09-22 RX ORDER — CLONAZEPAM 1 MG
1 TABLET ORAL
Refills: 0 | Status: DISCONTINUED | OUTPATIENT
Start: 2021-09-22 | End: 2021-09-28

## 2021-09-22 RX ADMIN — ARIPIPRAZOLE 10 MILLIGRAM(S): 15 TABLET ORAL at 08:36

## 2021-09-22 RX ADMIN — Medication 300 MILLIGRAM(S): at 08:36

## 2021-09-22 RX ADMIN — LURASIDONE HYDROCHLORIDE 20 MILLIGRAM(S): 40 TABLET ORAL at 16:58

## 2021-09-22 RX ADMIN — Medication 50 MILLIGRAM(S): at 20:22

## 2021-09-22 RX ADMIN — Medication 3 MILLIGRAM(S): at 20:58

## 2021-09-22 RX ADMIN — Medication 1 MILLIGRAM(S): at 13:59

## 2021-09-22 RX ADMIN — Medication 1 MILLIGRAM(S): at 08:36

## 2021-09-22 NOTE — BH INPATIENT PSYCHIATRY PROGRESS NOTE - PRN MEDS
MEDICATIONS  (PRN):  acetaminophen   Tablet .. 650 milliGRAM(s) Oral every 6 hours PRN Mild Pain (1 - 3), Moderate Pain (4 - 6), Severe Pain (7 - 10)  hydrOXYzine hydrochloride 25 milliGRAM(s) Oral every 6 hours PRN Anxiety  melatonin. 3 milliGRAM(s) Oral at bedtime PRN Insomnia  OLANZapine 7.5 milliGRAM(s) Oral every 6 hours PRN agitation  OLANZapine Injectable 7.5 milliGRAM(s) IntraMuscular once PRN agitation  propranolol 10 milliGRAM(s) Oral every 8 hours PRN restlessness/anxiety  traZODone 50 milliGRAM(s) Oral at bedtime PRN insomnia

## 2021-09-22 NOTE — BH INPATIENT PSYCHIATRY PROGRESS NOTE - NSBHFUPINTERVALHXFT_PSY_A_CORE
Patient seen and evaluated, case discussed with treatment team.   Patient was seen in dayroom. The patient is feeling "ok" today, reporting good sleep overnight and good appetite. She reports good effect of abilify, denying any further CAH and denying any akithisia or restlessness.  She continues to carry out ADLs without issue. Continues to deny any active SI/HI.

## 2021-09-22 NOTE — BH INPATIENT PSYCHIATRY PROGRESS NOTE - MSE UNSTRUCTURED FT
Woman appearing stated age with good eye contact, superficially cooperative, good hygiene and grooming, dressed in scrub pants. guarded, but less so today.  No PMA/R.  fairly well related.  Speech fluent with normal rate and volume.  Mood is "ok."  Affect is euthymic with improved constriction and blunting.  Thought process linear today. Thought content vague about active paranoia/delusions, no SI/HI.  AH last reported several days ago and she has not been observed responding to internal stimuli.  Insight and judgment are improving.  Impulse control appropriate at this time. Attention is grossly intact.   Woman appearing stated age with good eye contact, superficially cooperative, good hygiene and grooming. Guarded, but less so today.  No PMA/R.  fairly well related.  Speech fluent with normal rate and volume.  Mood is "ok."  Affect is euthymic with improved constriction and blunting.  Thought process linear today. Thought content vague about active paranoia/delusions, no SI/HI.  AH last reported several days ago and she has not been observed responding to internal stimuli.  Insight and judgment are improving.  Impulse control appropriate at this time. Attention is grossly intact.

## 2021-09-22 NOTE — BH INPATIENT PSYCHIATRY PROGRESS NOTE - NSBHCHARTREVIEWVS_PSY_A_CORE FT
Vital Signs Last 24 Hrs  T(C): 36.6 (09-22-21 @ 06:24), Max: 36.6 (09-22-21 @ 06:24)  T(F): 97.9 (09-22-21 @ 06:24), Max: 97.9 (09-22-21 @ 06:24)  HR: 102 (09-22-21 @ 06:24) (102 - 102)  BP: 148/87 (09-22-21 @ 06:24) (148/87 - 148/87)  BP(mean): --  RR: --  SpO2: --    Orthostatic VS  09-21-21 @ 06:41  Lying BP: --/-- HR: --  Sitting BP: 142/87 HR: 97  Standing BP: --/-- HR: --  Site: --  Mode: --

## 2021-09-22 NOTE — BH INPATIENT PSYCHIATRY PROGRESS NOTE - CURRENT MEDICATION
MEDICATIONS  (STANDING):  ARIPiprazole 15 milliGRAM(s) Oral daily  clonazePAM  Tablet 1 milliGRAM(s) Oral daily  clonazePAM  Tablet 1 milliGRAM(s) Oral <User Schedule>  lurasidone 20 milliGRAM(s) Oral <User Schedule>  venlafaxine  milliGRAM(s) Oral daily    MEDICATIONS  (PRN):  acetaminophen   Tablet .. 650 milliGRAM(s) Oral every 6 hours PRN Mild Pain (1 - 3), Moderate Pain (4 - 6), Severe Pain (7 - 10)  hydrOXYzine hydrochloride 25 milliGRAM(s) Oral every 6 hours PRN Anxiety  melatonin. 3 milliGRAM(s) Oral at bedtime PRN Insomnia  OLANZapine 7.5 milliGRAM(s) Oral every 6 hours PRN agitation  OLANZapine Injectable 7.5 milliGRAM(s) IntraMuscular once PRN agitation  propranolol 10 milliGRAM(s) Oral every 8 hours PRN restlessness/anxiety  traZODone 50 milliGRAM(s) Oral at bedtime PRN insomnia

## 2021-09-22 NOTE — BH INPATIENT PSYCHIATRY PROGRESS NOTE - NSBHASSESSSUMMFT_PSY_ALL_CORE
35-year-old female; domiciled with mother; unemployed; highest level of education some college; PPHx of depression, anxiety, prior bipolar diagnosis per records and per patient, 1 prior psych admission she thinks at [facility] vs stay overnight in [facility] (unsure) for depression, hx SIB by cutting, no known hx of violence; denies alcohol use; past use of marijuana and cocaine (last 6mos ago); denies PMHx; admitted from [facility] after medical admission.  BIB EMS activated by mother for what mother believed was seizure-like activity.  Neuro cleared patient.  Episode sounds like it could have been episode of catatonia from description of being frozen and ambivalent about moving.      Patient's reported sx with associated history point towards MDD w/ psychosis vs bipolar depression w/ psychosis or schizoaffective as possible etiology. She was initially guarded with FTD, vague speech, concrete thought process, loosening of associations. She was improving with medications, but now reveals ongoing paranoid delusions regarding her home, with more conviction and less flexibility than she had approached prior hallucinations with. She is compliant with medications and in behavioral control  The patient was less thought disordered today, and able to maintain good mood and affect during interview. Tolerating effexor, denies side effects. Reports good effect of abilify with no side effects, will continue to cross titrate. Patient amenable to Klonopin twice daily to avoid continued ativan PRN use; concern for dependance.     Legal: 927 status  Safety: routine checks  Psychiatric:  - down titrating Latuda to 20mg nightly w/ meal  - Abilify 15mg daily  - effexor 300mg daily  - Klonopin 1mg qAM and q2pm   - d/c ativan for anxiety   - trazodone 50mg qHS PRN for insomnia  Medical:  - f/u autoimmune encephalitis panel  Dispo: likely home, pending improvement

## 2021-09-23 PROCEDURE — 90853 GROUP PSYCHOTHERAPY: CPT

## 2021-09-23 PROCEDURE — 99232 SBSQ HOSP IP/OBS MODERATE 35: CPT

## 2021-09-23 RX ORDER — DIPHENHYDRAMINE HCL 50 MG
50 CAPSULE ORAL ONCE
Refills: 0 | Status: COMPLETED | OUTPATIENT
Start: 2021-09-23 | End: 2021-09-23

## 2021-09-23 RX ADMIN — Medication 50 MILLIGRAM(S): at 21:02

## 2021-09-23 RX ADMIN — Medication 1 MILLIGRAM(S): at 13:01

## 2021-09-23 RX ADMIN — Medication 1 MILLIGRAM(S): at 08:14

## 2021-09-23 RX ADMIN — Medication 650 MILLIGRAM(S): at 09:47

## 2021-09-23 RX ADMIN — Medication 650 MILLIGRAM(S): at 10:27

## 2021-09-23 RX ADMIN — Medication 3 MILLIGRAM(S): at 21:02

## 2021-09-23 RX ADMIN — ARIPIPRAZOLE 15 MILLIGRAM(S): 15 TABLET ORAL at 08:14

## 2021-09-23 RX ADMIN — Medication 50 MILLIGRAM(S): at 17:52

## 2021-09-23 RX ADMIN — Medication 300 MILLIGRAM(S): at 08:14

## 2021-09-23 NOTE — BH INPATIENT PSYCHIATRY PROGRESS NOTE - MSE UNSTRUCTURED FT
Woman appearing stated age with good eye contact, superficially cooperative, good hygiene and grooming. Less guarded.  No PMA/R.  well related.  Speech fluent with normal rate and volume.  Mood is "good."  Affect is euthymic with improved constriction and blunting.  Thought process linear today. Thought content vague with some overvalued ideas of paranoia, no SI/HI.  AH last reported several days ago and she has not been observed responding to internal stimuli.  Insight and judgment are improving.  Impulse control appropriate at this time. Attention is grossly intact.

## 2021-09-23 NOTE — BH INPATIENT PSYCHIATRY PROGRESS NOTE - NSBHASSESSSUMMFT_PSY_ALL_CORE
35-year-old female; domiciled with mother; unemployed; highest level of education some college; PPHx of depression, anxiety, prior bipolar diagnosis per records and per patient, 1 prior psych admission she thinks at [facility] vs stay overnight in [facility] (unsure) for depression, hx SIB by cutting, no known hx of violence; denies alcohol use; past use of marijuana and cocaine (last 6mos ago); denies PMHx; admitted from [facility] after medical admission.  BIB EMS activated by mother for what mother believed was seizure-like activity.  Neuro cleared patient.  Episode sounds like it could have been episode of catatonia from description of being frozen and ambivalent about moving.      Patient's reported sx with associated history point towards MDD w/ psychosis vs bipolar depression w/ psychosis or schizoaffective as possible etiology. She was initially guarded with FTD, vague speech, concrete thought process, loosening of associations. She is now improving again with medications. She is compliant with medications and in behavioral control  The patient was less thought disordered today, and able to maintain good mood and affect during interview. Tolerating effexor, denies side effects. Reports good effect of abilify with no side effects, will continue to cross titrate. Patient amenable to Klonopin twice daily to avoid continued ativan PRN use; reports good effect of medication, although continues to request more anxiety medications.    Legal: 927 status  Safety: routine checks  Psychiatric:  - d/c Latuda  - Abilify 15mg daily  - effexor 300mg daily  - Klonopin 1mg qAM and q2pm   - trazodone 50mg qHS PRN for insomnia  Medical:  - f/u autoimmune encephalitis panel  Dispo: likely home, pending improvement

## 2021-09-23 NOTE — BH INPATIENT PSYCHIATRY PROGRESS NOTE - CURRENT MEDICATION
MEDICATIONS  (STANDING):  ARIPiprazole 15 milliGRAM(s) Oral daily  clonazePAM  Tablet 1 milliGRAM(s) Oral daily  clonazePAM  Tablet 1 milliGRAM(s) Oral <User Schedule>  venlafaxine  milliGRAM(s) Oral daily    MEDICATIONS  (PRN):  acetaminophen   Tablet .. 650 milliGRAM(s) Oral every 6 hours PRN Mild Pain (1 - 3), Moderate Pain (4 - 6), Severe Pain (7 - 10)  hydrOXYzine hydrochloride 25 milliGRAM(s) Oral every 6 hours PRN Anxiety  melatonin. 3 milliGRAM(s) Oral at bedtime PRN Insomnia  OLANZapine 7.5 milliGRAM(s) Oral every 6 hours PRN agitation  OLANZapine Injectable 7.5 milliGRAM(s) IntraMuscular once PRN agitation  propranolol 10 milliGRAM(s) Oral every 8 hours PRN restlessness/anxiety  traZODone 50 milliGRAM(s) Oral at bedtime PRN insomnia

## 2021-09-23 NOTE — BH INPATIENT PSYCHIATRY PROGRESS NOTE - NSBHFUPINTERVALHXFT_PSY_A_CORE
Patient seen and evaluated, case discussed with treatment team.   Patient was seen at the outdoor [facility] tables.  The patient is feeling "good" today, although she reports poor sleep overnight due to 3am waking. She reports good effect of abilify, denying any further CAH and denying any akithisia or restlessness. She also reports good effect of klonopin, although continues to request more anxiety medications.  She continues to carry out ADLs without issue. Continues to deny any active SI/HI.

## 2021-09-23 NOTE — BH INPATIENT PSYCHIATRY PROGRESS NOTE - NSBHCHARTREVIEWVS_PSY_A_CORE FT
Vital Signs Last 24 Hrs  T(C): 36.1 (09-23-21 @ 06:25), Max: 36.1 (09-22-21 @ 17:29)  T(F): 97 (09-23-21 @ 06:25), Max: 97 (09-22-21 @ 17:29)  HR: 93 (09-23-21 @ 06:25) (93 - 93)  BP: 122/77 (09-23-21 @ 06:25) (122/77 - 122/77)  BP(mean): --  RR: --  SpO2: --

## 2021-09-24 LAB — SARS-COV-2 RNA SPEC QL NAA+PROBE: SIGNIFICANT CHANGE UP

## 2021-09-24 PROCEDURE — 99232 SBSQ HOSP IP/OBS MODERATE 35: CPT | Mod: 25

## 2021-09-24 RX ORDER — DIPHENHYDRAMINE HCL 50 MG
25 CAPSULE ORAL ONCE
Refills: 0 | Status: COMPLETED | OUTPATIENT
Start: 2021-09-24 | End: 2021-09-24

## 2021-09-24 RX ADMIN — Medication 1 MILLIGRAM(S): at 13:20

## 2021-09-24 RX ADMIN — Medication 1 MILLIGRAM(S): at 08:56

## 2021-09-24 RX ADMIN — Medication 300 MILLIGRAM(S): at 08:56

## 2021-09-24 RX ADMIN — Medication 3 MILLIGRAM(S): at 20:27

## 2021-09-24 RX ADMIN — ARIPIPRAZOLE 15 MILLIGRAM(S): 15 TABLET ORAL at 08:56

## 2021-09-24 RX ADMIN — Medication 650 MILLIGRAM(S): at 14:33

## 2021-09-24 RX ADMIN — Medication 50 MILLIGRAM(S): at 21:12

## 2021-09-24 RX ADMIN — Medication 25 MILLIGRAM(S): at 18:14

## 2021-09-24 NOTE — BH TREATMENT PLAN - NSTXDISORGINTERPR_PSY_ALL_CORE
Pt continues to demonstrate some progress towards psychiatric rehabilitation goals over the past week. Psychiatric Rehabilitation staff will continue to meet with pt individually to provide support, encouragement, and counseling so that they will continue identifying and utilizing effective coping skills for better symptom management.
Pt has demonstrated some progress towards psychiatric rehabilitation goals over the past week. Psychiatric Rehabilitation staff will continue to meet with pt individually to provide support, encouragement, and counseling so that they will continue identifying and utilizing effective coping skills for better symptom management.
Pt continues to demonstrate some progress towards psychiatric rehabilitation goals over the past week. Psychiatric Rehabilitation staff will continue to meet with pt individually to provide support, encouragement, and counseling so that they will continue identifying and utilizing effective coping skills for better symptom management.

## 2021-09-24 NOTE — BH TREATMENT PLAN - NSTXDISORGGOAL_PSY_ALL_CORE
Will identify 2 coping skills that assist in organizing

## 2021-09-24 NOTE — BH PSYCHOLOGY - GROUP THERAPY NOTE - NSBHPSYCHOLRESPONSE_PSY_A_CORE
Coping skills acquired/Insight displayed/Accepted support
Coping skills acquired/Insight displayed/Accepted support
Coping skills acquired/Accepted support
Coping skills acquired/Insight displayed/Accepted support
Coping skills acquired/Accepted support
Coping skills acquired/Insight displayed/Accepted support

## 2021-09-24 NOTE — BH TREATMENT PLAN - NSDCCRITERIA_PSY_ALL_CORE
resolution of mood sx and psychosis

## 2021-09-24 NOTE — BH PSYCHOLOGY - GROUP THERAPY NOTE - NSPSYCHOLGRPDBTPROB_PSY_A_CORE
emotional dysregulation
anxiety/emotional dysregulation
emotional dysregulation
anxiety/emotional dysregulation

## 2021-09-24 NOTE — BH PSYCHOLOGY - GROUP THERAPY NOTE - NSBHPSYCHOLGRPDUR_PSY_A_CORE
45 minutes

## 2021-09-24 NOTE — BH PSYCHOLOGY - GROUP THERAPY NOTE - NSBHPSYCHOLGRPTYPE_PSY_A_CORE
DBT Life Skills

## 2021-09-24 NOTE — BH TREATMENT PLAN - NSTXDEPRESGOAL_PSY_ALL_CORE
Exhibit improvements in self-grooming, hygiene, sleep and appetite
Will identify 2 coping skills that assist in improving mood

## 2021-09-24 NOTE — BH TREATMENT PLAN - NSTXPATIENTPARTICIPATE_PSY_ALL_CORE
Patient participated in identification of needs/problems/goals for treatment
Patient participated in identification of needs/problems/goals for treatment/Patient participated in development of after care plan
Patient participated in identification of needs/problems/goals for treatment/Patient participated in defining interventions/Patient participated in development of after care plan
Patient participated in identification of needs/problems/goals for treatment/Patient participated in defining interventions/Patient participated in development of after care plan

## 2021-09-24 NOTE — BH TREATMENT PLAN - NSBHPRIMARYDX_PSY_ALL_CORE
Psychosis    
Severe recurrent major depressive disorder with psychosis    

## 2021-09-24 NOTE — BH TREATMENT PLAN - NSTXPLANTHERAPYSESSIONSFT_PSY_ALL_CORE
09-09-21  Type of therapy: Cognitive behavior therapy,Dialectical behavior therapy,Coping skills,Creative arts therapy,Health and fitness,Inspiration and motiviation,Leisure development,Mindfulness,Music therapy,Peer advocate,Pet therapy,Self esteem,Spirituality,Stress management,Symptom management  Type of session: Individual  Level of patient participation: Attentive,Participated with encouragement  Duration of participation: 30 minutes  Therapy conducted by: Psych rehab  Therapy Summary: Pt has demonstrated some progress towards psychiatric rehabilitation goals over the past week. Pt is able to identify mindfulness as a healthy coping strategy to better manage symptoms. Pt endorses improvements in mood, sleep, and appetite. Pt presents with thought blocking and is vague when discussions symptoms and treatment goals. Pt identifies using mindfulness and focusing on what she can control in the moment as helpful to managing anxiety. Pt has been compliant with medications and is tolerating them well. Pt denies any current AH/VH or paranoia, however, is observed glancing around the room and checking behind her at times during the session. Pt denies any current suicidal ideations, intent, or plan at this time. Over the past week, pt has been increasingly receptive to skill development. Pt has attended approximately 95% of daily psychiatric rehabilitation groups. Pt is verbal in group with encouragement and participates appropriately. Pt reports groups are helpful in learning skills and adding structure to her day. Pt hopes to find a way to have a better schedule and routine at home. Pt has been visible on the unit, however, has not demonstrated much socialization with peers. Pt has not required any redirection and is not a behavioral management issue on the unit. Writer encouraged pt to continue engaging in treatment in order to better develop skills and for continued support.  
  09-23-21  Type of therapy: Cognitive behavior therapy,Dialectical behavior therapy,Coping skills,Creative arts therapy,Health and fitness,Inspiration and motiviation,Leisure development,Mindfulness,Music therapy,Peer advocate,Pet therapy,Social skills training,Spirituality,Stress management,Symptom management  Type of session: Individual  Level of patient participation: Attentive,Engaged,Participates  Duration of participation: 15 minutes  Therapy conducted by: Psych rehab  Therapy Summary: Pt continues to demonstrate some progress towards psychiatric rehabilitation goals over the past week. Pt is able to identify socializing, exercise, positive self-talk, and using a schedule as healthy coping strategies to better manage symptoms. Pt continues to demonstrate an improvement in mood and thought processing. Pt reports having poor sleep and is hoping to adjust medications in order to help. Pt is guarded and seems to be minimizing when asked about symptoms and treatment. Pt reports feeling “good” at this time, however, continues to have anxiety and labile mood at times. Pt has been compliant with medications and is tolerating them well. Pt denies any current AH/VH or paranoia. Pt also denies any current suicidal ideations, intent, or plan at this time. Over the past week, pt continues to be receptive to skill development. Pt has attended approximately 95% of daily psychiatric rehabilitation groups. Pt acknowledges learning a lot from groups including effective coping skills like distress tolerance skills and positive self-talk. Pt is verbal in group and participates appropriately. Pt has been visible on the unit and demonstrates an increase in positive socialization with select peers. Pt has not required any redirection and is not a behavioral management issue on the unit. Pt continues to require encouragement to verbalize thoughts, needs, and feelings effectively. Writer encouraged pt to continue engaging in treatment in order to better develop skills and for continued support.  
  09-16-21  Type of therapy: Cognitive behavior therapy,Dialectical behavior therapy,Coping skills,Creative arts therapy,Health and fitness,Inspiration and motiviation,Leisure development,Mindfulness,Music therapy,Peer advocate,Pet therapy,Self esteem,Social skills training,Spirituality,Stress management,Symptom management  Type of session: Individual  Level of patient participation: Attentive,Engaged,Participates  Duration of participation: 15 minutes  Therapy conducted by: Psych rehab  Therapy Summary: Pt continues to demonstrate some progress psychiatric rehabilitation goals over the past week. Pt is able to identify exercise, fresh air, deep breathing, and having a routine as a healthy coping strategy to better manage symptoms. Pt demonstrates significant improvement in mood and thought processing. Pt is better able to engage in a linear conversation, although needs encouragement to use coping skills when feeling anxious rather than asking for PRN medications. Pt is receptive to using coping strategies with assistance. Pt has been compliant with medications and is tolerating them well. Pt denies any current AH/VH or paranoia at this time. Pt presents with brighter affect. Pt also denies any current suicidal ideations, intent, or plan. Over the past week, pt has been receptive to skill development. Pt has attended 100% of daily psychiatric rehabilitation groups. Pt is more verbal in groups and participates appropriately. Pt has been visible on the unit and demonstrates an increase in positive socialization with select peers. Pt has not required any redirection and is not a behavioral management issue on the unit. Pt is better able to verbalize thoughts, needs, and feelings with encouragement. Writer encouraged pt to continue engaging in treatment in order to better develop skills and for continued support.

## 2021-09-24 NOTE — BH TREATMENT PLAN - NSTXDCOPNOINTERSW_PSY_ALL_CORE
ORESTES will provide psychoeducation to pt and her family about the importance of OP MH f/u
SW to provide psychoed, discharge planning, contact with collaterals, collaboration with treatment team.
SW provides encouragment to comply with treatment; SW provides psychoed, contact with collaterals, discharge planning and collaboration with treatment team. ORESTES referred pt to [facility] with pt's permission. ORESTES reached out to [facility], who reported pt is already registered with a [facility].

## 2021-09-24 NOTE — BH PSYCHOLOGY - GROUP THERAPY NOTE - NSPSYCHOLGRPDBTPT_PSY_A_CORE
stable mood and affect in group/patient showing good behavior control/no self-destructive impulses/behaviors/Patient able to identify mood states/other...
stable mood and affect in group/patient showing good behavior control/no self-destructive impulses/behaviors/other...
stable mood and affect in group/patient showing good behavior control/no self-destructive impulses/behaviors/Patient able to identify mood states/other...

## 2021-09-24 NOTE — BH PSYCHOLOGY - GROUP THERAPY NOTE - NSBHPSYCHOLGRPNAME_PSY_A_CORE
DBT Skills

## 2021-09-24 NOTE — BH PSYCHOLOGY - GROUP THERAPY NOTE - NSBHPTASSESSDT_PSY_A_CORE
14-Sep-2021 11:15
13-Sep-2021 11:15
23-Sep-2021 11:15
22-Sep-2021 11:15
10-Sep-2021 12:30
09-Sep-2021 11:15
24-Sep-2021 13:48
17-Sep-2021 11:15
16-Sep-2021 11:15
15-Sep-2021 11:15

## 2021-09-24 NOTE — BH INPATIENT PSYCHIATRY PROGRESS NOTE - NSBHASSESSSUMMFT_PSY_ALL_CORE
35-year-old female; domiciled with mother; unemployed; highest level of education some college; PPHx of depression, anxiety, prior bipolar diagnosis per records and per patient, 1 prior psych admission she thinks at [facility] vs stay overnight in [facility] (unsure) for depression, hx SIB by cutting, no known hx of violence; denies alcohol use; past use of marijuana and cocaine (last 6mos ago); denies PMHx; admitted from [facility] after medical admission.  BIB EMS activated by mother for what mother believed was seizure-like activity.  Neuro cleared patient.  Episode sounds like it could have been episode of catatonia from description of being frozen and ambivalent about moving.      Patient's reported sx with associated history point towards MDD w/ psychosis vs bipolar depression w/ psychosis or schizoaffective as possible etiology. She was initially guarded with FTD, vague speech, concrete thought process, loosening of associations. She is compliant with medications and in behavioral control  The patient continues to be less thought disordered, and able to maintain good mood and affect during interview. Tolerating effexor, denies side effects. Reports good effect of abilify with no side effects. Patient amenable to Klonopin twice daily to avoid continued ativan PRN use; reports good effect of medication, although continues to request more anxiety medications following discharge. Patient continues to improve on medications/    Legal: 927 status  Safety: routine checks  Psychiatric:  - Abilify 15mg daily  - effexor 300mg daily  - Klonopin 1mg qAM and q2pm   - trazodone 50mg qHS PRN for insomnia  Medical:  - f/u autoimmune encephalitis panel  Dispo: likely home, pending improvement

## 2021-09-24 NOTE — BH PSYCHOLOGY - GROUP THERAPY NOTE - NSPSYCHOLGRPDBTTOL_PSY_A_CORE FT
n/a
TIP
n/a
Distract, Improve, Self-soothe
Radical Acceptance, Willingness, Half-Smiling and Willing Hands
Pros and Cons
TIPP

## 2021-09-24 NOTE — BH PSYCHOLOGY - GROUP THERAPY NOTE - NSBHPSYCHOLASSESSPROV_PSY_A_CORE
Trainee Only
Licensed Staff Psychologist and Trainee
Trainee Only
Trainee Only
Licensed Staff Psychologist
Trainee Only
Licensed Staff Psychologist and Trainee
Trainee Only

## 2021-09-24 NOTE — BH PSYCHOLOGY - GROUP THERAPY NOTE - NSPSYCHOLGRPDBTEMOT_PSY_A_CORE FT
Accumulating Positives: Short Term  
n/a
Build Mastery / [place] Ahead  
n/a

## 2021-09-24 NOTE — BH INPATIENT PSYCHIATRY PROGRESS NOTE - NSBHFUPINTERVALHXFT_PSY_A_CORE
Patient seen and evaluated, case discussed with treatment team.  Per staff intrusive at times with peers.    Patient was seen in the day room.  The patient is feeling "good" today, noting that she has been sleeping better. She reports good effect of abilify, denying any akithisia or restlessness. Discussed outpatient follow up and management and improvement since hospitalization. She reports good effect of klonopin, although continues to request more anxiety medications for after discharge.  She continues to carry out ADLs without issue. Continues to deny any active SI/HI.

## 2021-09-24 NOTE — BH PSYCHOLOGY - GROUP THERAPY NOTE - NSPSYCHOLGRPDBTGOAL_PSY_A_CORE
reduce mood and affective lability/reduce impulsive self-defeating behavior/improve ability to indentify feelings/improve ability to communicate feelings/reduce vulnerability to emotional dysregualation
reduce mood and affective lability/reduce impulsive self-defeating behavior/reduce suicidal ideation/risk of attempt/reduce vulnerability to emotional dysregualation/promote skills to reduce anger
reduce mood and affective lability/improve ability to indentify feelings/improve ability to communicate feelings/reduce vulnerability to emotional dysregualation
reduce mood and affective lability/reduce impulsive self-defeating behavior/improve ability to indentify feelings/improve ability to communicate feelings/reduce vulnerability to emotional dysregualation
reduce mood and affective lability/reduce impulsive self-defeating behavior/improve ability to indentify feelings/improve ability to communicate feelings/reduce vulnerability to emotional dysregualation
reduce mood and affective lability/improve ability to indentify feelings/improve ability to communicate feelings/reduce vulnerability to emotional dysregualation
reduce mood and affective lability/reduce impulsive self-defeating behavior/improve ability to indentify feelings/reduce vulnerability to emotional dysregualation

## 2021-09-24 NOTE — BH PSYCHOLOGY - GROUP THERAPY NOTE - NSPSYCHOLGRPDBTPT_PSY_A_CORE FT
DBT Group is a group in which patients learn skills to better manage their emotions and behaviors. Group begins with a mindfulness practice so that patients have an opportunity to practice observing their internal and external experiences.  Following the mindfulness exercise the group learns new skills in a didactic format. Group today focused on the “emotion regulation” module.  Specifically, patients learned Build Mastery and [place] Ahead. Patients were asked to identify an activity to engage in every day that would help increase their sense of competence and accomplishment (Build Mastery). They were also asked to identify potential difficult situations, identify skills to help manage these difficulties, and imagine coping effectively ([place] Ahead).
DBT Group is a group in which patients learn skills to better manage their emotions and behaviors. Group begins with a mindfulness practice so that patients have an opportunity to practice observing their internal and external experiences. Following the mindfulness exercise the group learns new skills in a didactic format. Group today focused on the “Walking the Middle Path" section of the "Interpersonal Effectiveness" module, which are skills to help patients increase effectiveness both intrapersonally and interpersonally through balancing acceptance and change. Pts were taught the skills of “Validation of self and others” (to help promote perspective taking, acceptance/understanding of feelings). Pts engaged in discussing examples of how to practice the skills, particularly in challenging interpersonal situations or when they feel invalidated.  
DBT Group is a group in which patients learn skills to better manage their emotions and behaviors. Group begins with a mindfulness practice so that patients have an opportunity to practice observing their internal and external experiences. Following the mindfulness exercise the group learns new skills in a didactic format. Group today focused on the “distress tolerance” module, which are skills to help patients manage their crisis urges. Specifically, pts learned the “TIP" skills to change body chemistry in order to quickly reduce intense emotions. Patients engaged in practice of the skills in group and were asked to identify several examples of how they would practice the skills outside of group. HW assigned to practice at least one TIP skill.
DBT Group is a group in which patients learn skills to better manage their emotions and behaviors. Group begins with a mindfulness practice so that patients have an opportunity to practice observing their internal and external experiences. Following the mindfulness exercise the group learns new skills in a didactic format. Group today focused on the “distress tolerance” module, which are skills to help patients manage their crisis urges. Specifically, pts learned the skill "Pros and Cons" to help with decision making in terms of acting on or resisting crisis urges. Patients engaged in practicing how to use Pros and Cons in various situations. HW assigned to practice creating a Pros and Cons list for an anticipated stressor.  
DBT Group is a group in which patients learn skills to better manage their emotions and behaviors. Group today focused on the “mindfulness” module, which are skills to help patients increase their awareness of their present experience without judgment. Pts were taught the “what” skills (observe, describe, participate) and “how” skills (non-judgmentally, effectively, and one-mindfully) of mindfulness, and engaged in a variety of mindfulness exercises to practice the skills, and shared observations at the end of each activity. Pts engaged in collaborative discussion to identify how to practice the skills. 
DBT Group is a group in which patients learn skills to better manage their emotions and behaviors. Group begins with a mindfulness practice so that patients have an opportunity to practice observing their internal and external experiences.  Following the mindfulness exercise the group learns new skills in a didactic format. Group today focused on the “distress tolerance” module, which are skills to help patients manage their crisis urges.  Specifically, pts learned the skills of “Distract with Wise Mind Accepts,” "Improve the Moment," and "Self-soothe," which all focus on identifying healthy coping activities or behaviors pts can practice to help get through a stressful time without making the situation worse by engaging in target behaviors. Patients were asked to identify several examples of how they would practice the components of each skill, and pts provided examples of ways to practice this while in the hospital. HW assigned to practice at least one component of each skill.
DBT Group is a group in which patients learn skills to better manage their emotions and behaviors. Group begins with a mindfulness practice so that patients have an opportunity to practice observing their internal and external experiences.  Following the mindfulness exercise the group learns new skills in a didactic format. Group today focused on the “emotion regulation” module.  Specifically, patients learned the skill of Accumulating Positive Emotions in the Short Term by identifying pleasant activities they can mindfully do each day to reduce vulnerability to emotion dysregulation. Pts shared activities that they enjoy and engaged in supportive discussion of how to engage in pleasant activities when they are not feeling motivated to do so. Pts identified how they can begin to incorporate daily pleasant activities while in the hospital. 
DBT Group is a group in which patients learn skills to better manage their emotions and behaviors. Group begins with a mindfulness practice so that patients have an opportunity to practice observing their internal and external experiences.  Following the mindfulness exercise the group learns new skills in a didactic format. Pts were taught the concept of “wise mind,” which is about identifying different states of mind (emotional vs. reasonable mind) and finding ways to tap into wise mind which is a blend of the two, and the state of mind that is consistent with wise decision making and long term goals and values. 
DBT Group is a group in which patients learn skills to better manage their emotions and behaviors. Group begins with a mindfulness practice so that patients have an opportunity to practice observing their internal and external experiences.  Following the mindfulness exercise the group learns new skills in a didactic format. Group today focused on the “distress tolerance” module, which are skills to help patients manage their crisis urges.  Specifically, pts learned the skill of “radical acceptance,” which includes accepting painful situations in their lives they cannot change through turning the mind and practicing willingness. Patients were asked to determine difficult situations in their lives they needed to work on accepting, and provided examples of ways to practice this while in the hospital. 
DBT Group is a group in which patients learn skills to better manage their emotions and behaviors. Group begins with a mindfulness practice so that patients have an opportunity to practice observing their internal and external experiences. Following the mindfulness exercise the group learns new skills in a didactic format. Group today focused on the “distress tolerance” module, which are skills to help patients manage their crisis urges. Specifically, pts learned the “TIP" skills to change body chemistry in order to quickly reduce intense emotions. Patients engaged in practice of the skills in group and were asked to identify several examples of how they would practice the skills outside of group. HW assigned to practice at least one TIP skill.

## 2021-09-24 NOTE — BH INPATIENT PSYCHIATRY PROGRESS NOTE - CASE SUMMARY
35 year old woman admitted with psychosis after medical admission for possible seizure activity (sounding from her description like catatonia).  Sz ruled out at [facility].  Got pulse steroids and is now on prednisone taper.  Low suspicion for autoimmune encephalitis but will follow up pending CSF labs. Will continue Zyprexa.  She has FTD and guarded manner that severely strain the interview.  
As above.  Patient continues to be psychotic with CAH and FTD.  In behavioral control.  Mood symptoms are not prominent.  Will continue to titrate Effexor and Latuda.  
As above.  Patient with worsened paranoid delusions, discussed calking holes in bathroom to prevent voices from penetrating when she gets home.  Also very benzo seeking.  Will continue to titrate Effexor and Latuda.  
As above.  continue Zyprexa.  Assess for akathisia.  Low suspicion for autoimmune encephalitis but CSF panel still pending.  
As above.  No FTD.  Feels more anxious and depressed but no psychotic symptoms.  Discussed concerns for overuse of ativan.  
As above.  Requested multiple PRNs for anxiety today.  Appears in better spirits, however.  Collateral obtained from mother suggesting onset of depressive symptoms prior to psychotic symptoms.  Continuing to titrate Latuda and Effexor. 
As above.  Depression and psychosis both improving.  
Patient more organized in her thought process but remains anxious  Continue plan as tolerating Venlafaxine 225 and lurasidone 80 mg 
As above.  Patient endorses continued AH, now that are encouraging her, expresses doubt about the source of AH as external stimulus vs her own thoughts aloud.  Tolerated Latuda.  Will titrate.  Ativan for anxiety added.  
As above.  More psychotic thought content today with vagueness and guardedness surrounding delusions.  Cross titrating Latuda to Abilify starting today.  
As above.  Patient reports continued anxiety. Guarded about psychotic symptoms.  Continue cross titration of Latuda to Abilify.  
Steroid taper discontinued today per neuro.  Patient feels "ravenous" and it is unclear if this is side effect of Zyprexa or steroids.  Nevertheless, will dc Zyprexa and start Latuda and Effexor.  Banged head last night in anger and frustration after visit with mother.  Calm today. 
As above.  Continues to be somewhat guarded about psychotic symptoms.  Tolerating Abilify well.  Cross titration from Latuda ongoing.  
As above.  Patient still requesting more benzos.  explains feeling stressed because of one patient's mean comments to other patients.  Delusions about her home are ongoing but gradually being recontextualized, trending towards more reality based thought content.  
As above.  Continued improvement in psychotic symptoms.  Continue current regimen.

## 2021-09-24 NOTE — BH INPATIENT PSYCHIATRY PROGRESS NOTE - NSBHCHARTREVIEWVS_PSY_A_CORE FT
Vital Signs Last 24 Hrs  T(C): 36.8 (09-24-21 @ 06:52), Max: 36.8 (09-24-21 @ 06:52)  T(F): 98.2 (09-24-21 @ 06:52), Max: 98.2 (09-24-21 @ 06:52)  HR: 82 (09-24-21 @ 06:52) (82 - 82)  BP: 134/90 (09-24-21 @ 06:52) (134/90 - 134/90)  BP(mean): --  RR: --  SpO2: --

## 2021-09-24 NOTE — BH PSYCHOLOGY - GROUP THERAPY NOTE - NSPSYCHOLGRPDBTINT_PSY_A_CORE
other...
reviewed distress tolerance, skills homework
other...

## 2021-09-24 NOTE — BH INPATIENT PSYCHIATRY PROGRESS NOTE - MSE UNSTRUCTURED FT
Woman appearing stated age with good eye contact, superficially cooperative, good hygiene and grooming. Less guarded.  No PMA/R.  well related.  Speech fluent with normal rate and volume.  Mood is "good."  Affect is euthymic with improved constriction and blunting.  Thought process linear again today. Thought content vague with some overvalued ideas of paranoia, no SI/HI.  AH last reported several days ago and she has not been observed responding to internal stimuli.  Insight and judgment are improving.  Impulse control appropriate at this time. Attention is grossly intact.

## 2021-09-24 NOTE — BH INPATIENT PSYCHIATRY PROGRESS NOTE - MODIFICATIONS
Patient seen by me, chart reviewed, and case discussed with treatment team.  Reviewed and agree with above progress note, assessment and plan; corrections and modification made where appropriate.
Patient interviewed and evaluated with resident present to follow up on mood and psychotic symptoms and the case has been reviewed with the treatment team this morning.   I was physically present during the service to the patient and personally examined the patient and was directly involved in the management and recommendations of the care provided to the patient.  I have reviewed the above note and the mental status examination and I agree with its contents and made modifications as indicated
Patient seen by me, chart reviewed, and case discussed with treatment team.  Reviewed and agree with above progress note, assessment and plan; corrections and modification made where appropriate.

## 2021-09-24 NOTE — BH PSYCHOLOGY - GROUP THERAPY NOTE - NSPSYCHOLGRPDBTINT_PSY_A_CORE FT
taught mindfulness skill    
taught distress tolerance skill  
taught emotion regulation skill  
taught mindfulness skill    
taught interpersonal effectiveness skill  
taught distress tolerance skill    
taught distress tolerance skill    
taught emotion regulation skill    
taught distress tolerance skill

## 2021-09-24 NOTE — BH TREATMENT PLAN - NSTXDISORGINTERRN_PSY_ALL_CORE
Provide prompts and reality orientation as needed.
Provide supportive contacts and psychoeducation q shift.

## 2021-09-25 RX ADMIN — Medication 1 MILLIGRAM(S): at 10:07

## 2021-09-25 RX ADMIN — Medication 650 MILLIGRAM(S): at 13:06

## 2021-09-25 RX ADMIN — ARIPIPRAZOLE 15 MILLIGRAM(S): 15 TABLET ORAL at 10:07

## 2021-09-25 RX ADMIN — Medication 300 MILLIGRAM(S): at 10:07

## 2021-09-25 RX ADMIN — Medication 1 MILLIGRAM(S): at 13:05

## 2021-09-25 RX ADMIN — Medication 3 MILLIGRAM(S): at 21:25

## 2021-09-25 RX ADMIN — Medication 650 MILLIGRAM(S): at 13:38

## 2021-09-25 RX ADMIN — Medication 50 MILLIGRAM(S): at 21:04

## 2021-09-26 RX ADMIN — ARIPIPRAZOLE 15 MILLIGRAM(S): 15 TABLET ORAL at 09:10

## 2021-09-26 RX ADMIN — Medication 3 MILLIGRAM(S): at 22:13

## 2021-09-26 RX ADMIN — Medication 1 MILLIGRAM(S): at 09:10

## 2021-09-26 RX ADMIN — Medication 50 MILLIGRAM(S): at 21:16

## 2021-09-26 RX ADMIN — Medication 1 MILLIGRAM(S): at 13:03

## 2021-09-26 RX ADMIN — Medication 300 MILLIGRAM(S): at 09:11

## 2021-09-27 PROBLEM — Z78.9 OTHER SPECIFIED HEALTH STATUS: Chronic | Status: INACTIVE | Noted: 2021-03-12 | Resolved: 2021-08-26

## 2021-09-27 PROCEDURE — 99232 SBSQ HOSP IP/OBS MODERATE 35: CPT

## 2021-09-27 PROCEDURE — 90832 PSYTX W PT 30 MINUTES: CPT

## 2021-09-27 RX ADMIN — Medication 300 MILLIGRAM(S): at 09:19

## 2021-09-27 RX ADMIN — Medication 50 MILLIGRAM(S): at 20:27

## 2021-09-27 RX ADMIN — Medication 650 MILLIGRAM(S): at 16:22

## 2021-09-27 RX ADMIN — Medication 3 MILLIGRAM(S): at 21:34

## 2021-09-27 RX ADMIN — Medication 1 MILLIGRAM(S): at 13:11

## 2021-09-27 RX ADMIN — ARIPIPRAZOLE 15 MILLIGRAM(S): 15 TABLET ORAL at 09:18

## 2021-09-27 RX ADMIN — Medication 650 MILLIGRAM(S): at 15:41

## 2021-09-27 RX ADMIN — Medication 1 MILLIGRAM(S): at 09:18

## 2021-09-27 NOTE — BH PSYCHOLOGY - CLINICIAN PSYCHOTHERAPY NOTE - NSBHPSYCHOLRESPONSE_PSY_A_CORE
Symptoms reduced/Coping skills acquired/Insight displayed/Accepted support
Symptoms reduced/Coping skills acquired/Insight displayed/Accepted support
Coping skills acquired/Accepted support
Accepted support

## 2021-09-27 NOTE — BH DISCHARGE NOTE NURSING/SOCIAL WORK/PSYCH REHAB - DISCHARGE INSTRUCTIONS AFTERCARE APPOINTMENTS
In order to check the location, date, or time of your aftercare appointment, please refer to your Discharge Instructions Document given to you upon leaving the hospital.  If you have lost the instructions please call [phone]

## 2021-09-27 NOTE — BH INPATIENT PSYCHIATRY PROGRESS NOTE - NSBHFUPINTERVALHXFT_PSY_A_CORE
Patient seen and evaluated, case discussed with treatment team.  No overnight events.  No longer asking for additional benzos with additional standing Klonopin dose.  Reports good mood.  No longer preoccupied with delusional thought content.  Denies AH.  She reports good effect of abilify, denying any akithisia or restlessness.  Continues to deny any active SI/HI.

## 2021-09-27 NOTE — BH DISCHARGE NOTE NURSING/SOCIAL WORK/PSYCH REHAB - NSDCPRGOAL_PSY_ALL_CORE
Pt has demonstrated significant progress towards psychiatric rehabilitation goals during the current hospitalization. Pt is able to identify TIPP skills, practicing gratitude, exercise, positive self-talk, and socializing as healthy coping strategies to better manage symptoms. Pt endorses improvements in mood, sleep, appetite, and thought processing. Pt presents as calmer and is better able to engage in a linear conversation. Pt denies any current AH/VH or paranoia and is looking forward to discharge. Pt has been compliant with medications and is tolerating them well. Pt verbalizes improved insight and judgement into symptoms and treatment. Pt denies any current suicidal ideations, intent, or plan at this time. During the current hospitalization, pt has been increasingly receptive to skill development. Pt attended approximately 95% of daily psychiatric rehabilitation groups. Pt was more verbal in groups and participated appropriately as pt’s symptoms improved. Pt has been visible on the unit and demonstrates an increase in positive socialization with select peers. Pt is observed playing games, watching tv, and helping peers when on the milieu. Pt has not required any redirection and is not a behavioral management issue. Pt is able to verbalize, thoughts, needs, and feelings appropriately. Pt was provided with a Press Ganey survey to complete prior to discharge.

## 2021-09-27 NOTE — BH PSYCHOLOGY - CLINICIAN PSYCHOTHERAPY NOTE - NSTXDEPRESGOAL_PSY_ALL_CORE
Exhibit improvements in self-grooming, hygiene, sleep and appetite
Will identify 2 coping skills that assist in improving mood
Will identify 2 coping skills that assist in improving mood

## 2021-09-27 NOTE — BH PSYCHOLOGY - CLINICIAN PSYCHOTHERAPY NOTE - NSBHPSYCHOLGOALS_PSY_A_CORE
Decrease symptoms/Improve level of independent functioning/Improve social/vocational/coping skills/Treatment compliance
Decrease symptoms/Improve social/vocational/coping skills
Improve social/vocational/coping skills/Prevent relapse
Improve social/vocational/coping skills/Prevent relapse

## 2021-09-27 NOTE — BH DISCHARGE NOTE NURSING/SOCIAL WORK/PSYCH REHAB - NSDCPRRECOMMEND_PSY_ALL_CORE
Psychiatric Rehabilitation staff recommends that patient continues outpatient treatment at [facility] for ongoing medication management, support, and psychotherapy. In addition to, continuing exploring and utilizing healthy coping strategies for improved symptom management and sustained recovery.

## 2021-09-27 NOTE — BH PSYCHOLOGY - CLINICIAN PSYCHOTHERAPY NOTE - NSTXDISORGGOAL_PSY_ALL_CORE
Will identify 2 coping skills that assist in organizing

## 2021-09-27 NOTE — BH INPATIENT PSYCHIATRY PROGRESS NOTE - NSBHCHARTREVIEWVS_PSY_A_CORE FT
Vital Signs Last 24 Hrs  T(C): 36.7 (09-27-21 @ 07:47), Max: 36.7 (09-26-21 @ 17:11)  T(F): 98.1 (09-27-21 @ 07:47), Max: 98.1 (09-27-21 @ 07:47)  HR: 90 (09-27-21 @ 07:47) (90 - 90)  BP: 123/84 (09-27-21 @ 07:47) (123/84 - 123/84)  BP(mean): --  RR: --  SpO2: --

## 2021-09-27 NOTE — BH DISCHARGE NOTE NURSING/SOCIAL WORK/PSYCH REHAB - NSCDUDCCRISIS_PSY_A_CORE
[facility] Well  1 (496) [facility]-WELL ([phone])  Text "WELL" to 92654  Website: www.Jaunt/.[employer] [facility] 1 (473) 151-TNLL (7627) Website: www.safehorizon.org/.National Suicide Prevention Lifeline 5 (781) 757-2665/.  [employer]  1 (960) LIFENET (898-4378)/.  [facility]  [address] [place] [address] [phone]   Hours:  Monday through Friday from 9 AM to 3 PM/.  [employer] - [facility] Crisis Line  5 [phone] [phone], Option 1

## 2021-09-27 NOTE — BH INPATIENT PSYCHIATRY PROGRESS NOTE - MSE UNSTRUCTURED FT
Woman appearing stated age with good eye contact, cooperative, good hygiene and grooming. No PMA/R.  Well related.  Speech fluent with normal rate and volume.  Mood is "good."  Affect is euthymic.  Thought process linear. Thought content includes no noted delusions, no SI/HI.  Denies AH.  Insight and judgment are improving.  Impulse control appropriate at this time. Cognition is grossly intact.

## 2021-09-27 NOTE — BH PSYCHOLOGY - CLINICIAN PSYCHOTHERAPY NOTE - NSBHPSYCHOLINT_PSY_A_CORE
Problem-solving techniques discussed/Supported coping skills
Cognitive/behavioral therapy/Dialectical  Behavioral Therapy (DBT)/Problem-solving techniques discussed/Supported coping skills/Treatment compliance encouraged
Cognitive/behavioral therapy/Dialectical  Behavioral Therapy (DBT)
Cognitive/behavioral therapy/Dialectical  Behavioral Therapy (DBT)

## 2021-09-27 NOTE — BH DISCHARGE NOTE NURSING/SOCIAL WORK/PSYCH REHAB - PATIENT PORTAL LINK FT
You can access the FollowMyHealth Patient Portal offered by [facility] by registering at the following website: [email or web address]. By joining [facility]’s FollowMyHealth portal, you will also be able to view your health information using other applications (apps) compatible with our system.

## 2021-09-27 NOTE — BH DISCHARGE NOTE NURSING/SOCIAL WORK/PSYCH REHAB - NSBHDCADDR1FT_A_CORE
[facility] runs via Zoom. Please answer phone calls and check emails for Zoom information prior to and around the time of your intake appointment scheduled below.  PHP runs 11AM to 3 PM on the first day and from 99 AM to 3 PM weekdays thereafter. There are breaks for lunch and in between some groups. The program lasts of up 6 weeks. [facility] runs via Zoom. Please answer phone calls and check emails for Zoom information prior to and around the time of your intake appointment scheduled below.  PHP runs 11AM to 3 PM on the first day and from 9 AM to 3 PM weekdays thereafter. There are breaks for lunch and in between some groups. The program lasts of up 6 weeks.

## 2021-09-27 NOTE — BH PSYCHOLOGY - CLINICIAN PSYCHOTHERAPY NOTE - NSBHPSYCHOLNARRATIVE_PSY_A_CORE FT
Pt was dressed and groomed appropriately for session. She was alert and ox3. Pt reported a "good" mood and displayed an anxious affect. Pt's thought process was logical. No hallucinations, delusions, or obsessions observed. Pt's speech and motor behaviors were WNL. Pt denied s/h/i/i/p.     Pt and writer discussed that avoidance cycle and how going to get her medication during sessions was negatively reenforcing her anxiety. Pt and writer also reviewed mindfulness skills and discussed possible ways she could remember to use mindfulness when she is faced with difficult situations and emotions. 
Writer met with pt for individual psychotherapy session at the recommendation of the treatment team. Pt was alert and Ox3. Pt presented as dressed and groomed appropriately. Pt reported "good" mood and displayed congruent euthymic affect. Pt was engaged and cooperative throughout session. Pt's thought process was logical; no hallucinations, delusions, or obsessions observed or reported in session. Pt's speech and motor behavior was WNL. Pt denied s/h/i/i/p. Pt committed to maintaining safety on the unit and willingness to reach out to staff should pt need support.    Session focused on safety planning. Patient was able to identify appropriate items for each section. Please see Patient Safety Plan for further details.    
Patient was alert, superficially cooperative, and in control. Oriented x3. Casually dressed, well groomed. Maintained good eye contact. Speech normal in production, rate, volume, and tone. No abnormal psychomotor behavior. Mood "pretty good" with congruent affect. Thought process logical, goal-directed. Thought content generally relevant to discussion but mildly tangential at times with some evidence of delusional content. Denied auditory/visual hallucinations and suicidal/homicidal ideation, intent, plan, and urges to self-harm. Impulse control appears intact. Insight and judgment poor.    Session focused on coping with anxiety attacks using skills rather than medication. Patient was reluctant to discuss the nature of her anxiety attacks, insisting that she was unready to discuss the "root causes" but that she had plans to address the root causes by "caulking the holes in the pipes" when she gets home. Patient agreed to consider some alternative coping strategies, including exercise, distraction, and breathing, to deal with anxiety attacks, but displayed discomfort with the conversation and asked to go inside to take her medication shortly after beginning the session.   
Pt was dressed and groomed appropriately for session. She was alert and ox3. Pt reported a "good" mood and displayed an anxious affect. Pt's thought process was logical. No hallucinations or delusions observed. She reported that she had the same recurring thought that caused her anxiety but did not specify the thought. Pt's speech and motor behaviors were WNL. Pt denied s/h/i/i/p.     Pt and writer reviewed mindfulness practices such as focusing on the breath and identifying objects around her. Pt and writer also reviewed TIPP. Pt left the session after 20 minutes as she asked to take her medication to relieve her anxiety.

## 2021-09-27 NOTE — BH PSYCHOLOGY - CLINICIAN PSYCHOTHERAPY NOTE - NSBHPSYCHOLPROBS_PSY_ALL_CORE
Patient presents with c/o rash near IV insertion site. She was seen in the ED on the 7th, had a peripheral IV in the right antecubital; she reports it was very large and swollen and that got better but noticed a rash over the last day or so.  She does repor
Anxiety
Anxiety/Disorganization/Psychosis

## 2021-09-28 PROCEDURE — 99232 SBSQ HOSP IP/OBS MODERATE 35: CPT

## 2021-09-28 RX ORDER — CLONAZEPAM 1 MG
1 TABLET ORAL
Refills: 0 | Status: DISCONTINUED | OUTPATIENT
Start: 2021-09-28 | End: 2021-09-29

## 2021-09-28 RX ORDER — PROPRANOLOL HCL 160 MG
1 CAPSULE, EXTENDED RELEASE 24HR ORAL
Qty: 28 | Refills: 0
Start: 2021-09-28 | End: 2021-10-11

## 2021-09-28 RX ORDER — VENLAFAXINE HCL 75 MG
2 CAPSULE, EXT RELEASE 24 HR ORAL
Qty: 28 | Refills: 0
Start: 2021-09-28 | End: 2021-10-11

## 2021-09-28 RX ORDER — TRAZODONE HCL 50 MG
1 TABLET ORAL
Qty: 14 | Refills: 0
Start: 2021-09-28 | End: 2021-10-11

## 2021-09-28 RX ORDER — ALPRAZOLAM 0.25 MG
1 TABLET ORAL
Qty: 0 | Refills: 0 | DISCHARGE

## 2021-09-28 RX ORDER — CLONAZEPAM 1 MG
1 TABLET ORAL
Qty: 28 | Refills: 0
Start: 2021-09-28 | End: 2021-10-11

## 2021-09-28 RX ORDER — ARIPIPRAZOLE 15 MG/1
1 TABLET ORAL
Qty: 14 | Refills: 0
Start: 2021-09-28 | End: 2021-10-11

## 2021-09-28 RX ORDER — VENLAFAXINE HCL 75 MG
2 CAPSULE, EXT RELEASE 24 HR ORAL
Qty: 28 | Refills: 0
Start: 2021-09-28 | End: 2021-10-12

## 2021-09-28 RX ORDER — LANOLIN ALCOHOL/MO/W.PET/CERES
1 CREAM (GRAM) TOPICAL
Qty: 14 | Refills: 0
Start: 2021-09-28 | End: 2021-10-11

## 2021-09-28 RX ADMIN — Medication 50 MILLIGRAM(S): at 20:41

## 2021-09-28 RX ADMIN — Medication 3 MILLIGRAM(S): at 21:40

## 2021-09-28 RX ADMIN — Medication 300 MILLIGRAM(S): at 08:34

## 2021-09-28 RX ADMIN — ARIPIPRAZOLE 15 MILLIGRAM(S): 15 TABLET ORAL at 08:33

## 2021-09-28 RX ADMIN — Medication 1 MILLIGRAM(S): at 08:33

## 2021-09-28 RX ADMIN — Medication 1 MILLIGRAM(S): at 13:01

## 2021-09-28 NOTE — BH INPATIENT PSYCHIATRY PROGRESS NOTE - NSBHCHARTREVIEWVS_PSY_A_CORE FT
Vital Signs Last 24 Hrs  T(C): 36.1 (09-28-21 @ 06:36), Max: 36.8 (09-27-21 @ 17:33)  T(F): 97 (09-28-21 @ 06:36), Max: 98.3 (09-27-21 @ 17:33)  HR: --  BP: --  BP(mean): --  RR: --  SpO2: --    Orthostatic VS  09-28-21 @ 06:36  Lying BP: --/-- HR: --  Sitting BP: 110/79 HR: 82  Standing BP: --/-- HR: --  Site: --  Mode: --

## 2021-09-28 NOTE — BH INPATIENT PSYCHIATRY PROGRESS NOTE - CURRENT MEDICATION
MEDICATIONS  (STANDING):  ARIPiprazole 15 milliGRAM(s) Oral daily  clonazePAM  Tablet 1 milliGRAM(s) Oral <User Schedule>  venlafaxine  milliGRAM(s) Oral daily    MEDICATIONS  (PRN):  acetaminophen   Tablet .. 650 milliGRAM(s) Oral every 6 hours PRN Mild Pain (1 - 3), Moderate Pain (4 - 6), Severe Pain (7 - 10)  hydrOXYzine hydrochloride 25 milliGRAM(s) Oral every 6 hours PRN Anxiety  melatonin. 3 milliGRAM(s) Oral at bedtime PRN Insomnia  OLANZapine 7.5 milliGRAM(s) Oral every 6 hours PRN agitation  OLANZapine Injectable 7.5 milliGRAM(s) IntraMuscular once PRN agitation  propranolol 10 milliGRAM(s) Oral every 8 hours PRN restlessness/anxiety  traZODone 50 milliGRAM(s) Oral at bedtime PRN insomnia

## 2021-09-28 NOTE — BH INPATIENT PSYCHIATRY PROGRESS NOTE - NSTXDISORGDATEEST_PSY_ALL_CORE
03-Sep-2021
02-Sep-2021
03-Sep-2021
03-Sep-2021
08-Sep-2021
03-Sep-2021
08-Sep-2021
03-Sep-2021
02-Sep-2021
03-Sep-2021
03-Sep-2021
02-Sep-2021
02-Sep-2021
03-Sep-2021

## 2021-09-28 NOTE — BH INPATIENT PSYCHIATRY PROGRESS NOTE - NSBHASSESSSUMMFT_PSY_ALL_CORE
35-year-old female admitted from [facility] after medical admission for seizure workup, what mother recognized as seizure activity more likely catatonia, cleared by neuro.  Patient's reported sx with associated history point towards MDD w/ psychosis vs bipolar depression w/ psychosis or schizoaffective as possible etiology. She was initially guarded with vague speech, concrete thought process, loosening of associations. Doing well on Effexor, Abilify, and Klonopin with improved mood, remission of psychotic symptoms including AH, delusions, severe FTD.      - Abilify 15mg daily  - effexor 300mg daily  - Klonopin 1mg qAM and q2pm   - trazodone 50mg qHS PRN for insomnia  - d/c tomorrow PHP start date Thursday 9/30

## 2021-09-28 NOTE — BH INPATIENT PSYCHIATRY PROGRESS NOTE - NSBHFUPINTERVALHXFT_PSY_A_CORE
Patient seen and evaluated, case discussed with treatment team.  No overnight events.  Reports good mood.  No longer preoccupied with delusional thought content and recontextualizes paranoia about illegal car ring across the street from her apartment as having too much time at home during pandemic and letting her imagination run away with her.  Denies AH.  She reports good effect of abilify, denying any akithisia or restlessness.  Continues to deny any active SI/HI.

## 2021-09-29 VITALS — TEMPERATURE: 97 F | HEART RATE: 87 BPM | SYSTOLIC BLOOD PRESSURE: 127 MMHG | DIASTOLIC BLOOD PRESSURE: 80 MMHG

## 2021-09-29 LAB — SARS-COV-2 RNA SPEC QL NAA+PROBE: SIGNIFICANT CHANGE UP

## 2021-09-29 PROCEDURE — 90839 PSYTX CRISIS INITIAL 60 MIN: CPT

## 2021-09-29 RX ORDER — CLONAZEPAM 1 MG
1 TABLET ORAL ONCE
Refills: 0 | Status: DISCONTINUED | OUTPATIENT
Start: 2021-09-29 | End: 2021-09-29

## 2021-09-29 RX ADMIN — Medication 300 MILLIGRAM(S): at 09:19

## 2021-09-29 RX ADMIN — ARIPIPRAZOLE 15 MILLIGRAM(S): 15 TABLET ORAL at 09:19

## 2021-09-29 NOTE — BH INPATIENT PSYCHIATRY PROGRESS NOTE - NSBHCONTPROVIDER_PSY_ALL_CORE
Not applicable

## 2021-09-29 NOTE — BH INPATIENT PSYCHIATRY PROGRESS NOTE - NSBHCONSULTIPREASON_PSY_A_CORE
danger to self; mental illness expected to respond to inpatient care

## 2021-09-29 NOTE — BH INPATIENT PSYCHIATRY PROGRESS NOTE - NSBHMETABOLIC_PSY_ALL_CORE_FT
BMI: BMI (kg/m2): 25.7 (09-02-21 @ 14:49)  HbA1c:   Glucose: POCT Blood Glucose.: 102 mg/dL (03-12-21 @ 20:02)    BP: --  Lipid Panel: 
BMI: BMI (kg/m2): 25.7 (09-02-21 @ 14:49)  HbA1c:   Glucose: POCT Blood Glucose.: 102 mg/dL (03-12-21 @ 20:02)    BP: --  Lipid Panel: 
BMI: BMI (kg/m2): 25.7 (09-02-21 @ 14:49)  HbA1c:   Glucose: POCT Blood Glucose.: 102 mg/dL (03-12-21 @ 20:02)    BP: 134/90 (09-24-21 @ 06:52) (122/77 - 148/87)  Lipid Panel: 
BMI: BMI (kg/m2): 25.7 (09-02-21 @ 14:49)  HbA1c:   Glucose: POCT Blood Glucose.: 102 mg/dL (03-12-21 @ 20:02)    BP: --  Lipid Panel: 
BMI: BMI (kg/m2): 25.7 (09-02-21 @ 14:49)  HbA1c:   Glucose: POCT Blood Glucose.: 102 mg/dL (03-12-21 @ 20:02)    BP: 122/77 (09-23-21 @ 06:25) (122/77 - 148/87)  Lipid Panel: 
BMI: BMI (kg/m2): 25.7 (09-02-21 @ 14:49)  HbA1c:   Glucose: POCT Blood Glucose.: 102 mg/dL (03-12-21 @ 20:02)    BP: 117/73 (09-10-21 @ 07:40) (112/68 - 123/71)  Lipid Panel: 
BMI: BMI (kg/m2): 25.7 (09-02-21 @ 14:49)  HbA1c:   Glucose: POCT Blood Glucose.: 102 mg/dL (03-12-21 @ 20:02)    BP: 123/84 (09-27-21 @ 07:47) (117/72 - 131/87)  Lipid Panel: 
BMI: BMI (kg/m2): 25.7 (09-02-21 @ 14:49)  HbA1c:   Glucose: POCT Blood Glucose.: 102 mg/dL (03-12-21 @ 20:02)    BP: 112/68 (09-09-21 @ 08:16) (112/68 - 126/81)  Lipid Panel: 
BMI: BMI (kg/m2): 25.7 (09-02-21 @ 14:49)  HbA1c:   Glucose: POCT Blood Glucose.: 102 mg/dL (03-12-21 @ 20:02)    BP: 123/71 (09-08-21 @ 07:55) (123/71 - 126/81)  Lipid Panel: 
BMI: BMI (kg/m2): 25.7 (09-02-21 @ 14:49)  HbA1c:   Glucose: POCT Blood Glucose.: 102 mg/dL (03-12-21 @ 20:02)    BP: 133/91 (09-15-21 @ 07:37) (133/91 - 140/98)  Lipid Panel: 
BMI: BMI (kg/m2): 25.7 (09-02-21 @ 14:49)  HbA1c:   Glucose: POCT Blood Glucose.: 102 mg/dL (03-12-21 @ 20:02)    BP: 138/91 (09-16-21 @ 07:19) (133/91 - 140/98)  Lipid Panel: 
BMI: BMI (kg/m2): 25.7 (09-02-21 @ 14:49)  HbA1c:   Glucose: POCT Blood Glucose.: 102 mg/dL (03-12-21 @ 20:02)    BP: 140/98 (09-14-21 @ 06:53) (137/94 - 140/98)  Lipid Panel: 
BMI: BMI (kg/m2): 25.7 (09-02-21 @ 14:49)  HbA1c:   Glucose: POCT Blood Glucose.: 102 mg/dL (03-12-21 @ 20:02)    BP: 129/90 (09-17-21 @ 08:15) (129/90 - 138/91)  Lipid Panel: 
BMI: BMI (kg/m2): 25.7 (09-02-21 @ 14:49)  HbA1c:   Glucose: POCT Blood Glucose.: 102 mg/dL (03-12-21 @ 20:02)    BP: 126/81 (09-07-21 @ 06:40) (126/81 - 126/81)  Lipid Panel: 
BMI: BMI (kg/m2): 25.7 (09-02-21 @ 14:49)  HbA1c:   Glucose: POCT Blood Glucose.: 102 mg/dL (03-12-21 @ 20:02)    BP: 148/87 (09-22-21 @ 06:24) (148/87 - 148/87)  Lipid Panel: 
BMI: BMI (kg/m2): 25.7 (09-02-21 @ 14:49)  HbA1c:   Glucose: POCT Blood Glucose.: 102 mg/dL (03-12-21 @ 20:02)    BP: 137/94 (09-12-21 @ 07:43) (116/75 - 137/94)  Lipid Panel: 
BMI: BMI (kg/m2): 25.7 (09-02-21 @ 14:49)  HbA1c:   Glucose: POCT Blood Glucose.: 102 mg/dL (03-12-21 @ 20:02)    BP: 123/84 (09-27-21 @ 07:47) (123/84 - 131/87)  Lipid Panel: 
BMI: BMI (kg/m2): 25.7 (09-02-21 @ 14:49)  HbA1c:   Glucose: POCT Blood Glucose.: 102 mg/dL (03-12-21 @ 20:02)    BP: 121/45 (09-18-21 @ 10:11) (121/45 - 121/45)  Lipid Panel: 
BMI: BMI (kg/m2): 25.7 (09-02-21 @ 14:49)  HbA1c:   Glucose: POCT Blood Glucose.: 102 mg/dL (03-12-21 @ 20:02)    BP: --  Lipid Panel: 
BMI: BMI (kg/m2): 25.7 (09-02-21 @ 14:49)  HbA1c:   Glucose: POCT Blood Glucose.: 102 mg/dL (03-12-21 @ 20:02)    BP: 127/80 (09-29-21 @ 06:11) (123/84 - 127/80)  Lipid Panel: 
BMI: BMI (kg/m2): 25.7 (09-02-21 @ 14:49)  HbA1c:   Glucose: POCT Blood Glucose.: 102 mg/dL (03-12-21 @ 20:02)    BP: --  Lipid Panel:

## 2021-09-29 NOTE — BH INPATIENT PSYCHIATRY PROGRESS NOTE - NSTXDCOPNODATEEST_PSY_ALL_CORE
22-Sep-2021
03-Sep-2021
22-Sep-2021
28-Sep-2021
03-Sep-2021
22-Sep-2021
03-Sep-2021
03-Sep-2021
28-Sep-2021
03-Sep-2021

## 2021-09-29 NOTE — BH INPATIENT PSYCHIATRY PROGRESS NOTE - NSTXDEPRESGOAL_PSY_ALL_CORE
Will identify 2 coping skills that assist in improving mood
Will identify 2 coping skills that assist in improving mood
Exhibit improvements in self-grooming, hygiene, sleep and appetite
Will identify 2 coping skills that assist in improving mood

## 2021-09-29 NOTE — BH INPATIENT PSYCHIATRY PROGRESS NOTE - NSTXPROBANX_PSY_ALL_CORE
ANXIETY/PANIC/FEAR

## 2021-09-29 NOTE — BH INPATIENT PSYCHIATRY PROGRESS NOTE - NSBHFUPINTERVALHXFT_PSY_A_CORE
Patient seen individually for discharge day management. Greater than 30 minutes was spent with patient, staff and charting as part of discharge day management. I was physically present during the service to the patient and personally examined the patient and I was directly involved in the management plan and recommendations of the care provided to the patient.        No overnight events.  Reports good mood.  No longer preoccupied with delusional thought content and continues to recontextualize paranoia as having too much time at home during pandemic and letting her imagination run away with her.  Denies AH.  She reports good effect of abilify, denying any akithisia or restlessness.  Continues to deny any active SI/HI.  She has some worries about going home including worried about her uncle having left from his visit while she was in the hospital, thinks there may be some family strife.  Reviewed diagnosis including lack of diagnostic clarity about MDD with psychosis vs SAD.  Patient aware.  Stressed importance of med compliance and habits of healthy living.

## 2021-09-29 NOTE — BH INPATIENT PSYCHIATRY PROGRESS NOTE - NSICDXBHSECONDARYDX_PSY_ALL_CORE
Anxiety   F41.9  

## 2021-09-29 NOTE — BH INPATIENT PSYCHIATRY PROGRESS NOTE - NSBHASSESSSUMMFT_PSY_ALL_CORE
35-year-old female admitted from [facility] CL after medical admission for seizure workup, what mother recognized as seizure activity more likely catatonia, cleared by neuro.  Patient's reported sx with associated history point towards MDD w/ psychosis vs bipolar depression w/ psychosis or schizoaffective as possible etiology. She was initially guarded with vague speech, concrete thought process, loosening of associations. Doing well on Effexor, Abilify, and Klonopin with improved mood, remission of psychotic symptoms including AH, delusions, severe FTD.     Patient was started on Effexor and titrated to 300mg with good effect and tolerability.  Simultaneously, she was titrated on Zyprexa.  However, she was having dramatically increased appetite and Zyprexa was switched to Latuda as a result.  Of note, patient was continued on corticosteroid taper from [facility] for suspicion of autoimmune encephalitis.  After following up with [facility] Neuro service, their suspicion was low and they directed us to discontinue steroid taper.  Steroids may have contributed to increased appetite on Zyprexa and also worsened psychiatric symptoms.  Patient had trial of Latuda but could not tolerate it due to fatigue.  Cross-titration to Abilify up to 15mg was tolerated well and had good effect on psychotic symptoms.  Patient was very guarded initially but, with treatment, became more open about delusions and AH she was experiencing.  These symptoms gradually improved on antipsychotics without any disruption from changes in antipsychotic meds.  Her mood improved and affect became euthymic.  She has no residual psychotic or depressive symptoms.  She remains anxious on occasion which is well managed on Klonopin 1mg BID.  Patient was formerly much more anxious and requesting frequent Ativan PRNs prior to her switch to Klonopin.      Suicide and risk assessment performed prior to discharge. The patient has a low acute risk and low chronic risk of self-harm and aggression towards others. Protective factors include denying SI, no SIB, denying HI, good social supports in their family, no substance abuse, no current mood symptoms, no hopelessness, future-oriented in returning to home, no access to firearms.  Risk factors include presenting illness. Immediate risk was minimized by inpatient admission to a safe environment with appropriate supervision and limited access to lethal means. Future risk was minimized before discharge by treatment of acute episode, maximizing outpatient support, providing relevant patient education, discussing emergency procedures, and ensuring close follow-up. The patient remains at a low risk of self-harm, and such risk cannot be further ameliorated by continued inpatient treatment and the patient is therefore appropriate for discharge.       There were no behavioral problems on the unit.  Patient became agitated on one occasion early in admission in the context of psychotic paranoia, banging on the door after her mother left their visit.  She never required seclusion / restraints.  Patient did not self-harm on the unit.  Patient remained actively engaged in treatment.  Patient participated in individual, group, and milieu therapy.  Patient got along appropriately with staff and peers.   Patient did not have any medical problems during this hospitalization.  There were no medical consultations.    A full discussion of the factors that predict treatment success and relapse was held including safety planning.  A discussion of the risks and benefits of patient’s medication was held including a discussion of the metabolic risks and risk of EPS and TD was done     The patient has improved significantly and no longer requires inpatient treatment and care. Patient denies all suicidal and aggressive ideation, intent and plan. Patient denies anxiety symptoms and panic attacks. Patient is not judged to be an acute danger to self or others at this time. Patient will be discharged today to home and outpatient follow up.       Continue following medications, d/c to PHP  - Abilify 15mg daily  - effexor 300mg daily  - Klonopin 1mg qAM and q2pm   - trazodone 50mg qHS PRN for insomnia

## 2021-09-29 NOTE — BH INPATIENT PSYCHIATRY PROGRESS NOTE - NSBHROSSYSTEMS_PSY_ALL_CORE
Psychiatric

## 2021-09-29 NOTE — BH INPATIENT PSYCHIATRY PROGRESS NOTE - NSTXDCOPNODATETRGT_PSY_ALL_CORE
28-Sep-2021
10-Sep-2021
10-Sep-2021
20-Sep-2021
20-Sep-2021
10-Sep-2021
20-Sep-2021
20-Sep-2021
10-Sep-2021
05-Oct-2021
28-Sep-2021
10-Sep-2021
20-Sep-2021
28-Sep-2021
20-Sep-2021
05-Oct-2021
10-Sep-2021
20-Sep-2021
10-Sep-2021

## 2021-09-29 NOTE — BH INPATIENT PSYCHIATRY PROGRESS NOTE - NSBHINPTBILLING_PSY_ALL_CORE
17955 - Hospital Discharge Day Management; more than 30 min
15744 - Inpatient Moderate Complexity
90073 - Inpatient Moderate Complexity
87712 - Inpatient Moderate Complexity
37422 - Inpatient Moderate Complexity
79293 - Inpatient Moderate Complexity
55369 - Inpatient Moderate Complexity
04686 - Inpatient Moderate Complexity
36761 - Inpatient Low Complexity
78461 - Inpatient Moderate Complexity
45660 - Inpatient Moderate Complexity
09910 - Inpatient High Complexity
24654 - Inpatient Low Complexity
46003 - Inpatient Moderate Complexity
73755 - Inpatient Moderate Complexity
69108 - Inpatient Low Complexity
32266 - Inpatient Moderate Complexity
03137 - Inpatient Moderate Complexity
80124 - Inpatient Moderate Complexity
84654 - Inpatient Low Complexity
92911 - Inpatient Moderate Complexity

## 2021-09-29 NOTE — BH INPATIENT PSYCHIATRY PROGRESS NOTE - NSCGISEVERILLNESS_PSY_ALL_CORE
5 = Markedly ill - intrusive symptoms that distinctly impair social/occupational function or cause intrusive levels of distress
4 = Moderately ill – overt symptoms causing noticeable, but modest, functional impairment or distress; symptom level may warrant medication
4 = Moderately ill – overt symptoms causing noticeable, but modest, functional impairment or distress; symptom level may warrant medication
5 = Markedly ill - intrusive symptoms that distinctly impair social/occupational function or cause intrusive levels of distress
3 = Mildly ill – clearly established symptoms with minimal, if any, distress or difficulty in social and occupational function
5 = Markedly ill - intrusive symptoms that distinctly impair social/occupational function or cause intrusive levels of distress
5 = Markedly ill - intrusive symptoms that distinctly impair social/occupational function or cause intrusive levels of distress
3 = Mildly ill – clearly established symptoms with minimal, if any, distress or difficulty in social and occupational function

## 2021-09-29 NOTE — BH INPATIENT PSYCHIATRY PROGRESS NOTE - NSTXPROBDCOPNO_PSY_ALL_CORE
DISCHARGE ISSUE - NON-ADHERENT WITH OUTPATIENT SERVICES

## 2021-09-29 NOTE — BH INPATIENT PSYCHIATRY PROGRESS NOTE - NSBHCONSDANGERSELF_PSY_A_CORE
unable to care for self

## 2021-09-29 NOTE — BH INPATIENT PSYCHIATRY PROGRESS NOTE - CURRENT MEDICATION
MEDICATIONS  (STANDING):  ARIPiprazole 15 milliGRAM(s) Oral daily  clonazePAM  Tablet 1 milliGRAM(s) Oral <User Schedule>  clonazePAM  Tablet 1 milliGRAM(s) Oral once  venlafaxine  milliGRAM(s) Oral daily    MEDICATIONS  (PRN):  acetaminophen   Tablet .. 650 milliGRAM(s) Oral every 6 hours PRN Mild Pain (1 - 3), Moderate Pain (4 - 6), Severe Pain (7 - 10)  hydrOXYzine hydrochloride 25 milliGRAM(s) Oral every 6 hours PRN Anxiety  melatonin. 3 milliGRAM(s) Oral at bedtime PRN Insomnia  OLANZapine 7.5 milliGRAM(s) Oral every 6 hours PRN agitation  OLANZapine Injectable 7.5 milliGRAM(s) IntraMuscular once PRN agitation  propranolol 10 milliGRAM(s) Oral every 8 hours PRN restlessness/anxiety  traZODone 50 milliGRAM(s) Oral at bedtime PRN insomnia

## 2021-09-29 NOTE — BH INPATIENT PSYCHIATRY PROGRESS NOTE - NSBHCHARTREVIEWVS_PSY_A_CORE FT
Vital Signs Last 24 Hrs  T(C): 36.2 (09-29-21 @ 06:11), Max: 36.7 (09-28-21 @ 17:22)  T(F): 97.1 (09-29-21 @ 06:11), Max: 98 (09-28-21 @ 17:22)  HR: 87 (09-29-21 @ 06:11) (87 - 87)  BP: 127/80 (09-29-21 @ 06:11) (127/80 - 127/80)  BP(mean): --  RR: --  SpO2: --    Orthostatic VS  09-28-21 @ 06:36  Lying BP: --/-- HR: --  Sitting BP: 110/79 HR: 82  Standing BP: --/-- HR: --  Site: --  Mode: --

## 2021-09-29 NOTE — BH INPATIENT PSYCHIATRY PROGRESS NOTE - NSBHATTESTSEENBY_PSY_A_CORE
NP without Attending Psychiatrist
attending Psychiatrist without NP/Trainee
NP without Attending Psychiatrist
attending Psychiatrist without NP/Trainee
Attending Psychiatrist supervising NP/Trainee, meeting pt...
NP without Attending Psychiatrist
Attending Psychiatrist supervising NP/Trainee, meeting pt...
attending Psychiatrist without NP/Trainee
Attending Psychiatrist supervising NP/Trainee, meeting pt...
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
## 2021-09-29 NOTE — BH INPATIENT PSYCHIATRY PROGRESS NOTE - NSBHMETABOLICLABS_PSY_ALL_CORE
Labs within last 12 months

## 2021-09-29 NOTE — BH INPATIENT PSYCHIATRY PROGRESS NOTE - NSICDXBHTERTIARYDX_PSY_ALL_CORE
R/O Bipolar I disorder   F31.9  R/O Catatonia   F06.1  R/O MDD (major depressive disorder), recurrent, severe, with psychosis   F33.3  R/O Autoimmune encephalitis   G04.81  

## 2021-09-29 NOTE — BH INPATIENT PSYCHIATRY PROGRESS NOTE - NSBHATTENDATTEST_PSY_ALL_CORE
I have personally seen, examined and participated in the care of this patient. I have reviewed all pertinent clinical information, including history, physical exam, plan and the Medical/PA/NP Student’s note and agree except as noted.

## 2021-09-29 NOTE — BH INPATIENT PSYCHIATRY DISCHARGE NOTE - NSDCCPCAREPLAN_GEN_ALL_CORE_FT
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

## 2021-09-29 NOTE — BH INPATIENT PSYCHIATRY PROGRESS NOTE - NSTXDISORGGOAL_PSY_ALL_CORE
Will identify 2 coping skills that assist in organizing

## 2021-09-29 NOTE — BH INPATIENT PSYCHIATRY PROGRESS NOTE - MSE OPTIONS
Unstructured MSE
Structured MSE
Unstructured MSE
Structured MSE
Structured MSE

## 2021-09-29 NOTE — BH INPATIENT PSYCHIATRY PROGRESS NOTE - NSTXDISORGDATETRGT_PSY_ALL_CORE
30-Sep-2021
16-Sep-2021
10-Sep-2021
10-Sep-2021
15-Sep-2021
16-Sep-2021
30-Sep-2021
16-Sep-2021
23-Sep-2021
16-Sep-2021
15-Sep-2021
16-Sep-2021
29-Sep-2021
10-Sep-2021
23-Sep-2021
10-Sep-2021
29-Sep-2021
23-Sep-2021
28-Sep-2021
23-Sep-2021
10-Sep-2021

## 2021-09-29 NOTE — BH INPATIENT PSYCHIATRY PROGRESS NOTE - NSBHFUPINTERVALCCFT_PSY_A_CORE
Patient seen for f/u of psychosis
SUBJECTIVE:    The patient states that she is doing okay today. She states that she is tolerating food, both breakfast and lunch today. She says that she is a good eater and states eating is the highlight of her day.     The patient states that her daughter is coming by later day.  Pt seen again later in the day with daughter at present.  Daughter notes some clinical improvement; pt with some memory issues (not her usual) - cannot recall recent hospital admission of many weeks for UTI/PNA.    Family working out home arrangements.    OBJECTIVE:  I/O's    Intake/Output Summary (Last 24 hours) at 10/21/2019 0828  Last data filed at 10/21/2019 0600  Gross per 24 hour   Intake 1578.71 ml   Output 1850 ml   Net -271.29 ml       Last Recorded Vitals  Blood pressure 164/82, pulse 68, temperature 96.3 °F (35.7 °C), temperature source Axillary, resp. rate 14, height 5' 3\" (1.6 m), weight 61 kg (134 lb 7.7 oz), SpO2 98 %.  Body mass index is 23.82 kg/m².      Physical Exam  Constitutional:       Comments: Thin; frail   Cardiovascular:      Rate and Rhythm: Normal rate and regular rhythm.      Heart sounds: No murmur.   Pulmonary:      Effort: Pulmonary effort is normal.      Breath sounds: Normal breath sounds. No wheezing, rhonchi or rales.   Abdominal:      General: Abdomen is flat.      Palpations: Abdomen is soft.      Tenderness: There is no tenderness. There is no guarding or rebound.   Musculoskeletal:      Comments: LLE nttp; no erythema or swelling, no palpable deformities; ROM is weak, movement spontaneous   Skin:     General: Skin is warm.   Neurological:      No focal deficits  Psychiatric:         Mood and Affect: Mood normal.      LABORATORY DATA:    CHEM7:  Sodium (mmol/L)   Date Value   10/21/2019 142     Potassium (mmol/L)   Date Value   10/21/2019 4.3     Chloride (mmol/L)   Date Value   10/21/2019 114 (H)     Glucose (mg/dL)   Date Value   10/21/2019 97     CALCIUM (mg/dL)   Date Value   10/21/2019 8.9 
    Carbon Dioxide (mmol/L)   Date Value   10/21/2019 23     BUN (mg/dL)   Date Value   10/21/2019 22 (H)     Creatinine (mg/dL)   Date Value   10/21/2019 0.72       CBC:  WBC (K/mcL)   Date Value   10/21/2019 8.2     RBC (mil/mcL)   Date Value   10/21/2019 3.33 (L)     HCT (%)   Date Value   10/21/2019 30.0 (L)     HGB (g/dL)   Date Value   10/21/2019 9.5 (L)     PLT (K/mcL)   Date Value   10/21/2019 314      Coags:  INR (no units)   Date Value   07/23/2019 1.0   07/23/2019     INR Therapeutic Range: 2.0 to 3.0 (2.5 to 3.5 recommended for recurrent thrombotic episodes and mechanical prosthetic heart valves.)        Hepatic Panel:  AST/SGOT (Units/L)   Date Value   10/20/2019 19     ALT/SGPT (Units/L)   Date Value   10/20/2019 62     No results found for: GGTP  ALK PHOSPHATASE (Units/L)   Date Value   10/20/2019 105     TOTAL BILIRUBIN (mg/dL)   Date Value   10/20/2019 0.3         IMAGING STUDIES:    XR TIBIA FIBULA 2 VIEWS LEFT   Final Result   1.  No acute fracture demonstrated.       **The absence of findings should not deter follow-up or further evaluation of concerning clinical findings.       Electronically Signed by: TWYLA CLEMONS M.D.    Signed on: 10/20/2019 8:02 AM              ASSESSMENT/PLAN:      Weakness and hypotension; likely due to dehydration and hypovolemia  -Patient with recent hx of UTI and PNA  -Per patient and daughter has been slowly improving  -Family and pt report poor PO intake as of late  -Patient noted to be weak at assisted living (SBP into the 70's?, was in the 90's in ED)  -Likely hypovolemic d/t poor PO intake lately; no signs of sepsis at this time  -Encouraging PO intake, will continue IVF for right now  -Holding home BP meds; likely able to restart soon  -Monitoring electrolytes as well, especially given recent illnesses  -Supplimented K (10/20)     LLE pain, likely muscle cramping due to above  -XR (-) for fracture; US neg for DVT  -Clinically no signs of infection; no signs of 
Patient seen for f/u of psychosis
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  -Continue with chronic snider at this time  -Abx Coverage: Continue PO abx discussed with daughter, no indication at this time to need IV abx's)     Moderate protein calorie malnutrition  -Encouraging PO intake, nutritional supplimentation as tolerated (pt is not excited about the taste of Ensure)     Anemia, likely of chronic iron-deficiency   Hgb 10.1 --> 9.5 (10/20)    Continue home regimen with PO intake improves     Primary hypertension - Hold home BP meds, monitor BP for now and restart when appropriate  Hyperlipidemia   Osteoarthritis - Pain control as ordered prn   Ulcerative colitis - Continue with home delizicol and pyridium  GERD - Continue with home protonix  Macular degeneration - Continue with home regimen  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  Hopefully soon.  Family working for additional hospice options at home; will discuss with CM/SW (per daughter, this will need to be set up inorder for pt to return home; pt is okay with this plan)      PCP:  [employer] MD     Charting performed by scott Dow for Dr. Bhavin Reardon.     All medical record entries made by the elizabethibfrancia were at my direction. I have reviewed the chart and agree that the record accurately reflects my personal performance of the history, physical exam, hospital course, and assessment and plan.    
Patient seen for f/u of psychosis
Patient seen for follow up for psychosis. 
Patient seen for follow up for psychosis. 
Patient seen for f/u of psychosis
Patient seen for follow up for psychosis. 
Patient seen for f/u of psychosis

## 2021-09-29 NOTE — BH INPATIENT PSYCHIATRY PROGRESS NOTE - NSTXDCOPNOPROGRES_PSY_ALL_CORE
Improving
No Change
Improving
Improving
No Change
Improving
No Change

## 2021-09-29 NOTE — BH INPATIENT PSYCHIATRY DISCHARGE NOTE - HOSPITAL COURSE
35-year-old female admitted from [facility] after medical admission for seizure workup, what mother recognized as seizure activity more likely catatonia, cleared by neuro.  Patient's reported sx with associated history point towards MDD w/ psychosis vs bipolar depression w/ psychosis or schizoaffective as possible etiology. She was initially guarded with vague speech, concrete thought process, loosening of associations. Doing well on Effexor, Abilify, and Klonopin with improved mood, remission of psychotic symptoms including AH, delusions, severe FTD.     Patient was started on Effexor and titrated to 300mg with good effect and tolerability.  Simultaneously, she was titrated on Zyprexa.  However, she was having dramatically increased appetite and Zyprexa was switched to Latuda as a result.  Of note, patient was continued on corticosteroid taper from [facility] for suspicion of autoimmune encephalitis.  After following up with [facility] Neuro service, their suspicion was low and they directed us to discontinue steroid taper.  Steroids may have contributed to increased appetite on Zyprexa and also worsened psychiatric symptoms.  Patient had trial of Latuda but could not tolerate it due to fatigue.  Cross-titration to Abilify up to 15mg was tolerated well and had good effect on psychotic symptoms.  Patient was very guarded initially but, with treatment, became more open about delusions and AH she was experiencing.  These symptoms gradually improved on antipsychotics without any disruption from changes in antipsychotic meds.  Her mood improved and affect became euthymic.  She has no residual psychotic or depressive symptoms.  She remains anxious on occasion which is well managed on Klonopin 1mg BID.  Patient was formerly much more anxious and requesting frequent Ativan PRNs prior to her switch to Klonopin.      Suicide and risk assessment performed prior to discharge. The patient has a low acute risk and low chronic risk of self-harm and aggression towards others. Protective factors include denying SI, no SIB, denying HI, good social supports in their family, no substance abuse, no current mood symptoms, no hopelessness, future-oriented in returning to home, no access to firearms.  Risk factors include presenting illness. Immediate risk was minimized by inpatient admission to a safe environment with appropriate supervision and limited access to lethal means. Future risk was minimized before discharge by treatment of acute episode, maximizing outpatient support, providing relevant patient education, discussing emergency procedures, and ensuring close follow-up. The patient remains at a low risk of self-harm, and such risk cannot be further ameliorated by continued inpatient treatment and the patient is therefore appropriate for discharge.       There were no behavioral problems on the unit.  Patient became agitated on one occasion early in admission in the context of psychotic paranoia, banging on the door after her mother left their visit.  She never required seclusion / restraints.  Patient did not self-harm on the unit.  Patient remained actively engaged in treatment.  Patient participated in individual, group, and milieu therapy.  Patient got along appropriately with staff and peers.   Patient did not have any medical problems during this hospitalization.  There were no medical consultations.    A full discussion of the factors that predict treatment success and relapse was held including safety planning.  A discussion of the risks and benefits of patient’s medication was held including a discussion of the metabolic risks and risk of EPS and TD was done     The patient has improved significantly and no longer requires inpatient treatment and care. Patient denies all suicidal and aggressive ideation, intent and plan. Patient denies anxiety symptoms and panic attacks. Patient is not judged to be an acute danger to self or others at this time. Patient will be discharged today to home and outpatient follow up.       Continue following medications, d/c to PHP  - Abilify 15mg daily  - effexor 300mg daily  - Klonopin 1mg qAM and q2pm   - trazodone 50mg qHS PRN for insomnia

## 2021-09-29 NOTE — BH INPATIENT PSYCHIATRY PROGRESS NOTE - NSTXDCOPNOGOAL_PSY_ALL_CORE
Will agree to participate in appropriate outpatient care

## 2021-09-29 NOTE — BH INPATIENT PSYCHIATRY PROGRESS NOTE - NSTXANXGOAL_PSY_ALL_CORE
Identify and practice 3 coping skills to manage anxiety

## 2021-09-29 NOTE — BH INPATIENT PSYCHIATRY PROGRESS NOTE - NSTXDISORGPROGRES_PSY_ALL_CORE
Improving
Improving
Met - goal discontinued
Improving
Improving
No Change
Improving
No Change
Improving
Improving
No Change
Improving
No Change
Improving
No Change
Met - goal discontinued
Improving

## 2021-09-29 NOTE — BH INPATIENT PSYCHIATRY PROGRESS NOTE - NSTXDISORGINTERMD_PSY_ALL_CORE
latuda and effexor

## 2021-09-29 NOTE — BH INPATIENT PSYCHIATRY DISCHARGE NOTE - NSDCMRMEDTOKEN_GEN_ALL_CORE_FT
ARIPiprazole 15 mg oral tablet: 1 tab(s) orally once a day  clonazePAM 1 mg oral tablet: 1 tab(s) orally 2 times a day MDD:2mg  melatonin 3 mg oral tablet: 1 tab(s) orally once a day (at bedtime), As needed, Insomnia  propranolol 10 mg oral tablet: 1 tab(s) orally 2 times a day, As Needed -restlessness/anxiety   traZODone 50 mg oral tablet: 1 tab(s) orally once a day (at bedtime), As needed, insomnia  venlafaxine 150 mg oral capsule, extended release: 2 cap(s) orally once a day

## 2021-09-29 NOTE — BH INPATIENT PSYCHIATRY PROGRESS NOTE - NSDCCRITERIA_PSY_ALL_CORE
resolution of mood sx and psychosis

## 2021-09-29 NOTE — BH INPATIENT PSYCHIATRY DISCHARGE NOTE - NSBHMETABOLIC_PSY_ALL_CORE_FT
BMI: BMI (kg/m2): 25.7 (09-02-21 @ 14:49)  HbA1c:   Glucose: POCT Blood Glucose.: 102 mg/dL (03-12-21 @ 20:02)    BP: 127/80 (09-29-21 @ 06:11) (123/84 - 131/87)  Lipid Panel:  BMI: BMI (kg/m2): 25.7 (09-02-21 @ 14:49)  HbA1c:   Glucose: POCT Blood Glucose.: 102 mg/dL (03-12-21 @ 20:02)    BP: 127/80 (09-29-21 @ 06:11) (123/84 - 131/87)  Lipid Panel: pending

## 2021-09-30 ENCOUNTER — OUTPATIENT (OUTPATIENT)
Dept: OUTPATIENT SERVICES | Facility: HOSPITAL | Age: 36
LOS: 1 days | Discharge: HOME | End: 2021-09-30
Payer: MEDICAID

## 2021-09-30 PROCEDURE — 90792 PSYCH DIAG EVAL W/MED SRVCS: CPT

## 2021-09-30 RX ORDER — VENLAFAXINE HCL 75 MG
2 CAPSULE, EXT RELEASE 24 HR ORAL
Qty: 60 | Refills: 0
Start: 2021-09-30 | End: 2021-10-29

## 2021-09-30 RX ORDER — VENLAFAXINE HCL 75 MG
1 CAPSULE, EXT RELEASE 24 HR ORAL
Qty: 14 | Refills: 0
Start: 2021-09-30 | End: 2021-10-13

## 2021-10-06 PROCEDURE — ZZZZZ: CPT

## 2021-10-11 PROCEDURE — 99212 OFFICE O/P EST SF 10 MIN: CPT

## 2021-10-20 LAB
CULTURE RESULTS: SIGNIFICANT CHANGE UP
SPECIMEN SOURCE: SIGNIFICANT CHANGE UP

## 2021-10-27 DIAGNOSIS — F33.3 MAJOR DEPRESSIVE DISORDER, RECURRENT, SEVERE WITH PSYCHOTIC SYMPTOMS: ICD-10-CM

## 2021-12-01 PROCEDURE — G9005: CPT

## 2022-01-04 PROCEDURE — 99214 OFFICE O/P EST MOD 30 MIN: CPT | Mod: 95

## 2022-07-11 NOTE — ED ADULT NURSE NOTE - CCCP TRG CHIEF CMPLNT
Left message on voicemail, per authorization, advising results/recommendations and to call back with any questions/concerns.     Left info for [facility] in [place]    keely eval

## 2022-10-06 NOTE — BH TREATMENT PLAN - NSTXDCOPNOPROGRES_PSY_ALL_CORE
[de-identified] : fever, runny nose, cough
[FreeTextEntry6] : Sister was seen on 10/03/22 dx RSV, URI, LOM.  2 nights ago on Tuesday night started with fever and cough.  Last night Tmax 104.5 given Motrin.  This AM T99, has a runny nose clear and cough throughout the night.  Vomited cough meds.  Eating and drinking today ok.  Active.  No ear pain.  No V/D otherwise.  
Improving
Improving

## 2023-06-16 NOTE — BH INPATIENT PSYCHIATRY ASSESSMENT NOTE - VETERAN
A STENT SYNERGY XD MNRL 2.5MM 32MM DLV SYS 1 ACC PORT RADOPQ - [id number] was deployed in the obtuse marginal.   The stent was deployed at 11 rolo for 20 seconds at 6/18/2023 11:50 AM . No

## 2023-07-07 ENCOUNTER — INPATIENT (INPATIENT)
Facility: HOSPITAL | Age: 38
LOS: 24 days | Discharge: HOME | End: 2023-08-01
Attending: PSYCHIATRY & NEUROLOGY | Admitting: PSYCHIATRY & NEUROLOGY
Payer: MEDICAID

## 2023-07-07 ENCOUNTER — EMERGENCY (EMERGENCY)
Facility: HOSPITAL | Age: 38
LOS: 1 days | Discharge: PSYCHIATRIC OR SUBSTANCE USE FACILITY | End: 2023-07-07
Attending: EMERGENCY MEDICINE | Admitting: EMERGENCY MEDICINE
Payer: MEDICAID

## 2023-07-07 VITALS
SYSTOLIC BLOOD PRESSURE: 125 MMHG | RESPIRATION RATE: 17 BRPM | OXYGEN SATURATION: 99 % | HEART RATE: 84 BPM | DIASTOLIC BLOOD PRESSURE: 84 MMHG

## 2023-07-07 VITALS
TEMPERATURE: 98 F | RESPIRATION RATE: 16 BRPM | DIASTOLIC BLOOD PRESSURE: 110 MMHG | OXYGEN SATURATION: 99 % | SYSTOLIC BLOOD PRESSURE: 150 MMHG | HEART RATE: 90 BPM

## 2023-07-07 VITALS — WEIGHT: 125 LBS | TEMPERATURE: 98 F | HEIGHT: 64 IN

## 2023-07-07 DIAGNOSIS — F33.9 MAJOR DEPRESSIVE DISORDER, RECURRENT, UNSPECIFIED: ICD-10-CM

## 2023-07-07 LAB
ALBUMIN SERPL ELPH-MCNC: 4.9 G/DL — SIGNIFICANT CHANGE UP (ref 3.3–5)
ALP SERPL-CCNC: 77 U/L — SIGNIFICANT CHANGE UP (ref 40–120)
ALT FLD-CCNC: 19 U/L — SIGNIFICANT CHANGE UP (ref 4–33)
ANION GAP SERPL CALC-SCNC: 17 MMOL/L — HIGH (ref 7–14)
APAP SERPL-MCNC: <10 UG/ML — LOW (ref 15–25)
AST SERPL-CCNC: 18 U/L — SIGNIFICANT CHANGE UP (ref 4–32)
B PERT DNA SPEC QL NAA+PROBE: SIGNIFICANT CHANGE UP
B PERT+PARAPERT DNA PNL SPEC NAA+PROBE: SIGNIFICANT CHANGE UP
BASOPHILS # BLD AUTO: 0.04 K/UL — SIGNIFICANT CHANGE UP (ref 0–0.2)
BASOPHILS NFR BLD AUTO: 0.5 % — SIGNIFICANT CHANGE UP (ref 0–2)
BILIRUB SERPL-MCNC: 0.5 MG/DL — SIGNIFICANT CHANGE UP (ref 0.2–1.2)
BORDETELLA PARAPERTUSSIS (RAPRVP): SIGNIFICANT CHANGE UP
BUN SERPL-MCNC: 12 MG/DL — SIGNIFICANT CHANGE UP (ref 7–23)
C PNEUM DNA SPEC QL NAA+PROBE: SIGNIFICANT CHANGE UP
CALCIUM SERPL-MCNC: 9.7 MG/DL — SIGNIFICANT CHANGE UP (ref 8.4–10.5)
CHLORIDE SERPL-SCNC: 103 MMOL/L — SIGNIFICANT CHANGE UP (ref 98–107)
CO2 SERPL-SCNC: 22 MMOL/L — SIGNIFICANT CHANGE UP (ref 22–31)
CREAT SERPL-MCNC: 0.65 MG/DL — SIGNIFICANT CHANGE UP (ref 0.5–1.3)
EGFR: 116 ML/MIN/1.73M2 — SIGNIFICANT CHANGE UP
EOSINOPHIL # BLD AUTO: 0.04 K/UL — SIGNIFICANT CHANGE UP (ref 0–0.5)
EOSINOPHIL NFR BLD AUTO: 0.5 % — SIGNIFICANT CHANGE UP (ref 0–6)
ETHANOL SERPL-MCNC: <10 MG/DL — SIGNIFICANT CHANGE UP
FLUAV SUBTYP SPEC NAA+PROBE: SIGNIFICANT CHANGE UP
FLUBV RNA SPEC QL NAA+PROBE: SIGNIFICANT CHANGE UP
GLUCOSE SERPL-MCNC: 100 MG/DL — HIGH (ref 70–99)
HADV DNA SPEC QL NAA+PROBE: SIGNIFICANT CHANGE UP
HCOV 229E RNA SPEC QL NAA+PROBE: SIGNIFICANT CHANGE UP
HCOV HKU1 RNA SPEC QL NAA+PROBE: SIGNIFICANT CHANGE UP
HCOV NL63 RNA SPEC QL NAA+PROBE: SIGNIFICANT CHANGE UP
HCOV OC43 RNA SPEC QL NAA+PROBE: SIGNIFICANT CHANGE UP
HCT VFR BLD CALC: 38.5 % — SIGNIFICANT CHANGE UP (ref 34.5–45)
HGB BLD-MCNC: 12.6 G/DL — SIGNIFICANT CHANGE UP (ref 11.5–15.5)
HMPV RNA SPEC QL NAA+PROBE: SIGNIFICANT CHANGE UP
HPIV1 RNA SPEC QL NAA+PROBE: SIGNIFICANT CHANGE UP
HPIV2 RNA SPEC QL NAA+PROBE: SIGNIFICANT CHANGE UP
HPIV3 RNA SPEC QL NAA+PROBE: SIGNIFICANT CHANGE UP
HPIV4 RNA SPEC QL NAA+PROBE: SIGNIFICANT CHANGE UP
IANC: 6.01 K/UL — SIGNIFICANT CHANGE UP (ref 1.8–7.4)
IMM GRANULOCYTES NFR BLD AUTO: 0.2 % — SIGNIFICANT CHANGE UP (ref 0–0.9)
LYMPHOCYTES # BLD AUTO: 1.63 K/UL — SIGNIFICANT CHANGE UP (ref 1–3.3)
LYMPHOCYTES # BLD AUTO: 19.8 % — SIGNIFICANT CHANGE UP (ref 13–44)
M PNEUMO DNA SPEC QL NAA+PROBE: SIGNIFICANT CHANGE UP
MCHC RBC-ENTMCNC: 29 PG — SIGNIFICANT CHANGE UP (ref 27–34)
MCHC RBC-ENTMCNC: 32.7 GM/DL — SIGNIFICANT CHANGE UP (ref 32–36)
MCV RBC AUTO: 88.7 FL — SIGNIFICANT CHANGE UP (ref 80–100)
MONOCYTES # BLD AUTO: 0.51 K/UL — SIGNIFICANT CHANGE UP (ref 0–0.9)
MONOCYTES NFR BLD AUTO: 6.2 % — SIGNIFICANT CHANGE UP (ref 2–14)
NEUTROPHILS # BLD AUTO: 6.01 K/UL — SIGNIFICANT CHANGE UP (ref 1.8–7.4)
NEUTROPHILS NFR BLD AUTO: 72.8 % — SIGNIFICANT CHANGE UP (ref 43–77)
NRBC # BLD: 0 /100 WBCS — SIGNIFICANT CHANGE UP (ref 0–0)
NRBC # FLD: 0 K/UL — SIGNIFICANT CHANGE UP (ref 0–0)
PLATELET # BLD AUTO: 346 K/UL — SIGNIFICANT CHANGE UP (ref 150–400)
POTASSIUM SERPL-MCNC: 3.7 MMOL/L — SIGNIFICANT CHANGE UP (ref 3.5–5.3)
POTASSIUM SERPL-SCNC: 3.7 MMOL/L — SIGNIFICANT CHANGE UP (ref 3.5–5.3)
PROT SERPL-MCNC: 7.9 G/DL — SIGNIFICANT CHANGE UP (ref 6–8.3)
RAPID RVP RESULT: SIGNIFICANT CHANGE UP
RBC # BLD: 4.34 M/UL — SIGNIFICANT CHANGE UP (ref 3.8–5.2)
RBC # FLD: 11.3 % — SIGNIFICANT CHANGE UP (ref 10.3–14.5)
RSV RNA SPEC QL NAA+PROBE: SIGNIFICANT CHANGE UP
RV+EV RNA SPEC QL NAA+PROBE: SIGNIFICANT CHANGE UP
SALICYLATES SERPL-MCNC: <0.3 MG/DL — LOW (ref 15–30)
SARS-COV-2 RNA SPEC QL NAA+PROBE: SIGNIFICANT CHANGE UP
SODIUM SERPL-SCNC: 142 MMOL/L — SIGNIFICANT CHANGE UP (ref 135–145)
TOXICOLOGY SCREEN, DRUGS OF ABUSE, SERUM RESULT: SIGNIFICANT CHANGE UP
TSH SERPL-MCNC: 0.93 UIU/ML — SIGNIFICANT CHANGE UP (ref 0.27–4.2)
WBC # BLD: 8.25 K/UL — SIGNIFICANT CHANGE UP (ref 3.8–10.5)
WBC # FLD AUTO: 8.25 K/UL — SIGNIFICANT CHANGE UP (ref 3.8–10.5)

## 2023-07-07 PROCEDURE — 93010 ELECTROCARDIOGRAM REPORT: CPT

## 2023-07-07 PROCEDURE — 99285 EMERGENCY DEPT VISIT HI MDM: CPT

## 2023-07-07 RX ADMIN — Medication 2 MILLIGRAM(S): at 21:58

## 2023-07-07 NOTE — ED BEHAVIORAL HEALTH ASSESSMENT NOTE - NSBHMSESPEECH_PSY_A_CORE
No care due was identified.  Health [facility] Embedded Care Due Messages. Reference number: [id number].   7/06/2023 10:06:51 AM CDT   Abnormal as indicated, otherwise normal...

## 2023-07-07 NOTE — ED ADULT NURSE REASSESSMENT NOTE - NS ED NURSE REASSESS COMMENT FT1
Pt verbalizing anxiety and paranoia. Pt endorsing desire to stay at [facility] and not be admitted to [facility]. Medicated as per NP PRN order due to anxiety and not complying with plan of care.

## 2023-07-07 NOTE — ED BEHAVIORAL HEALTH NOTE - BEHAVIORAL HEALTH NOTE
As per request of provider, writer contacted patient’s mother gulshan ([phone]) to obtain collateral information. the following information is per the mother.   Patient is a 38 Yo female domiciled w/mother, hx of depression mother believes, Not working or studying, single no children, BIB EMS  activated by mother  because patient was not responding. Mother said she looked like she was in shock or dehydrated.  Mother says patient was rigid, catatonic, had dead eye, couldn’t sit up, was shaking and throwing up. She reports patient did not eat or drink anything since yesterday. Mother says yesterday patient was acting bizarre. Mother says patient came into the mother’s room yesterday asking to stay with her and asked mom to stay in the bathroom while she was showering because she was afraid. Patient then called her father in [place], uncle and mother saying she needs help, doesn’t feel good and wanted everyone to be apart of her medical care. Mother reports the patient didn’t eat or drink anything since yesterday. She reports the patient did not endorse any Si or hi. She is unaware of any AVH, paranoia or delusions. She says patient has been struggling to get reconnected to a psychiatrist and her previous psychiatrist she got disconnected to because they became pregnant. Mother reports the patient was prescribed venlafaxine and lamotrigine (maybe clonazepam too?) mother unsure of specifics. Mother says patient stopped medication 2-3 days ago. Mother reports patient does not use any drugs or alcohol. Mother reports patient was lats hospitalized at [facility] last year for Si. Mother says patient saw her PCP yesterday due to headaches and depression. Patient was advised to f/u with [facility] crisis center.  No medical problems. Patient is covid vaccinated and has no recent travel or exposure. She denied any family hx of mental illness or addiction. Patient does not have access to firearms or weapons. Writer inquired about baseline and mother said patient is more responsive and says everyday is different for her. She reports never seeing her with how she was today. Mother says patient may be dehydrated, may need rest, may need to be sedated and feels she would benefit from hospitalization. Writer agreed to keep mother updated. As per request of provider, writer contacted patient’s mother gulshan ([phone]) to obtain collateral information. the following information is per the mother.   Patient is a 36 Yo female domiciled w/mother, hx of depression mother believes, Not working or studying, single no children, BIB EMS  activated by mother  because patient was not responding. Mother said she looked like she was in shock or dehydrated.  Mother says patient was rigid, catatonic, had dead eye, couldn’t sit up, was shaking and throwing up. She reports patient did not eat or drink anything since yesterday. Mother says yesterday patient was acting bizarre. Mother says patient came into the mother’s room yesterday asking to stay with her and asked mom to stay in the bathroom while she was showering because she was afraid. Patient then called her father in [place], uncle and mother saying she needs help, doesn’t feel good and wanted everyone to be apart of her medical care. Mother reports the patient didn’t eat or drink anything since yesterday. She reports the patient did not endorse any Si or hi. She is unaware of any AVH, paranoia or delusions. She says patient has been struggling to get reconnected to a psychiatrist and her previous psychiatrist she got disconnected to because they became pregnant. Mother reports the patient was prescribed venlafaxine and lamotrigine (maybe clonazepam too?) mother unsure of specifics. Mother says patient stopped medication 2-3 days ago. Mother reports patient does not use any drugs or alcohol. Mother reports patient was lats hospitalized at [facility] last year for Si. Mother says patient saw her PCP yesterday due to headaches and depression. Patient was advised to f/u with [facility] crisis center.  No medical problems. Patient is covid vaccinated and has no recent travel or exposure. She denied any family hx of mental illness or addiction. Patient does not have access to firearms or weapons. Writer inquired about baseline and mother said patient is more responsive and says everyday is different for her. She reports never seeing her with how she was today. Mother says patient may be dehydrated, may need rest, may need to be sedated and feels she would benefit from hospitalization. Writer agreed to keep mother updated. As per request of provider, writer contacted patient’s mother gulshan ([phone]) to obtain collateral information. the following information is per the mother.   Patient is a 36 Yo female domiciled w/mother, hx of depression mother believes, Not working or studying, single no children, BIB EMS  activated by mother  because patient was not responding. Mother said she looked like she was in shock or dehydrated.  Mother says patient was rigid, catatonic, had dead eye, couldn’t sit up, was shaking and throwing up. She reports patient did not eat or drink anything since yesterday. Mother says yesterday patient was acting bizarre. Mother says patient came into the mother’s room yesterday asking to stay with her and asked mom to stay in the bathroom while she was showering because she was afraid. Patient then called her father in [place], uncle and mother saying she needs help, doesn’t feel good and wanted everyone to be apart of her medical care. Mother reports the patient didn’t eat or drink anything since yesterday. She reports the patient did not endorse any Si or hi. She is unaware of any AVH, paranoia or delusions. She says patient has been struggling to get reconnected to a psychiatrist and her previous psychiatrist she got disconnected to because they became pregnant. Mother reports the patient was prescribed venlafaxine and lamotrigine (maybe clonazepam too?) mother unsure of specifics. Mother says patient stopped medication 2-3 days ago. Mother reports patient does not use any drugs or alcohol. Mother reports patient was lats hospitalized at [facility] last year for Si. Mother says patient saw her PCP yesterday due to headaches and depression. Patient was advised to f/u with [facility] crisis center.  No medical problems. Patient is covid vaccinated and has no recent travel or exposure. She denied any family hx of mental illness or addiction. Patient does not have access to firearms or weapons. Writer inquired about baseline and mother said patient is more responsive and says everyday is different for her. She reports never seeing her with how she was today. Mother says patient may be dehydrated, may need rest, may need to be sedated and feels she would benefit from hospitalization. Writer agreed to keep mother updated.    Writer informed mother of patient's admission to [facility].

## 2023-07-07 NOTE — ED BEHAVIORAL HEALTH ASSESSMENT NOTE - HPI (INCLUDE ILLNESS QUALITY, SEVERITY, DURATION, TIMING, CONTEXT, MODIFYING FACTORS, ASSOCIATED SIGNS AND SYMPTOMS)
37F, domiciled with mother; unemployed; no dependents, past psychiatric history of psychotic depression with catatonia and post-traumatic stress disorder, one prior psych admission Nov 2021, follows at [facility] outpatient, no hx suicide attempt, hx SIB by cutting, no known hx of violence, no known substance use, hx trauma, BIB EMS activated by mother with concern for catatonia.    Patient is observed to stare at the floor sitting still, not moving extremities. Makes intermittent eye contact, staring at times, marked speech latency, very slow to respond. Patient is often unable to complete a thought to answer questions. At times appears internally preoccupied and will agree with something even though nothing was asked. After repeating question several times, pt states she is here for chest pain. Reports having anxiety for one month. States she has no desire to eat and has not eaten in many days. Admits to missing medication at least 2-3 days. Denies hearing voices or being fearful. Denies suicidal ideation. Denies using drugs or alcohol. Patient states she has "a lot" of allergies but cannot state what they are (confirmed with mother, no allergies).    Scored 10 on [facility] Deyvi - for immobility, mutism, staring, catalepsy, negativism, withdrawal and autonomic abnormality (HTN).     See  note for collateral.

## 2023-07-07 NOTE — ED BEHAVIORAL HEALTH ASSESSMENT NOTE - SUMMARY
37F, domiciled with mother; unemployed; no dependents, past psychiatric history of psychotic depression with catatonia and post-traumatic stress disorder, one prior psych admission Nov 2021, follows at [facility] outpatient, no hx suicide attempt, hx SIB by cutting, no known hx of violence, no known substance use, hx trauma, BIB EMS activated by mother with concern for catatonia.  Patient appears catatonic, with mutism, staring, catalepsy, negativism, poor PO intake and has been missing doses of psychiatric medications. She is unable to care for her basic needs requiring involuntary psychiatric admission. Patient has remained in behavioral control and is not self harming, does not require CO at this time.

## 2023-07-07 NOTE — ED PROVIDER NOTE - CLINICAL SUMMARY MEDICAL DECISION MAKING FREE TEXT BOX
36 yo F PMH Anxiety, Bipolar, Depression presents with increased anxiety x 3 days. As per mom, patient stopped taking her medications x3 days ago. As per mom, patient was rigid, catatonic, and having a blank stare. Increased withdrawn behavior since yesterday with no eating, drinking since yesterday. Denies SI/HI/AH/VH. Denies chest pain, sob, abdominal pain, fever, chills.  Labs, EKG  SW collateral  Psych consult  Dispo as per Psych 38 yo F PMH Anxiety, Bipolar, Depression presents with increased anxiety x 3 days. As per mom, patient stopped taking her medications x3 days ago. As per mom, patient was rigid, catatonic, and having a blank stare. Increased withdrawn behavior since yesterday with no eating, drinking since yesterday. Denies SI/HI/AH/VH. Denies chest pain, sob, abdominal pain, fever, chills.  Labs, EKG  SW collateral  Psych consult  Dispo Admit

## 2023-07-07 NOTE — ED BEHAVIORAL HEALTH ASSESSMENT NOTE - PAST PSYCHOTROPIC MEDICATION
celexa, wellbutrin, prozac, lexapro, zoloft, latuda, ativan, gabapentin, valium, Vyvanse, lamotrigine, buprenorphine

## 2023-07-07 NOTE — ED ADULT TRIAGE NOTE - CHIEF COMPLAINT QUOTE
EMS states" Patient's mom called as patient is not eating, not drinking not sleeping for 1 day " patient is not cooperative in triage , trying to elope.  NP called. patient refuses to answer " EMS states" Patient's mom called as patient is not eating, not drinking not sleeping for 1 day " patient is not cooperative in triage , trying to elope.  NP called. patient refuses to answer " patient is seen by  NP,& ER MD Dr Herring in triage and evaluated for abnormal VS. patient also states she did not take her anxiety meds and she is been getting chest pain on and off, EKG done in triage shows NSR. patient moved to .

## 2023-07-07 NOTE — ED BEHAVIORAL HEALTH ASSESSMENT NOTE - OTHER PAST PSYCHIATRIC HISTORY (INCLUDE DETAILS REGARDING ONSET, COURSE OF ILLNESS, INPATIENT/OUTPATIENT TREATMENT)
hx of major depressive disorder with psychosis/catatonia, anxiety and post-traumatic stress disorder, one admission Nov 2021 at [facility], follows at [facility] with Dr. Victor, not seen since May 2023. no hx suicide attempt.

## 2023-07-07 NOTE — BH PATIENT PROFILE - STATED REASON FOR ADMISSION
dizziness and trouble getting up from sitting position and anxiety. mother called EMS as patient has a seizure and was not well

## 2023-07-07 NOTE — ED ADULT NURSE NOTE - OBJECTIVE STATEMENT
Pt brought in by EMS. Hx: Bipolar, MDD. As per EMS Pt has not been sleeping/eating, not working, acting paranoid at home and heaving preoccupations. Pt stopped lamotrigine and venlafaxine x2/3 days ago. Pt has [facility] crisis center appointment tomorrow, but mother worried for Pt immediate well-being. Pt presents preoccupied and standoffish but cooperative. Denies; Hallucinations, S/I, H/I, anxiety, CP, SOB.

## 2023-07-07 NOTE — BH PATIENT PROFILE - HOME MEDICATIONS
LaMICtal 100 mg oral tablet , 1 tab(s) orally once a day  venlafaxine 150 mg oral tablet, extended release , 2 tab(s) orally once a day   clonazePAM 1 mg oral tablet , 1 tab(s) orally 2 times a day MDD:2mg  traZODone 50 mg oral tablet , 1 tab(s) orally once a day (at bedtime), As needed, insomnia

## 2023-07-07 NOTE — ED BEHAVIORAL HEALTH ASSESSMENT NOTE - PSYCHIATRIC ISSUES AND PLAN (INCLUDE STANDING AND PRN MEDICATION)
change klonopin to ativan 2mg BID, titrate as tolerated, hold for sedation. continue effexor 300 and lamictal 100. trazodone 50 PRN insomnia. PRN Zyprexa 5-10mg PO/IM agitation. change klonopin to ativan 2mg BID, titrate as tolerated, hold for sedation. continue effexor 300 and lamictal 100. trazodone 50 PRN insomnia. PRN Zyprexa 5mg PO/IM agitation. Ativan 1mg PRN anxiety

## 2023-07-07 NOTE — BH PATIENT PROFILE - FALL HARM RISK - UNIVERSAL INTERVENTIONS
Bed in lowest position, wheels locked, appropriate side rails in place/Instruct patient to call for assistance before getting out of bed or chair/Non-slip footwear when patient is out of bed/Physically safe environment - no spills, clutter or unnecessary equipment/Purposeful Proactive Rounding/Room/bathroom lighting operational, light cord in reach

## 2023-07-07 NOTE — ED ADULT NURSE NOTE - NSFALLUNIVINTERV_ED_ALL_ED
Bed/Stretcher in lowest position, wheels locked, appropriate side rails in place/Call bell, personal items and telephone in reach/Instruct patient to call for assistance before getting out of bed/chair/stretcher/Non-slip footwear applied when patient is off stretcher/[place] to call system/Physically safe environment - no spills, clutter or unnecessary equipment/Purposeful proactive rounding/Room/bathroom lighting operational, light cord in reach

## 2023-07-07 NOTE — BH PATIENT PROFILE - INFORMATION PROVIDED TO:
NOTED.  Of note, in 3/9/2017 E-visit patient was advised to check with her EP MD (Dr Kelley) regarding need for Warfarin due to AF.    SEE MEDIA SCAN:  6/14/2017 [facility] / Dr Kelley.  HX of PAF.  \"Anticoagulation for stroke risk reduction is recommended based on the [id number] score. Patient has again voiced her preference to discontinue warfarin and expressed understanding of the risks involved with doing so.\"   patient

## 2023-07-07 NOTE — ED BEHAVIORAL HEALTH NOTE - BEHAVIORAL HEALTH NOTE
Writer contacted [facility] ([phone]) to obtain recertification. Writer spoke w/ Evy ALATORRE who provided auth# [id number] and reports that staff will reach out to ongoing UR on the next business day. Writer contacted [facility] ([phone]) to obtain recertification. Writer spoke w/ Evy ALATORRE who provided auth# [id number] and reports that staff will reach out to ongoing UR on the next business day.

## 2023-07-07 NOTE — ED ADULT NURSE NOTE - CHIEF COMPLAINT QUOTE
EMS states" Patient's mom called as patient is not eating, not drinking not sleeping for 1 day " patient is not cooperative in triage , trying to elope.  NP called. patient refuses to answer " patient is seen by  NP,& ER MD Dr Herring in triage and evaluated for abnormal VS. patient also states she did not take her anxiety meds and she is been getting chest pain on and off, EKG done in triage shows NSR. patient moved to .

## 2023-07-07 NOTE — ED PROVIDER NOTE - PATIENT PORTAL LINK FT
You can access the FollowMyHealth Patient Portal offered by [facility] by registering at the following website: [email or web address]. By joining [employer]’s FollowMyHealth portal, you will also be able to view your health information using other applications (apps) compatible with our system.

## 2023-07-07 NOTE — ED ADULT NURSE NOTE - AFFECT QUALITY
[] Continue  2 -[]  Met [] Progress Noted [] Not Met [] Defer Goals [] Continue  3 -[]  Met [] Progress Noted [] Not Met [] Defer Goals [] Continue  4 -[]  Met [] Progress Noted [] Not Met [] Defer Goals [] Continue  5 -[]  Met [] Progress Noted [] Not Met [] Defer Goals [] Continue  6 -[]  Met [] Progress Noted [] Not Met [] Defer Goals [] Continue      Adjustments to plan of care:Begin new POC. Patients Report of Tolerance:Good    Communication with other providers:Discussed eval with OTR/L. Equipment provided to patient:None    Attended: Eval + 7  Cancels: 1   No Shows: 0    Insurance: [place]    Changes in medical status or medications: None    PLAN: Pt to be seen 1x a week for 12 weeks.       Electronically Signed by JENNIFER Lomas 7/10/2019
Depressed

## 2023-07-07 NOTE — ED PROVIDER NOTE - OBJECTIVE STATEMENT
38 yo F PMH Anxiety, Bipolar, Depression presents with increased anxiety x 3 days. As per mom, patient stopped taking her medications x3 days ago. As per mom, patient was rigid, catatonic, and having a blank stare. Increased withdrawn behavior since yesterday with no eating, drinking since yesterday. Denies SI/HI/AH/VH. Denies chest pain, sob, abdominal pain, fever, chills.
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
## 2023-07-07 NOTE — ED BEHAVIORAL HEALTH ASSESSMENT NOTE - RISK ASSESSMENT
acute risks include anxiety, not eating, catatonic symptoms, non adherence to treatment. chronic risks include hx depression, prior admissions, prior self injurious behavior, unemployment. protective factors include no suicidal ideation, no suicide attempt, no violence hx, no current substance use, no known access to weapons, supportive family. LOW imminent risk for suicide/violence but due to severity of symptoms interfering with pt ability to meet basic needs (particularly for food) pt is unable to care for self needing inpatient stabilization on emergency status. will mitigate risk further with medication management and milieu therapy.

## 2023-07-07 NOTE — ED BEHAVIORAL HEALTH ASSESSMENT NOTE - CURRENT MEDICATION
effexor 300, lamictal 100, klonopin 1 bid, trazodone 50 prn (missed 2-3 days of meds per mom) effexor 300, lamictal 100, klonopin 1 bid, trazodone 50 prn (missed 2-3 days of meds per mom)    ISTOP : last filled klonopin 1mg #60 on 5/31/23. regular fills monthly from Dr. Victor.

## 2023-07-08 DIAGNOSIS — F33.3 MAJOR DEPRESSIVE DISORDER, RECURRENT, SEVERE WITH PSYCHOTIC SYMPTOMS: ICD-10-CM

## 2023-07-08 LAB
A1C WITH ESTIMATED AVERAGE GLUCOSE RESULT: 5 % — SIGNIFICANT CHANGE UP (ref 4–5.6)
ESTIMATED AVERAGE GLUCOSE: 97 — SIGNIFICANT CHANGE UP

## 2023-07-08 PROCEDURE — 99221 1ST HOSP IP/OBS SF/LOW 40: CPT

## 2023-07-08 RX ORDER — TRAZODONE HCL 50 MG
50 TABLET ORAL AT BEDTIME
Refills: 0 | Status: DISCONTINUED | OUTPATIENT
Start: 2023-07-08 | End: 2023-07-31

## 2023-07-08 RX ORDER — LAMOTRIGINE 25 MG/1
100 TABLET, ORALLY DISINTEGRATING ORAL DAILY
Refills: 0 | Status: DISCONTINUED | OUTPATIENT
Start: 2023-07-08 | End: 2023-08-01

## 2023-07-08 RX ORDER — VENLAFAXINE HCL 75 MG
300 CAPSULE, EXT RELEASE 24 HR ORAL DAILY
Refills: 0 | Status: DISCONTINUED | OUTPATIENT
Start: 2023-07-08 | End: 2023-08-01

## 2023-07-08 RX ORDER — DIPHENHYDRAMINE HCL 50 MG
50 CAPSULE ORAL EVERY 6 HOURS
Refills: 0 | Status: DISCONTINUED | OUTPATIENT
Start: 2023-07-08 | End: 2023-07-09

## 2023-07-08 RX ORDER — OLANZAPINE 15 MG/1
5 TABLET, FILM COATED ORAL EVERY 6 HOURS
Refills: 0 | Status: DISCONTINUED | OUTPATIENT
Start: 2023-07-08 | End: 2023-07-09

## 2023-07-08 RX ADMIN — Medication 300 MILLIGRAM(S): at 09:42

## 2023-07-08 RX ADMIN — LAMOTRIGINE 100 MILLIGRAM(S): 25 TABLET, ORALLY DISINTEGRATING ORAL at 09:41

## 2023-07-08 RX ADMIN — Medication 2 MILLIGRAM(S): at 09:39

## 2023-07-08 NOTE — BH INPATIENT PSYCHIATRY ASSESSMENT NOTE - RISK ASSESSMENT
Risk factors for self-harm / harm to others: sex, psychiatric diagnosis, past hx of cutting  Protective factors: inpatient and in a controlled setting with limited access to lethal means. not endorsing harm to self or others, able to contract for safety.

## 2023-07-08 NOTE — BH INPATIENT PSYCHIATRY ASSESSMENT NOTE - NSBHASSESSSUMMFT_PSY_ALL_CORE
38yo female, domiciled with mother; unemployed; no dependents, past psychiatric history of psychotic depression with catatonia and post-traumatic stress disorder, one prior psych admission Nov 2021, follows at [facility] outpatient, no hx suicide attempt, hx SIB by cutting, no known hx of violence, no known substance use, hx trauma, BIB EMS activated by mother with concern for catatonia. 9.39    patient presenting with concerns for catatonia. in ED patient showing evidence of catatonia, unable to meaningfully engage in interview, solis michael score of 10. On admission, patient's Clonazepam switched to lorazepam 2mg BID. on interview today, patient with significantly improved catatonic symptoms, able to engage in interview, able to reflect on her recent concerning behavior that align with catatonia. currently denies feeling depressed, not showing signs of claudia. she does endorse feelings of paranoia. diagnostically would align with catatonia from psychotic depression. pt is being prescribed Effexor and Lamotrigine which will be continued, as well as lorazepam 2mg BID. hospitalization to assure safety, stabilize symptoms, optimize medications and coordinate aftercare services. plan as below     q15 min obs   Effexor XR 300mg daily   Lamotrigine 100mg daily   Lorazepam 2mg BID for catatonia   Zyprexa PRN for agitation   obtain collateral

## 2023-07-08 NOTE — BH INPATIENT PSYCHIATRY ASSESSMENT NOTE - CURRENT MEDICATION
MEDICATIONS  (STANDING):  lamoTRIgine 100 milliGRAM(s) Oral daily  LORazepam     Tablet 2 milliGRAM(s) Oral two times a day  venlafaxine  milliGRAM(s) Oral daily    MEDICATIONS  (PRN):  diphenhydrAMINE Injectable 50 milliGRAM(s) IntraMuscular every 6 hours PRN Extrapyramidal prophylaxis  OLANZapine Injectable 5 milliGRAM(s) IntraMuscular every 6 hours PRN agitation  traZODone 50 milliGRAM(s) Oral at bedtime PRN insomnia

## 2023-07-08 NOTE — BH INPATIENT PSYCHIATRY ASSESSMENT NOTE - MSE UNSTRUCTURED FT
Appearance: Dressed appropriately. fair hygiene + grooming. wearing mask.   Attitude / Behavior: calm and cooperative.   Relatedness: fair   Eye contact: fair   Motor: No abnormal movements, no psychomotor slowing or activation.  Speech: Regular rate. spontaneous   Mood: "better"  Affect: neutral. mood-congruent  Thought Process: somewhat circumstantial and overall logical   Thought Content: denies SI and HI. some preoccupations. expressing paranoia   Perceptual: denies AVH.   Insight: fair   Judgment: fair   Impulse control: fair     Attention: fair  Gait: Intact.

## 2023-07-08 NOTE — BH INPATIENT PSYCHIATRY ASSESSMENT NOTE - NSBHINFOSOURCE_PSY_ALL_CORE
Subjective:     Todd Aaron Sipple is a 52 y.o. male who presents for Other (WC/FU)      Patient is a 52-year-old male who presents for suture removal from a laceration of the right forearm May 5.  He denies any pain or drainage from the region.  He has been tolerating full duty fine.    PMH:   No pertinent past medical history to this problem  MEDS:  Medications were reviewed in EMR  ALLERGIES:  Allergies were reviewed in EMR  SOCHX:    FH:   No pertinent family history to this problem       Objective:     Blood Pressure 132/86 (BP Location: Left arm, Patient Position: Sitting, BP Cuff Size: Adult)   Pulse 60   Temperature 37 °C (98.6 °F) (Temporal)   Respiration 16   Height 1.829 m (6')   Weight 95.3 kg (210 lb)   Oxygen Saturation 98%   Body Mass Index 28.48 kg/m²     Constitutional: Pt is oriented to person, place, and time.  Appears well-developed and well-nourished. No distress.   HENT:   Mouth/Throat: Oropharynx is clear and moist.   Eyes: Conjunctivae are normal.   Cardiovascular: Normal rate.    Pulmonary/Chest: Effort normal.   Musculoskeletal:   Neurological: Pt is alert and oriented to person, place, and time. Coordination normal.   Skin:  Well appearing laceration with intact sutures. No sign of infection  Psychiatric: Pt has a normal mood and affect.  Behavior is normal.       Assessment/Plan:       1. Laceration of right forearm, subsequent encounter    2. Visit for suture removal    • Released to Full Duty FROM   TO    • -Sutures removed and laceration fortified with dermabond  • -Discharged  •      Differential diagnosis, natural history, supportive care, and indications for immediate follow-up discussed.   Patient

## 2023-07-08 NOTE — BH INPATIENT PSYCHIATRY ASSESSMENT NOTE - NSBHCHARTREVIEWVS_PSY_A_CORE FT
Vital Signs Last 24 Hrs  T(C): 36.4 (07-07-23 @ 23:51), Max: 36.7 (07-07-23 @ 15:59)  T(F): 97.6 (07-07-23 @ 23:51), Max: 98 (07-07-23 @ 15:59)  HR: 84 (07-07-23 @ 22:55) (84 - 90)  BP: 125/84 (07-07-23 @ 22:55) (125/84 - 150/110)  BP(mean): --  RR: 17 (07-07-23 @ 22:55) (16 - 17)  SpO2: 99% (07-07-23 @ 22:55) (99% - 99%)    Orthostatic VS  07-07-23 @ 23:51  Lying BP: --/-- HR: --  Sitting BP: 130/80 HR: 89  Standing BP: 138/93 HR: 88  Site: --  Mode: --

## 2023-07-08 NOTE — BH INPATIENT PSYCHIATRY ASSESSMENT NOTE - OTHER PAST PSYCHIATRIC HISTORY (INCLUDE DETAILS REGARDING ONSET, COURSE OF ILLNESS, INPATIENT/OUTPATIENT TREATMENT)
1 prior hospitalization at [facility]   no prior SA  currently in outpatient tx @ [facility]

## 2023-07-08 NOTE — PSYCHIATRIC REHAB INITIAL EVALUATION - NSBHPRRECOMMEND_PSY_ALL_CORE
The writer attempted to meet with the patient to orient her to the psychiatric rehabilitation staff and services, however, the patient was sleeping. The writer retrieved the patient’s information from her chart. As per the patient’s chart, the patient presented with dizziness and trouble getting up from sitting position and anxiety. The writer established a psychiatric rehabilitation goal for the patient to work on over the next seven days. The patient’s psychiatric rehabilitation goal is to identify and practice 3 coping skills to manage anxiety for her anxiety/panic/fear goal. Over the next seven days, Psychiatric Rehabilitation staff will utilize individual psychotherapy and psychoeducation to assist the patient in reaching her treatment goal. The writer was unable to assess for SI/HI/AH/VH due to the patient sleeping.

## 2023-07-09 LAB
CHOLEST SERPL-MCNC: 190 MG/DL — SIGNIFICANT CHANGE UP
HDLC SERPL-MCNC: 58 MG/DL — SIGNIFICANT CHANGE UP
LIPID PNL WITH DIRECT LDL SERPL: 120 MG/DL — HIGH
NON HDL CHOLESTEROL: 132 MG/DL — HIGH
TRIGL SERPL-MCNC: 58 MG/DL — SIGNIFICANT CHANGE UP

## 2023-07-09 PROCEDURE — 99231 SBSQ HOSP IP/OBS SF/LOW 25: CPT

## 2023-07-09 RX ADMIN — Medication 300 MILLIGRAM(S): at 09:44

## 2023-07-09 RX ADMIN — Medication 50 MILLIGRAM(S): at 23:01

## 2023-07-09 RX ADMIN — LAMOTRIGINE 100 MILLIGRAM(S): 25 TABLET, ORALLY DISINTEGRATING ORAL at 09:44

## 2023-07-09 RX ADMIN — Medication 1 MILLIGRAM(S): at 21:49

## 2023-07-09 RX ADMIN — Medication 1 MILLIGRAM(S): at 14:53

## 2023-07-09 NOTE — BH INPATIENT PSYCHIATRY PROGRESS NOTE - CURRENT MEDICATION
MEDICATIONS  (STANDING):  lamoTRIgine 100 milliGRAM(s) Oral daily  LORazepam     Tablet 1 milliGRAM(s) Oral three times a day  venlafaxine  milliGRAM(s) Oral daily    MEDICATIONS  (PRN):  diphenhydrAMINE Injectable 50 milliGRAM(s) IntraMuscular every 6 hours PRN Extrapyramidal prophylaxis  OLANZapine Injectable 5 milliGRAM(s) IntraMuscular every 6 hours PRN agitation  traZODone 50 milliGRAM(s) Oral at bedtime PRN insomnia   MEDICATIONS  (STANDING):  lamoTRIgine 100 milliGRAM(s) Oral daily  LORazepam     Tablet 1 milliGRAM(s) Oral three times a day  LORazepam   Injectable 1 milliGRAM(s) IntraMuscular once  venlafaxine  milliGRAM(s) Oral daily    MEDICATIONS  (PRN):  LORazepam     Tablet 2 milliGRAM(s) Oral every 6 hours PRN agitation  LORazepam   Injectable 2 milliGRAM(s) IntraMuscular once PRN severe agitation  traZODone 50 milliGRAM(s) Oral at bedtime PRN insomnia   MEDICATIONS  (STANDING):  lamoTRIgine 100 milliGRAM(s) Oral daily  LORazepam     Tablet 1 milliGRAM(s) Oral three times a day  venlafaxine  milliGRAM(s) Oral daily    MEDICATIONS  (PRN):  LORazepam     Tablet 2 milliGRAM(s) Oral every 6 hours PRN agitation  LORazepam   Injectable 2 milliGRAM(s) IntraMuscular once PRN severe agitation  traZODone 50 milliGRAM(s) Oral at bedtime PRN insomnia

## 2023-07-09 NOTE — BH INPATIENT PSYCHIATRY PROGRESS NOTE - MSE UNSTRUCTURED FT
Appearance: Dressed appropriately. fair hygiene + grooming. wearing mask.   Attitude / Behavior: calm and cooperative.   Relatedness: fair   Eye contact: fair   Motor: No abnormal movements, no psychomotor slowing or activation.  Speech: Regular rate. spontaneous   Mood: "better"  Affect: neutral. mood-congruent  Thought Process: somewhat circumstantial and overall logical   Thought Content: denies SI and HI. some preoccupations. expressing paranoia   Perceptual: denies AVH.   Insight: fair   Judgment: fair   Impulse control: fair     Attention: fair  Gait: Intact.  Appearance: Dressed in hospital gown. fair hygiene + grooming. not wearing mask.   Attitude / Behavior: Guarded, appears to be anxious towards interviewer.  Relatedness: fair.   Eye contact: fair .  Motor: Psychomotor slowing, at times pauses in movement, i.e. when reaching towards phone and walking.  Speech: Increased latency, monotone, normal volume.  Mood: "I am unfocused".  Affect: Neutral,   Thought Process: Pauses in production, disorganized.   Thought Content: Paucity of content. denies SI. some preoccupations. expressing paranoia   Perceptual: unable to assess AVH due to speech latency.   Insight: limited  Judgment: limited  Impulse control: limited     Attention: limited to fair  Gait: Intact.  Appearance: Dressed in hospital gown. fair hygiene + grooming. not wearing mask.   Attitude / Behavior: Guarded, appears to be anxious towards interviewer.  Relatedness: fair.   Eye contact: poor  Motor: Psychomotor slowing, at times pauses in movement  Speech: Increased latency, monotone, normal volume. decreased productivity.   Mood: "I am unfocused".  Affect: constricted  Thought Process: with blocking. illogical.   Thought Content: Paucity of content. denies SI. some preoccupations. expressing paranoia   Perceptual: denies AVH but appears possibly internally preoccupied    Insight: limited  Judgment: limited  Impulse control: limited     Attention: limited to fair  Gait: Intact.

## 2023-07-09 NOTE — BH INPATIENT PSYCHIATRY PROGRESS NOTE - NSBHASSESSSUMMFT_PSY_ALL_CORE
Blanquita is a 38yo female, domiciled with mother; unemployed; no dependents, past psychiatric history of psychotic depression with catatonia and post-traumatic stress disorder, one prior psych admission Nov 2021, follows at [facility] outpatient, no hx suicide attempt, hx SIB by cutting, no known hx of violence, no known substance use, hx trauma, BIB EMS activated by mother with concern for catatonia.    Working Dx MDD, recurrent episode, with severe catatonia.    Today, the patient reports no improvement in symptoms, continued anxiety, poor appetite, sleep, and potential paranoia. On exam appears thought-blocked, with speech latency, negativism, and psychomotor slowing. Denies passive and active SI, but unable to discuss NSSIB.      Given recent severe catatonia, pt continues to require hospitalization for safety and safe discharge planning.      Plan:  1.	Legal: continue 9.13 voluntary hospitalization  2.	Safety: routine obs appropriate as pt denies passive and active SI and is able to commit to safety on the unit  -ativan 1 mg PO/IM for severe agitation  3.	Psychiatric:  -continue lexapro 10 mg for anxiety and depression  -begin trial of remeron 7.5 mg for sleep and anxiety  -atarax PRN anxiety  -trazodone 25 mg PRN insomnia  4.	I/G/M therapy with DBT focus  5.	Medical:   -pt should have f/u with GYN s/p D+C; continue to monitor bleeding or any related sxs  -TSH wnl  6.	Collateral/Dispo:   -no consent for collateral at this time  -dispo when stable-referral made to [facility] Blanquita is a 36yo female, domiciled with mother; unemployed; no dependents, past psychiatric history of psychotic depression with catatonia and post-traumatic stress disorder, one prior psych admission Nov 2021, follows at [facility] outpatient, no hx suicide attempt, hx SIB by cutting, no known hx of violence, no known substance use, hx trauma, BIB EMS activated by mother with concern for catatonia.    Working Dx MDD, recurrent episode, with severe catatonia.    Today, the patient reports no improvement in symptoms, continued anxiety, poor appetite, sleep, and potential paranoia. On exam appears thought-blocked, with speech latency, negativism, and psychomotor slowing. Denies passive and active SI, but unable to discuss NSSIB.      Given recent severe catatonia, pt continues to require hospitalization for safety and safe discharge planning.      Plan:  1.	Legal: continue 9.39 involuntary hospitalization  2.	Safety: routine obs appropriate as pt denies passive and active SI and is able to commit to safety on the unit  -Decreased ativan 1 mg PO/IM for catatonia  3.	Psychiatric:  -continue lamictal 100 mg for anxiety and depression  -continue venlafaxine  mg for anxiety and depression  -trazodone 50 mg PRN insomnia  4.	I/G/M therapy with DBT focus  5.	Medical: None  6.	Collateral/Dispo:   -no collateral at this time  -dispo when stable Blanquita is a 38yo female, domiciled with mother; unemployed; no dependents, past psychiatric history of psychotic depression with catatonia and post-traumatic stress disorder, one prior psych admission Nov 2021, follows at [facility] outpatient, no hx suicide attempt, hx SIB by cutting, no known hx of violence, no known substance use, hx trauma, BIB EMS activated by mother with concern for catatonia.    Working Dx MDD, recurrent episode, with severe catatonia.    Today, the patient reports no improvement in symptoms, continued anxiety, poor appetite, sleep, and potential paranoia. On exam appears thought-blocked, with speech latency, negativism, and psychomotor slowing. Denies passive and active SI, but unable to discuss NSSIB.      Given recent severe catatonia and with exam findings suggesting ongoing / worsening catatonic sx and with pt having refused lorazepam, pt unable to care for self and equires hospitalization for safety and safe discharge planning.      Plan:  1.	Legal: continue 9.39 involuntary hospitalization  2.	Safety: routine obs appropriate as pt denies passive and active SI and is able to commit to safety on the unit  -Decreased ativan 1 mg PO/IM for catatonia  3.	Psychiatric:  -continue lamictal 100 mg for anxiety and depression  -continue venlafaxine  mg for anxiety and depression  -trazodone 50 mg PRN insomnia  4.	I/G/M therapy with DBT focus  5.	Medical: None  6.	Collateral/Dispo:   -no collateral at this time  -dispo when stable

## 2023-07-09 NOTE — BH INPATIENT PSYCHIATRY PROGRESS NOTE - NSBHATTESTCOMMENTATTENDFT_PSY_A_CORE
Patient declined lorazepam last night and this morning. She appears regressed with worsening catatonic symptoms including mutism, stuck in indecision, poor PO intake, staring, and with negativism. Pt makes disorganized statements including appearing panicked and stating “I crossed state lines with a child.” She is unable to explain what she means by this. Will continue with ordered medications. Activated x1 dose of lorazepam IM this afternoon for catatonic sx.

## 2023-07-09 NOTE — BH INPATIENT PSYCHIATRY PROGRESS NOTE - NSBHFUPINTERVALHXFT_PSY_A_CORE
Chart reviewed. Case discussed with interdisciplinary team. Patient seen and examined for followup of catatonia. Non-adherent with standing medication. No PRNs requested. Per sleep log slept before midnight, 1 awakening between 2:15a-5a.     Today, patient states that  Chart reviewed. Case discussed with interdisciplinary team. Patient seen and examined for followup of catatonia. No acute overnight events. Non-adherent with standing medication. No PRNs requested. Per sleep log slept before midnight, 1 awakening between 2:15a-5a.     Today pt has difficulty expressing her thoughts, often pausing and showing indecision in movements. She states that her mom visited yesterday; when the phone rings during interview, she wonders whether her mother is calling. Some indecision is present in movement when walking towards the phone. Today, denies pt passive and active SI, unable to respond to questions about NSSIB and HI due to speech latency.       Describes mood today as "I am unfocused". Perseverates on medications, stating "I should take medication; I need to take medication", though also reluctant and worried that they are causing nausea. States that she wants to do therapy. Notes that she ate a little bit and is hungry, but unable to provide other information about appetite. Some evidence of paranoia, as patient states "I don't want to do any zoom" when brought into an interview with a TV in it and requests a different private space. Unable to respond to questions regarding AVH, TW/TI. Unable to determine presence of delusions.    Slept before midnight without PRNs, with 1 awakening. Pt states that yesterday, Ativan made her feel "calm"; though wishes to decrease Ativan dose due to nausea. Provided psychoed about importance of Ativan in her treatment regimen and explained its efficacy in reducing pauses and anxiety. Expresses additional concern about headaches, though unable to elaborate due to speech latency. Denies physical complaints. No c/f medication issues or side effects aside from nausea.

## 2023-07-09 NOTE — BH INPATIENT PSYCHIATRY PROGRESS NOTE - NSBHMETABOLIC_PSY_ALL_CORE_FT
BMI:   HbA1c: A1C with Estimated Average Glucose Result: 5.0 % (07-08-23 @ 11:45)    Glucose:   BP: --  Lipid Panel:  BMI:   HbA1c: A1C with Estimated Average Glucose Result: 5.0 % (07-08-23 @ 11:45)    Glucose:   BP: --  Lipid Panel: Date/Time: 07-09-23 @ 10:15  Cholesterol, Serum: 190  Direct LDL: --  HDL Cholesterol, Serum: 58  Total Cholesterol/HDL Ration Measurement: --  Triglycerides, Serum: 58

## 2023-07-09 NOTE — BH INPATIENT PSYCHIATRY PROGRESS NOTE - PRN MEDS
MEDICATIONS  (PRN):  diphenhydrAMINE Injectable 50 milliGRAM(s) IntraMuscular every 6 hours PRN Extrapyramidal prophylaxis  OLANZapine Injectable 5 milliGRAM(s) IntraMuscular every 6 hours PRN agitation  traZODone 50 milliGRAM(s) Oral at bedtime PRN insomnia   MEDICATIONS  (PRN):  LORazepam     Tablet 2 milliGRAM(s) Oral every 6 hours PRN agitation  LORazepam   Injectable 2 milliGRAM(s) IntraMuscular once PRN severe agitation  traZODone 50 milliGRAM(s) Oral at bedtime PRN insomnia

## 2023-07-09 NOTE — BH INPATIENT PSYCHIATRY PROGRESS NOTE - NSBHCHARTREVIEWVS_PSY_A_CORE FT
Vital Signs Last 24 Hrs  T(C): --  T(F): --  HR: --  BP: --  BP(mean): --  RR: --  SpO2: --    Orthostatic VS  07-07-23 @ 23:51  Lying BP: --/-- HR: --  Sitting BP: 130/80 HR: 89  Standing BP: 138/93 HR: 88  Site: --  Mode: --

## 2023-07-10 PROCEDURE — 99231 SBSQ HOSP IP/OBS SF/LOW 25: CPT

## 2023-07-10 RX ADMIN — LAMOTRIGINE 100 MILLIGRAM(S): 25 TABLET, ORALLY DISINTEGRATING ORAL at 16:22

## 2023-07-10 RX ADMIN — Medication 1 MILLIGRAM(S): at 21:45

## 2023-07-10 RX ADMIN — Medication 1 MILLIGRAM(S): at 16:21

## 2023-07-10 RX ADMIN — Medication 300 MILLIGRAM(S): at 16:22

## 2023-07-10 NOTE — BH INPATIENT PSYCHIATRY PROGRESS NOTE - MSE UNSTRUCTURED FT
The patient appears stated age, with fair hygiene, and is dressed appropriately, lying in bed.  She was calm but largely uncooperative with the interview, not answering most questions.  She maintained limited eye contact.  No restlessness or slowing of movements observed.  The patient did not say much, answered some questions with nodding and shaking of her head.  Unable to assess mood, thought process, thought content.  Her affect is constricted, stable, appropriate.  Insight is poor.  Judgment is impaired.  Impulse control has been fair on the unit.

## 2023-07-10 NOTE — BH INPATIENT PSYCHIATRY PROGRESS NOTE - NSBHMETABOLIC_PSY_ALL_CORE_FT
BMI:   HbA1c: A1C with Estimated Average Glucose Result: 5.0 % (07-08-23 @ 11:45)    Glucose:   BP: --  Lipid Panel: Date/Time: 07-09-23 @ 10:15  Cholesterol, Serum: 190  Direct LDL: --  HDL Cholesterol, Serum: 58  Total Cholesterol/HDL Ration Measurement: --  Triglycerides, Serum: 58

## 2023-07-10 NOTE — BH INPATIENT PSYCHIATRY PROGRESS NOTE - NSBHCHARTREVIEWVS_PSY_A_CORE FT
Vital Signs Last 24 Hrs  T(C): 36.7 (07-10-23 @ 08:36), Max: 36.7 (07-10-23 @ 08:36)  T(F): 98.1 (07-10-23 @ 08:36), Max: 98.1 (07-10-23 @ 08:36)  HR: --  BP: --  BP(mean): --  RR: 19 (07-10-23 @ 08:36) (19 - 19)  SpO2: --    Orthostatic VS  07-10-23 @ 08:36  Lying BP: --/-- HR: --  Sitting BP: 133/84 HR: 101  Standing BP: 148/83 HR: 105  Site: --  Mode: --

## 2023-07-10 NOTE — BH INPATIENT PSYCHIATRY PROGRESS NOTE - PRN MEDS
MEDICATIONS  (PRN):  LORazepam     Tablet 2 milliGRAM(s) Oral every 6 hours PRN agitation  LORazepam   Injectable 2 milliGRAM(s) IntraMuscular once PRN severe agitation  traZODone 50 milliGRAM(s) Oral at bedtime PRN insomnia

## 2023-07-10 NOTE — BH INPATIENT PSYCHIATRY PROGRESS NOTE - NSBHFUPINTERVALHXFT_PSY_A_CORE
Patient seen for follow-up of depression, catatonia.  Chart reviewed. Case discussed with interdisciplinary team. No events reported overnight.  This morning, the patient is not engaging in conversation.  She is lying in bed, opens her eyes, but doesn't speak much, not answering questions.  She also refused medications this morning, told the nurse she did not need them.  The patient was uncooperative with most questions.  She did state the treatment team can call her mother.  The patient slept well.  Staff reports she is eating well with encouragement.

## 2023-07-10 NOTE — BH SOCIAL WORK INITIAL PSYCHOSOCIAL EVALUATION - SAFE PLACE TO LIVE
Ongoing SW/CM Assessment/Plan of Care Note     Discharge plan as follows:  Discharge Destination: Rehab if patient improves medically: [facility] has clinically accepted & will submit for insurance auth if or when patient medically ready      Barriers to Discharge: Not medically ready for discharge, remains intubated  Patient's discharge planning goal:SNF  Discharge Services: SNF for short term rehab   Prior Services: None  Baseline Level of Care: Home with significant other; independent with ADL/IADLs; uses a walker, cane as needed; has had [facility] in the past  Transportation: to be determined   PCP: Antonieta Craig MD   DME: Walker, cane  Advanced Directives:  On file, current   [facility].  Copy of activation form provided to medical records to be scanned into chart on 12/13.   COVID Test: Negative 12/10/22  COVID Vaccine status: Vaccinated  IMM/MOON: IMM pending     Important Phone Numbers:   Yenny Lauren Osh: [phone]  [facility]: [phone]    Activated POA, daughter Val: [phone]    See SW/CM flowsheets for goals and other objective data.    Patient/Family discharge goal (s):  Goal #1: Extended Care Facility discharge arranged  Goal #2: Transportation arranged or issues addressed  Goal #3: Transportation arranged or issues addressed    PT Recommendation:          OT Recommendation:  Recommendation for Discharge Support: OT WI: 24 Hour assist       SLP Recommendation:       Disposition:       Progress note:   Reviewed chat, collaborated with healthcare team. Patient remains ventilated and sedated. Patient's daughters, Val and Mariaelena, are involved in patient's care; Val is patient's activated POA. Family meeting to take place today.     Patient's discharge plan remains unchanged. Patient had been accepted at [facility] previously; will need to submit for insurance authorization prior. Will also need to communicate with [facility]  regarding bed availability as patient nears discharge readiness. CM department to follow.          no

## 2023-07-10 NOTE — BH INPATIENT PSYCHIATRY PROGRESS NOTE - CURRENT MEDICATION
MEDICATIONS  (STANDING):  lamoTRIgine 100 milliGRAM(s) Oral daily  LORazepam     Tablet 1 milliGRAM(s) Oral three times a day  venlafaxine  milliGRAM(s) Oral daily    MEDICATIONS  (PRN):  LORazepam     Tablet 2 milliGRAM(s) Oral every 6 hours PRN agitation  LORazepam   Injectable 2 milliGRAM(s) IntraMuscular once PRN severe agitation  traZODone 50 milliGRAM(s) Oral at bedtime PRN insomnia

## 2023-07-10 NOTE — BH INPATIENT PSYCHIATRY PROGRESS NOTE - NSBHASSESSSUMMFT_PSY_ALL_CORE
Blanquita is a 36yo female, domiciled with mother; unemployed; no dependents, past psychiatric history of psychotic depression with catatonia and post-traumatic stress disorder, one prior psych admission Nov 2021, follows at [facility] outpatient, no hx suicide attempt, hx SIB by cutting, no known hx of violence, no known substance use, hx trauma, BIB EMS activated by mother with concern for catatonia.    Working Dx MDD, recurrent episode, with severe catatonia.    Today, the patient is not cooperative with interview, not saying much, refused am medications.  Will continue to offer medications.  The patient agreed to have treatment team call her mother.    Plan:  1.	Legal: continue 9.39 involuntary hospitalization  2.	Safety: routine obs appropriate as pt denies passive and active SI and is able to commit to safety on the unit  -Continue ativan 1 mg PO/IM for catatonia  3.	Psychiatric:  -continue lamictal 100 mg for anxiety and depression  -continue venlafaxine  mg for anxiety and depression  -trazodone 50 mg PRN insomnia  4.	I/G/M therapy with DBT focus  5.	Medical: None  6.	Collateral/Dispo:   -dispo when stable

## 2023-07-10 NOTE — BH SOCIAL WORK INITIAL PSYCHOSOCIAL EVALUATION - OTHER PAST PSYCHIATRIC HISTORY (INCLUDE DETAILS REGARDING ONSET, COURSE OF ILLNESS, INPATIENT/OUTPATIENT TREATMENT)
Pt is a 38yo female, domiciled with mother; unemployed; no dependents, past psychiatric history of psychotic depression with catatonia and post-traumatic stress disorder, one prior psych admission Nov 2021, followed at [facility] outpatient, no hx suicide attempt, hx SIB by cutting, no known hx of violence, no known substance use, hx trauma, BIB EMS activated by mother with concern for catatonia.     On interview in 2W: patient acknowledges not feeling well days leading up to hospitalization. She is unable to state when she was last at her usual state of health, but states that she was doing fine after last hospitalization, that she was working and able to maintain relationships. She states over the last several weeks, she began “disassociating” that she would find herself on ‘autopilot’ for 40-50 min intervals where she was unable to control herself, would find that she was walking in circles. She also reported worsening anxiety over this period of time. She endorses having “thoughts” and differentiates this from hallucinations. She denies VH. Patient denies currently feeling depressed. Denies symptoms of claudia. She states she is adherent to her prescribed medications. She denies substance use. Patient is able to state the names and doses of all of her medications and said that after ED switched her clonazepam to lorazepam, that she felt “much better,” and is hoping to remain on Lorazepam. Patient reports past instances of fleeting Si without intent or plan; she currently denies and is able to contract for safety. Denies HI.      Per ED assessment:   Patient is observed to stare at the floor sitting still, not moving extremities. Makes intermittent eye contact, staring at times, marked speech latency, very slow to respond. Patient is often unable to complete a thought to answer questions. At times appears internally preoccupied and will agree with something even though nothing was asked. After repeating question several times, pt states she is here for chest pain. Reports having anxiety for one month. States she has no desire to eat and has not eaten in many days. Admits to missing medication at least 2-3 days. Denies hearing voices or being fearful. Denies suicidal ideation. Denies using drugs or alcohol.    Per pts mother gulshan :  Patient is a 38 Yo female domiciled w/mother, hx of depression mother believes, Not working or studying, single no children, BIB EMS  activated by mother  because patient was not responding. Mother said she looked like she was in shock or dehydrated.  Mother says patient was rigid, catatonic, had dead eye, couldn’t sit up, was shaking and throwing up. She reports patient did not eat or drink anything since yesterday. Mother says yesterday patient was acting bizarre. Mother says patient came into the mother’s room yesterday asking to stay with her and asked mom to stay in the bathroom while she was showering because she was afraid. Patient then called her father in [place], uncle and mother saying she needs help, doesn’t feel good and wanted everyone to be apart of her medical care. Mother reports the patient didn’t eat or drink anything since yesterday. She reports the patient did not endorse any Si or hi. She is unaware of any AVH, paranoia or delusions. She says patient has been struggling to get reconnected to a psychiatrist and her previous psychiatrist she got disconnected to because they became pregnant. Mother reports the patient was prescribed venlafaxine and lamotrigine (maybe clonazepam too?) mother unsure of specifics. Mother says patient stopped medication 2-3 days ago. Mother reports patient does not use any drugs or alcohol. Mother reports patient was lats hospitalized at [facility] last year for Si. Mother says patient saw her PCP yesterday due to headaches and depression. Patient was advised to f/u with [facility] crisis center.  No medical problems. Patient is covid vaccinated and has no recent travel or exposure. She denied any family hx of mental illness or addiction. Patient does not have access to firearms or weapons. Writer inquired about baseline and mother said patient is more responsive and says everyday is different for her. She reports never seeing her with how she was on day of admit. Klisyri Pregnancy And Lactation Text: It is unknown if this medication can harm a developing fetus or if it is excreted in breast milk.

## 2023-07-11 PROCEDURE — 99231 SBSQ HOSP IP/OBS SF/LOW 25: CPT

## 2023-07-11 RX ORDER — ARIPIPRAZOLE 15 MG/1
2 TABLET ORAL DAILY
Refills: 0 | Status: DISCONTINUED | OUTPATIENT
Start: 2023-07-11 | End: 2023-07-13

## 2023-07-11 RX ADMIN — Medication 1 MILLIGRAM(S): at 08:06

## 2023-07-11 RX ADMIN — Medication 1 MILLIGRAM(S): at 20:44

## 2023-07-11 RX ADMIN — ARIPIPRAZOLE 2 MILLIGRAM(S): 15 TABLET ORAL at 10:09

## 2023-07-11 RX ADMIN — LAMOTRIGINE 100 MILLIGRAM(S): 25 TABLET, ORALLY DISINTEGRATING ORAL at 08:06

## 2023-07-11 RX ADMIN — Medication 300 MILLIGRAM(S): at 08:06

## 2023-07-11 RX ADMIN — Medication 1 MILLIGRAM(S): at 12:22

## 2023-07-11 NOTE — BH INPATIENT PSYCHIATRY PROGRESS NOTE - NSBHASSESSSUMMFT_PSY_ALL_CORE
Blanquita is a 38yo female, domiciled with mother; unemployed; no dependents, past psychiatric history of psychotic depression with catatonia and post-traumatic stress disorder, one prior psych admission Nov 2021, follows at [facility] outpatient, no hx suicide attempt, hx SIB by cutting, no known hx of violence, no known substance use, hx trauma, BIB EMS activated by mother with concern for catatonia.    Working Dx MDD, recurrent episode, with severe catatonia.    Today, the patient is speaking more, but with continued symptoms of catatonia.  Noted latency and disorganization of thoughts.  Tolerating medications well.  Will add low dose Abilify for thoughts and mood, with plan to slowly titrate while watching for worsening of catatonia.    Plan:  1.	Legal: continue 9.39 involuntary hospitalization  2.	Safety: routine obs appropriate as pt denies passive and active SI and is able to commit to safety on the unit  -Continue ativan 1 mg PO/IM for catatonia  3.	Psychiatric:  -continue lamictal 100 mg for anxiety and depression  -continue venlafaxine  mg for anxiety and depression  -Abilify 2mg daily for mood and disorganization  -trazodone 50 mg PRN insomnia  4.	I/G/M therapy with DBT focus  5.	Medical: None  6.	Collateral/Dispo:   -dispo when stable

## 2023-07-11 NOTE — BH INPATIENT PSYCHIATRY PROGRESS NOTE - CURRENT MEDICATION
MEDICATIONS  (STANDING):  ARIPiprazole 2 milliGRAM(s) Oral daily  lamoTRIgine 100 milliGRAM(s) Oral daily  LORazepam     Tablet 1 milliGRAM(s) Oral three times a day  venlafaxine  milliGRAM(s) Oral daily    MEDICATIONS  (PRN):  LORazepam     Tablet 2 milliGRAM(s) Oral every 6 hours PRN agitation  LORazepam   Injectable 2 milliGRAM(s) IntraMuscular once PRN severe agitation  traZODone 50 milliGRAM(s) Oral at bedtime PRN insomnia

## 2023-07-11 NOTE — BH INPATIENT PSYCHIATRY PROGRESS NOTE - NSBHCHARTREVIEWVS_PSY_A_CORE FT
Vital Signs Last 24 Hrs  T(C): 36.7 (07-11-23 @ 07:48), Max: 36.7 (07-11-23 @ 07:48)  T(F): 98 (07-11-23 @ 07:48), Max: 98 (07-11-23 @ 07:48)  HR: --  BP: --  BP(mean): --  RR: 17 (07-11-23 @ 07:48) (17 - 17)  SpO2: --    Orthostatic VS  07-11-23 @ 07:48  Lying BP: --/-- HR: --  Sitting BP: 135/89 HR: 101  Standing BP: 129/90 HR: 104  Site: --  Mode: --  Orthostatic VS  07-10-23 @ 08:36  Lying BP: --/-- HR: --  Sitting BP: 133/84 HR: 101  Standing BP: 148/83 HR: 105  Site: --  Mode: --

## 2023-07-11 NOTE — BH INPATIENT PSYCHIATRY PROGRESS NOTE - NSBHFUPINTERVALHXFT_PSY_A_CORE
Patient seen for follow-up of depression, catatonia.  Chart reviewed. Case discussed with interdisciplinary team. No events reported overnight.  The patient took medications starting yesterday afternoon.  She ate some yesterday, and ate  breakfast this morning.  The patient is more interactive today, but continues to have significant thought latency and disorder.  She has difficulty expressing herself, talking around answers, giving oddly worded answers, and sometimes becoming incoherent.  She admits to some depression, no SI.  Behavior has been well controlled.  No hallucinations.  The patient slept well.  After discussion, the patient was agreeable to starting Abilify to target mood and disorganized thoughts.

## 2023-07-11 NOTE — BH INPATIENT PSYCHIATRY PROGRESS NOTE - MSE UNSTRUCTURED FT
The patient appears stated age, with fair hygiene, and is dressed appropriately.  She was calm, makes attempts to be cooperative with the interview.  She maintained appropriate eye contact.  Mild slowing of movements observed.  Gait steady.  The patient's speech was fluent, normal in tone, decreased rate, and soft in volume.  Her mood is "depressed."  Affect is constricted, stable, appropriate.  The patient's thoughts are noted with latency, tangential, and incoherent at times.  She denies any clear delusions or hallucinations.  No suicidal or homicidal ideation, intent, or plans.  Insight is partial.  Judgment is impaired.  Impulse control has been fair on the unit.

## 2023-07-12 PROCEDURE — 99231 SBSQ HOSP IP/OBS SF/LOW 25: CPT

## 2023-07-12 RX ORDER — CALCIUM CARBONATE 500(1250)
1 TABLET ORAL THREE TIMES A DAY
Refills: 0 | Status: DISCONTINUED | OUTPATIENT
Start: 2023-07-12 | End: 2023-08-01

## 2023-07-12 RX ADMIN — ARIPIPRAZOLE 2 MILLIGRAM(S): 15 TABLET ORAL at 08:37

## 2023-07-12 RX ADMIN — Medication 0.5 MILLIGRAM(S): at 17:40

## 2023-07-12 RX ADMIN — Medication 1 TABLET(S): at 17:40

## 2023-07-12 RX ADMIN — Medication 1 MILLIGRAM(S): at 08:37

## 2023-07-12 RX ADMIN — Medication 300 MILLIGRAM(S): at 08:37

## 2023-07-12 RX ADMIN — LAMOTRIGINE 100 MILLIGRAM(S): 25 TABLET, ORALLY DISINTEGRATING ORAL at 08:37

## 2023-07-12 RX ADMIN — Medication 1 MILLIGRAM(S): at 20:23

## 2023-07-12 RX ADMIN — Medication 1 MILLIGRAM(S): at 12:17

## 2023-07-12 NOTE — BH INPATIENT PSYCHIATRY PROGRESS NOTE - NSBHMETABOLIC_PSY_ALL_CORE_FT
BMI:   HbA1c: A1C with Estimated Average Glucose Result: 5.0 % (07-08-23 @ 11:45)    Glucose:   BP: --  Lipid Panel: Date/Time: 07-09-23 @ 10:15  Cholesterol, Serum: 190  Direct LDL: --  HDL Cholesterol, Serum: 58  Total Cholesterol/HDL Ration Measurement: --  Triglycerides, Serum: 58   BMI: BMI (kg/m2): 19.2 (07-14-23 @ 07:35)  HbA1c: A1C with Estimated Average Glucose Result: 5.0 % (07-08-23 @ 11:45)    Glucose:   BP: --  Lipid Panel: Date/Time: 07-09-23 @ 10:15  Cholesterol, Serum: 190  Direct LDL: --  HDL Cholesterol, Serum: 58  Total Cholesterol/HDL Ration Measurement: --  Triglycerides, Serum: 58

## 2023-07-12 NOTE — BH INPATIENT PSYCHIATRY PROGRESS NOTE - NSBHFUPINTERVALHXFT_PSY_A_CORE
Patient seen for follow-up of depression, catatonia.  Chart reviewed. Case discussed with interdisciplinary team. No events reported overnight.  The patient took medications starting yesterday afternoon.  She ate some yesterday, and ate  breakfast this morning.  The patient is more interactive today, but continues to have significant thought latency and disorder.  She has difficulty expressing herself, talking around answers, giving oddly worded answers, and sometimes becoming incoherent.  She admits to some depression, no SI.  Behavior has been well controlled.  No hallucinations.  The patient slept well.  After discussion, the patient was agreeable to starting Abilify to target mood and disorganized thoughts. Patient seen and evaluated for follow-up of depression, catatonia.  Chart reviewed. Case discussed with interdisciplinary team. No events reported overnight.  The patient is more interactive today, but continues to have significant thought latency and disorder.  She has difficulty expressing herself, talking around answers, giving oddly worded answers, and appears guarded.  She admits to some depression, no SI.  Behavior has been well controlled.  No hallucinations.  The patient slept well.  The patient has tolerated Abilify with no EPS.

## 2023-07-12 NOTE — BH INPATIENT PSYCHIATRY PROGRESS NOTE - PRN MEDS
MEDICATIONS  (PRN):  acetaminophen     Tablet .. 650 milliGRAM(s) Oral every 6 hours PRN Mild Pain (1 - 3), Moderate Pain (4 - 6), Severe Pain (7 - 10)  calcium carbonate    500 mG (Tums) Chewable 1 Tablet(s) Chew three times a day PRN Dyspepsia  LORazepam     Tablet 2 milliGRAM(s) Oral every 6 hours PRN agitation  LORazepam   Injectable 2 milliGRAM(s) IntraMuscular once PRN severe agitation  ondansetron    Tablet 4 milliGRAM(s) Oral every 6 hours PRN Nausea and/or Vomiting  traZODone 50 milliGRAM(s) Oral at bedtime PRN insomnia   MEDICATIONS  (PRN):  acetaminophen     Tablet .. 650 milliGRAM(s) Oral every 6 hours PRN Mild Pain (1 - 3), Moderate Pain (4 - 6), Severe Pain (7 - 10)  calcium carbonate    500 mG (Tums) Chewable 1 Tablet(s) Chew three times a day PRN Dyspepsia  hydrOXYzine hydrochloride 25 milliGRAM(s) Oral every 6 hours PRN anxiety  LORazepam     Tablet 2 milliGRAM(s) Oral every 6 hours PRN agitation  LORazepam   Injectable 2 milliGRAM(s) IntraMuscular once PRN severe agitation  ondansetron    Tablet 4 milliGRAM(s) Oral every 6 hours PRN Nausea and/or Vomiting  traZODone 50 milliGRAM(s) Oral at bedtime PRN insomnia

## 2023-07-12 NOTE — BH INPATIENT PSYCHIATRY PROGRESS NOTE - NSBHCHARTREVIEWVS_PSY_A_CORE FT
Vital Signs Last 24 Hrs  T(C): 36.7 (07-13-23 @ 07:36), Max: 36.7 (07-13-23 @ 07:36)  T(F): 98.1 (07-13-23 @ 07:36), Max: 98.1 (07-13-23 @ 07:36)  HR: --  BP: --  BP(mean): --  RR: 18 (07-13-23 @ 07:36) (18 - 18)  SpO2: 99% (07-13-23 @ 07:36) (99% - 99%)    Orthostatic VS  07-13-23 @ 07:36  Lying BP: --/-- HR: --  Sitting BP: 139/90 HR: 104  Standing BP: 129/95 HR: 108  Site: --  Mode: --  Orthostatic VS  07-12-23 @ 07:39  Lying BP: --/-- HR: --  Sitting BP: 135/89 HR: 101  Standing BP: 127/86 HR: 107  Site: --  Mode: --   Vital Signs Last 24 Hrs  T(C): 36.6 (07-16-23 @ 07:28), Max: 36.6 (07-16-23 @ 07:28)  T(F): 97.9 (07-16-23 @ 07:28), Max: 97.9 (07-16-23 @ 07:28)  HR: --  BP: --  BP(mean): --  RR: 18 (07-16-23 @ 07:28) (18 - 18)  SpO2: --    Orthostatic VS  07-16-23 @ 07:28  Lying BP: --/-- HR: --  Sitting BP: 128/79 HR: 89  Standing BP: 118/84 HR: 95  Site: --  Mode: --  Orthostatic VS  07-15-23 @ 07:12  Lying BP: --/-- HR: --  Sitting BP: 130/90 HR: 97  Standing BP: 130/91 HR: 100  Site: --  Mode: --

## 2023-07-12 NOTE — BH INPATIENT PSYCHIATRY PROGRESS NOTE - NSBHASSESSSUMMFT_PSY_ALL_CORE
Blanquita is a 38yo female, domiciled with mother; unemployed; no dependents, past psychiatric history of psychotic depression with catatonia and post-traumatic stress disorder, one prior psych admission Nov 2021, follows at [facility] outpatient, no hx suicide attempt, hx SIB by cutting, no known hx of violence, no known substance use, hx trauma, BIB EMS activated by mother with concern for catatonia.    Working Dx MDD, recurrent episode, with severe catatonia.    Today, the patient is speaking more, but with continued symptoms of catatonia.  Noted latency and disorganization of thoughts.  Tolerating medications well.  Will add low dose Abilify for thoughts and mood, with plan to slowly titrate while watching for worsening of catatonia.    Plan:  1.	Legal: continue 9.39 involuntary hospitalization  2.	Safety: routine obs appropriate as pt denies passive and active SI and is able to commit to safety on the unit  -Continue ativan 1 mg PO/IM for catatonia  3.	Psychiatric:  -continue lamictal 100 mg for anxiety and depression  -continue venlafaxine  mg for anxiety and depression  -Abilify 2mg daily for mood and disorganization  -trazodone 50 mg PRN insomnia  4.	I/G/M therapy with DBT focus  5.	Medical: None  6.	Collateral/Dispo:   -dispo when stable Blanquita is a 36yo female, domiciled with mother; unemployed; no dependents, past psychiatric history of psychotic depression with catatonia and post-traumatic stress disorder, one prior psych admission Nov 2021, follows at [facility] outpatient, no hx suicide attempt, hx SIB by cutting, no known hx of violence, no known substance use, hx trauma, BIB EMS activated by mother with concern for catatonia.    Working Dx MDD, recurrent episode, with severe catatonia.    Today, the patient is speaking more but noted disorganization of thoughts.  Tolerating medications well.  Pt requires continued inpatient psychiatric hospitalization for safety and stabilization.      Plan:  1.	Legal: continue 9.39 involuntary hospitalization  2.	Safety: routine obs appropriate as pt denies passive and active SI and is able to commit to safety on the unit  -Continue ativan 1 mg PO/IM for catatonia  3.	Psychiatric:  -continue lamictal 100 mg for anxiety and depression  -continue venlafaxine  mg for anxiety and depression  -Abilify 2mg daily for mood and disorganization  -trazodone 50 mg PRN insomnia  4.	I/G/M therapy with DBT focus  5.	Medical: None  6.	Collateral/Dispo:   -dispo when stable

## 2023-07-12 NOTE — BH INPATIENT PSYCHIATRY PROGRESS NOTE - CURRENT MEDICATION
MEDICATIONS  (STANDING):  lamoTRIgine 100 milliGRAM(s) Oral daily  LORazepam     Tablet 1 milliGRAM(s) Oral three times a day  venlafaxine  milliGRAM(s) Oral daily    MEDICATIONS  (PRN):  acetaminophen     Tablet .. 650 milliGRAM(s) Oral every 6 hours PRN Mild Pain (1 - 3), Moderate Pain (4 - 6), Severe Pain (7 - 10)  calcium carbonate    500 mG (Tums) Chewable 1 Tablet(s) Chew three times a day PRN Dyspepsia  LORazepam     Tablet 2 milliGRAM(s) Oral every 6 hours PRN agitation  LORazepam   Injectable 2 milliGRAM(s) IntraMuscular once PRN severe agitation  ondansetron    Tablet 4 milliGRAM(s) Oral every 6 hours PRN Nausea and/or Vomiting  traZODone 50 milliGRAM(s) Oral at bedtime PRN insomnia   MEDICATIONS  (STANDING):  ARIPiprazole 10 milliGRAM(s) Oral daily  lamoTRIgine 100 milliGRAM(s) Oral daily  LORazepam     Tablet 1 milliGRAM(s) Oral three times a day  venlafaxine  milliGRAM(s) Oral daily    MEDICATIONS  (PRN):  acetaminophen     Tablet .. 650 milliGRAM(s) Oral every 6 hours PRN Mild Pain (1 - 3), Moderate Pain (4 - 6), Severe Pain (7 - 10)  calcium carbonate    500 mG (Tums) Chewable 1 Tablet(s) Chew three times a day PRN Dyspepsia  hydrOXYzine hydrochloride 25 milliGRAM(s) Oral every 6 hours PRN anxiety  LORazepam     Tablet 2 milliGRAM(s) Oral every 6 hours PRN agitation  LORazepam   Injectable 2 milliGRAM(s) IntraMuscular once PRN severe agitation  ondansetron    Tablet 4 milliGRAM(s) Oral every 6 hours PRN Nausea and/or Vomiting  traZODone 50 milliGRAM(s) Oral at bedtime PRN insomnia

## 2023-07-13 PROCEDURE — 99231 SBSQ HOSP IP/OBS SF/LOW 25: CPT

## 2023-07-13 RX ORDER — ACETAMINOPHEN 500 MG
650 TABLET ORAL EVERY 6 HOURS
Refills: 0 | Status: DISCONTINUED | OUTPATIENT
Start: 2023-07-13 | End: 2023-08-01

## 2023-07-13 RX ORDER — ONDANSETRON 8 MG/1
4 TABLET, FILM COATED ORAL ONCE
Refills: 0 | Status: COMPLETED | OUTPATIENT
Start: 2023-07-13 | End: 2023-07-13

## 2023-07-13 RX ORDER — IBUPROFEN 200 MG
400 TABLET ORAL ONCE
Refills: 0 | Status: COMPLETED | OUTPATIENT
Start: 2023-07-13 | End: 2023-07-13

## 2023-07-13 RX ORDER — ARIPIPRAZOLE 15 MG/1
5 TABLET ORAL DAILY
Refills: 0 | Status: DISCONTINUED | OUTPATIENT
Start: 2023-07-14 | End: 2023-07-14

## 2023-07-13 RX ORDER — ONDANSETRON 8 MG/1
4 TABLET, FILM COATED ORAL EVERY 6 HOURS
Refills: 0 | Status: DISCONTINUED | OUTPATIENT
Start: 2023-07-13 | End: 2023-08-01

## 2023-07-13 RX ADMIN — ONDANSETRON 4 MILLIGRAM(S): 8 TABLET, FILM COATED ORAL at 15:58

## 2023-07-13 RX ADMIN — Medication 1 TABLET(S): at 08:39

## 2023-07-13 RX ADMIN — Medication 1 MILLIGRAM(S): at 12:07

## 2023-07-13 RX ADMIN — Medication 650 MILLIGRAM(S): at 11:36

## 2023-07-13 RX ADMIN — ARIPIPRAZOLE 2 MILLIGRAM(S): 15 TABLET ORAL at 08:05

## 2023-07-13 RX ADMIN — Medication 300 MILLIGRAM(S): at 08:05

## 2023-07-13 RX ADMIN — LAMOTRIGINE 100 MILLIGRAM(S): 25 TABLET, ORALLY DISINTEGRATING ORAL at 08:05

## 2023-07-13 RX ADMIN — Medication 1 MILLIGRAM(S): at 21:19

## 2023-07-13 RX ADMIN — Medication 1 MILLIGRAM(S): at 08:05

## 2023-07-13 RX ADMIN — Medication 400 MILLIGRAM(S): at 16:28

## 2023-07-13 RX ADMIN — Medication 650 MILLIGRAM(S): at 10:52

## 2023-07-13 NOTE — BH INPATIENT PSYCHIATRY PROGRESS NOTE - NSBHFUPINTERVALHXFT_PSY_A_CORE
Patient seen for follow-up of depression, catatonia.  Chart reviewed. Case discussed with interdisciplinary team. No events reported overnight.  The patient took medications starting yesterday afternoon.  She ate some yesterday, and ate  breakfast this morning.  The patient is more interactive today, but continues to have significant thought latency and disorder.  She has difficulty expressing herself, talking around answers, giving oddly worded answers, and sometimes becoming incoherent.  She admits to some depression, no SI.  Behavior has been well controlled.  No hallucinations.  The patient slept well.  After discussion, the patient was agreeable to starting Abilify to target mood and disorganized thoughts. Patient seen for follow-up of depression, catatonia.  Chart reviewed. Case discussed with interdisciplinary team. No events reported overnight.  The patient took medications with encouragement.  The patient is more interactive but continues to have significant thought latency and disorder.  She has difficulty expressing herself and does express paranoia.  She admits to some depression, no SI.  Behavior has been well controlled.  No hallucinations.  The patient slept well.  After discussion, the patient was agreeable to titration of Abilify to target paranoid and disorganized thoughts.

## 2023-07-13 NOTE — BH INPATIENT PSYCHIATRY PROGRESS NOTE - NSBHASSESSSUMMFT_PSY_ALL_CORE
Blanquita is a 36yo female, domiciled with mother; unemployed; no dependents, past psychiatric history of psychotic depression with catatonia and post-traumatic stress disorder, one prior psych admission Nov 2021, follows at [facility] outpatient, no hx suicide attempt, hx SIB by cutting, no known hx of violence, no known substance use, hx trauma, BIB EMS activated by mother with concern for catatonia.    Working Dx MDD, recurrent episode, with severe catatonia.    Today, the patient is speaking more, but with continued symptoms of catatonia.  Noted latency and disorganization of thoughts.  Tolerating medications well.  Will add low dose Abilify for thoughts and mood, with plan to slowly titrate while watching for worsening of catatonia.    Plan:  1.	Legal: continue 9.39 involuntary hospitalization  2.	Safety: routine obs appropriate as pt denies passive and active SI and is able to commit to safety on the unit  -Continue ativan 1 mg PO/IM for catatonia  3.	Psychiatric:  -continue lamictal 100 mg for anxiety and depression  -continue venlafaxine  mg for anxiety and depression  -Abilify 2mg daily for mood and disorganization  -trazodone 50 mg PRN insomnia  4.	I/G/M therapy with DBT focus  5.	Medical: None  6.	Collateral/Dispo:   -dispo when stable Blanquita is a 38yo female, domiciled with mother; unemployed; no dependents, past psychiatric history of psychotic depression with catatonia and post-traumatic stress disorder, one prior psych admission Nov 2021, follows at [facility] outpatient, no hx suicide attempt, hx SIB by cutting, no known hx of violence, no known substance use, hx trauma, BIB EMS activated by mother with concern for catatonia.    Working Dx MDD, recurrent episode, with severe catatonia.    Patient showing improvement as sx of catatonia improved but remains paranoid.  Noted latency and disorganization of thoughts.  Tolerating medications well.  Will titrate Abilify to 5mg for paranoid thoughts and mood.  Pt requires continued inpatient psychiatric hospitalization for safety and stabilization.    Plan:  1.	Legal: continue 9.39 involuntary hospitalization  2.	Safety: routine obs appropriate as pt denies passive and active SI and is able to commit to safety on the unit  -Continue ativan 1 mg PO TID for catatonia  3.	Psychiatric:  -continue lamictal 100 mg for anxiety and depression  -continue venlafaxine  mg for anxiety and depression  -increase Abilify from 2mg to 5mg daily for paranoia and disorganization  -trazodone 50 mg PRN insomnia  4.	I/G/M therapy with DBT focus  5.	Medical: None  6.	Collateral/Dispo:   -dispo when stable

## 2023-07-14 PROCEDURE — 99231 SBSQ HOSP IP/OBS SF/LOW 25: CPT

## 2023-07-14 RX ORDER — ARIPIPRAZOLE 15 MG/1
10 TABLET ORAL DAILY
Refills: 0 | Status: DISCONTINUED | OUTPATIENT
Start: 2023-07-15 | End: 2023-07-17

## 2023-07-14 RX ADMIN — Medication 1 MILLIGRAM(S): at 13:23

## 2023-07-14 RX ADMIN — Medication 1 MILLIGRAM(S): at 08:53

## 2023-07-14 RX ADMIN — Medication 2 MILLIGRAM(S): at 01:08

## 2023-07-14 RX ADMIN — Medication 300 MILLIGRAM(S): at 08:53

## 2023-07-14 RX ADMIN — Medication 650 MILLIGRAM(S): at 20:22

## 2023-07-14 RX ADMIN — Medication 1 MILLIGRAM(S): at 20:22

## 2023-07-14 RX ADMIN — LAMOTRIGINE 100 MILLIGRAM(S): 25 TABLET, ORALLY DISINTEGRATING ORAL at 08:53

## 2023-07-14 RX ADMIN — ARIPIPRAZOLE 5 MILLIGRAM(S): 15 TABLET ORAL at 08:53

## 2023-07-14 NOTE — BH INPATIENT PSYCHIATRY PROGRESS NOTE - MSE UNSTRUCTURED FT
The patient appears stated age, with fair hygiene, and is dressed appropriately.  She was calm, makes attempts to be cooperative with the interview.  She maintained appropriate eye contact.  Mild slowing of movements observed.  Gait steady.  The patient's speech was fluent, normal in tone, decreased rate, and soft in volume.  Her mood is "depressed."  Affect is constricted, stable, appropriate.  The patient's thoughts are noted with latency, tangential, and incoherent at times.  She denies any clear delusions or hallucinations.  No suicidal or homicidal ideation, intent, or plans.  Insight is partial.  Judgment is impaired.  Impulse control has been fair on the unit. The patient appears stated age, with fair hygiene, and is dressed appropriately.  She was calm, makes attempts to be cooperative with the interview.  She maintained appropriate eye contact.  Mild slowing of movements observed.  Gait steady.  The patient's speech was fluent, normal in tone, decreased rate, and soft in volume.  Her mood is "depressed."  Affect is constricted, stable, appropriate.  The patient's thoughts are noted with latency, tangential, and incoherent at times.  She has paranoid delusions, denies A/V hallucinations.  No suicidal or homicidal ideation, intent, or plans.  Insight is partial.  Judgment is impaired.  Impulse control has been fair on the unit.

## 2023-07-14 NOTE — BH INPATIENT PSYCHIATRY PROGRESS NOTE - CURRENT MEDICATION
MEDICATIONS  (STANDING):  ARIPiprazole 10 milliGRAM(s) Oral daily  lamoTRIgine 100 milliGRAM(s) Oral daily  LORazepam     Tablet 1 milliGRAM(s) Oral three times a day  venlafaxine  milliGRAM(s) Oral daily    MEDICATIONS  (PRN):  acetaminophen     Tablet .. 650 milliGRAM(s) Oral every 6 hours PRN Mild Pain (1 - 3), Moderate Pain (4 - 6), Severe Pain (7 - 10)  calcium carbonate    500 mG (Tums) Chewable 1 Tablet(s) Chew three times a day PRN Dyspepsia  LORazepam     Tablet 2 milliGRAM(s) Oral every 6 hours PRN agitation  LORazepam   Injectable 2 milliGRAM(s) IntraMuscular once PRN severe agitation  ondansetron    Tablet 4 milliGRAM(s) Oral every 6 hours PRN Nausea and/or Vomiting  traZODone 50 milliGRAM(s) Oral at bedtime PRN insomnia   MEDICATIONS  (STANDING):  ARIPiprazole 10 milliGRAM(s) Oral daily  lamoTRIgine 100 milliGRAM(s) Oral daily  LORazepam     Tablet 1 milliGRAM(s) Oral three times a day  venlafaxine  milliGRAM(s) Oral daily    MEDICATIONS  (PRN):  acetaminophen     Tablet .. 650 milliGRAM(s) Oral every 6 hours PRN Mild Pain (1 - 3), Moderate Pain (4 - 6), Severe Pain (7 - 10)  calcium carbonate    500 mG (Tums) Chewable 1 Tablet(s) Chew three times a day PRN Dyspepsia  hydrOXYzine hydrochloride 25 milliGRAM(s) Oral every 6 hours PRN anxiety  LORazepam     Tablet 2 milliGRAM(s) Oral every 6 hours PRN agitation  LORazepam   Injectable 2 milliGRAM(s) IntraMuscular once PRN severe agitation  ondansetron    Tablet 4 milliGRAM(s) Oral every 6 hours PRN Nausea and/or Vomiting  traZODone 50 milliGRAM(s) Oral at bedtime PRN insomnia

## 2023-07-14 NOTE — BH INPATIENT PSYCHIATRY PROGRESS NOTE - NSBHASSESSSUMMFT_PSY_ALL_CORE
Blanquita is a 36yo female, domiciled with mother; unemployed; no dependents, past psychiatric history of psychotic depression with catatonia and post-traumatic stress disorder, one prior psych admission Nov 2021, follows at [facility] outpatient, no hx suicide attempt, hx SIB by cutting, no known hx of violence, no known substance use, hx trauma, BIB EMS activated by mother with concern for catatonia.    Working Dx MDD, recurrent episode, with severe catatonia.    Today, the patient is speaking more, but with continued symptoms of catatonia.  Noted latency and disorganization of thoughts.  Tolerating medications well.  Will add low dose Abilify for thoughts and mood, with plan to slowly titrate while watching for worsening of catatonia.    Plan:  1.	Legal: continue 9.39 involuntary hospitalization  2.	Safety: routine obs appropriate as pt denies passive and active SI and is able to commit to safety on the unit  -Continue ativan 1 mg PO/IM for catatonia  3.	Psychiatric:  -continue lamictal 100 mg for anxiety and depression  -continue venlafaxine  mg for anxiety and depression  -Abilify 2mg daily for mood and disorganization  -trazodone 50 mg PRN insomnia  4.	I/G/M therapy with DBT focus  5.	Medical: None  6.	Collateral/Dispo:   -dispo when stable Blanquita is a 36yo female, domiciled with mother; unemployed; no dependents, past psychiatric history of psychotic depression with catatonia and post-traumatic stress disorder, one prior psych admission Nov 2021, follows at [facility] outpatient, no hx suicide attempt, hx SIB by cutting, no known hx of violence, no known substance use, hx trauma, BIB EMS activated by mother with concern for catatonia.    Working Dx MDD, recurrent episode, with severe catatonia.    Patient showing improvement as sx of catatonia improved but remains paranoid.  Noted latency and disorganization of thoughts.  Tolerating medications well.  Will titrate Abilify to 10mg for paranoid thoughts and mood.  Pt requires continued inpatient psychiatric hospitalization for safety and stabilization.    Plan:  1.	Legal: continue 9.39 involuntary hospitalization  2.	Safety: routine obs appropriate as pt denies passive and active SI and is able to commit to safety on the unit  -Continue ativan 1 mg PO TID for catatonia  3.	Psychiatric:  -continue lamictal 100 mg for anxiety and depression  -continue venlafaxine  mg for anxiety and depression  -increase Abilify to 10mg daily for mood and disorganization  -trazodone 50 mg PRN insomnia  4.	I/G/M therapy with DBT focus  5.	Medical: None  6.	Collateral/Dispo:   -dispo when stable

## 2023-07-14 NOTE — BH INPATIENT PSYCHIATRY PROGRESS NOTE - NSBHPROGMEDSE_PSY_A_CORE FT
Nausea and headaches (unclear frequency, location, etiology).

## 2023-07-14 NOTE — BH INPATIENT PSYCHIATRY PROGRESS NOTE - NSBHCHARTREVIEWVS_PSY_A_CORE FT
Vital Signs Last 24 Hrs  T(C): 36.4 (07-14-23 @ 07:35), Max: 36.4 (07-14-23 @ 07:35)  T(F): 97.6 (07-14-23 @ 07:35), Max: 97.6 (07-14-23 @ 07:35)  HR: --  BP: --  BP(mean): --  RR: 18 (07-14-23 @ 07:35) (18 - 18)  SpO2: 99% (07-14-23 @ 07:35) (99% - 99%)    Orthostatic VS  07-14-23 @ 07:35  Lying BP: --/-- HR: --  Sitting BP: 129/89 HR: 98  Standing BP: 118/80 HR: 100  Site: --  Mode: --  Orthostatic VS  07-13-23 @ 07:36  Lying BP: --/-- HR: --  Sitting BP: 139/90 HR: 104  Standing BP: 129/95 HR: 108  Site: --  Mode: --   Vital Signs Last 24 Hrs  T(C): 36.6 (07-16-23 @ 07:28), Max: 36.6 (07-16-23 @ 07:28)  T(F): 97.9 (07-16-23 @ 07:28), Max: 97.9 (07-16-23 @ 07:28)  HR: --  BP: --  BP(mean): --  RR: 18 (07-16-23 @ 07:28) (18 - 18)  SpO2: --    Orthostatic VS  07-16-23 @ 07:28  Lying BP: --/-- HR: --  Sitting BP: 128/79 HR: 89  Standing BP: 118/84 HR: 95  Site: --  Mode: --  Orthostatic VS  07-15-23 @ 07:12  Lying BP: --/-- HR: --  Sitting BP: 130/90 HR: 97  Standing BP: 130/91 HR: 100  Site: --  Mode: --

## 2023-07-14 NOTE — BH INPATIENT PSYCHIATRY PROGRESS NOTE - NSBHFUPINTERVALHXFT_PSY_A_CORE
Patient seen for follow-up of depression, catatonia.  Chart reviewed. Case discussed with interdisciplinary team. No events reported overnight.  The patient took medications starting yesterday afternoon.  She ate some yesterday, and ate  breakfast this morning.  The patient is more interactive today, but continues to have significant thought latency and disorder.  She has difficulty expressing herself, talking around answers, giving oddly worded answers, and sometimes becoming incoherent.  She admits to some depression, no SI.  Behavior has been well controlled.  No hallucinations.  The patient slept well.  After discussion, the patient was agreeable to starting Abilify to target mood and disorganized thoughts. Patient seen for follow-up of depression, catatonia.  Chart reviewed. Case discussed with interdisciplinary team. No events reported overnight.  The patient taking medications and c/o paranoia.  The patient is more interactive today, but continues to have significant thought latency and appears suspicious.  She has difficulty expressing herself, talking around answers, giving oddly worded answers.  She admits to some depression, no SI.  Behavior has been well controlled.  No hallucinations.  The patient slept well and fair appetite, improved.  The patient is agreeable to titration of Abilify to target paranoia and disorganized thoughts.  No side effects, no EPS.

## 2023-07-14 NOTE — BH INPATIENT PSYCHIATRY PROGRESS NOTE - NSBHMETABOLIC_PSY_ALL_CORE_FT
BMI: BMI (kg/m2): 19.2 (07-14-23 @ 07:35)  HbA1c: A1C with Estimated Average Glucose Result: 5.0 % (07-08-23 @ 11:45)    Glucose:   BP: --  Lipid Panel: Date/Time: 07-09-23 @ 10:15  Cholesterol, Serum: 190  Direct LDL: --  HDL Cholesterol, Serum: 58  Total Cholesterol/HDL Ration Measurement: --  Triglycerides, Serum: 58

## 2023-07-15 RX ADMIN — ARIPIPRAZOLE 10 MILLIGRAM(S): 15 TABLET ORAL at 08:07

## 2023-07-15 RX ADMIN — LAMOTRIGINE 100 MILLIGRAM(S): 25 TABLET, ORALLY DISINTEGRATING ORAL at 08:07

## 2023-07-15 RX ADMIN — Medication 1 MILLIGRAM(S): at 12:02

## 2023-07-15 RX ADMIN — Medication 1 MILLIGRAM(S): at 08:07

## 2023-07-15 RX ADMIN — Medication 650 MILLIGRAM(S): at 23:14

## 2023-07-15 RX ADMIN — Medication 300 MILLIGRAM(S): at 08:07

## 2023-07-15 RX ADMIN — Medication 1 MILLIGRAM(S): at 20:34

## 2023-07-16 RX ORDER — HYDROXYZINE HCL 10 MG
25 TABLET ORAL EVERY 6 HOURS
Refills: 0 | Status: DISCONTINUED | OUTPATIENT
Start: 2023-07-16 | End: 2023-08-01

## 2023-07-16 RX ADMIN — Medication 50 MILLIGRAM(S): at 23:45

## 2023-07-16 RX ADMIN — Medication 300 MILLIGRAM(S): at 09:08

## 2023-07-16 RX ADMIN — ARIPIPRAZOLE 10 MILLIGRAM(S): 15 TABLET ORAL at 09:07

## 2023-07-16 RX ADMIN — Medication 650 MILLIGRAM(S): at 11:35

## 2023-07-16 RX ADMIN — Medication 650 MILLIGRAM(S): at 18:47

## 2023-07-16 RX ADMIN — Medication 1 MILLIGRAM(S): at 09:07

## 2023-07-16 RX ADMIN — LAMOTRIGINE 100 MILLIGRAM(S): 25 TABLET, ORALLY DISINTEGRATING ORAL at 09:07

## 2023-07-16 RX ADMIN — Medication 25 MILLIGRAM(S): at 14:13

## 2023-07-16 RX ADMIN — Medication 1 MILLIGRAM(S): at 13:02

## 2023-07-16 RX ADMIN — Medication 1 MILLIGRAM(S): at 20:48

## 2023-07-17 PROCEDURE — 99232 SBSQ HOSP IP/OBS MODERATE 35: CPT

## 2023-07-17 RX ORDER — MULTIVIT-MIN/FERROUS GLUCONATE 9 MG/15 ML
1 LIQUID (ML) ORAL DAILY
Refills: 0 | Status: DISCONTINUED | OUTPATIENT
Start: 2023-07-17 | End: 2023-08-01

## 2023-07-17 RX ORDER — ARIPIPRAZOLE 15 MG/1
15 TABLET ORAL DAILY
Refills: 0 | Status: DISCONTINUED | OUTPATIENT
Start: 2023-07-17 | End: 2023-07-21

## 2023-07-17 RX ADMIN — ARIPIPRAZOLE 10 MILLIGRAM(S): 15 TABLET ORAL at 08:04

## 2023-07-17 RX ADMIN — Medication 1 MILLIGRAM(S): at 20:09

## 2023-07-17 RX ADMIN — Medication 2 MILLIGRAM(S): at 20:59

## 2023-07-17 RX ADMIN — Medication 25 MILLIGRAM(S): at 14:52

## 2023-07-17 RX ADMIN — Medication 650 MILLIGRAM(S): at 22:41

## 2023-07-17 RX ADMIN — LAMOTRIGINE 100 MILLIGRAM(S): 25 TABLET, ORALLY DISINTEGRATING ORAL at 08:04

## 2023-07-17 RX ADMIN — Medication 650 MILLIGRAM(S): at 02:22

## 2023-07-17 RX ADMIN — Medication 300 MILLIGRAM(S): at 08:05

## 2023-07-17 RX ADMIN — Medication 1 MILLIGRAM(S): at 08:05

## 2023-07-17 RX ADMIN — Medication 650 MILLIGRAM(S): at 23:00

## 2023-07-17 RX ADMIN — Medication 25 MILLIGRAM(S): at 23:01

## 2023-07-17 RX ADMIN — Medication 1 MILLIGRAM(S): at 12:25

## 2023-07-17 NOTE — BH INPATIENT PSYCHIATRY PROGRESS NOTE - MSE UNSTRUCTURED FT
The patient appears stated age, with fair hygiene, and is dressed appropriately.  She was calm, makes attempts to be cooperative with the interview.  She maintained appropriate eye contact.  No PMA or PMR noted.  Gait steady.  The patient's speech was fluent, normal in tone, slightly decreased rate, and soft in volume.  Her mood is "depressed, but functioning better."  Affect is constricted, stable, appropriate.  The patient's thoughts are noted with less latency, less tangential, more linear.  She has paranoid delusions but did not want to elaborate today, denies A/V hallucinations.  No suicidal or homicidal ideation, intent, or plans.  Insight is partial.  Judgment is impaired.  Impulse control has been fair on the unit.

## 2023-07-17 NOTE — BH INPATIENT PSYCHIATRY PROGRESS NOTE - NSBHFUPINTERVALHXFT_PSY_A_CORE
Patient seen for follow-up of depression, catatonia.  Chart reviewed. Case discussed with interdisciplinary team. Staff reports pt somatically preoccupied and reported throwing up once yesterday. Pt observed to attend group therapy today and on approach, pt stated that she feels more "like I'm functioning again." Pt agrees to further increase in abilify to help with sxs but expresses concern that abilify is making her eat more. Pt reports some eating d/o history and feels abilify is making her reach for more unhealthy options. Nutrition discussed and pt agreed to nutrition consult to speak about meal choices. The patient is  taking medications and c/o paranoia but would not elaborate on this today, "Can I tell you tomorrow?" as lunch came on the unit and she did not want her lunch to be left on the side. The patient is more interactive today than previously reported. Speech is fluid though pt somewhat guarded at times.  She admits to some continued depression, no SI.  Behavior has been well controlled.  No hallucinations.  The patient slept well and fair appetite, improved.  The patient is agreeable to titration of Abilify to target paranoia and disorganized thoughts.  No side effects, no EPS.

## 2023-07-17 NOTE — BH INPATIENT PSYCHIATRY PROGRESS NOTE - CURRENT MEDICATION
MEDICATIONS  (STANDING):  ARIPiprazole 15 milliGRAM(s) Oral daily  lamoTRIgine 100 milliGRAM(s) Oral daily  LORazepam     Tablet 1 milliGRAM(s) Oral three times a day  multivitamin/minerals 1 Tablet(s) Oral daily  venlafaxine  milliGRAM(s) Oral daily    MEDICATIONS  (PRN):  acetaminophen     Tablet .. 650 milliGRAM(s) Oral every 6 hours PRN Mild Pain (1 - 3), Moderate Pain (4 - 6), Severe Pain (7 - 10)  calcium carbonate    500 mG (Tums) Chewable 1 Tablet(s) Chew three times a day PRN Dyspepsia  hydrOXYzine hydrochloride 25 milliGRAM(s) Oral every 6 hours PRN anxiety  LORazepam     Tablet 2 milliGRAM(s) Oral every 6 hours PRN agitation  LORazepam   Injectable 2 milliGRAM(s) IntraMuscular once PRN severe agitation  ondansetron    Tablet 4 milliGRAM(s) Oral every 6 hours PRN Nausea and/or Vomiting  traZODone 50 milliGRAM(s) Oral at bedtime PRN insomnia

## 2023-07-17 NOTE — BH INPATIENT PSYCHIATRY PROGRESS NOTE - PRN MEDS
MEDICATIONS  (PRN):  acetaminophen     Tablet .. 650 milliGRAM(s) Oral every 6 hours PRN Mild Pain (1 - 3), Moderate Pain (4 - 6), Severe Pain (7 - 10)  calcium carbonate    500 mG (Tums) Chewable 1 Tablet(s) Chew three times a day PRN Dyspepsia  hydrOXYzine hydrochloride 25 milliGRAM(s) Oral every 6 hours PRN anxiety  LORazepam     Tablet 2 milliGRAM(s) Oral every 6 hours PRN agitation  LORazepam   Injectable 2 milliGRAM(s) IntraMuscular once PRN severe agitation  ondansetron    Tablet 4 milliGRAM(s) Oral every 6 hours PRN Nausea and/or Vomiting  traZODone 50 milliGRAM(s) Oral at bedtime PRN insomnia

## 2023-07-17 NOTE — BH INPATIENT PSYCHIATRY PROGRESS NOTE - NSBHCHARTREVIEWVS_PSY_A_CORE FT
Vital Signs Last 24 Hrs  T(C): 36.8 (07-17-23 @ 07:34), Max: 36.8 (07-17-23 @ 07:34)  T(F): 98.2 (07-17-23 @ 07:34), Max: 98.2 (07-17-23 @ 07:34)  HR: --  BP: --  BP(mean): --  RR: 18 (07-17-23 @ 07:34) (18 - 18)  SpO2: --    Orthostatic VS  07-17-23 @ 07:34  Lying BP: --/-- HR: --  Sitting BP: 124/80 HR: 98  Standing BP: 120/82 HR: 104  Site: --  Mode: --  Orthostatic VS  07-16-23 @ 07:28  Lying BP: --/-- HR: --  Sitting BP: 128/79 HR: 89  Standing BP: 118/84 HR: 95  Site: --  Mode: --

## 2023-07-17 NOTE — BH INPATIENT PSYCHIATRY PROGRESS NOTE - NSBHASSESSSUMMFT_PSY_ALL_CORE
Blanquita is a 36yo female, domiciled with mother; unemployed; no dependents, past psychiatric history of psychotic depression with catatonia and post-traumatic stress disorder, one prior psych admission Nov 2021, follows at [facility] outpatient, no hx suicide attempt, hx SIB by cutting, no known hx of violence, no known substance use, hx trauma, BIB EMS activated by mother with concern for catatonia.    Working Dx MDD, recurrent episode, with severe catatonia.    Patient with sx of catatonia improved but remains paranoid, declined to elaborate today.  Thoughts appear more linear, slight pauses, but over all fluid conversation.  Tolerating medications well.  Will titrate Abilify to 15mg for paranoid thoughts and mood.  Pt requires continued inpatient psychiatric hospitalization for safety and stabilization.    Plan:  1.	Legal: continue 9.39 involuntary hospitalization  2.	Safety: routine obs appropriate as pt denies passive and active SI and is able to commit to safety on the unit  -Continue ativan 1 mg PO TID for catatonia  3.	Psychiatric:  -continue lamictal 100 mg for anxiety and depression  -continue venlafaxine  mg for anxiety and depression  -increase Abilify to 10mg daily for mood and disorganization  -trazodone 50 mg PRN insomnia  4.	I/G/M therapy with DBT focus  5.	Medical: None  6.	Collateral/Dispo:   -dispo when stable

## 2023-07-18 PROCEDURE — 99232 SBSQ HOSP IP/OBS MODERATE 35: CPT

## 2023-07-18 RX ADMIN — LAMOTRIGINE 100 MILLIGRAM(S): 25 TABLET, ORALLY DISINTEGRATING ORAL at 08:10

## 2023-07-18 RX ADMIN — Medication 300 MILLIGRAM(S): at 08:10

## 2023-07-18 RX ADMIN — Medication 1 MILLIGRAM(S): at 20:08

## 2023-07-18 RX ADMIN — Medication 2 MILLIGRAM(S): at 21:29

## 2023-07-18 RX ADMIN — ARIPIPRAZOLE 15 MILLIGRAM(S): 15 TABLET ORAL at 08:09

## 2023-07-18 RX ADMIN — Medication 25 MILLIGRAM(S): at 11:46

## 2023-07-18 RX ADMIN — Medication 25 MILLIGRAM(S): at 22:20

## 2023-07-18 RX ADMIN — Medication 2 MILLIGRAM(S): at 15:54

## 2023-07-18 RX ADMIN — Medication 1 TABLET(S): at 08:09

## 2023-07-18 RX ADMIN — Medication 1 MILLIGRAM(S): at 08:10

## 2023-07-18 RX ADMIN — Medication 1 MILLIGRAM(S): at 12:48

## 2023-07-18 RX ADMIN — Medication 650 MILLIGRAM(S): at 20:36

## 2023-07-18 RX ADMIN — Medication 650 MILLIGRAM(S): at 14:11

## 2023-07-18 NOTE — BH INPATIENT PSYCHIATRY PROGRESS NOTE - NSBHCHARTREVIEWVS_PSY_A_CORE FT
Vital Signs Last 24 Hrs  T(C): 36.5 (07-18-23 @ 07:29), Max: 36.5 (07-18-23 @ 07:29)  T(F): 97.7 (07-18-23 @ 07:29), Max: 97.7 (07-18-23 @ 07:29)  HR: --  BP: --  BP(mean): --  RR: 19 (07-18-23 @ 07:29) (19 - 19)  SpO2: --    Orthostatic VS  07-18-23 @ 07:29  Lying BP: --/-- HR: --  Sitting BP: 127/81 HR: 102  Standing BP: 125/85 HR: 112  Site: --  Mode: --  Orthostatic VS  07-17-23 @ 07:34  Lying BP: --/-- HR: --  Sitting BP: 124/80 HR: 98  Standing BP: 120/82 HR: 104  Site: --  Mode: --

## 2023-07-18 NOTE — BH INPATIENT PSYCHIATRY PROGRESS NOTE - MSE UNSTRUCTURED FT
The patient appears stated age, with fair hygiene, and is dressed appropriately.  She was calm, makes attempts to be cooperative with the interview.  She maintained appropriate eye contact.  No PMA or PMR noted.  Gait steady.  The patient's speech was fluent, normal in tone, slightly decreased rate, and soft in volume.  Her mood is "anxious."  Affect is constricted, stable, appropriate.  The patient's thoughts are noted with less latency, less tangential, more linear.  She has paranoia and appears hypervigilant at times, denies A/V hallucinations.  No suicidal or homicidal ideation, intent, or plans.  Insight is partial.  Judgment is impaired.  Impulse control has been fair on the unit.

## 2023-07-18 NOTE — BH INPATIENT PSYCHIATRY PROGRESS NOTE - NSBHFUPINTERVALHXFT_PSY_A_CORE
Patient seen for follow-up of depression, catatonia.  Chart reviewed. Case discussed with interdisciplinary team. Staff reports pt more anxious overnight and requested multiple PO PRNs for anxiety. Pt observed in her room, in bed on approach, pt stated that she had poor sleep overnight because "I was thinking about food." Nutrition consult placed; discussed strategies to help with carbohydrate cravings. Pt was receptive. The patient is more interactive today, more verbal. Pt does endorse increase in what she characterizes as "paranoia" around others but when she elaborates, appears more closely related to anxiety. Pt, however, appears more hypervigilant and staff reports pt startles easily.  She admits to some continued depression, no SI.  Behavior has been well controlled.  No hallucinations.  The patient is agreeable to titration of Abilify to target paranoia and disorganized thoughts.  No side effects, no EPS.

## 2023-07-18 NOTE — BH INPATIENT PSYCHIATRY PROGRESS NOTE - NSBHASSESSSUMMFT_PSY_ALL_CORE
Denisemary jane Felder   1/6/2017 9:00 AM   Anticoagulation Therapy Visit    Description:  32 year old female   Provider:  [facility]   Department:  [facility]           INR as of 1/6/2017     Selected INR 1.7! (1/6/2017)      Anticoagulation Summary as of 1/6/2017     INR goal 2.0-3.0   Selected INR 1.7! (1/6/2017)   Full instructions 7.5 mg on Mon, Fri; 5 mg all other days   Next INR check 1/13/2017    Indications   Deep vein thrombosis (DVT) (H) [I82.409] [I82.409]  Long-term (current) use of anticoagulants [Z79.01] [Z79.01]         Description     Start taking 7.5 mg Mondays and Fridays; 5 mg all other days.      Your next Anticoagulation Clinic appointment(s)     Jan 06, 2017  9:00 AM   Anticoagulation Visit with [facility]   [facility] ([facility]    [address] [id number] [phone]              Contact Numbers     Please call [phone] to cancel and/or reschedule your appointment.  Please call [phone] with any problems or questions regarding your therapy          January 2017 Details    Sun Mon Tue Wed Thu Fri Sat     1               2               3               4               5               6      7.5 mg   See details      7      5 mg           8      5 mg         9      7.5 mg         10      5 mg         11      5 mg         12      5 mg         13            14                 15               16               17               18               19               20               21                 22               23               24               25               26               27               28                 29               30               31                    Date Details   01/06 This INR check       Date of next INR:  1/13/2017         How to take your warfarin dose     To take:  5 mg Take 1 of the 5 mg tablets.    To take:  7.5 mg Take 1.5 of the 5 mg tablets.            Blanquita is a 38yo female, domiciled with mother; unemployed; no dependents, past psychiatric history of psychotic depression with catatonia and post-traumatic stress disorder, one prior psych admission Nov 2021, follows at [facility] outpatient, no hx suicide attempt, hx SIB by cutting, no known hx of violence, no known substance use, hx trauma, BIB EMS activated by mother with concern for catatonia.    Working Dx MDD, recurrent episode, with severe catatonia.    Patient with sx of catatonia improved but remains paranoid, hypervigilant, endorses increased anxiety.  Thoughts appear more linear, slight pauses, but over all fluid conversation.  Tolerating medications well.  Will c/w titrate Abilify to 15mg for paranoid thoughts and mood.  Pt requires continued inpatient psychiatric hospitalization for safety and stabilization.    Plan:  1.	Legal: continue 9.39 involuntary hospitalization  2.	Safety: routine obs appropriate as pt denies passive and active SI and is able to commit to safety on the unit  -Continue ativan 1 mg PO TID for catatonia  3.	Psychiatric:  -continue lamictal 100 mg for anxiety and depression  -continue venlafaxine  mg for anxiety and depression  -increase Abilify to 10mg daily for mood and disorganization  -trazodone 50 mg PRN insomnia  4.	I/G/M therapy with DBT focus  5.	Medical: None  6.	Collateral/Dispo:   -dispo when stable

## 2023-07-18 NOTE — DIETITIAN INITIAL EVALUATION ADULT - PERTINENT LABORATORY DATA
Glucose: 100 mg/dL (07.07.23 @ 17:34)     A1C with Estimated Average Glucose Result: 5.0 % (07-08-23 @ 11:45)    Lipid Panel: Date/Time: 07-09-23 @ 10:15  Cholesterol, Serum: 190  Direct LDL: --  HDL Cholesterol, Serum: 58  Total Cholesterol/HDL Ration Measurement: --  Triglycerides, Serum: 58

## 2023-07-18 NOTE — DIETITIAN INITIAL EVALUATION ADULT - ADD RECOMMEND
c/w daily Multivitamin w/ minerals for micronutrient coverage per MD order. Encourage adequate po intake as tolerated and honor food preferences PRN. Monitor PO intake/tolerance, weights, labs, BM's, and skin integrity.

## 2023-07-19 PROCEDURE — 90853 GROUP PSYCHOTHERAPY: CPT

## 2023-07-19 PROCEDURE — 99231 SBSQ HOSP IP/OBS SF/LOW 25: CPT

## 2023-07-19 RX ADMIN — ARIPIPRAZOLE 15 MILLIGRAM(S): 15 TABLET ORAL at 08:24

## 2023-07-19 RX ADMIN — Medication 650 MILLIGRAM(S): at 05:31

## 2023-07-19 RX ADMIN — Medication 1 MILLIGRAM(S): at 12:40

## 2023-07-19 RX ADMIN — Medication 1 TABLET(S): at 08:24

## 2023-07-19 RX ADMIN — Medication 300 MILLIGRAM(S): at 08:25

## 2023-07-19 RX ADMIN — Medication 1 MILLIGRAM(S): at 17:55

## 2023-07-19 RX ADMIN — LAMOTRIGINE 100 MILLIGRAM(S): 25 TABLET, ORALLY DISINTEGRATING ORAL at 08:24

## 2023-07-19 RX ADMIN — Medication 2 MILLIGRAM(S): at 11:10

## 2023-07-19 RX ADMIN — Medication 1 MILLIGRAM(S): at 08:24

## 2023-07-19 RX ADMIN — Medication 2 MILLIGRAM(S): at 20:40

## 2023-07-19 NOTE — BH INPATIENT PSYCHIATRY PROGRESS NOTE - PRN MEDS
MEDICATIONS  (PRN):  acetaminophen     Tablet .. 650 milliGRAM(s) Oral every 6 hours PRN Mild Pain (1 - 3), Moderate Pain (4 - 6), Severe Pain (7 - 10)  calcium carbonate    500 mG (Tums) Chewable 1 Tablet(s) Chew three times a day PRN Dyspepsia  hydrOXYzine hydrochloride 25 milliGRAM(s) Oral every 6 hours PRN anxiety  LORazepam     Tablet 1 milliGRAM(s) Oral every 6 hours PRN agitation/severe anxiety  LORazepam   Injectable 2 milliGRAM(s) IntraMuscular once PRN severe agitation  ondansetron    Tablet 4 milliGRAM(s) Oral every 6 hours PRN Nausea and/or Vomiting  traZODone 50 milliGRAM(s) Oral at bedtime PRN insomnia   MEDICATIONS  (PRN):  acetaminophen     Tablet .. 650 milliGRAM(s) Oral every 6 hours PRN Mild Pain (1 - 3), Moderate Pain (4 - 6), Severe Pain (7 - 10)  calcium carbonate    500 mG (Tums) Chewable 1 Tablet(s) Chew three times a day PRN Dyspepsia  clonazePAM  Tablet 0.5 milliGRAM(s) Oral at bedtime PRN insomnia  hydrOXYzine hydrochloride 25 milliGRAM(s) Oral every 6 hours PRN anxiety  lidocaine   4% Patch 1 Patch Transdermal every 24 hours PRN neck pain  LORazepam     Tablet 1 milliGRAM(s) Oral every 6 hours PRN agitation/severe anxiety  LORazepam   Injectable 2 milliGRAM(s) IntraMuscular once PRN severe agitation  ondansetron    Tablet 4 milliGRAM(s) Oral every 6 hours PRN Nausea and/or Vomiting  traZODone 50 milliGRAM(s) Oral at bedtime PRN insomnia

## 2023-07-19 NOTE — BH INPATIENT PSYCHIATRY PROGRESS NOTE - NSBHFUPINTERVALHXFT_PSY_A_CORE
Patient seen for follow-up of depression, catatonia.  Chart reviewed. Case discussed with interdisciplinary team. Staff reports pt more anxious overnight and requested multiple PO PRNs for anxiety. Pt observed in her room, in bed on approach, pt stated that she had poor sleep overnight because "I was thinking about food." Nutrition consult placed; discussed strategies to help with carbohydrate cravings. Pt was receptive. The patient is more interactive today, more verbal. Pt does endorse increase in what she characterizes as "paranoia" around others but when she elaborates, appears more closely related to anxiety. Pt, however, appears more hypervigilant and staff reports pt startles easily.  She admits to some continued depression, no SI.  Behavior has been well controlled.  No hallucinations.  The patient is agreeable to titration of Abilify to target paranoia and disorganized thoughts.  No side effects, no EPS. Patient seen for follow-up of depression, catatonia.  Chart reviewed. Case discussed with interdisciplinary team. Staff reports anxious at night and requested PRNs for anxiety, reports requiring added Ativan 2mg for sleep. Pt observed visible on unit, more engaged and was receptive to med adjustments. The patient is more interactive today, more verbal. Pt does endorse anxiety but no overt paranoia or psychosis but does appear hypervigilant.  She admits to some continued depression, no SI.  Behavior has been well controlled.  No hallucinations.  The patient has tolerated Abilify with noted benefit used to treat paranoia and disorganized thoughts.  No side effects, no EPS.  Pt agrees to increase in standing Ativan HS dose to 2mg with goal to limit reliance on prns.

## 2023-07-19 NOTE — BH INPATIENT PSYCHIATRY PROGRESS NOTE - NSBHASSESSSUMMFT_PSY_ALL_CORE
Blanquita is a 36yo female, domiciled with mother; unemployed; no dependents, past psychiatric history of psychotic depression with catatonia and post-traumatic stress disorder, one prior psych admission Nov 2021, follows at [facility] outpatient, no hx suicide attempt, hx SIB by cutting, no known hx of violence, no known substance use, hx trauma, BIB EMS activated by mother with concern for catatonia.    Working Dx MDD, recurrent episode, with severe catatonia.    Patient with sx of catatonia improved but remains paranoid, hypervigilant, endorses increased anxiety.  Thoughts appear more linear, slight pauses, but over all fluid conversation.  Tolerating medications well.  Will c/w titrate Abilify to 15mg for paranoid thoughts and mood.  Pt requires continued inpatient psychiatric hospitalization for safety and stabilization.    Plan:  1.	Legal: continue 9.39 involuntary hospitalization  2.	Safety: routine obs appropriate as pt denies passive and active SI and is able to commit to safety on the unit  -Continue ativan 1 mg PO TID for catatonia  3.	Psychiatric:  -continue lamictal 100 mg for anxiety and depression  -continue venlafaxine  mg for anxiety and depression  -increase Abilify to 10mg daily for mood and disorganization  -trazodone 50 mg PRN insomnia  4.	I/G/M therapy with DBT focus  5.	Medical: None  6.	Collateral/Dispo:   -dispo when stable Blanquita is a 38yo female, domiciled with mother; unemployed; no dependents, past psychiatric history of psychotic depression with catatonia and post-traumatic stress disorder, one prior psych admission Nov 2021, follows at [facility] outpatient, no hx suicide attempt, hx SIB by cutting, no known hx of violence, no known substance use, hx trauma, BIB EMS activated by mother with concern for catatonia.    Working Dx MDD, recurrent episode, with severe catatonia.    Patient with sx of catatonia resolved but remains paranoid at times, hypervigilant, endorses anxiety.  Thoughts appear more linear, slight pauses, but over all fluid conversation.  Tolerating medications well.  Will increase Ativan to 2mg QHS and continue Abilify 15mg daily for paranoid thoughts and mood.  Pt requires continued inpatient psychiatric hospitalization for safety and stabilization.    Plan:  1.	Legal: continue 9.39 involuntary hospitalization  2.	Safety: routine obs appropriate as pt denies passive and active SI and is able to commit to safety on the unit  - Increase ativan to 4mg daily with 2mg QHS for insomnia and rx of catatonia  3.	Psychiatric:  -continue lamictal 100 mg for anxiety and depression  -continue venlafaxine  mg for anxiety and depression  -continue Abilify 15mg daily for mood and disorganization  -trazodone 50 mg PRN insomnia  4.	I/G/M therapy with DBT focus  5.	Medical: None  6.	Collateral/Dispo:   -dispo when stable Blanquita is a 36yo female, domiciled with mother; unemployed; no dependents, past psychiatric history of psychotic depression with catatonia and post-traumatic stress disorder, one prior psych admission Nov 2021, follows at [facility] outpatient, no hx suicide attempt, hx SIB by cutting, no known hx of violence, no known substance use, hx trauma, BIB EMS activated by mother with concern for catatonia.    Working Dx MDD, recurrent episode, with severe catatonia.    Patient with sx of catatonia resolved but remains paranoid at times, hypervigilant, endorses anxiety.  Thoughts appear more linear, slight pauses, but over all fluid conversation.  Tolerating medications well.  Will increase Ativan to 2mg QHS and continue Abilify 15mg daily for paranoid thoughts and mood.  Pt requires continued inpatient psychiatric hospitalization for safety and stabilization.    Plan:  1.	Legal: continue 9.39 involuntary hospitalization  2.	Safety: routine obs appropriate as pt denies passive and active SI and is able to commit to safety on the unit  - Increase ativan to 4mg daily (1mg q9am, 1mg q1pm) with 2mg QHS for insomnia and rx of catatonia  3.	Psychiatric:  -continue lamictal 100 mg for anxiety and depression  -continue venlafaxine  mg for anxiety and depression  -continue Abilify 15mg daily for mood and disorganization  -trazodone 50 mg PRN insomnia  4.	I/G/M therapy with DBT focus  5.	Medical: None  6.	Collateral/Dispo:   -dispo when stable

## 2023-07-19 NOTE — BH INPATIENT PSYCHIATRY PROGRESS NOTE - NSBHCHARTREVIEWVS_PSY_A_CORE FT
Vital Signs Last 24 Hrs  T(C): 36.8 (07-19-23 @ 07:21), Max: 36.8 (07-19-23 @ 07:21)  T(F): 98.2 (07-19-23 @ 07:21), Max: 98.2 (07-19-23 @ 07:21)  HR: --  BP: --  BP(mean): --  RR: 18 (07-19-23 @ 07:21) (18 - 18)  SpO2: 99% (07-19-23 @ 07:21) (99% - 99%)    Orthostatic VS  07-19-23 @ 07:21  Lying BP: --/-- HR: --  Sitting BP: 135/87 HR: 103  Standing BP: 130/83 HR: 100  Site: --  Mode: --  Orthostatic VS  07-18-23 @ 07:29  Lying BP: --/-- HR: --  Sitting BP: 127/81 HR: 102  Standing BP: 125/85 HR: 112  Site: --  Mode: --   Vital Signs Last 24 Hrs  T(C): 36.5 (07-25-23 @ 07:13), Max: 36.5 (07-25-23 @ 07:13)  T(F): 97.7 (07-25-23 @ 07:13), Max: 97.7 (07-25-23 @ 07:13)  HR: --  BP: --  BP(mean): --  RR: 18 (07-25-23 @ 07:13) (18 - 18)  SpO2: --    Orthostatic VS  07-25-23 @ 07:13  Lying BP: --/-- HR: --  Sitting BP: 118/66 HR: 88  Standing BP: 113/73 HR: 87  Site: --  Mode: --  Orthostatic VS  07-24-23 @ 08:52  Lying BP: --/-- HR: --  Sitting BP: 140/88 HR: 109  Standing BP: 127/76 HR: 107  Site: --  Mode: --

## 2023-07-19 NOTE — BH INPATIENT PSYCHIATRY PROGRESS NOTE - NSDCCRITERIA_PSY_ALL_CORE
When pt is no longer an acute or imminent risk of harm to self or others, and is able to care for self safely, pt may then be discharged.  When pt is no longer an acute or imminent risk of harm to self or others, and is able to care for self safely, pt may then be discharged.  CGI less than or equal to 3.

## 2023-07-19 NOTE — BH INPATIENT PSYCHIATRY PROGRESS NOTE - CURRENT MEDICATION
MEDICATIONS  (STANDING):  ARIPiprazole 15 milliGRAM(s) Oral daily  lamoTRIgine 100 milliGRAM(s) Oral daily  LORazepam     Tablet 2 milliGRAM(s) Oral at bedtime  multivitamin/minerals 1 Tablet(s) Oral daily  venlafaxine  milliGRAM(s) Oral daily    MEDICATIONS  (PRN):  acetaminophen     Tablet .. 650 milliGRAM(s) Oral every 6 hours PRN Mild Pain (1 - 3), Moderate Pain (4 - 6), Severe Pain (7 - 10)  calcium carbonate    500 mG (Tums) Chewable 1 Tablet(s) Chew three times a day PRN Dyspepsia  hydrOXYzine hydrochloride 25 milliGRAM(s) Oral every 6 hours PRN anxiety  LORazepam     Tablet 1 milliGRAM(s) Oral every 6 hours PRN agitation/severe anxiety  LORazepam   Injectable 2 milliGRAM(s) IntraMuscular once PRN severe agitation  ondansetron    Tablet 4 milliGRAM(s) Oral every 6 hours PRN Nausea and/or Vomiting  traZODone 50 milliGRAM(s) Oral at bedtime PRN insomnia   MEDICATIONS  (STANDING):  ARIPiprazole 20 milliGRAM(s) Oral daily  clonazePAM  Tablet 1 milliGRAM(s) Oral <User Schedule>  lamoTRIgine 100 milliGRAM(s) Oral daily  multivitamin/minerals 1 Tablet(s) Oral daily  venlafaxine  milliGRAM(s) Oral daily    MEDICATIONS  (PRN):  acetaminophen     Tablet .. 650 milliGRAM(s) Oral every 6 hours PRN Mild Pain (1 - 3), Moderate Pain (4 - 6), Severe Pain (7 - 10)  calcium carbonate    500 mG (Tums) Chewable 1 Tablet(s) Chew three times a day PRN Dyspepsia  clonazePAM  Tablet 0.5 milliGRAM(s) Oral at bedtime PRN insomnia  hydrOXYzine hydrochloride 25 milliGRAM(s) Oral every 6 hours PRN anxiety  lidocaine   4% Patch 1 Patch Transdermal every 24 hours PRN neck pain  LORazepam     Tablet 1 milliGRAM(s) Oral every 6 hours PRN agitation/severe anxiety  LORazepam   Injectable 2 milliGRAM(s) IntraMuscular once PRN severe agitation  ondansetron    Tablet 4 milliGRAM(s) Oral every 6 hours PRN Nausea and/or Vomiting  traZODone 50 milliGRAM(s) Oral at bedtime PRN insomnia

## 2023-07-20 PROCEDURE — 90853 GROUP PSYCHOTHERAPY: CPT

## 2023-07-20 PROCEDURE — 99231 SBSQ HOSP IP/OBS SF/LOW 25: CPT

## 2023-07-20 RX ADMIN — ARIPIPRAZOLE 15 MILLIGRAM(S): 15 TABLET ORAL at 08:23

## 2023-07-20 RX ADMIN — Medication 650 MILLIGRAM(S): at 22:40

## 2023-07-20 RX ADMIN — Medication 1 MILLIGRAM(S): at 11:17

## 2023-07-20 RX ADMIN — Medication 650 MILLIGRAM(S): at 23:40

## 2023-07-20 RX ADMIN — Medication 1 TABLET(S): at 08:23

## 2023-07-20 RX ADMIN — Medication 1 MILLIGRAM(S): at 17:48

## 2023-07-20 RX ADMIN — Medication 50 MILLIGRAM(S): at 22:40

## 2023-07-20 RX ADMIN — Medication 50 MILLIGRAM(S): at 00:33

## 2023-07-20 RX ADMIN — Medication 2 MILLIGRAM(S): at 20:21

## 2023-07-20 RX ADMIN — Medication 650 MILLIGRAM(S): at 16:38

## 2023-07-20 RX ADMIN — Medication 25 MILLIGRAM(S): at 16:39

## 2023-07-20 RX ADMIN — LAMOTRIGINE 100 MILLIGRAM(S): 25 TABLET, ORALLY DISINTEGRATING ORAL at 08:23

## 2023-07-20 RX ADMIN — Medication 300 MILLIGRAM(S): at 08:24

## 2023-07-20 NOTE — BH INPATIENT PSYCHIATRY PROGRESS NOTE - NSBHFUPINTERVALHXFT_PSY_A_CORE
Patient seen for follow-up of depression, catatonia.  Chart reviewed. Case discussed with interdisciplinary team. Staff reports pt more anxious overnight and requested multiple PO PRNs for anxiety. Pt observed in her room, in bed on approach, pt stated that she had poor sleep overnight because "I was thinking about food." Nutrition consult placed; discussed strategies to help with carbohydrate cravings. Pt was receptive. The patient is more interactive today, more verbal. Pt does endorse increase in what she characterizes as "paranoia" around others but when she elaborates, appears more closely related to anxiety. Pt, however, appears more hypervigilant and staff reports pt startles easily.  She admits to some continued depression, no SI.  Behavior has been well controlled.  No hallucinations.  The patient is agreeable to titration of Abilify to target paranoia and disorganized thoughts.  No side effects, no EPS. Patient seen for follow-up of depression, catatonia.  Chart reviewed. Case discussed with interdisciplinary team.  No issues overnight. The patient is more interactive today, more verbal.  She admits to some continued depression, no SI.  Sleep reportedly improved, fair appetite.  Behavior has been well controlled.  No hallucinations.  The patient is agreeable to continuing meds as stated with Abilify tolerated with benefit noted related to symptoms of paranoia and disorganized thoughts.  No side effects, no EPS.  No somatic complaints.

## 2023-07-20 NOTE — BH INPATIENT PSYCHIATRY PROGRESS NOTE - NSBHCHARTREVIEWVS_PSY_A_CORE FT
Vital Signs Last 24 Hrs  T(C): 36.7 (07-20-23 @ 07:28), Max: 36.7 (07-20-23 @ 07:28)  T(F): 98 (07-20-23 @ 07:28), Max: 98 (07-20-23 @ 07:28)  HR: --  BP: --  BP(mean): --  RR: 18 (07-20-23 @ 07:28) (18 - 18)  SpO2: 99% (07-20-23 @ 07:28) (99% - 99%)    Orthostatic VS  07-20-23 @ 07:28  Lying BP: --/-- HR: --  Sitting BP: 129/87 HR: 105  Standing BP: 126/84 HR: 100  Site: --  Mode: --  Orthostatic VS  07-19-23 @ 07:21  Lying BP: --/-- HR: --  Sitting BP: 135/87 HR: 103  Standing BP: 130/83 HR: 100  Site: --  Mode: --   Vital Signs Last 24 Hrs  T(C): 36.5 (07-25-23 @ 07:13), Max: 36.5 (07-25-23 @ 07:13)  T(F): 97.7 (07-25-23 @ 07:13), Max: 97.7 (07-25-23 @ 07:13)  HR: --  BP: --  BP(mean): --  RR: 18 (07-25-23 @ 07:13) (18 - 18)  SpO2: --    Orthostatic VS  07-25-23 @ 07:13  Lying BP: --/-- HR: --  Sitting BP: 118/66 HR: 88  Standing BP: 113/73 HR: 87  Site: --  Mode: --  Orthostatic VS  07-24-23 @ 08:52  Lying BP: --/-- HR: --  Sitting BP: 140/88 HR: 109  Standing BP: 127/76 HR: 107  Site: --  Mode: --

## 2023-07-20 NOTE — BH INPATIENT PSYCHIATRY PROGRESS NOTE - NSBHASSESSSUMMFT_PSY_ALL_CORE
Blanquita is a 38yo female, domiciled with mother; unemployed; no dependents, past psychiatric history of psychotic depression with catatonia and post-traumatic stress disorder, one prior psych admission Nov 2021, follows at [facility] outpatient, no hx suicide attempt, hx SIB by cutting, no known hx of violence, no known substance use, hx trauma, BIB EMS activated by mother with concern for catatonia.    Working Dx MDD, recurrent episode, with severe catatonia.    Patient with sx of catatonia improved but remains paranoid, hypervigilant, endorses increased anxiety.  Thoughts appear more linear, slight pauses, but over all fluid conversation.  Tolerating medications well.  Will c/w titrate Abilify to 15mg for paranoid thoughts and mood.  Pt requires continued inpatient psychiatric hospitalization for safety and stabilization.    Plan:  1.	Legal: continue 9.39 involuntary hospitalization  2.	Safety: routine obs appropriate as pt denies passive and active SI and is able to commit to safety on the unit  -Continue ativan 1 mg PO TID for catatonia  3.	Psychiatric:  -continue lamictal 100 mg for anxiety and depression  -continue venlafaxine  mg for anxiety and depression  -increase Abilify to 10mg daily for mood and disorganization  -trazodone 50 mg PRN insomnia  4.	I/G/M therapy with DBT focus  5.	Medical: None  6.	Collateral/Dispo:   -dispo when stable Blanquita is a 36yo female, domiciled with mother; unemployed; no dependents, past psychiatric history of psychotic depression with catatonia and post-traumatic stress disorder, one prior psych admission Nov 2021, follows at [facility] outpatient, no hx suicide attempt, hx SIB by cutting, no known hx of violence, no known substance use, hx trauma, BIB EMS activated by mother with concern for catatonia.    Working Dx MDD, recurrent episode, with severe catatonia.    Patient with sx of catatonia resolved but remains paranoid at times, hypervigilant, endorses anxiety.  Thoughts appear more linear, slight pauses, but over all fluid conversation.  Tolerating medications well.  Will increase Ativan to 2mg QHS and continue Abilify 15mg daily for paranoid thoughts and mood.  Pt requires continued inpatient psychiatric hospitalization for safety and stabilization.    Plan:  1.	Legal: continue 9.39 involuntary hospitalization  2.	Safety: routine obs appropriate as pt denies passive and active SI and is able to commit to safety on the unit  - Increase ativan to 4mg daily (1mg q9am, 1mg q1pm) with 2mg QHS for insomnia and rx of catatonia  3.	Psychiatric:  -continue lamictal 100 mg for anxiety and depression  -continue venlafaxine  mg for anxiety and depression  -continue Abilify 15mg daily for mood and disorganization  -trazodone 50 mg PRN insomnia  4.	I/G/M therapy with DBT focus  5.	Medical: None  6.	Collateral/Dispo:   -dispo when stable

## 2023-07-21 RX ORDER — ARIPIPRAZOLE 15 MG/1
20 TABLET ORAL DAILY
Refills: 0 | Status: DISCONTINUED | OUTPATIENT
Start: 2023-07-21 | End: 2023-08-01

## 2023-07-21 RX ORDER — GABAPENTIN 400 MG/1
100 CAPSULE ORAL
Refills: 0 | Status: DISCONTINUED | OUTPATIENT
Start: 2023-07-21 | End: 2023-07-24

## 2023-07-21 RX ADMIN — LAMOTRIGINE 100 MILLIGRAM(S): 25 TABLET, ORALLY DISINTEGRATING ORAL at 08:25

## 2023-07-21 RX ADMIN — ARIPIPRAZOLE 15 MILLIGRAM(S): 15 TABLET ORAL at 08:25

## 2023-07-21 RX ADMIN — Medication 1 TABLET(S): at 08:25

## 2023-07-21 RX ADMIN — Medication 300 MILLIGRAM(S): at 08:25

## 2023-07-21 RX ADMIN — Medication 1 MILLIGRAM(S): at 09:20

## 2023-07-21 RX ADMIN — Medication 2 MILLIGRAM(S): at 20:11

## 2023-07-21 RX ADMIN — GABAPENTIN 100 MILLIGRAM(S): 400 CAPSULE ORAL at 12:21

## 2023-07-21 RX ADMIN — Medication 1 MILLIGRAM(S): at 15:31

## 2023-07-21 NOTE — BH INPATIENT PSYCHIATRY PROGRESS NOTE - MSE UNSTRUCTURED FT
The patient appears stated age, with fair hygiene, and is dressed appropriately.  She was calm and cooperative with the interview.  She maintained appropriate eye contact.  No PMA or PMR noted.  Gait steady.  The patient's speech was fluent, normal in tone, slightly decreased rate, and soft in volume.  Her mood is "anxious."  Affect is constricted, stable, appropriate.  The patient's thoughts are noted with less latency, less tangential, more linear.  She has paranoia and appears hypervigilant at times, reports +AH hallucinations that are not command in nature.  No suicidal or homicidal ideation, intent, or plans.  Insight is partial.  Judgment is impaired.  Impulse control has been fair on the unit.
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
## 2023-07-21 NOTE — BH INPATIENT PSYCHIATRY PROGRESS NOTE - CURRENT MEDICATION
MEDICATIONS  (STANDING):  ARIPiprazole 20 milliGRAM(s) Oral daily  gabapentin 100 milliGRAM(s) Oral <User Schedule>  lamoTRIgine 100 milliGRAM(s) Oral daily  LORazepam     Tablet 2 milliGRAM(s) Oral at bedtime  multivitamin/minerals 1 Tablet(s) Oral daily  venlafaxine  milliGRAM(s) Oral daily    MEDICATIONS  (PRN):  acetaminophen     Tablet .. 650 milliGRAM(s) Oral every 6 hours PRN Mild Pain (1 - 3), Moderate Pain (4 - 6), Severe Pain (7 - 10)  calcium carbonate    500 mG (Tums) Chewable 1 Tablet(s) Chew three times a day PRN Dyspepsia  hydrOXYzine hydrochloride 25 milliGRAM(s) Oral every 6 hours PRN anxiety  LORazepam     Tablet 1 milliGRAM(s) Oral every 6 hours PRN agitation/severe anxiety  LORazepam   Injectable 2 milliGRAM(s) IntraMuscular once PRN severe agitation  ondansetron    Tablet 4 milliGRAM(s) Oral every 6 hours PRN Nausea and/or Vomiting  traZODone 50 milliGRAM(s) Oral at bedtime PRN insomnia

## 2023-07-21 NOTE — BH INPATIENT PSYCHIATRY PROGRESS NOTE - NSBHASSESSSUMMFT_PSY_ALL_CORE
Blanquita is a 36yo female, domiciled with mother; unemployed; no dependents, past psychiatric history of psychotic depression with catatonia and post-traumatic stress disorder, one prior psych admission Nov 2021, follows at [facility] outpatient, no hx suicide attempt, hx SIB by cutting, no known hx of violence, no known substance use, hx trauma, BIB EMS activated by mother with concern for catatonia.    Working Dx MDD, recurrent episode, with severe catatonia.    Patient with sx of catatonia improved but remains paranoid, hypervigilant, endorses increased anxiety. Reports +AH today that are very upsetting to her and states this is why she is requesting so many PO PRNs Thoughts appear more linear, slight pauses, but over all fluid conversation.  Tolerating medications well.  Will c/w titrate Abilify to 20mg for paranoid thoughts and mood. Will add gabapentin 100 mg PO @ 9AM and 1PM for anxiety. Pt requires continued inpatient psychiatric hospitalization for safety and stabilization.     Plan:  1.	Legal: continue 9.39 involuntary hospitalization  2.	Safety: routine obs appropriate as pt denies passive and active SI and is able to commit to safety on the unit  -Continue ativan 1 mg PO TID for catatonia  3.	Psychiatric:  -continue lamictal 100 mg for anxiety and depression  -continue venlafaxine  mg for anxiety and depression  -increase Abilify to 10mg daily for mood and disorganization  -trazodone 50 mg PRN insomnia  4.	I/G/M therapy with DBT focus  5.	Medical: None  6.	Collateral/Dispo:   -dispo when stable

## 2023-07-21 NOTE — BH INPATIENT PSYCHIATRY PROGRESS NOTE - NSBHFUPINTERVALHXFT_PSY_A_CORE
Patient seen for follow-up of depression, catatonia.  Chart reviewed. Case discussed with interdisciplinary team. Staff reports pt has been medication seeking and  requested multiple PO PRNs for anxiety. Pt observed in the halls and asked that she be interviewed in private. Pt states she has been experiencing +AH of 3 voices, one whispering "whore" and at other times hearing voices repeating what she is saying. Pt noted to be hypervigilant, looking out the window of the interview room multiple times to ensure no one was listening to her. RN gave pt the ativan PRN pt requested and pt asked "Is this real ativan? There's nothing in the water right?" Pt explains that when she reports to staff that she is anxious, she's really upset about the voices. Pt admits that in the past, the voices returned when she stopped taking her medications. Pt then stated that she needed better boundaries with her mother as her mother will insist on attending her outpt appts, "If she needs help, does she need to get admitted too?" Pt provided with support and encouragement.  The patient is agreeable to titration of Abilify to 20 mg PO QD to target paranoia and disorganized thoughts. Pt agreed to addition of gabapentin 100 mg PO at 9AM and 1PM to address anxiety. Pt provided with medication education including but not limited to possible side effects like sedation, dizziness, N/V, and metabolin changes; pt verbalized understanding.  No side effects, no EPS reported.

## 2023-07-21 NOTE — BH INPATIENT PSYCHIATRY PROGRESS NOTE - NSBHCHARTREVIEWVS_PSY_A_CORE FT
Vital Signs Last 24 Hrs  T(C): 36.7 (07-21-23 @ 08:15), Max: 36.7 (07-21-23 @ 08:15)  T(F): 98.1 (07-21-23 @ 08:15), Max: 98.1 (07-21-23 @ 08:15)  HR: --  BP: --  BP(mean): --  RR: --  SpO2: --    Orthostatic VS  07-21-23 @ 08:15  Lying BP: --/-- HR: --  Sitting BP: 129/80 HR: 97  Standing BP: 111/89 HR: 100  Site: --  Mode: --  Orthostatic VS  07-20-23 @ 07:28  Lying BP: --/-- HR: --  Sitting BP: 129/87 HR: 105  Standing BP: 126/84 HR: 100  Site: --  Mode: --

## 2023-07-22 PROCEDURE — 93010 ELECTROCARDIOGRAM REPORT: CPT

## 2023-07-22 RX ORDER — LIDOCAINE 4 G/100G
1 CREAM TOPICAL EVERY 24 HOURS
Refills: 0 | Status: DISCONTINUED | OUTPATIENT
Start: 2023-07-22 | End: 2023-08-01

## 2023-07-22 RX ADMIN — LIDOCAINE 1 PATCH: 4 CREAM TOPICAL at 16:16

## 2023-07-22 RX ADMIN — GABAPENTIN 100 MILLIGRAM(S): 400 CAPSULE ORAL at 13:24

## 2023-07-22 RX ADMIN — Medication 1 TABLET(S): at 08:13

## 2023-07-22 RX ADMIN — Medication 1 MILLIGRAM(S): at 15:07

## 2023-07-22 RX ADMIN — GABAPENTIN 100 MILLIGRAM(S): 400 CAPSULE ORAL at 08:14

## 2023-07-22 RX ADMIN — Medication 300 MILLIGRAM(S): at 08:13

## 2023-07-22 RX ADMIN — Medication 2 MILLIGRAM(S): at 20:09

## 2023-07-22 RX ADMIN — Medication 25 MILLIGRAM(S): at 05:40

## 2023-07-22 RX ADMIN — Medication 1 MILLIGRAM(S): at 09:06

## 2023-07-22 RX ADMIN — ARIPIPRAZOLE 20 MILLIGRAM(S): 15 TABLET ORAL at 08:14

## 2023-07-22 RX ADMIN — Medication 650 MILLIGRAM(S): at 06:33

## 2023-07-22 RX ADMIN — Medication 650 MILLIGRAM(S): at 05:33

## 2023-07-22 RX ADMIN — Medication 25 MILLIGRAM(S): at 13:24

## 2023-07-22 RX ADMIN — Medication 25 MILLIGRAM(S): at 22:01

## 2023-07-22 RX ADMIN — LAMOTRIGINE 100 MILLIGRAM(S): 25 TABLET, ORALLY DISINTEGRATING ORAL at 08:14

## 2023-07-23 RX ADMIN — Medication 1 MILLIGRAM(S): at 14:33

## 2023-07-23 RX ADMIN — LAMOTRIGINE 100 MILLIGRAM(S): 25 TABLET, ORALLY DISINTEGRATING ORAL at 08:30

## 2023-07-23 RX ADMIN — Medication 1 TABLET(S): at 08:31

## 2023-07-23 RX ADMIN — Medication 2 MILLIGRAM(S): at 20:26

## 2023-07-23 RX ADMIN — ARIPIPRAZOLE 20 MILLIGRAM(S): 15 TABLET ORAL at 08:31

## 2023-07-23 RX ADMIN — Medication 300 MILLIGRAM(S): at 08:31

## 2023-07-23 RX ADMIN — GABAPENTIN 100 MILLIGRAM(S): 400 CAPSULE ORAL at 13:13

## 2023-07-23 RX ADMIN — Medication 650 MILLIGRAM(S): at 20:38

## 2023-07-23 RX ADMIN — Medication 1 MILLIGRAM(S): at 08:38

## 2023-07-23 RX ADMIN — Medication 650 MILLIGRAM(S): at 22:04

## 2023-07-23 RX ADMIN — GABAPENTIN 100 MILLIGRAM(S): 400 CAPSULE ORAL at 08:30

## 2023-07-23 RX ADMIN — Medication 25 MILLIGRAM(S): at 15:58

## 2023-07-24 PROCEDURE — 90853 GROUP PSYCHOTHERAPY: CPT

## 2023-07-24 PROCEDURE — 99232 SBSQ HOSP IP/OBS MODERATE 35: CPT

## 2023-07-24 RX ORDER — CLONAZEPAM 1 MG
1 TABLET ORAL
Refills: 0 | Status: DISCONTINUED | OUTPATIENT
Start: 2023-07-24 | End: 2023-07-25

## 2023-07-24 RX ORDER — CLONAZEPAM 1 MG
0.5 TABLET ORAL AT BEDTIME
Refills: 0 | Status: DISCONTINUED | OUTPATIENT
Start: 2023-07-24 | End: 2023-07-25

## 2023-07-24 RX ORDER — CLONAZEPAM 1 MG
0.5 TABLET ORAL AT BEDTIME
Refills: 0 | Status: DISCONTINUED | OUTPATIENT
Start: 2023-07-24 | End: 2023-07-24

## 2023-07-24 RX ADMIN — Medication 1 MILLIGRAM(S): at 08:51

## 2023-07-24 RX ADMIN — ARIPIPRAZOLE 20 MILLIGRAM(S): 15 TABLET ORAL at 08:06

## 2023-07-24 RX ADMIN — Medication 1 MILLIGRAM(S): at 12:12

## 2023-07-24 RX ADMIN — GABAPENTIN 100 MILLIGRAM(S): 400 CAPSULE ORAL at 08:05

## 2023-07-24 RX ADMIN — Medication 300 MILLIGRAM(S): at 08:06

## 2023-07-24 RX ADMIN — Medication 650 MILLIGRAM(S): at 10:36

## 2023-07-24 RX ADMIN — LAMOTRIGINE 100 MILLIGRAM(S): 25 TABLET, ORALLY DISINTEGRATING ORAL at 08:06

## 2023-07-24 RX ADMIN — Medication 1 MILLIGRAM(S): at 15:47

## 2023-07-24 RX ADMIN — Medication 650 MILLIGRAM(S): at 18:51

## 2023-07-24 RX ADMIN — Medication 0.5 MILLIGRAM(S): at 22:48

## 2023-07-24 RX ADMIN — Medication 1 TABLET(S): at 08:06

## 2023-07-24 NOTE — BH INPATIENT PSYCHIATRY PROGRESS NOTE - NSBHCHARTREVIEWVS_PSY_A_CORE FT
Vital Signs Last 24 Hrs  T(C): 36.6 (07-24-23 @ 08:52), Max: 36.6 (07-24-23 @ 08:52)  T(F): 97.8 (07-24-23 @ 08:52), Max: 97.8 (07-24-23 @ 08:52)  HR: --  BP: --  BP(mean): --  RR: --  SpO2: --    Orthostatic VS  07-24-23 @ 08:52  Lying BP: --/-- HR: --  Sitting BP: 140/88 HR: 109  Standing BP: 127/76 HR: 107  Site: --  Mode: --  Orthostatic VS  07-23-23 @ 07:46  Lying BP: --/-- HR: --  Sitting BP: 124/81 HR: 94  Standing BP: 130/89 HR: 96  Site: --  Mode: --

## 2023-07-24 NOTE — BH INPATIENT PSYCHIATRY PROGRESS NOTE - NSBHMETABOLIC_PSY_ALL_CORE_FT
BMI: BMI (kg/m2): 19.2 (07-14-23 @ 07:35)  HbA1c: A1C with Estimated Average Glucose Result: 5.0 % (07-08-23 @ 11:45)    Glucose:   BP: 144/84 (07-22-23 @ 12:53) (144/84 - 144/84)  Lipid Panel: Date/Time: 07-09-23 @ 10:15  Cholesterol, Serum: 190  Direct LDL: --  HDL Cholesterol, Serum: 58  Total Cholesterol/HDL Ration Measurement: --  Triglycerides, Serum: 58

## 2023-07-24 NOTE — BH INPATIENT PSYCHIATRY PROGRESS NOTE - NSBHASSESSSUMMFT_PSY_ALL_CORE
Blanquita is a 36yo female, domiciled with mother; unemployed; no dependents, past psychiatric history of psychotic depression with catatonia and post-traumatic stress disorder, one prior psych admission Nov 2021, follows at [facility] outpatient, no hx suicide attempt, hx SIB by cutting, no known hx of violence, no known substance use, hx trauma, BIB EMS activated by mother with concern for catatonia.    Working Dx MDD, recurrent episode, with severe catatonia.    Patient reports improved paranoid, but appears hypervigilant, endorses increased anxiety. Denies any further AH today. Pt noted to request ativan PO PRNs often throughout weekend. Thoughts appear more linear, fluid conversation.  Tolerating medications well.  Will c/w titrate Abilify to 20mg, will d/c gabapentin, will d/c ativan QHS, will start klonopin 1 mg PO @ 9AM and 1 PM and klonopin 0.5 mg PO PRN QHS. Discussed discharge planning and SW to look into [facility]. Pt requires continued inpatient psychiatric hospitalization for safety and stabilization.     Plan:  1.	Legal: continue 9.39 involuntary hospitalization  2.	Safety: routine obs appropriate as pt denies passive and active SI and is able to commit to safety on the unit  -Continue ativan 1 mg PO TID for catatonia  3.	Psychiatric:  -continue lamictal 100 mg for anxiety and depression  -continue venlafaxine  mg for anxiety and depression  -increase Abilify to 10mg daily for mood and disorganization  -trazodone 50 mg PRN insomnia  4.	I/G/M therapy with DBT focus  5.	Medical: None  6.	Collateral/Dispo:   -dispo when stable

## 2023-07-24 NOTE — BH INPATIENT PSYCHIATRY PROGRESS NOTE - MSE UNSTRUCTURED FT
The patient appears stated age, with fair hygiene, and is dressed appropriately.  She was calm and cooperative with the interview.  She maintained appropriate eye contact.  No PMA or PMR noted.  Gait steady.  The patient's speech was fluent, normal in tone and rate, and regular in volume.  Her mood is "anxious."  Affect is constricted, stable, appropriate.  The patient's thoughts are linear.  She reports less paranoia but appears hypervigilant at times, now denies A/V hallucinations.  No suicidal or homicidal ideation, intent, or plans.  Insight is partial.  Judgment is impaired.  Impulse control has been fair on the unit.

## 2023-07-24 NOTE — BH PSYCHOLOGY - CLINICIAN PSYCHOTHERAPY NOTE - NSBHPSYCHOLNARRATIVE_PSY_A_CORE FT
Pt seen for individual psychotherapy and was engaged. Pt reported being emotionally stable over the past weekend. Pt reported sleep disturbance due to the noise caused by her roommate at night. Pt noted that she is waiting for her discharge plan and hoping to get outpatient services afterward rather than PHP due to the eagerness of going back to work. Pt seemed to present the positive side of her performance when she was asked about her feelings and experience in the unit (e.g., minimizing the effects from unpleasant experience with her mom). Pt denied SI/HI/SIB.      MSE:   Appearance: Dressed casually and groomed adequately   Behavior: Appropriately   Speech and Motor: Normal   Affect: Full   Mood: Euthymic   TP: Logical, coherent and goal directed   Cognition: A&O x4   Judgement: Fair   Insight: Fair  Pt seen for individual psychotherapy and was engaged. Writer followed up her communication with her  regarding discharge/referral plan and her mother's visit on the past weekend. Pt reported being emotionally stable over the past a couple of days. Pt noted that she is waiting for her discharge plan and hoping to get outpatient services afterward rather than PHP due to the eagerness of going back to work. Pt reported sleep disturbance due to the noise caused by her roommate at night. Pt seemed to present the positive side of her performance when she was asked about her feelings and experience in the unit (e.g., minimizing the effects from unpleasant experience with her mom). Supportive interventions were provided (e.g., reflective listening and validation). Pt denied SI/HI/SIB.     MSE:   Appearance: Dressed casually and groomed adequately   Behavior: Appropriately   Speech and Motor: Normal   Affect: Full   Mood: Euthymic   TP: Logical, coherent and goal directed   Cognition: A&O x4   Judgement: Fair   Insight: Fair

## 2023-07-24 NOTE — BH INPATIENT PSYCHIATRY PROGRESS NOTE - CURRENT MEDICATION
MEDICATIONS  (STANDING):  ARIPiprazole 20 milliGRAM(s) Oral daily  clonazePAM  Tablet 1 milliGRAM(s) Oral <User Schedule>  lamoTRIgine 100 milliGRAM(s) Oral daily  multivitamin/minerals 1 Tablet(s) Oral daily  venlafaxine  milliGRAM(s) Oral daily    MEDICATIONS  (PRN):  acetaminophen     Tablet .. 650 milliGRAM(s) Oral every 6 hours PRN Mild Pain (1 - 3), Moderate Pain (4 - 6), Severe Pain (7 - 10)  calcium carbonate    500 mG (Tums) Chewable 1 Tablet(s) Chew three times a day PRN Dyspepsia  clonazePAM  Tablet 0.5 milliGRAM(s) Oral at bedtime PRN anxiety  hydrOXYzine hydrochloride 25 milliGRAM(s) Oral every 6 hours PRN anxiety  lidocaine   4% Patch 1 Patch Transdermal every 24 hours PRN neck pain  LORazepam     Tablet 1 milliGRAM(s) Oral every 6 hours PRN agitation/severe anxiety  LORazepam   Injectable 2 milliGRAM(s) IntraMuscular once PRN severe agitation  ondansetron    Tablet 4 milliGRAM(s) Oral every 6 hours PRN Nausea and/or Vomiting  traZODone 50 milliGRAM(s) Oral at bedtime PRN insomnia   MEDICATIONS  (STANDING):  ARIPiprazole 20 milliGRAM(s) Oral daily  clonazePAM  Tablet 1 milliGRAM(s) Oral <User Schedule>  lamoTRIgine 100 milliGRAM(s) Oral daily  multivitamin/minerals 1 Tablet(s) Oral daily  venlafaxine  milliGRAM(s) Oral daily    MEDICATIONS  (PRN):  acetaminophen     Tablet .. 650 milliGRAM(s) Oral every 6 hours PRN Mild Pain (1 - 3), Moderate Pain (4 - 6), Severe Pain (7 - 10)  calcium carbonate    500 mG (Tums) Chewable 1 Tablet(s) Chew three times a day PRN Dyspepsia  clonazePAM  Tablet 0.5 milliGRAM(s) Oral at bedtime PRN insomnia  hydrOXYzine hydrochloride 25 milliGRAM(s) Oral every 6 hours PRN anxiety  lidocaine   4% Patch 1 Patch Transdermal every 24 hours PRN neck pain  LORazepam     Tablet 1 milliGRAM(s) Oral every 6 hours PRN agitation/severe anxiety  LORazepam   Injectable 2 milliGRAM(s) IntraMuscular once PRN severe agitation  ondansetron    Tablet 4 milliGRAM(s) Oral every 6 hours PRN Nausea and/or Vomiting  traZODone 50 milliGRAM(s) Oral at bedtime PRN insomnia

## 2023-07-24 NOTE — BH INPATIENT PSYCHIATRY PROGRESS NOTE - NSBHFUPINTERVALHXFT_PSY_A_CORE
Patient seen for follow-up of depression, catatonia.  Chart reviewed. Case discussed with interdisciplinary team. Staff reports pt has been medication seeking and  requested multiple PO PRNs for anxiety. Discussed ativan use with pt again today and pt agreed to switching to klonopin 1 mg PO @ 9AM and 1 PM with klonopin 0.5 PO PRN QHS if necessary (pt sates she is sleeping well at night and does not think she needs the ativan 2 mg QHS - this was discontinued). Pt states she did not like the gabapentin and this was discontinued.  Pt noted to be hypervigilant, startling when this writer knocked softly on her door. The patient denies any further AH with titration of Abilify to 20 mg PO QD. Spoke at length about discharge planning and pt states she would be interested in [facility], pt feels PHP would interfere with her job search. No side effects, no EPS reported.

## 2023-07-24 NOTE — BH INPATIENT PSYCHIATRY PROGRESS NOTE - PRN MEDS
MEDICATIONS  (PRN):  acetaminophen     Tablet .. 650 milliGRAM(s) Oral every 6 hours PRN Mild Pain (1 - 3), Moderate Pain (4 - 6), Severe Pain (7 - 10)  calcium carbonate    500 mG (Tums) Chewable 1 Tablet(s) Chew three times a day PRN Dyspepsia  clonazePAM  Tablet 0.5 milliGRAM(s) Oral at bedtime PRN anxiety  hydrOXYzine hydrochloride 25 milliGRAM(s) Oral every 6 hours PRN anxiety  lidocaine   4% Patch 1 Patch Transdermal every 24 hours PRN neck pain  LORazepam     Tablet 1 milliGRAM(s) Oral every 6 hours PRN agitation/severe anxiety  LORazepam   Injectable 2 milliGRAM(s) IntraMuscular once PRN severe agitation  ondansetron    Tablet 4 milliGRAM(s) Oral every 6 hours PRN Nausea and/or Vomiting  traZODone 50 milliGRAM(s) Oral at bedtime PRN insomnia   MEDICATIONS  (PRN):  acetaminophen     Tablet .. 650 milliGRAM(s) Oral every 6 hours PRN Mild Pain (1 - 3), Moderate Pain (4 - 6), Severe Pain (7 - 10)  calcium carbonate    500 mG (Tums) Chewable 1 Tablet(s) Chew three times a day PRN Dyspepsia  clonazePAM  Tablet 0.5 milliGRAM(s) Oral at bedtime PRN insomnia  hydrOXYzine hydrochloride 25 milliGRAM(s) Oral every 6 hours PRN anxiety  lidocaine   4% Patch 1 Patch Transdermal every 24 hours PRN neck pain  LORazepam     Tablet 1 milliGRAM(s) Oral every 6 hours PRN agitation/severe anxiety  LORazepam   Injectable 2 milliGRAM(s) IntraMuscular once PRN severe agitation  ondansetron    Tablet 4 milliGRAM(s) Oral every 6 hours PRN Nausea and/or Vomiting  traZODone 50 milliGRAM(s) Oral at bedtime PRN insomnia

## 2023-07-25 PROCEDURE — 90853 GROUP PSYCHOTHERAPY: CPT

## 2023-07-25 PROCEDURE — 99232 SBSQ HOSP IP/OBS MODERATE 35: CPT

## 2023-07-25 RX ORDER — CLONAZEPAM 1 MG
1 TABLET ORAL
Refills: 0 | Status: DISCONTINUED | OUTPATIENT
Start: 2023-07-25 | End: 2023-07-27

## 2023-07-25 RX ORDER — CLONAZEPAM 1 MG
0.5 TABLET ORAL AT BEDTIME
Refills: 0 | Status: DISCONTINUED | OUTPATIENT
Start: 2023-07-25 | End: 2023-07-28

## 2023-07-25 RX ADMIN — Medication 650 MILLIGRAM(S): at 13:15

## 2023-07-25 RX ADMIN — Medication 1 MILLIGRAM(S): at 08:42

## 2023-07-25 RX ADMIN — Medication 300 MILLIGRAM(S): at 08:42

## 2023-07-25 RX ADMIN — Medication 1 TABLET(S): at 08:42

## 2023-07-25 RX ADMIN — LAMOTRIGINE 100 MILLIGRAM(S): 25 TABLET, ORALLY DISINTEGRATING ORAL at 08:42

## 2023-07-25 RX ADMIN — ARIPIPRAZOLE 20 MILLIGRAM(S): 15 TABLET ORAL at 08:42

## 2023-07-25 RX ADMIN — Medication 1 MILLIGRAM(S): at 12:47

## 2023-07-25 RX ADMIN — Medication 650 MILLIGRAM(S): at 21:05

## 2023-07-25 RX ADMIN — Medication 650 MILLIGRAM(S): at 12:15

## 2023-07-25 NOTE — BH INPATIENT PSYCHIATRY PROGRESS NOTE - NSBHASSESSSUMMFT_PSY_ALL_CORE
Blanquita is a 36yo female, domiciled with mother; unemployed; no dependents, past psychiatric history of psychotic depression with catatonia and post-traumatic stress disorder, one prior psych admission Nov 2021, follows at [facility] outpatient, no hx suicide attempt, hx SIB by cutting, no known hx of violence, no known substance use, hx trauma, BIB EMS activated by mother with concern for catatonia.    Working Dx MDD, recurrent episode, with severe catatonia.    Patient reports improved paranoid, but appears hypervigilant, endorses continued anxiety. Denies any further AH today. Pt noted to request ativan PO PRNs often overnight. Thoughts appear more linear, but pt is over inclusive.  Tolerating medications well.  Will c/w titrate Abilify to 20mg, will d/c gabapentin, will d/c ativan QHS, will start klonopin 1 mg PO @ 9AM and 1 PM and klonopin 0.5 mg PO PRN QHS. Discussed discharge planning and SW to look into [facility]. Pt requires continued inpatient psychiatric hospitalization for safety and stabilization. Projected discharge early next week     Plan:  1.	Legal: continue 9.39 involuntary hospitalization  2.	Safety: routine obs appropriate as pt denies passive and active SI and is able to commit to safety on the unit  -Continue ativan 1 mg PO TID for catatonia  3.	Psychiatric:  -continue lamictal 100 mg for anxiety and depression  -continue venlafaxine  mg for anxiety and depression  -increase Abilify to 10mg daily for mood and disorganization  -trazodone 50 mg PRN insomnia  4.	I/G/M therapy with DBT focus  5.	Medical: None  6.	Collateral/Dispo:   -dispo when stable

## 2023-07-25 NOTE — BH INPATIENT PSYCHIATRY PROGRESS NOTE - NSBHCHARTREVIEWVS_PSY_A_CORE FT
Vital Signs Last 24 Hrs  T(C): 36.5 (07-25-23 @ 07:13), Max: 36.5 (07-25-23 @ 07:13)  T(F): 97.7 (07-25-23 @ 07:13), Max: 97.7 (07-25-23 @ 07:13)  HR: --  BP: --  BP(mean): --  RR: 18 (07-25-23 @ 07:13) (18 - 18)  SpO2: --    Orthostatic VS  07-25-23 @ 07:13  Lying BP: --/-- HR: --  Sitting BP: 118/66 HR: 88  Standing BP: 113/73 HR: 87  Site: --  Mode: --  Orthostatic VS  07-24-23 @ 08:52  Lying BP: --/-- HR: --  Sitting BP: 140/88 HR: 109  Standing BP: 127/76 HR: 107  Site: --  Mode: --

## 2023-07-25 NOTE — BH INPATIENT PSYCHIATRY PROGRESS NOTE - MSE UNSTRUCTURED FT
The patient appears stated age, with fair hygiene, and is dressed appropriately.  She was calm and cooperative with the interview.  She maintained appropriate eye contact.  No PMA or PMR noted.  Gait steady.  The patient's speech was fluent, normal in tone and rate, and regular in volume.  Her mood is "anxious."  Affect is constricted, stable, appropriate.  The patient's thoughts are linear, but speech is over productive and over inclusive requiring redirection.  She reports less paranoia but appears hypervigilant at times, now denies A/V hallucinations.  No suicidal or homicidal ideation, intent, or plans.  Insight is partial.  Judgment is improving.  Impulse control has been fair on the unit.

## 2023-07-25 NOTE — BH INPATIENT PSYCHIATRY PROGRESS NOTE - CURRENT MEDICATION
MEDICATIONS  (STANDING):  ARIPiprazole 20 milliGRAM(s) Oral daily  clonazePAM  Tablet 1 milliGRAM(s) Oral <User Schedule>  lamoTRIgine 100 milliGRAM(s) Oral daily  multivitamin/minerals 1 Tablet(s) Oral daily  venlafaxine  milliGRAM(s) Oral daily    MEDICATIONS  (PRN):  acetaminophen     Tablet .. 650 milliGRAM(s) Oral every 6 hours PRN Mild Pain (1 - 3), Moderate Pain (4 - 6), Severe Pain (7 - 10)  calcium carbonate    500 mG (Tums) Chewable 1 Tablet(s) Chew three times a day PRN Dyspepsia  clonazePAM  Tablet 0.5 milliGRAM(s) Oral at bedtime PRN insomnia  hydrOXYzine hydrochloride 25 milliGRAM(s) Oral every 6 hours PRN anxiety  lidocaine   4% Patch 1 Patch Transdermal every 24 hours PRN neck pain  LORazepam     Tablet 1 milliGRAM(s) Oral every 6 hours PRN agitation/severe anxiety  LORazepam   Injectable 2 milliGRAM(s) IntraMuscular once PRN severe agitation  ondansetron    Tablet 4 milliGRAM(s) Oral every 6 hours PRN Nausea and/or Vomiting  traZODone 50 milliGRAM(s) Oral at bedtime PRN insomnia

## 2023-07-25 NOTE — BH INPATIENT PSYCHIATRY PROGRESS NOTE - PRN MEDS
MEDICATIONS  (PRN):  acetaminophen     Tablet .. 650 milliGRAM(s) Oral every 6 hours PRN Mild Pain (1 - 3), Moderate Pain (4 - 6), Severe Pain (7 - 10)  calcium carbonate    500 mG (Tums) Chewable 1 Tablet(s) Chew three times a day PRN Dyspepsia  clonazePAM  Tablet 0.5 milliGRAM(s) Oral at bedtime PRN insomnia  hydrOXYzine hydrochloride 25 milliGRAM(s) Oral every 6 hours PRN anxiety  lidocaine   4% Patch 1 Patch Transdermal every 24 hours PRN neck pain  LORazepam     Tablet 1 milliGRAM(s) Oral every 6 hours PRN agitation/severe anxiety  LORazepam   Injectable 2 milliGRAM(s) IntraMuscular once PRN severe agitation  ondansetron    Tablet 4 milliGRAM(s) Oral every 6 hours PRN Nausea and/or Vomiting  traZODone 50 milliGRAM(s) Oral at bedtime PRN insomnia

## 2023-07-25 NOTE — BH INPATIENT PSYCHIATRY PROGRESS NOTE - NSBHMETABOLIC_PSY_ALL_CORE_FT
BMI: BMI (kg/m2): 19.2 (07-14-23 @ 07:35)  HbA1c: A1C with Estimated Average Glucose Result: 5.0 % (07-08-23 @ 11:45)    Glucose:   BP: 144/84 (07-22-23 @ 12:53) (144/84 - 144/84)  Lipid Panel: Date/Time: 07-09-23 @ 10:15  Cholesterol, Serum: 190  Direct LDL: --  HDL Cholesterol, Serum: 58  Total Cholesterol/HDL Ration Measurement: --  Triglycerides, Serum: 58   BMI: BMI (kg/m2): 19.2 (07-14-23 @ 07:35)  HbA1c: A1C with Estimated Average Glucose Result: 5.0 % (07-08-23 @ 11:45)    Glucose:   BP: --  Lipid Panel: Date/Time: 07-09-23 @ 10:15  Cholesterol, Serum: 190  Direct LDL: --  HDL Cholesterol, Serum: 58  Total Cholesterol/HDL Ration Measurement: --  Triglycerides, Serum: 58

## 2023-07-25 NOTE — BH INPATIENT PSYCHIATRY PROGRESS NOTE - NSBHFUPINTERVALHXFT_PSY_A_CORE
Patient seen for follow-up of depression, catatonia.  Chart reviewed. Case discussed with interdisciplinary team. Staff reports pt has been medication seeking and  requested multiple PO PRNs for anxiety. Discussed ativan use with pt again today and pt agreed allow klonopin to work before asking for a PRN.  Pt noted to be hypervigilant at times. Overinclusive with over productive speech requiring redirection. Pt requesting room change as she feels her roommate has been "belittling me" by asking the pt if she needs extra blankets, "I can advocate for myself!" Pt also feels the roommate follows her back into her room. Nursing made aware and will try to accommodate pt's request. The patient denies any further AH with titration of Abilify to 20 mg PO QD. Spoke at length about discharge planning and pt states she would be interested in [facility], pt feels PHP would interfere with her job search. SW working on this - projected discharge early next week. No side effects, no EPS reported.

## 2023-07-26 PROCEDURE — 99232 SBSQ HOSP IP/OBS MODERATE 35: CPT

## 2023-07-26 RX ADMIN — Medication 25 MILLIGRAM(S): at 04:31

## 2023-07-26 RX ADMIN — ARIPIPRAZOLE 20 MILLIGRAM(S): 15 TABLET ORAL at 08:37

## 2023-07-26 RX ADMIN — Medication 1 MILLIGRAM(S): at 08:38

## 2023-07-26 RX ADMIN — Medication 650 MILLIGRAM(S): at 17:06

## 2023-07-26 RX ADMIN — Medication 1 TABLET(S): at 08:37

## 2023-07-26 RX ADMIN — Medication 1 MILLIGRAM(S): at 17:53

## 2023-07-26 RX ADMIN — Medication 300 MILLIGRAM(S): at 08:37

## 2023-07-26 RX ADMIN — Medication 25 MILLIGRAM(S): at 14:02

## 2023-07-26 RX ADMIN — LAMOTRIGINE 100 MILLIGRAM(S): 25 TABLET, ORALLY DISINTEGRATING ORAL at 08:37

## 2023-07-26 RX ADMIN — Medication 1 MILLIGRAM(S): at 12:56

## 2023-07-26 RX ADMIN — Medication 0.5 MILLIGRAM(S): at 22:33

## 2023-07-26 NOTE — BH TREATMENT PLAN - NSTXDISORGINTERRN_PSY_ALL_CORE
Encourage and educate patient coping mechanisms to ground herself and organize her thoughts through breathing and mindfulness.
Encourage and educate patient coping mechanisms to ground herself and organize her thoughts through breathing and mindfulness.

## 2023-07-26 NOTE — BH TREATMENT PLAN - NSTXCOPEINTERRN_PSY_ALL_CORE
Encourage and educate patient methods of healthy coping such as reading, walking, mindfulness etc.
Encourage and educate patient methods of healthy coping such as reading, walking, mindfulness etc.

## 2023-07-26 NOTE — BH TREATMENT PLAN - NSTXPLANTHERAPYSESSIONSFT_PSY_ALL_CORE
07-20-23  Type of therapy: Dialectical behavior therapy, Psychoeducation, Spirituality  Type of session: Individual  Level of patient participation: Attentive, Engaged  Duration of participation: 30 minutes  Therapy conducted by: Psych rehab  Therapy Summary: Writer met with patient to assess progress of psychiatric rehabilitation goal over the past week. Patient stated that she is doing well and identified lack of paranoia, improved sleep, and happier mood as improved symptoms. Writer worked towards safety planning with patient and patient was engaged and encouraged for discharge. Writer encouraged patient to continue to attend groups for coping skill development and to practice utilization of skills on the unit.     Over the past seven days, patient was visible on the unit, attended 30% of groups, and exhibited pleasant interactions with peers. Patient is engaged in treatment with staff and medication compliant. Patient has good behavioral control, good ADLs, and denied SI.  
  07-20-23  Type of therapy: Dialectical behavior therapy, Psychoeducation, Spirituality  Type of session: Individual  Level of patient participation: Attentive, Engaged  Duration of participation: 30 minutes  Therapy conducted by: Psych rehab  Therapy Summary: Writer met with patient to assess progress of psychiatric rehabilitation goal over the past week. Patient stated that she is doing well and identified lack of paranoia, improved sleep, and happier mood as improved symptoms. Writer worked towards safety planning with patient and patient was engaged and encouraged for discharge. Writer encouraged patient to continue to attend groups for coping skill development and to practice utilization of skills on the unit.     Over the past seven days, patient was visible on the unit, attended 30% of groups, and exhibited pleasant interactions with peers. Patient is engaged in treatment with staff and medication compliant. Patient has good behavioral control, good ADLs, and denied SI.

## 2023-07-26 NOTE — BH INPATIENT PSYCHIATRY PROGRESS NOTE - NSBHASSESSSUMMFT_PSY_ALL_CORE
Blanquita is a 36yo female, domiciled with mother; unemployed; no dependents, past psychiatric history of psychotic depression with catatonia and post-traumatic stress disorder, one prior psych admission Nov 2021, follows at [facility] outpatient, no hx suicide attempt, hx SIB by cutting, no known hx of violence, no known substance use, hx trauma, BIB EMS activated by mother with concern for catatonia.    Working Dx MDD, recurrent episode, with severe catatonia.    Patient now denies feeling paranoid and no longer appears hypervigilant, endorses improving anxiety. Denies any further AH today. Pt noted to request atarax PO PRN overnight, but not ativan - given positive reinforcement for this. Thoughts are linear, but pt is over inclusive.  Tolerating medications well.  Discussed ROME abilify and pt gave consent to explore with pharmacy if insurance will cover this. Discussed discharge planning and SW to look into [facility]. Pt requires continued inpatient psychiatric hospitalization for safety and stabilization. Projected discharge early next week     Plan:  1.	Legal: continue 9.39 involuntary hospitalization  2.	Safety: routine obs appropriate as pt denies passive and active SI and is able to commit to safety on the unit  -Continue ativan 1 mg PO TID for catatonia  3.	Psychiatric:  -continue lamictal 100 mg for anxiety and depression  -continue venlafaxine  mg for anxiety and depression  -increase Abilify to 10mg daily for mood and disorganization  -trazodone 50 mg PRN insomnia  4.	I/G/M therapy with DBT focus  5.	Medical: None  6.	Collateral/Dispo:   -dispo when stable

## 2023-07-26 NOTE — BH INPATIENT PSYCHIATRY PROGRESS NOTE - NSBHFUPINTERVALHXFT_PSY_A_CORE
Patient seen for follow-up of depression, catatonia.  Chart reviewed. Case discussed with interdisciplinary team. Pt noted to receive atarax PRN at 4:30AM for anxiety, but pt states she slept better overnight with a new roommate. Pt noted to be less hypervigilant today, with bright affect, remains overinclusive but redirectable. The patient denies any further AH with titration of Abilify to 20 mg PO QD. Spoke at length about ROME and pt is interested if her insurance will cover it; will request from pharmacy if pt has benefits for this. Pt is amenable to referral to [facility] and [facility] and SW working on this - projected discharge early next week. No side effects, no EPS reported.

## 2023-07-26 NOTE — BH INPATIENT PSYCHIATRY PROGRESS NOTE - MSE UNSTRUCTURED FT
The patient appears stated age, with fair hygiene, and is dressed appropriately.  She was calm and cooperative with the interview.  She maintained appropriate eye contact.  No PMA or PMR noted.  Gait steady.  The patient's speech was fluent, normal in tone and rate, and regular in volume.  Her mood is "better with sleep."  Affect is constricted, stable, appropriate.  The patient's thoughts are linear, but speech is over inclusive (pt is easier to redirect today).  She now denies paranoia and no longer appears hypervigilant, now denies A/V hallucinations.  No suicidal or homicidal ideation, intent, or plans.  Insight is improving.  Judgment is improving.  Impulse control has been fair on the unit.

## 2023-07-26 NOTE — BH TREATMENT PLAN - NSTXDCOPNOINTERSW_PSY_ALL_CORE
SW will encourage PT to take medications as perscribed to better chances of mental stabiliy in the community.
SW will encourage PT to take medications as perscribed to better chances of mental stabiliy in the community.

## 2023-07-26 NOTE — BH TREATMENT PLAN - NSTXANXINTERPR_PSY_ALL_CORE
Patient made some progress towards her treatment goal evidenced by increased engagement in groups for coping skill development and identification of social support and mindfulness as effective coping skills. Over the next seven days, Psychiatric rehabilitation staff will continue to provide encouragement, support, psychotherapy, and psychoeducation to assist the patient in the progression of her treatment goal.
Patient made some progress towards her treatment goal evidenced by increased engagement in groups for coping skill development and identification of social support and mindfulness as effective coping skills. Over the next seven days, Psychiatric rehabilitation staff will continue to provide encouragement, support, psychotherapy, and psychoeducation to assist the patient in the progression of her treatment goal.

## 2023-07-26 NOTE — BH TREATMENT PLAN - NSBHPRIMARYDX_PSY_ALL_CORE
Major depressive disorder, recurrent episode, severe with catatonia    
Major depressive disorder, recurrent episode, severe with catatonia

## 2023-07-26 NOTE — BH INPATIENT PSYCHIATRY PROGRESS NOTE - NSBHCHARTREVIEWVS_PSY_A_CORE FT
Vital Signs Last 24 Hrs  T(C): 36.7 (07-26-23 @ 07:51), Max: 36.7 (07-26-23 @ 07:51)  T(F): 98.1 (07-26-23 @ 07:51), Max: 98.1 (07-26-23 @ 07:51)  HR: --  BP: --  BP(mean): --  RR: --  SpO2: --    Orthostatic VS  07-26-23 @ 07:51  Lying BP: --/-- HR: --  Sitting BP: 123/87 HR: 88  Standing BP: 121/79 HR: 86  Site: upper left arm  Mode: electronic  Orthostatic VS  07-25-23 @ 07:13  Lying BP: --/-- HR: --  Sitting BP: 118/66 HR: 88  Standing BP: 113/73 HR: 87  Site: --  Mode: --

## 2023-07-27 PROCEDURE — 99232 SBSQ HOSP IP/OBS MODERATE 35: CPT

## 2023-07-27 RX ORDER — CLONAZEPAM 1 MG
1 TABLET ORAL DAILY
Refills: 0 | Status: DISCONTINUED | OUTPATIENT
Start: 2023-07-27 | End: 2023-08-01

## 2023-07-27 RX ORDER — CLONAZEPAM 1 MG
1.5 TABLET ORAL
Refills: 0 | Status: DISCONTINUED | OUTPATIENT
Start: 2023-07-27 | End: 2023-07-28

## 2023-07-27 RX ADMIN — Medication 300 MILLIGRAM(S): at 08:39

## 2023-07-27 RX ADMIN — ARIPIPRAZOLE 20 MILLIGRAM(S): 15 TABLET ORAL at 08:39

## 2023-07-27 RX ADMIN — Medication 1 TABLET(S): at 18:01

## 2023-07-27 RX ADMIN — Medication 1 MILLIGRAM(S): at 08:38

## 2023-07-27 RX ADMIN — LAMOTRIGINE 100 MILLIGRAM(S): 25 TABLET, ORALLY DISINTEGRATING ORAL at 08:39

## 2023-07-27 RX ADMIN — Medication 0.5 MILLIGRAM(S): at 20:33

## 2023-07-27 RX ADMIN — Medication 650 MILLIGRAM(S): at 14:20

## 2023-07-27 RX ADMIN — Medication 1.5 MILLIGRAM(S): at 12:32

## 2023-07-27 RX ADMIN — Medication 1 MILLIGRAM(S): at 15:02

## 2023-07-27 NOTE — BH INPATIENT PSYCHIATRY PROGRESS NOTE - PRN MEDS
MEDICATIONS  (PRN):  acetaminophen     Tablet .. 650 milliGRAM(s) Oral every 6 hours PRN Mild Pain (1 - 3), Moderate Pain (4 - 6), Severe Pain (7 - 10)  calcium carbonate    500 mG (Tums) Chewable 1 Tablet(s) Chew three times a day PRN Dyspepsia  clonazePAM  Tablet 0.5 milliGRAM(s) Oral at bedtime PRN insomnia  hydrOXYzine hydrochloride 25 milliGRAM(s) Oral every 6 hours PRN anxiety  lidocaine   4% Patch 1 Patch Transdermal every 24 hours PRN neck pain  LORazepam     Tablet 1 milliGRAM(s) Oral every 12 hours PRN agitation/severe anxiety  LORazepam   Injectable 2 milliGRAM(s) IntraMuscular once PRN severe agitation  ondansetron    Tablet 4 milliGRAM(s) Oral every 6 hours PRN Nausea and/or Vomiting  traZODone 50 milliGRAM(s) Oral at bedtime PRN insomnia   no

## 2023-07-27 NOTE — BH INPATIENT PSYCHIATRY PROGRESS NOTE - NSBHCHARTREVIEWVS_PSY_A_CORE FT
Vital Signs Last 24 Hrs  T(C): 36.4 (07-27-23 @ 07:45), Max: 36.4 (07-27-23 @ 07:45)  T(F): 97.6 (07-27-23 @ 07:45), Max: 97.6 (07-27-23 @ 07:45)  HR: --  BP: --  BP(mean): --  RR: 18 (07-27-23 @ 07:45) (18 - 18)  SpO2: --    Orthostatic VS  07-27-23 @ 07:45  Lying BP: --/-- HR: --  Sitting BP: 111/74 HR: 85  Standing BP: 109/76 HR: 89  Site: --  Mode: --  Orthostatic VS  07-26-23 @ 07:51  Lying BP: --/-- HR: --  Sitting BP: 123/87 HR: 88  Standing BP: 121/79 HR: 86  Site: upper left arm  Mode: electronic

## 2023-07-27 NOTE — BH INPATIENT PSYCHIATRY PROGRESS NOTE - NSBHFUPINTERVALHXFT_PSY_A_CORE
Patient seen for follow-up of depression, catatonia.  Chart reviewed. Case discussed with interdisciplinary team. Pt noted to receive PO PRNs for anxiety overnight and asks that klonopin be increased. Pt reports more anxiety in the afternoons. Pt states that she is using radical acceptance, "more patience" and realizing what her triggers are (phone) and being aware of her reactions as coping mechanisms for anxiety. Klonopin increased to 1 mg PO QD, 1.5 mg PO @ 1PM, and 05, mg PO QHS PRN. Pt noted to be less hypervigilant today, with bright affect, remains overinclusive but redirectable. The patient denies any further AH with titration of Abilify to 20 mg PO QD. Pt remains interested in [facility] and [facility] pharmacy reports she is covered for abilify maintena. Pt is amenable to referral to [facility] and [facility] and SW working on this - projected discharge early next week. No side effects, no EPS reported.

## 2023-07-27 NOTE — DISCHARGE NOTE PROVIDER - NSFOLLOWUPCLINICSTOKEN_GEN_ALL_ED_FT
[de-identified] : Pt here today for follow up.\par Pt with history of DM- doing well with current Meds [id number]: || ||00\01||False;

## 2023-07-27 NOTE — BH INPATIENT PSYCHIATRY PROGRESS NOTE - NSBHASSESSSUMMFT_PSY_ALL_CORE
Blanquita is a 38yo female, domiciled with mother; unemployed; no dependents, past psychiatric history of psychotic depression with catatonia and post-traumatic stress disorder, one prior psych admission Nov 2021, follows at [facility] outpatient, no hx suicide attempt, hx SIB by cutting, no known hx of violence, no known substance use, hx trauma, BIB EMS activated by mother with concern for catatonia.    Working Dx MDD, recurrent episode, with severe catatonia.    Patient now denies feeling paranoid and no longer appears hypervigilant, endorses continued anxiety. Denies any further AH today. Pt noted to request PO PRNs overnight for anxiety. Pt requests an increase in klonopin in the afternoons. Klonopin increased to 1 mg PO QD, 1.5 mg PO @ 1PM, and 0.5 mg PO QHS PRN. Thoughts are linear, but pt is over inclusive.  Tolerating medications well.  Pt continues to be interested in ROME abilify; will order for Monday. Discussed discharge planning and SW to look into [facility]. Pt requires continued inpatient psychiatric hospitalization for safety and stabilization. Projected discharge early next week     Plan:  1.	Legal: continue 9.39 involuntary hospitalization  2.	Safety: routine obs appropriate as pt denies passive and active SI and is able to commit to safety on the unit  -Continue ativan 1 mg PO TID for catatonia  3.	Psychiatric:  -continue lamictal 100 mg for anxiety and depression  -continue venlafaxine  mg for anxiety and depression  -increase Abilify to 10mg daily for mood and disorganization  -trazodone 50 mg PRN insomnia  4.	I/G/M therapy with DBT focus  5.	Medical: None  6.	Collateral/Dispo:   -dispo when stable

## 2023-07-27 NOTE — BH INPATIENT PSYCHIATRY PROGRESS NOTE - CURRENT MEDICATION
MEDICATIONS  (STANDING):  ARIPiprazole 20 milliGRAM(s) Oral daily  clonazePAM  Tablet 1.5 milliGRAM(s) Oral <User Schedule>  clonazePAM  Tablet 1 milliGRAM(s) Oral daily  lamoTRIgine 100 milliGRAM(s) Oral daily  multivitamin/minerals 1 Tablet(s) Oral daily  venlafaxine  milliGRAM(s) Oral daily    MEDICATIONS  (PRN):  acetaminophen     Tablet .. 650 milliGRAM(s) Oral every 6 hours PRN Mild Pain (1 - 3), Moderate Pain (4 - 6), Severe Pain (7 - 10)  calcium carbonate    500 mG (Tums) Chewable 1 Tablet(s) Chew three times a day PRN Dyspepsia  clonazePAM  Tablet 0.5 milliGRAM(s) Oral at bedtime PRN insomnia  hydrOXYzine hydrochloride 25 milliGRAM(s) Oral every 6 hours PRN anxiety  lidocaine   4% Patch 1 Patch Transdermal every 24 hours PRN neck pain  LORazepam     Tablet 1 milliGRAM(s) Oral every 12 hours PRN agitation/severe anxiety  LORazepam   Injectable 2 milliGRAM(s) IntraMuscular once PRN severe agitation  ondansetron    Tablet 4 milliGRAM(s) Oral every 6 hours PRN Nausea and/or Vomiting  traZODone 50 milliGRAM(s) Oral at bedtime PRN insomnia   MEDICATIONS  (STANDING):  ARIPiprazole 20 milliGRAM(s) Oral daily  clonazePAM  Tablet 1 milliGRAM(s) Oral daily  clonazePAM  Tablet 1.5 milliGRAM(s) Oral <User Schedule>  lamoTRIgine 100 milliGRAM(s) Oral daily  multivitamin/minerals 1 Tablet(s) Oral daily  venlafaxine  milliGRAM(s) Oral daily    MEDICATIONS  (PRN):  acetaminophen     Tablet .. 650 milliGRAM(s) Oral every 6 hours PRN Mild Pain (1 - 3), Moderate Pain (4 - 6), Severe Pain (7 - 10)  calcium carbonate    500 mG (Tums) Chewable 1 Tablet(s) Chew three times a day PRN Dyspepsia  clonazePAM  Tablet 0.5 milliGRAM(s) Oral at bedtime PRN insomnia  hydrOXYzine hydrochloride 25 milliGRAM(s) Oral every 6 hours PRN anxiety  lidocaine   4% Patch 1 Patch Transdermal every 24 hours PRN neck pain  LORazepam     Tablet 1 milliGRAM(s) Oral every 12 hours PRN agitation/severe anxiety  LORazepam   Injectable 2 milliGRAM(s) IntraMuscular once PRN severe agitation  ondansetron    Tablet 4 milliGRAM(s) Oral every 6 hours PRN Nausea and/or Vomiting  traZODone 50 milliGRAM(s) Oral at bedtime PRN insomnia

## 2023-07-27 NOTE — BH INPATIENT PSYCHIATRY PROGRESS NOTE - MSE UNSTRUCTURED FT
The patient appears stated age, with fair hygiene, and is dressed appropriately.  She was calm and cooperative with the interview.  She maintained appropriate eye contact.  No PMA or PMR noted.  Gait steady.  The patient's speech was fluent, normal in tone and rate, and regular in volume.  Her mood is "I want to advocate for myself, I'm anxious."  Affect is constricted, stable, appropriate.  The patient's thoughts are linear, but speech is over inclusive (pt is easier to redirect today).  She now denies paranoia and no longer appears hypervigilant, now denies A/V hallucinations.  No suicidal or homicidal ideation, intent, or plans.  Insight is improving.  Judgment is improving.  Impulse control has been fair on the unit.

## 2023-07-27 NOTE — BH PSYCHOLOGY - CLINICIAN PSYCHOTHERAPY NOTE - NSBHPSYCHOLNARRATIVE_PSY_A_CORE FT
Pt seen for individual psychotherapy. Session started with a brief check-in. Pt provided details about her feelings under previous and current medications and expressed a desire to make some adjustments (e.g., take meds before meals). Writer encouraged pt to discuss the medication management with her NP. Pt was engaged in discussions about her anxiety triggers and interpersonal challenges. Pt reported being triggered by people using specific words in conversations, in which she heard them a lot from father in the past. Pt reported difficulty in making connections with new people, as she is concerned of their options. Pt shared mixed feelings of confidence (about who she is and what she wants) and insecurity (about being triggered and hurt because people all have different personalities), which lead to anxiety and impact her responses to others. Pt seems to be able to realize and acknowledge her struggles in interpersonal dynamics and is motivated to work on them. Pt denied SI/HI/SIB.     MSE:   Appearance: Dressed casually and groomed adequately   Behavior: Appropriately   Speech and Motor: Normal   Affect: Full   Mood: Euthymic    TP: Logical, coherent, sometimes distracted in topics but is redirectable   Cognition: A&O x4   Judgement: Good   Insight: Good  Pt seen for individual psychotherapy. Session started with a brief check-in. Pt provided details about her feelings under previous and current medications and expressed a desire to make some adjustments (e.g., take meds before meals). Writer encouraged pt to discuss the medication management with her NP. Pt was engaged in discussions about her anxiety triggers and interpersonal challenges. Pt reported being triggered by people using specific words in conversations, in which she heard them a lot from father in the past. Pt reported difficulty in making connections with new people, as she is concerned of their opinions. Pt shared mixed feelings of confidence (about who she is and what she wants) and insecurity (about being triggered and hurt because people all have different personalities), which lead to anxiety and impact her responses to others. Pt seems to be able to realize and acknowledge her struggles in interpersonal dynamics and is motivated to work on them. Pt denied SI/HI/SIB.     MSE:   Appearance: Dressed casually and groomed adequately   Behavior: Appropriately   Speech and Motor: Normal   Affect: Full   Mood: Euthymic    TP: Logical, coherent, sometimes distracted in topics but is redirectable   Cognition: A&O x4   Judgement: Good   Insight: Good

## 2023-07-27 NOTE — STUDENT SIGN OFF DOCUMENT - DOCUMENTS STUDENTS ARE SIGNED OFF ON
Plan of Care/Assessment and Intervention

## 2023-07-28 PROCEDURE — 90853 GROUP PSYCHOTHERAPY: CPT

## 2023-07-28 PROCEDURE — 99232 SBSQ HOSP IP/OBS MODERATE 35: CPT

## 2023-07-28 RX ORDER — CLONAZEPAM 1 MG
2 TABLET ORAL
Refills: 0 | Status: DISCONTINUED | OUTPATIENT
Start: 2023-07-28 | End: 2023-08-01

## 2023-07-28 RX ADMIN — LIDOCAINE 1 PATCH: 4 CREAM TOPICAL at 09:15

## 2023-07-28 RX ADMIN — LAMOTRIGINE 100 MILLIGRAM(S): 25 TABLET, ORALLY DISINTEGRATING ORAL at 08:18

## 2023-07-28 RX ADMIN — Medication 2 MILLIGRAM(S): at 14:06

## 2023-07-28 RX ADMIN — Medication 1 TABLET(S): at 18:16

## 2023-07-28 RX ADMIN — ARIPIPRAZOLE 20 MILLIGRAM(S): 15 TABLET ORAL at 08:18

## 2023-07-28 RX ADMIN — Medication 25 MILLIGRAM(S): at 19:39

## 2023-07-28 RX ADMIN — Medication 50 MILLIGRAM(S): at 21:32

## 2023-07-28 RX ADMIN — Medication 1 MILLIGRAM(S): at 08:17

## 2023-07-28 RX ADMIN — Medication 300 MILLIGRAM(S): at 08:17

## 2023-07-28 NOTE — BH INPATIENT PSYCHIATRY PROGRESS NOTE - MSE UNSTRUCTURED FT
The patient appears stated age, with fair hygiene, and is dressed appropriately.  She was calm and cooperative with the interview.  She maintained appropriate eye contact.  No PMA or PMR noted.  Gait steady.  The patient's speech was fluent, normal in tone and rate, and regular in volume.  Her mood is "I'm still anxious."  Affect is constricted, stable, appropriate.  The patient's thoughts are linear, but speech is over inclusive (pt is easier to redirect today).  She now denies paranoia and no longer appears hypervigilant, now denies A/V hallucinations.  No suicidal or homicidal ideation, intent, or plans.  Insight is improving.  Judgment is improving.  Impulse control has been fair on the unit.

## 2023-07-28 NOTE — BH DISCHARGE NOTE NURSING/SOCIAL WORK/PSYCH REHAB - NSDCADDINFO1FT_PSY_ALL_CORE
We are located in the [facility] on the [facility]. The street entrance to our building is at [address] [place] [address]. The hospital parking lot can be accessed at [address] [place] [address].

## 2023-07-28 NOTE — BH INPATIENT PSYCHIATRY PROGRESS NOTE - PRN MEDS
The sheath was removed from the right radial artery. MEDICATIONS  (PRN):  acetaminophen     Tablet .. 650 milliGRAM(s) Oral every 6 hours PRN Mild Pain (1 - 3), Moderate Pain (4 - 6), Severe Pain (7 - 10)  calcium carbonate    500 mG (Tums) Chewable 1 Tablet(s) Chew three times a day PRN Dyspepsia  hydrOXYzine hydrochloride 25 milliGRAM(s) Oral every 6 hours PRN anxiety  lidocaine   4% Patch 1 Patch Transdermal every 24 hours PRN neck pain  LORazepam   Injectable 2 milliGRAM(s) IntraMuscular once PRN severe agitation  ondansetron    Tablet 4 milliGRAM(s) Oral every 6 hours PRN Nausea and/or Vomiting  traZODone 50 milliGRAM(s) Oral at bedtime PRN insomnia

## 2023-07-28 NOTE — BH DISCHARGE NOTE NURSING/SOCIAL WORK/PSYCH REHAB - NSBHDCADDR1FT_A_CORE
[phone] [place] [address], [place] [address], [facility]

## 2023-07-28 NOTE — BH INPATIENT PSYCHIATRY PROGRESS NOTE - CURRENT MEDICATION
MEDICATIONS  (STANDING):  ARIPiprazole 20 milliGRAM(s) Oral daily  clonazePAM  Tablet 1 milliGRAM(s) Oral daily  clonazePAM  Tablet 2 milliGRAM(s) Oral <User Schedule>  lamoTRIgine 100 milliGRAM(s) Oral daily  multivitamin/minerals 1 Tablet(s) Oral daily  venlafaxine  milliGRAM(s) Oral daily    MEDICATIONS  (PRN):  acetaminophen     Tablet .. 650 milliGRAM(s) Oral every 6 hours PRN Mild Pain (1 - 3), Moderate Pain (4 - 6), Severe Pain (7 - 10)  calcium carbonate    500 mG (Tums) Chewable 1 Tablet(s) Chew three times a day PRN Dyspepsia  hydrOXYzine hydrochloride 25 milliGRAM(s) Oral every 6 hours PRN anxiety  lidocaine   4% Patch 1 Patch Transdermal every 24 hours PRN neck pain  LORazepam   Injectable 2 milliGRAM(s) IntraMuscular once PRN severe agitation  ondansetron    Tablet 4 milliGRAM(s) Oral every 6 hours PRN Nausea and/or Vomiting  traZODone 50 milliGRAM(s) Oral at bedtime PRN insomnia

## 2023-07-28 NOTE — BH DISCHARGE NOTE NURSING/SOCIAL WORK/PSYCH REHAB - NSBHDCADDR2FT_A_CORE
[address] [place] [address]  Ambulatory [facility] [address]nd [place] [address]

## 2023-07-28 NOTE — BH INPATIENT PSYCHIATRY PROGRESS NOTE - NSBHFUPINTERVALHXFT_PSY_A_CORE
Patient seen for follow-up of depression, catatonia.  Chart reviewed. Case discussed with interdisciplinary team. Pt noted to receive PO PRNs for anxiety overnight despite klonopin being increased. Pt reports more anxiety in the afternoons and requests further increase in klonopin. Discussed medications at length and pt agrees to changes in klonopin dose to 1 mg PO QD and 2 mg PO @ 2PM, ativan PRN and klonopin PRN discontinued. Pt agrees to try using atarax for any break through anxiety.  Pt denies paranoia or A/Vh today. Pt remains interested in ROME and [facility] pharmacy reports she is covered for abilify maintena; considering giving ROME on Monday. Pt is amenable to referral to [facility] and [facility] and SW working on this - projected discharge early next week. No side effects, no EPS reported.

## 2023-07-28 NOTE — BH DISCHARGE NOTE NURSING/SOCIAL WORK/PSYCH REHAB - NSCDUDCCRISIS_PSY_A_CORE
[facility]  [phone] [facility]-WELL [phone])  Text "WELL" to [phone]  Website: [email or web address] Suicide Prevention Lifeline 6 (506) 843-2569/.  [facility]  [phone] [place], [place] [address] [phone]   Hours:  Monday through Friday from 9 AM to 3 PM/988 Suicide and Crisis Lifeline

## 2023-07-28 NOTE — BH INPATIENT PSYCHIATRY PROGRESS NOTE - NSBHCHARTREVIEWVS_PSY_A_CORE FT
Vital Signs Last 24 Hrs  T(C): 36.4 (07-28-23 @ 07:31), Max: 36.4 (07-28-23 @ 07:31)  T(F): 97.6 (07-28-23 @ 07:31), Max: 97.6 (07-28-23 @ 07:31)  HR: --  BP: --  BP(mean): --  RR: 18 (07-28-23 @ 07:31) (18 - 18)  SpO2: --    Orthostatic VS  07-28-23 @ 07:31  Lying BP: --/-- HR: --  Sitting BP: 107/75 HR: 87  Standing BP: 110/79 HR: 89  Site: --  Mode: --  Orthostatic VS  07-27-23 @ 07:45  Lying BP: --/-- HR: --  Sitting BP: 111/74 HR: 85  Standing BP: 109/76 HR: 89  Site: --  Mode: --

## 2023-07-28 NOTE — BH DISCHARGE NOTE NURSING/SOCIAL WORK/PSYCH REHAB - PATIENT PORTAL LINK FT
You can access the FollowMyHealth Patient Portal offered by [facility] by registering at the following website: [email or web address]. By joining [facility]’s FollowMyHealth portal, you will also be able to view your health information using other applications (apps) compatible with our system.

## 2023-07-28 NOTE — BH DISCHARGE NOTE NURSING/SOCIAL WORK/PSYCH REHAB - NSDCPRGOAL_PSY_ALL_CORE
Throughout her inpatient stay, patient demonstrated notable progress towards her treatment goal. When patient was first admitted, patient presented with depression and catatonia. With treatment, patient demonstrated improvement in her symptoms including: speech and organized thought process. Patient participated and engaged in various psychiatric rehabilitation groups with minimal redirections. Patient has been cooperative in treatment and appropriately made her needs known. Psychiatric rehabilitation team encouraged patient to continue utilizing coping skills that she acquired during her hospitalization. Throughout her stay, patient was visible in the milieu and social with select peers in the unit. Patient will be discharged since patient is no longer danger to self or others. Patient will benefit from following treatment in outpatient treatment services for continuity of care.

## 2023-07-28 NOTE — BH INPATIENT PSYCHIATRY PROGRESS NOTE - NSBHASSESSSUMMFT_PSY_ALL_CORE
Blanquita is a 38yo female, domiciled with mother; unemployed; no dependents, past psychiatric history of psychotic depression with catatonia and post-traumatic stress disorder, one prior psych admission Nov 2021, follows at [facility] outpatient, no hx suicide attempt, hx SIB by cutting, no known hx of violence, no known substance use, hx trauma, BIB EMS activated by mother with concern for catatonia.    Working Dx MDD, recurrent episode, with severe catatonia.    Patient now denies feeling paranoid or A/VH, endorses continued anxiety. Pt noted to request PO PRNs overnight for anxiety. Pt requests an increase in klonopin in the afternoons. Discussed medications at length and pt agrees to changes in klonopin dose to 1 mg PO QD and 2 mg PO @ 2PM, ativan PRN and klonopin PRN discontinued. Pt agrees to try using atarax for any break through anxiety. Thoughts are linear, but pt is over inclusive at times.  Tolerating medications well.  Pt continues to be interested in ROME abilify; will order for Monday. Discussed discharge planning and SW to look into [facility]. Projected discharge early next week     Plan:  1.	Legal: continue 9.39 involuntary hospitalization  2.	Safety: routine obs appropriate as pt denies passive and active SI and is able to commit to safety on the unit  -Continue ativan 1 mg PO TID for catatonia  3.	Psychiatric:  -continue lamictal 100 mg for anxiety and depression  -continue venlafaxine  mg for anxiety and depression  -increase Abilify to 10mg daily for mood and disorganization  -trazodone 50 mg PRN insomnia  4.	I/G/M therapy with DBT focus  5.	Medical: None  6.	Collateral/Dispo:   -dispo when stable

## 2023-07-28 NOTE — BH DISCHARGE NOTE NURSING/SOCIAL WORK/PSYCH REHAB - DISCHARGE INSTRUCTIONS AFTERCARE APPOINTMENTS
In order to check the location, date, or time of your aftercare appointment, please refer to your Discharge Instructions Document given to you upon leaving the hospital.  If you have lost the instructions please call [phone]

## 2023-07-29 RX ADMIN — Medication 25 MILLIGRAM(S): at 18:38

## 2023-07-29 RX ADMIN — Medication 1 MILLIGRAM(S): at 08:03

## 2023-07-29 RX ADMIN — ARIPIPRAZOLE 20 MILLIGRAM(S): 15 TABLET ORAL at 08:03

## 2023-07-29 RX ADMIN — Medication 2 MILLIGRAM(S): at 13:02

## 2023-07-29 RX ADMIN — Medication 50 MILLIGRAM(S): at 20:32

## 2023-07-29 RX ADMIN — Medication 1 TABLET(S): at 08:03

## 2023-07-29 RX ADMIN — Medication 300 MILLIGRAM(S): at 08:03

## 2023-07-29 RX ADMIN — LAMOTRIGINE 100 MILLIGRAM(S): 25 TABLET, ORALLY DISINTEGRATING ORAL at 08:03

## 2023-07-30 RX ADMIN — ARIPIPRAZOLE 20 MILLIGRAM(S): 15 TABLET ORAL at 08:19

## 2023-07-30 RX ADMIN — Medication 2 MILLIGRAM(S): at 13:08

## 2023-07-30 RX ADMIN — Medication 1 MILLIGRAM(S): at 08:20

## 2023-07-30 RX ADMIN — Medication 25 MILLIGRAM(S): at 06:38

## 2023-07-30 RX ADMIN — Medication 25 MILLIGRAM(S): at 17:42

## 2023-07-30 RX ADMIN — LAMOTRIGINE 100 MILLIGRAM(S): 25 TABLET, ORALLY DISINTEGRATING ORAL at 08:20

## 2023-07-30 RX ADMIN — Medication 300 MILLIGRAM(S): at 08:20

## 2023-07-31 PROCEDURE — 99238 HOSP IP/OBS DSCHRG MGMT 30/<: CPT

## 2023-07-31 PROCEDURE — 90853 GROUP PSYCHOTHERAPY: CPT

## 2023-07-31 RX ORDER — ARIPIPRAZOLE 15 MG/1
1 TABLET ORAL
Qty: 14 | Refills: 0
Start: 2023-07-31 | End: 2023-08-23

## 2023-07-31 RX ORDER — LAMOTRIGINE 25 MG/1
1 TABLET, ORALLY DISINTEGRATING ORAL
Refills: 0 | DISCHARGE

## 2023-07-31 RX ORDER — CLONAZEPAM 1 MG
1 TABLET ORAL
Qty: 14 | Refills: 0
Start: 2023-07-31 | End: 2023-08-23

## 2023-07-31 RX ORDER — CLONAZEPAM 1 MG
1 TABLET ORAL
Qty: 14 | Refills: 0
Start: 2023-07-31 | End: 2023-08-13

## 2023-07-31 RX ORDER — VENLAFAXINE HCL 75 MG
2 CAPSULE, EXT RELEASE 24 HR ORAL
Qty: 28 | Refills: 0
Start: 2023-07-31 | End: 2023-08-13

## 2023-07-31 RX ORDER — LAMOTRIGINE 25 MG/1
1 TABLET, ORALLY DISINTEGRATING ORAL
Qty: 14 | Refills: 0
Start: 2023-07-31 | End: 2023-08-23

## 2023-07-31 RX ORDER — HYDROXYZINE HCL 10 MG
1 TABLET ORAL
Qty: 28 | Refills: 0
Start: 2023-07-31 | End: 2023-08-23

## 2023-07-31 RX ORDER — VENLAFAXINE HCL 75 MG
2 CAPSULE, EXT RELEASE 24 HR ORAL
Qty: 28 | Refills: 0
Start: 2023-07-31 | End: 2023-08-23

## 2023-07-31 RX ORDER — LAMOTRIGINE 25 MG/1
1 TABLET, ORALLY DISINTEGRATING ORAL
Qty: 14 | Refills: 0
Start: 2023-07-31 | End: 2023-08-13

## 2023-07-31 RX ORDER — ARIPIPRAZOLE 15 MG/1
1 TABLET ORAL
Qty: 14 | Refills: 0
Start: 2023-07-31 | End: 2023-08-13

## 2023-07-31 RX ORDER — HYDROXYZINE HCL 10 MG
1 TABLET ORAL
Qty: 28 | Refills: 0
Start: 2023-07-31 | End: 2023-08-13

## 2023-07-31 RX ADMIN — Medication 300 MILLIGRAM(S): at 08:02

## 2023-07-31 RX ADMIN — ARIPIPRAZOLE 20 MILLIGRAM(S): 15 TABLET ORAL at 08:03

## 2023-07-31 RX ADMIN — Medication 1 MILLIGRAM(S): at 08:03

## 2023-07-31 RX ADMIN — Medication 2 MILLIGRAM(S): at 13:11

## 2023-07-31 RX ADMIN — Medication 25 MILLIGRAM(S): at 17:52

## 2023-07-31 RX ADMIN — LAMOTRIGINE 100 MILLIGRAM(S): 25 TABLET, ORALLY DISINTEGRATING ORAL at 08:03

## 2023-07-31 NOTE — BH PSYCHOLOGY - GROUP THERAPY NOTE - NSPSYCHOLGRPDBTGOAL_PSY_A_CORE
reduce impulsive self-defeating behavior/reduce interpersonal conflicts
reduce mood and affective lability/reduce impulsive self-defeating behavior
reduce mood and affective lability/improve ability to indentify feelings/improve ability to communicate feelings/reduce vulnerability to emotional dysregualation
reduce mood and affective lability/reduce impulsive self-defeating behavior
reduce interpersonal conflicts/improve ability to communicate feelings/improve ability re: assertive/set limits
reduce mood and affective lability/reduce impulsive self-defeating behavior/improve ability to indentify feelings
reduce mood and affective lability/reduce interpersonal conflicts/improve ability to indentify feelings/improve ability to communicate feelings/improve ability re: assertive/set limits/reduce vulnerability to emotional dysregualation

## 2023-07-31 NOTE — BH PSYCHOLOGY - GROUP THERAPY NOTE - NSPSYCHOLGRPGENPT_PSY_A_CORE FT
Patients attended Women's process group focused on the topics of boundaries. The group started with a brief check-in exercise regarding patient’s associations with the word “boundaries”. Pts supported one another and provided words of affirmations for members who struggled to set healthy boundaries in their lives (e.g. workplace, family and romantic relationships, etc.). Next, the group openly processed struggles around respecting both yourself and others, finding a middle ground of porous and rigid boundaries, and knowing when to end unhealthy relationships that aren't serving them. Patients were also guided in discussions around when setting boundaries has been successful in their lives and what barriers were present when setting their desired boundaries was more challenging. Patients were given worksheet with rules for how to create healthy boundaries and group members processed what resonated with them and what was historically difficult for patients (e.g. “ Let others experience their own choices” and “Do not give advice unless asked”. Group leaders  guided the discussion, provided perspective to reframe some unhelpful thoughts, and provided trauma informed care where appropriate.  
Patients attended Women's process group. The group started with a brief introduction where pts shared messages they grew up hearing in their families and how it has impacted their lives and how they view the world. Next, the group openly processed struggles around safety on the unit, understanding boundaries, and how to ask for help when needed. Pts also discussed the struggles of being in an inpatient unit where they have to juggle between getting the treatment they need and also navigating social and interpersonal relationships with their peers. Clinician and other group leaders provided their perspective to allow for a discussion, peers supported and validated each other. 
The group began with each patient introducing themselves and briefly sharing something their inner child wanted but did not receive. Group guidelines were reviewed, and the patients were encouraged to be respectful and courteous to each other regarding individual beliefs/experiences. The patient engaged in activities that asked them to write a letter to their younger self and to draw or write what their “safe space” as a child looked like. The patients engaged in respectful dialogues discussing their experiences completing the activities.  
The group began with each patient introducing themselves and briefly sharing a strength and a vulnerability of theirs in the context of relationships. Group guidelines were reviewed, and the patients were encouraged to be respectful and courteous to each other regarding individual beliefs/experiences. The patients engaged in a discussion around the experience of their relationships, elements of their relationships and different messaging they may have received about how to exist/behave in relationships.

## 2023-07-31 NOTE — BH PSYCHOLOGY - GROUP THERAPY NOTE - NSPSYCHOLGRPDBTPT_PSY_A_CORE
stable mood and affect in group
stable mood and affect in group/patient showing good behavior control/Patient able to identify mood states
stable mood and affect in group/patient showing good behavior control/Patient able to identify mood states
stable mood and affect in group/no self-destructive behaviors/impulses reported/no self-destructive impulses/behaviors
stable mood and affect in group/no self-destructive behaviors/impulses reported/no self-destructive impulses/behaviors
stable mood and affect in group/no self-destructive impulses/behaviors/Patient able to identify mood states
stable mood and affect in group/no self-destructive impulses/behaviors/Patient able to identify mood states

## 2023-07-31 NOTE — BH INPATIENT PSYCHIATRY PROGRESS NOTE - NSTXCOPEDATETRGT_PSY_ALL_CORE
14-Jul-2023
27-Jul-2023
14-Jul-2023
03-Aug-2023
14-Jul-2023
03-Aug-2023
03-Aug-2023
27-Jul-2023
14-Jul-2023
27-Jul-2023
27-Jul-2023
14-Jul-2023
27-Jul-2023

## 2023-07-31 NOTE — BH PSYCHOLOGY - CLINICIAN PSYCHOTHERAPY NOTE - NSBHPSYCHOLPROBS_PSY_ALL_CORE
Anxiety/Depression
Academic/Vocational/Social Dysfunction/Anxiety/Depression
Anxiety/Depression
Anxiety/Depression

## 2023-07-31 NOTE — BH INPATIENT PSYCHIATRY PROGRESS NOTE - NSBHASSESSSUMMFT_PSY_ALL_CORE
Blanquita is a 38yo female, domiciled with mother; unemployed; no dependents, past psychiatric history of psychotic depression with catatonia and post-traumatic stress disorder, one prior psych admission Nov 2021, follows at [facility] outpatient, no hx suicide attempt, hx SIB by cutting, no known hx of violence, no known substance use, hx trauma, BIB EMS activated by mother with concern for catatonia.    Working Dx MDD, recurrent episode, with severe catatonia.    Patient now denies feeling paranoid or A/VH, endorses improved anxiety. Pt noted to request PO PRNs overnight for breakthrough anxiety. Pt Tolerating medications well.  Pt declined ROME abilify. Accepted to [facility]. Projected discharge tomorrow     Plan:  1.	Legal: continue 9.39 involuntary hospitalization  2.	Safety: routine obs appropriate as pt denies passive and active SI and is able to commit to safety on the unit  -klonopin 1 mg PO QD and 2 mg PO @ 2PM for anxiety   3.	Psychiatric:  -continue lamictal 100 mg for anxiety and depression  -continue venlafaxine  mg for anxiety and depression  -increase Abilify to 20mg daily for mood and disorganization  -trazodone 50 mg PRN insomnia d/c'ed per pt request   4.	I/G/M therapy with DBT focus  5.	Medical: None  6.	Collateral/Dispo:   -dispo when stable

## 2023-07-31 NOTE — BH PSYCHOLOGY - GROUP THERAPY NOTE - NSBHPSYCHOLASSESSPROV_PSY_A_CORE
Licensed Psychologist and Psychology Trainee
Psychology Trainee only
Licensed Psychologist and Psychology Trainee
Licensed Psychologist and Psychology Trainee
Licensed Psychologist
Licensed Psychologist
Psychology Trainee only
Licensed Psychologist
Psychology Trainee only
Licensed Psychologist and Psychology Trainee
Licensed Psychologist and Psychology Trainee

## 2023-07-31 NOTE — BH INPATIENT PSYCHIATRY DISCHARGE NOTE - ATTENDING DISCHARGE PHYSICAL EXAMINATION:
Care was discussed and reviewed in interdisciplinary treatment team.  The patient has continued to show improvement in symptoms.  She states her depression is much improved, and she denies any anxiety.  She denies any SI, and her behavior has been well controlled.  The patient has been sleeping better, without nightmares.  The patient has been fully engaged in treatment on the unit, compliant with medications, and is looking forward to continuing care as an outpatient.  The patient has support from her family, who are also protective factors for her.  She was able to safety plan appropriately.  The patient’s risk cannot be further decreased with continued inpatient hospitalization.  She is no longer an acute danger to herself or others, and is stable for discharge home.

## 2023-07-31 NOTE — BH INPATIENT PSYCHIATRY DISCHARGE NOTE - NSDCCPCAREPLAN_GEN_ALL_CORE_FT
PRINCIPAL DISCHARGE DIAGNOSIS  Diagnosis: Major depressive disorder, recurrent episode, severe with catatonia  Assessment and Plan of Treatment:

## 2023-07-31 NOTE — BH PSYCHOLOGY - GROUP THERAPY NOTE - NSPSYCHOLGRPDBTPROB_PSY_A_CORE
anxiety/depressed mood/emotional dysregulation/irritability/labile affect
anxiety/emotional dysregulation/impulsive behaviors
dependency/poor judgment/social isolation
depressed mood/emotional dysregulation
anxiety/depressed mood/emotional dysregulation
anger/anxiety/impulsive behaviors/irritability
anger/anxiety/impulsive behaviors/irritability

## 2023-07-31 NOTE — BH INPATIENT PSYCHIATRY PROGRESS NOTE - NSTXDCOPNOPROGRES_PSY_ALL_CORE
No Change
Improving
No Change

## 2023-07-31 NOTE — BH INPATIENT PSYCHIATRY PROGRESS NOTE - NSCGISEVERILLNESS_PSY_ALL_CORE
4 = Moderately ill – overt symptoms causing noticeable, but modest, functional impairment or distress; symptom level may warrant medication
5 = Markedly ill - intrusive symptoms that distinctly impair social/occupational function or cause intrusive levels of distress
5 = Markedly ill - intrusive symptoms that distinctly impair social/occupational function or cause intrusive levels of distress
4 = Moderately ill – overt symptoms causing noticeable, but modest, functional impairment or distress; symptom level may warrant medication
5 = Markedly ill - intrusive symptoms that distinctly impair social/occupational function or cause intrusive levels of distress
4 = Moderately ill – overt symptoms causing noticeable, but modest, functional impairment or distress; symptom level may warrant medication
4 = Moderately ill – overt symptoms causing noticeable, but modest, functional impairment or distress; symptom level may warrant medication
5 = Markedly ill - intrusive symptoms that distinctly impair social/occupational function or cause intrusive levels of distress

## 2023-07-31 NOTE — BH INPATIENT PSYCHIATRY PROGRESS NOTE - NSICDXBHPRIMARYDX_PSY_ALL_CORE
Major depressive disorder, recurrent episode, severe with catatonia   F33.2  

## 2023-07-31 NOTE — BH INPATIENT PSYCHIATRY PROGRESS NOTE - NSTXDISORGDATETRGT_PSY_ALL_CORE
14-Jul-2023
14-Jul-2023
03-Aug-2023
03-Aug-2023
14-Jul-2023
27-Jul-2023
03-Aug-2023
14-Jul-2023
27-Jul-2023
14-Jul-2023
27-Jul-2023

## 2023-07-31 NOTE — BH INPATIENT PSYCHIATRY PROGRESS NOTE - CURRENT MEDICATION
MEDICATIONS  (STANDING):  ARIPiprazole 20 milliGRAM(s) Oral daily  clonazePAM  Tablet 2 milliGRAM(s) Oral <User Schedule>  clonazePAM  Tablet 1 milliGRAM(s) Oral daily  lamoTRIgine 100 milliGRAM(s) Oral daily  multivitamin/minerals 1 Tablet(s) Oral daily  venlafaxine  milliGRAM(s) Oral daily    MEDICATIONS  (PRN):  acetaminophen     Tablet .. 650 milliGRAM(s) Oral every 6 hours PRN Mild Pain (1 - 3), Moderate Pain (4 - 6), Severe Pain (7 - 10)  calcium carbonate    500 mG (Tums) Chewable 1 Tablet(s) Chew three times a day PRN Dyspepsia  hydrOXYzine hydrochloride 25 milliGRAM(s) Oral every 6 hours PRN anxiety  lidocaine   4% Patch 1 Patch Transdermal every 24 hours PRN neck pain  LORazepam   Injectable 2 milliGRAM(s) IntraMuscular once PRN severe agitation  ondansetron    Tablet 4 milliGRAM(s) Oral every 6 hours PRN Nausea and/or Vomiting   MEDICATIONS  (STANDING):  ARIPiprazole 20 milliGRAM(s) Oral daily  clonazePAM  Tablet 1 milliGRAM(s) Oral daily  clonazePAM  Tablet 2 milliGRAM(s) Oral <User Schedule>  lamoTRIgine 100 milliGRAM(s) Oral daily  multivitamin/minerals 1 Tablet(s) Oral daily  venlafaxine  milliGRAM(s) Oral daily    MEDICATIONS  (PRN):  acetaminophen     Tablet .. 650 milliGRAM(s) Oral every 6 hours PRN Mild Pain (1 - 3), Moderate Pain (4 - 6), Severe Pain (7 - 10)  calcium carbonate    500 mG (Tums) Chewable 1 Tablet(s) Chew three times a day PRN Dyspepsia  hydrOXYzine hydrochloride 25 milliGRAM(s) Oral every 6 hours PRN anxiety  lidocaine   4% Patch 1 Patch Transdermal every 24 hours PRN neck pain  LORazepam   Injectable 2 milliGRAM(s) IntraMuscular once PRN severe agitation  ondansetron    Tablet 4 milliGRAM(s) Oral every 6 hours PRN Nausea and/or Vomiting

## 2023-07-31 NOTE — BH INPATIENT PSYCHIATRY PROGRESS NOTE - NSBHATTESTAPPBILLTIME_PSY_A_CORE
I attest my time as ERIC is greater than 50% of the total combined time spent on qualifying patient care activities. I have reviewed and verified the documentation.

## 2023-07-31 NOTE — BH INPATIENT PSYCHIATRY PROGRESS NOTE - NSTXDCOPNODATETRGT_PSY_ALL_CORE
04-Aug-2023
17-Jul-2023
28-Jul-2023
24-Jul-2023
24-Jul-2023
28-Jul-2023
17-Jul-2023
24-Jul-2023
24-Jul-2023
28-Jul-2023
17-Jul-2023
28-Jul-2023
17-Jul-2023
28-Jul-2023
28-Jul-2023

## 2023-07-31 NOTE — BH INPATIENT PSYCHIATRY DISCHARGE NOTE - NSBHFUPINTERVALHXFT_PSY_A_CORE
Pt assessed for depression and catatonia. Chart reviewed and case discussed with treatment team. No interval events reported overnight. Pt states that she is excited for discharge today. Pt adamantly denies SIIP, HIIP, A/VH, or paranoia today. Pt is future oriented and states that she is looking forward to seeking employment and reconnecting with her family. Pt denies any SE. Pt educated on the use of 911 or going to the nearest ED if safety concerns should arise in the community; pt verbalized understanding. Pt was discharged to her mother's home today.

## 2023-07-31 NOTE — BH INPATIENT PSYCHIATRY PROGRESS NOTE - NSBHATTESTBILLING_PSY_A_CORE
73400-Gomadogevq OBS or IP - moderate complexity OR 35-49 mins
54273-Oayquzrynr OBS or IP - low complexity OR 25-34 mins
79086-Xkjsnusizv OBS or IP - low complexity OR 25-34 mins
67015-Qmjnnyemlj OBS or IP - moderate complexity OR 35-49 mins
98120-Dbcakdwdlg OBS or IP - moderate complexity OR 35-49 mins
82988-Azzdscxztf OBS or IP - low complexity OR 25-34 mins
49658-Zsopysvfvy OBS or IP - moderate complexity OR 35-49 mins
46012-Kbuepxwavk OBS or IP - low complexity OR 25-34 mins
42990-Sdpiguckoe OBS or IP - low complexity OR 25-34 mins
36243-Hhogiaraqo OBS or IP - low complexity OR 25-34 mins
37020-Ekhpyaccoj OBS or IP - low complexity OR 25-34 mins
66928-Ywshwcslqy OBS or IP - moderate complexity OR 35-49 mins
60165-Hneqvmikxe OBS or IP - moderate complexity OR 35-49 mins
88275-Jgtkcdxttf OBS or IP - moderate complexity OR 35-49 mins
53475-Muleajiqwx OBS or IP - moderate complexity OR 35-49 mins
30153-Xnfhpwsncy OBS or IP - moderate complexity OR 35-49 mins
06548-Kemrtlqdcc OBS or IP - moderate complexity OR 35-49 mins

## 2023-07-31 NOTE — BH PSYCHOLOGY - GROUP THERAPY NOTE - NSPSYCHOLGRPGENGOAL_PSY_A_CORE
improve level of independent functioning/improve social/vocational/coping skills
decrease symptoms/improve family functioning/improve social/vocational/coping skills/psychoeducation
improve level of independent functioning/improve reality testing/improve social/vocational/coping skills
improve family functioning/improve level of independent functioning

## 2023-07-31 NOTE — BH INPATIENT PSYCHIATRY PROGRESS NOTE - NSBHCONSBHPROVCNTCTNOFT_PSY_A_CORE
afterhours; defer to primary

## 2023-07-31 NOTE — BH INPATIENT PSYCHIATRY PROGRESS NOTE - NSBHCHARTREVIEWVS_PSY_A_CORE FT
Vital Signs Last 24 Hrs  T(C): 37 (07-31-23 @ 07:29), Max: 37 (07-31-23 @ 07:29)  T(F): 98.6 (07-31-23 @ 07:29), Max: 98.6 (07-31-23 @ 07:29)  HR: --  BP: --  BP(mean): --  RR: 18 (07-31-23 @ 07:29) (18 - 18)  SpO2: 100% (07-31-23 @ 07:29) (100% - 100%)    Orthostatic VS  07-31-23 @ 07:29  Lying BP: --/-- HR: --  Sitting BP: 117/64 HR: 82  Standing BP: 122/72 HR: 84  Site: --  Mode: --  Orthostatic VS  07-30-23 @ 07:18  Lying BP: --/-- HR: --  Sitting BP: 108/72 HR: 92  Standing BP: 119/78 HR: 98  Site: upper left arm  Mode: electronic

## 2023-07-31 NOTE — BH INPATIENT PSYCHIATRY DISCHARGE NOTE - NSBHDCHANDOFFFT_PSY_ALL_CORE
Encrypted [facility] email sent to Dr. Traylor and [facility] staff including discharge summary, medication list, and this writer's contact [phone] for any further questions or concerns

## 2023-07-31 NOTE — BH PSYCHOLOGY - GROUP THERAPY NOTE - NSBHPSYCHOLRESPONSE_PSY_A_CORE
Accepted support
Symptoms reduced/Coping skills acquired/Insight displayed/Accepted support
Accepted support
Coping skills acquired/Insight displayed/Accepted support
Insight displayed/Accepted support
Insight displayed/Accepted support
Symptoms reduced/Coping skills acquired/Insight displayed/Accepted support
Coping skills acquired/Insight displayed/Accepted support

## 2023-07-31 NOTE — BH INPATIENT PSYCHIATRY PROGRESS NOTE - NSTXANXGOAL_PSY_ALL_CORE
Identify and practice 3 coping skills to manage anxiety

## 2023-07-31 NOTE — BH PSYCHOLOGY - GROUP THERAPY NOTE - NSBHPSYCHOLGRPNAME_PSY_A_CORE
DBT (Dialectical Behavior Therapy) Group
Women’s Issues
DBT (Dialectical Behavior Therapy) Group

## 2023-07-31 NOTE — BH INPATIENT PSYCHIATRY DISCHARGE NOTE - NSDCMRMEDTOKEN_GEN_ALL_CORE_FT
ARIPiprazole 20 mg oral tablet: 1 tab(s) orally once a day  clonazePAM 1 mg oral tablet: 1 tab(s) orally once a day MDD: 3 mg  clonazePAM 2 mg oral tablet: 1 tab(s) orally once a day taken at 2PM daily MDD: 3 mg  hydrOXYzine hydrochloride 25 mg oral tablet: 1 tab(s) orally 2 times a day as needed for anxiety  lamoTRIgine 100 mg oral tablet: 1 tab(s) orally once a day  venlafaxine 150 mg oral capsule, extended release: 2 cap(s) orally once a day

## 2023-07-31 NOTE — BH PSYCHOLOGY - GROUP THERAPY NOTE - NSPSYCHOLGRPDBTEMOT_PSY_A_CORE FT
N/A
Pt attended the DBT Skills Group which is held daily. Today’s topic was an introduction to Emotion Regulation and discussing What Emotions Do For Us and Factors that Make It Hard to Regulate. Group members did a quick check-in and then participated in a brief mindfulness exercise.  reviewed the goals of emotion regulation skills, the function of emotions, and factors that make regulation difficult. Participants read from the handouts and provided examples. Writer introduced the HW assignment on challenging myths of emotions. 
N/A
NA

## 2023-07-31 NOTE — BH PSYCHOLOGY - GROUP THERAPY NOTE - NSPSYCHOLGRPGENPROB_PSY_A_CORE
family dysfunction
academic/vocational/social dysfunction/anxiety/depression
psychosis
academic/vocational/social dysfunction/anxiety/depression

## 2023-07-31 NOTE — BH PSYCHOLOGY - GROUP THERAPY NOTE - NSBHPTASSESSDT_PSY_A_CORE
28-Jul-2023 11:15
26-Jul-2023 11:15
24-Jul-2023 14:15
21-Jul-2023 11:15
31-Jul-2023 14:15
17-Jul-2023 11:15
20-Jul-2023 11:15
24-Jul-2023 11:15
25-Jul-2023 11:15
19-Jul-2023 11:15
31-Jul-2023 11:15

## 2023-07-31 NOTE — BH INPATIENT PSYCHIATRY PROGRESS NOTE - NSBHFUPINTERVALHXFT_PSY_A_CORE
Patient seen for follow-up of depression, catatonia.  Chart reviewed. Case discussed with interdisciplinary team. Pt noted to receive PO PRNs for break through anxiety over the weekend, but overall, pt states that she feels the increase in klonopin has been helpful. Pt denies, SIIP, HIIP, paranoia or A/VH today. Pt declined ROME abilify maintena stating she did not want to change anything just prior to discharge tomorrow. Pt reports having a good visit with her mother last night and pt states that she was not "triggered" and her mother has come to an understanding with the pt about her outpt care. Pt states she is looking forward to starting [employer] and is future oriented, discussing work and seeing family like her uncle who has been a support. projected discharge tomorrow. No side effects, no EPS reported.

## 2023-07-31 NOTE — BH INPATIENT PSYCHIATRY DISCHARGE NOTE - OTHER PAST PSYCHIATRIC HISTORY (INCLUDE DETAILS REGARDING ONSET, COURSE OF ILLNESS, INPATIENT/OUTPATIENT TREATMENT)
Pt is a 36yo female, domiciled with mother; unemployed; no dependents, past psychiatric history of psychotic depression with catatonia and post-traumatic stress disorder, one prior psych admission Nov 2021, followed at [facility] outpatient, no hx suicide attempt, hx SIB by cutting, no known hx of violence, no known substance use, hx trauma, BIB EMS activated by mother with concern for catatonia.     On interview in 2W: patient acknowledges not feeling well days leading up to hospitalization. She is unable to state when she was last at her usual state of health, but states that she was doing fine after last hospitalization, that she was working and able to maintain relationships. She states over the last several weeks, she began “disassociating” that she would find herself on ‘autopilot’ for 40-50 min intervals where she was unable to control herself, would find that she was walking in circles. She also reported worsening anxiety over this period of time. She endorses having “thoughts” and differentiates this from hallucinations. She denies VH. Patient denies currently feeling depressed. Denies symptoms of claudia. She states she is adherent to her prescribed medications. She denies substance use. Patient is able to state the names and doses of all of her medications and said that after ED switched her clonazepam to lorazepam, that she felt “much better,” and is hoping to remain on Lorazepam. Patient reports past instances of fleeting Si without intent or plan; she currently denies and is able to contract for safety. Denies HI.      Per ED assessment:   Patient is observed to stare at the floor sitting still, not moving extremities. Makes intermittent eye contact, staring at times, marked speech latency, very slow to respond. Patient is often unable to complete a thought to answer questions. At times appears internally preoccupied and will agree with something even though nothing was asked. After repeating question several times, pt states she is here for chest pain. Reports having anxiety for one month. States she has no desire to eat and has not eaten in many days. Admits to missing medication at least 2-3 days. Denies hearing voices or being fearful. Denies suicidal ideation. Denies using drugs or alcohol.    Per pts mother gulshan :  Patient is a 36 Yo female domiciled w/mother, hx of depression mother believes, Not working or studying, single no children, BIB EMS  activated by mother  because patient was not responding. Mother said she looked like she was in shock or dehydrated.  Mother says patient was rigid, catatonic, had dead eye, couldn’t sit up, was shaking and throwing up. She reports patient did not eat or drink anything since yesterday. Mother says yesterday patient was acting bizarre. Mother says patient came into the mother’s room yesterday asking to stay with her and asked mom to stay in the bathroom while she was showering because she was afraid. Patient then called her father in [place], uncle and mother saying she needs help, doesn’t feel good and wanted everyone to be apart of her medical care. Mother reports the patient didn’t eat or drink anything since yesterday. She reports the patient did not endorse any Si or hi. She is unaware of any AVH, paranoia or delusions. She says patient has been struggling to get reconnected to a psychiatrist and her previous psychiatrist she got disconnected to because they became pregnant. Mother reports the patient was prescribed venlafaxine and lamotrigine (maybe clonazepam too?) mother unsure of specifics. Mother says patient stopped medication 2-3 days ago. Mother reports patient does not use any drugs or alcohol. Mother reports patient was lats hospitalized at [facility] last year for Si. Mother says patient saw her PCP yesterday due to headaches and depression. Patient was advised to f/u with [facility] crisis center.  No medical problems. Patient is covid vaccinated and has no recent travel or exposure. She denied any family hx of mental illness or addiction. Patient does not have access to firearms or weapons. Writer inquired about baseline and mother said patient is more responsive and says everyday is different for her. She reports never seeing her with how she was on day of admit.

## 2023-07-31 NOTE — BH PSYCHOLOGY - CLINICIAN PSYCHOTHERAPY NOTE - TOKEN PULL-DIAGNOSIS
Primary Diagnosis:  Major depressive disorder, recurrent episode, severe with catatonia [F33.2]      Catatonia [F06.1]      Bipolar I disorder with catatonia [F31.9]        Problem Dx:   

## 2023-07-31 NOTE — BH INPATIENT PSYCHIATRY PROGRESS NOTE - NSTXANXDATEEST_PSY_ALL_CORE
08-Jul-2023

## 2023-07-31 NOTE — BH PSYCHOLOGY - GROUP THERAPY NOTE - NSBHPSYCHOLPARTICIPCOMMENT_PSY_A_CORE FT
Pt actively participated and was engaged throughout session. Pt spoke several times, was tangential but positively engaged in discussion and with peers. 
Patient was attentive and engaged throughout group and actively and spontaneously participated as well as when prompted. Pt spoke about how aspects of the mindfulness exercise resonated with her ("Season always changing") and spoke about the importance of dialectical thinking in accepting change and remaining clear in her "roots" and in her "truth". At times patient spoke for longer periods, appearing somewhat tangential and disorganized, but was reflective and expressed emotions positive openly throughout group. Patient also engaged well with other members in both verbal and non-verbal ways (e.g. nodding in agreement when other members would share).
The patient was fully engaged throughout. She offered insightful questions to the group and participating without prompting. 
The patient was fully engaged throughout the group session. She shared openly and respectfully with the group. 
Patient was attentive and engaged throughout group and actively and spontaneously participated as well as when prompted. Pt spoke about the challenges around "limiting how I communicate my feelings on the inside to others" in terms of setting her own boundaries and shared with the group that she perceives herself as person with rigid boundaries.  At times patient spoke for longer periods, appearing somewhat tangential and disorganized (e.g. offering a metaphor for boundaries), but was reflective and expressed emotions positive openly throughout group. Patient also engaged well with other members and group leaders in both verbal and non-verbal ways (e.g. nodding in agreement when other members would share). 
Pt was attentive and engaged actively and spontaneously throughout group. Pt reported distracting with contributing (e.g., volunteering in an animal shelter) helpful. Pt also provided support and suggestions to other group members. 
Pt joined the group late and left the group earlier. Pt was engaged in the mindfulness exercise, but was quiet in the discussion of learned skills.
Pt was engaged but disorganized in her thought process. She was observed to be tangential and hyperverbal; difficult to redirect. 
Pt was engaged and actively participated. She appeared slightly more linear and shared her observations and experiences of the mindfulness exercise. 
Patient was attentive and engaged throughout group and actively and spontaneously participated as well as when prompted. Pt spoke about practicing DBT skills in between groups and shared the challenge in implementing the skills after they are learned. At times patient spoke for longer periods, appearing somewhat tangential and disorganized, but was reflective and expressed emotions openly throughout group. Patient also engaged well with other members in both verbal and non-verbal ways (e.g. nodding in agreement when other members would share). Pt expressed skepticism about when and with whom to be validating, particularly in the content of engaging with someone with "bad intent".           
Pt was engaged and attentive. She actively and spontaneously participated in discussion and exercise. Pt reported that she found the paired relaxation exercise helpful.

## 2023-07-31 NOTE — BH INPATIENT PSYCHIATRY PROGRESS NOTE - NSBHCONTPROVIDER_PSY_ALL_CORE
No...

## 2023-07-31 NOTE — BH INPATIENT PSYCHIATRY PROGRESS NOTE - NSTXPSYCHOGOAL_PSY_ALL_CORE
Will engage in a 15 minute conversation with no irrational content
Will identify 2 coping skills that help mitigate hallucinations
Will identify 2 coping skills that help mitigate hallucinations
Will engage in a 15 minute conversation with no irrational content
Will identify 2 coping skills that help mitigate hallucinations
Will engage in a 15 minute conversation with no irrational content
Will engage in a 15 minute conversation with no irrational content

## 2023-07-31 NOTE — BH PSYCHOLOGY - CLINICIAN PSYCHOTHERAPY NOTE - NSTXPSYCHOGOAL_PSY_ALL_CORE
Will engage in a 15 minute conversation with no irrational content
Will engage in a 15 minute conversation with no irrational content
Will identify 2 coping skills that help mitigate hallucinations
Will engage in a 15 minute conversation with no irrational content

## 2023-07-31 NOTE — BH PSYCHOLOGY - CLINICIAN PSYCHOTHERAPY NOTE - NSBHPSYCHOLGOALS_PSY_A_CORE
Improve level of independent functioning/Treatment compliance
Improve family functioning/Improve social/vocational/coping skills
Improve level of independent functioning/Improve social/vocational/coping skills/other...
Improve level of independent functioning/Improve social/vocational/coping skills

## 2023-07-31 NOTE — BH INPATIENT PSYCHIATRY PROGRESS NOTE - NSTXDISORGINTERMD_PSY_ALL_CORE
medication management 

## 2023-07-31 NOTE — BH PSYCHOLOGY - GROUP THERAPY NOTE - NSPSYCHOLGRPGENINT_PSY_A_CORE
stress management/supported coping skills/supportive therapy
cognitive/behavioral therapy/dialectical behavior therapy (dbt)/problem solving techniques discussed/supported coping skills/supportive therapy
supported coping skills
dynamic issues addressed

## 2023-07-31 NOTE — BH INPATIENT PSYCHIATRY DISCHARGE NOTE - NSBHASSESSSUMMFT_PSY_ALL_CORE
38yo female, domiciled with mother; unemployed; no dependents, past psychiatric history of psychotic depression with catatonia and post-traumatic stress disorder, one prior psych admission Nov 2021, follows at [facility] outpatient, no hx suicide attempt, hx SIB by cutting, no known hx of violence, no known substance use, hx trauma, BIB EMS activated by mother with concern for catatonia. 9.39    Today, pt adamantly denies SIIP, HIIP, A/VH, or paranoia. Pt is future oriented and cites her family as protective factors. Pt stated spontaneously that she needs to stay on her medications as an outpt. Pt discharged to her mother's home today

## 2023-07-31 NOTE — BH INPATIENT PSYCHIATRY PROGRESS NOTE - PRN MEDS
MEDICATIONS  (PRN):  acetaminophen     Tablet .. 650 milliGRAM(s) Oral every 6 hours PRN Mild Pain (1 - 3), Moderate Pain (4 - 6), Severe Pain (7 - 10)  calcium carbonate    500 mG (Tums) Chewable 1 Tablet(s) Chew three times a day PRN Dyspepsia  hydrOXYzine hydrochloride 25 milliGRAM(s) Oral every 6 hours PRN anxiety  lidocaine   4% Patch 1 Patch Transdermal every 24 hours PRN neck pain  LORazepam   Injectable 2 milliGRAM(s) IntraMuscular once PRN severe agitation  ondansetron    Tablet 4 milliGRAM(s) Oral every 6 hours PRN Nausea and/or Vomiting

## 2023-07-31 NOTE — BH INPATIENT PSYCHIATRY DISCHARGE NOTE - HPI (INCLUDE ILLNESS QUALITY, SEVERITY, DURATION, TIMING, CONTEXT, MODIFYING FACTORS, ASSOCIATED SIGNS AND SYMPTOMS)
Per ED assessment: "38yo female, domiciled with mother; unemployed; no dependents, past psychiatric history of psychotic depression with catatonia and post-traumatic stress disorder, one prior psych admission Nov 2021, follows at [facility] outpatient, no hx suicide attempt, hx SIB by cutting, no known hx of violence, no known substance use, hx trauma, BIB EMS activated by mother with concern for catatonia. 9.39    On interview in 2W: patient acknowledges not feeling well days leading up to hospitalization. She is unable to state when she was last at her usual state of health, but states that she was doing fine after last hospitalization, that she was working and able to maintain relationships. She states over the last several weeks, she began “disassociating” that she would find herself on ‘autopilot’ for 40-50 min interavls where she was unable to control herself, would find that she was walking in circles. She also reported worsening anxiety over this period of time. She endorses having “thoughts” and differentiates this from hallucinations. She denies VH. Patient denies currently feeling depressed. Denies symptoms of claudia. She states she is adherent to her prescribed medications. She denies substance use. Patient is able to tell me the names and doses of all of her medications and tells me after ED switched her clonazepam to lorazepam, that she felt “much better,” and is hoping to remain on Lorazepam. Patient reports past instances of fleeting Si without intent or plan; she currently denies and is able to contract for safety. Denies HI.      Per ED assessment:     Patient is observed to stare at the floor sitting still, not moving extremities. Makes intermittent eye contact, staring at times, marked speech latency, very slow to respond. Patient is often unable to complete a thought to answer questions. At times appears internally preoccupied and will agree with something even though nothing was asked. After repeating question several times, pt states she is here for chest pain. Reports having anxiety for one month. States she has no desire to eat and has not eaten in many days. Admits to missing medication at least 2-3 days. Denies hearing voices or being fearful. Denies suicidal ideation. Denies using drugs or alcohol. Patient states she has "a lot" of allergies but cannot state what they are (confirmed with mother, no allergies).    Scored 10 on Ribeiro Deyvi - for immobility, mutism, staring, catalepsy, negativism, withdrawal and autonomic abnormality (HTN)."

## 2023-07-31 NOTE — BH PSYCHOLOGY - GROUP THERAPY NOTE - NSBHPSYCHOLGRPTYPE_PSY_A_CORE
DBT Life Skills
DBT Life Skills
General Group Therapy
DBT Life Skills
DBT Life Skills
General Group Therapy
General Group Therapy
DBT Life Skills
DBT Life Skills
General Group Therapy
DBT Life Skills

## 2023-07-31 NOTE — BH PSYCHOLOGY - GROUP THERAPY NOTE - NSPSYCHOLGRPDBTINT_PSY_A_CORE
reviewed distress tolerance, skills homework
reviewed interpersonal effectiveness homework
reviewed interpersonal effectiveness homework
reviewed distress tolerance, skills homework
reviewed distress tolerance, skills homework
reviewed interpersonal effectiveness homework
review emotion regulation homework

## 2023-07-31 NOTE — BH INPATIENT PSYCHIATRY PROGRESS NOTE - MSE UNSTRUCTURED FT
The patient appears stated age, with fair hygiene, and is dressed appropriately.  She was calm and cooperative with the interview.  She maintained appropriate eye contact.  No PMA or PMR noted.  Gait steady.  The patient's speech was fluent, normal in tone and rate, and regular in volume.  Her mood is "Better."  Affect is constricted, stable, appropriate.  The patient's thoughts are linear.  She now denies paranoia and no longer appears hypervigilant, now denies A/V hallucinations.  No suicidal or homicidal ideation, intent, or plans.  Insight is improving.  Judgment is improving.  Impulse control has been fair on the unit.

## 2023-07-31 NOTE — BH INPATIENT PSYCHIATRY PROGRESS NOTE - NSTXDISORGGOAL_PSY_ALL_CORE
Will verbalize 1 strategy to successfully cope with disturbed thinking
Will demonstrate purposeful and predictable thoughts/behaviors by making a request
Will demonstrate purposeful and predictable thoughts/behaviors by making a request
Will identify 2 coping skills that assist in organizing
Will demonstrate purposeful and predictable thoughts/behaviors by making a request
Will identify 2 coping skills that assist in organizing
Will demonstrate purposeful and predictable thoughts/behaviors by making a request
Will demonstrate purposeful and predictable thoughts/behaviors by making a request
Will identify 2 coping skills that assist in organizing
Will verbalize 1 strategy to successfully cope with disturbed thinking
Will demonstrate purposeful and predictable thoughts/behaviors by making a request
Will identify 2 coping skills that assist in organizing
Will demonstrate purposeful and predictable thoughts/behaviors by making a request
Will verbalize 1 strategy to successfully cope with disturbed thinking
Will identify 2 coping skills that assist in organizing

## 2023-07-31 NOTE — BH PSYCHOLOGY - CLINICIAN PSYCHOTHERAPY NOTE - NSBHPSYCHOLINT_PSY_A_CORE
Supportive therapy
Supported coping skills/Supportive therapy
Dynamic issues addressed/Encourage medication compliance/Supportive therapy/Treatment compliance encouraged
Supportive therapy

## 2023-07-31 NOTE — BH PSYCHOLOGY - GROUP THERAPY NOTE - NSPSYCHOLGRPBILLING_PSY_A_CORE
[id number] - Group Psychotherapy
[id number] - Group Psychotherapy
[id number] - Group Psychotherapy
[id number] - Group Psychotherapy
[id number] - Group Psychotherapy
[id number] - Group Psychotherapy
[id number] - Group Psychotherapy
[id number] - Group Psychotherapy

## 2023-07-31 NOTE — BH PSYCHOLOGY - GROUP THERAPY NOTE - NSBHPSYCHOLGRPDUR_PSY_A_CORE
45 minutes
45 minutes
60 minutes
45 minutes
45 minutes
60 minutes
45 minutes

## 2023-07-31 NOTE — BH PSYCHOLOGY - GROUP THERAPY NOTE - NSBHPSYCHOLADHERE_PSY_A_CORE
Reason for Disposition   [1] COVID-19 diagnosed by positive lab test AND [2] mild symptoms (e g , cough, fever, others) AND [0] no complications or SOB    Answer Assessment - Initial Assessment Questions  1  COVID-19 DIAGNOSIS: "Who made your Coronavirus (COVID-19) diagnosis?" "Was it confirmed by a positive lab test?" If not diagnosed by a HCP, ask "Are there lots of cases (community spread) where you live?" (See public health department website, if unsure)      Pt is having symptoms and want to be tested   2  COVID-19 EXPOSURE: "Was there any known exposure to COVID before the symptoms began?" CDC Definition of close contact: within 6 feet (2 meters) for a total of 15 minutes or more over a 24-hour period  unsure  3  ONSET: "When did the COVID-19 symptoms start?"       1/17/21  4  WORST SYMPTOM: "What is your worst symptom?" (e g , cough, fever, shortness of breath, muscle aches)      diarrhea  5  COUGH: "Do you have a cough?" If so, ask: "How bad is the cough?"        Yes mild   6  FEVER: "Do you have a fever?" If so, ask: "What is your temperature, how was it measured, and when did it start?"      no  7  RESPIRATORY STATUS: "Describe your breathing?" (e g , shortness of breath, wheezing, unable to speak)       wnl  8  BETTER-SAME-WORSE: "Are you getting better, staying the same or getting worse compared to yesterday?"  If getting worse, ask, "In what way?"      better  9  HIGH RISK DISEASE: "Do you have any chronic medical problems?" (e g , asthma, heart or lung disease, weak immune system, etc )      none  10  PREGNANCY: "Is there any chance you are pregnant?" "When was your last menstrual period?"        n/a  11   OTHER SYMPTOMS: "Do you have any other symptoms?"  (e g , chills, fatigue, headache, loss of smell or taste, muscle pain, sore throat)       Headache, sore throat    Protocols used: CORONAVIRUS (COVID-19)  DIAGNOSED OR SUSPECTED-ADULT-OH
Good
Fair
Good
Fair

## 2023-07-31 NOTE — BH INPATIENT PSYCHIATRY PROGRESS NOTE - ATTENDING COMMENTS
Care was discussed and reviewed in interdisciplinary treatment team.

## 2023-07-31 NOTE — BH PSYCHOLOGY - GROUP THERAPY NOTE - NSBHPSYCHOLPARTICIP_PSY_A_CORE
Fully engaged
Partially engaged
Fully engaged

## 2023-07-31 NOTE — BH PSYCHOLOGY - GROUP THERAPY NOTE - NSPSYCHOLGRPDBTTOL_PSY_A_CORE FT
IMPROVE the Moment.  Group started with a brief mindfulness exercise and check-in on skills strengthening and generalization. Group then reviewed goals of distress tolerance skills. Group members reviewed ideas for practicing IMPROVE and members provided personal examples and shared suggestions. Practice assignment was also provided and reviewed and group members stated that felt confident they could practice skill after group.     
N/A
Pt attended the DBT Skills Group which is held daily.Today’s group reviewed the distress tolerance skill of changing body chemistry through TIPP skills.   led the group in practicing paired muscle relaxation, reviewed components of the skill, and practiced “effective rethinking and paired relaxation” in which each member identified a personal distressing scenario, a personal coping statement, and practiced paired relaxation together with good effect. Homework was reviewed and members shared their experiences practicing the skill and challenges they face in practicing.
NA
Distracting Wise Mind ACCEPTS. Group members did a quick check-in and then participated in a brief mindfulness exercise. Group started with a brief mindfulness exercise and check-in on skills strengthening and generalization. Group then reviewed goals of distress tolerance skills. Group members reviewed the concept of wise mind and discussed ideas for practicing the ACCEPTS skills. Participants read from the handouts and provided examples, suggestions, and support to the group. Writer introduced the HW assignment on practicing the distracting skills. 
N/A
N/A

## 2023-07-31 NOTE — BH INPATIENT PSYCHIATRY PROGRESS NOTE - NSTXDISORGDATEEST_PSY_ALL_CORE
07-Jul-2023

## 2023-07-31 NOTE — BH INPATIENT PSYCHIATRY PROGRESS NOTE - NSTXANXDATETRGT_PSY_ALL_CORE
15-Jul-2023
15-Jul-2023
21-Jul-2023
21-Jul-2023
03-Aug-2023
15-Jul-2023
21-Jul-2023
27-Jul-2023
15-Jul-2023
27-Jul-2023
15-Jul-2023
21-Jul-2023
27-Jul-2023
03-Aug-2023
03-Aug-2023

## 2023-07-31 NOTE — BH INPATIENT PSYCHIATRY PROGRESS NOTE - NSBHATTESTTYPEVISIT_PSY_A_CORE
On-site Attending supervising ERIC (99XXX codes)
On-site Attending supervising ERIC (99XXX codes)
Attending Only
On-site Attending supervising ERIC (99XXX codes)
Attending Only
On-site Attending supervising ERIC (99XXX codes)
Attending with Resident/Fellow/Student
On-site Attending supervising ERIC (99XXX codes)
Attending Only
On-site Attending supervising ERIC (99XXX codes)

## 2023-07-31 NOTE — BH PSYCHOLOGY - CLINICIAN PSYCHOTHERAPY NOTE - NSBHPSYCHOLNARRATIVE_PSY_A_CORE FT
Pt seen for individual therapy and was fully engaged throughout the session. Session started with a brief check-in for the past weekend. Pt reported being able to successfully manage her distress without acting on her impulses a few times. Writer provided positive reinforcement to her progress in using DBT skills. Pt completed the safety plan with writer and agrees on using this plan for crisis situations. Pt denied SI/HI/SIB.

## 2023-08-01 VITALS — RESPIRATION RATE: 18 BRPM | TEMPERATURE: 98 F | OXYGEN SATURATION: 99 %

## 2023-08-01 RX ADMIN — Medication 300 MILLIGRAM(S): at 08:17

## 2023-08-01 RX ADMIN — Medication 1 MILLIGRAM(S): at 08:17

## 2023-08-01 RX ADMIN — Medication 25 MILLIGRAM(S): at 03:50

## 2023-08-01 RX ADMIN — ARIPIPRAZOLE 20 MILLIGRAM(S): 15 TABLET ORAL at 08:17

## 2023-08-01 RX ADMIN — LAMOTRIGINE 100 MILLIGRAM(S): 25 TABLET, ORALLY DISINTEGRATING ORAL at 08:17

## 2023-08-02 ENCOUNTER — OUTPATIENT (OUTPATIENT)
Dept: OUTPATIENT SERVICES | Facility: HOSPITAL | Age: 38
LOS: 1 days | Discharge: HOME | End: 2023-08-02
Payer: MEDICAID

## 2023-08-02 DIAGNOSIS — F33.2 MAJOR DEPRESSIVE DISORDER, RECURRENT SEVERE WITHOUT PSYCHOTIC FEATURES: ICD-10-CM

## 2023-08-11 RX ORDER — VENLAFAXINE HCL 75 MG
2 CAPSULE, EXT RELEASE 24 HR ORAL
Qty: 30 | Refills: 0
Start: 2023-08-11 | End: 2023-08-25

## 2023-08-11 RX ORDER — CLONAZEPAM 1 MG
1 TABLET ORAL
Qty: 15 | Refills: 0
Start: 2023-08-11 | End: 2023-08-25

## 2023-08-11 RX ORDER — ARIPIPRAZOLE 15 MG/1
1 TABLET ORAL
Qty: 15 | Refills: 0
Start: 2023-08-11 | End: 2023-08-25

## 2023-08-11 RX ORDER — HYDROXYZINE HCL 10 MG
1 TABLET ORAL
Qty: 30 | Refills: 0
Start: 2023-08-11 | End: 2023-08-25

## 2023-08-11 RX ORDER — CLONAZEPAM 1 MG
1 TABLET ORAL
Qty: 15 | Refills: 0 | DISCHARGE
Start: 2023-08-11 | End: 2023-08-25

## 2023-08-11 RX ORDER — LAMOTRIGINE 25 MG/1
1 TABLET, ORALLY DISINTEGRATING ORAL
Qty: 15 | Refills: 0
Start: 2023-08-11 | End: 2023-08-25

## 2023-08-11 RX ORDER — VENLAFAXINE HCL 75 MG
1 TABLET ORAL
Qty: 30 | Refills: 0 | DISCHARGE
Start: 2023-08-11 | End: 2023-08-25

## 2023-09-06 PROCEDURE — 99214 OFFICE O/P EST MOD 30 MIN: CPT

## 2023-09-07 NOTE — BH INPATIENT PSYCHIATRY PROGRESS NOTE - NSBHATTESTBILLONSITE_PSY_A_CORE
When you have headaches how often is the pain severe SOMETIMES=10 PTS    How often do headaches limit your daily activities RARELY=8 PTS    When you have a headache how often do you wish you could lie down VERY OFTEN= 11 PTS    How often have you felt too tired to work because of your headaches SOMETIMES=10 PTS    How often have you felt fed up or irritated because of your headaches SOMETIMES=10 PTS    How often did headachs limit concentration on work or daily activity SOMETIMES=10 PTS    TOTAL 59       ERIC to bill

## 2023-09-13 PROCEDURE — 99214 OFFICE O/P EST MOD 30 MIN: CPT

## 2023-10-11 PROCEDURE — 99214 OFFICE O/P EST MOD 30 MIN: CPT | Mod: 95

## 2023-10-24 NOTE — BH INPATIENT PSYCHIATRY PROGRESS NOTE - NSTXDCOPNODATEEST_PSY_ALL_CORE
100
10-Jul-2023
10-Jul-2023
21-Jul-2023
21-Jul-2023
10-Jul-2023
28-Jul-2023
21-Jul-2023
10-Jul-2023
17-Jul-2023
normal...
21-Jul-2023
21-Jul-2023
17-Jul-2023
21-Jul-2023

## 2023-10-25 NOTE — BH INPATIENT PSYCHIATRY ASSESSMENT NOTE - OTHER PAST PSYCHIATRIC HISTORY (INCLUDE DETAILS REGARDING ONSET, COURSE OF ILLNESS, INPATIENT/OUTPATIENT TREATMENT)
General per PSYCKES:   Unspecified/Other Anxiety Disorder | Unspecified/Other Neurocognitve Disorders | Major Depressive Disorder | Unspecified/Other Bipolar | Alcohol related disorders | Generalized Anxiety Disorder | Tobacco  related disorder

## 2024-03-11 NOTE — ED BEHAVIORAL HEALTH ASSESSMENT NOTE - PATIENT SEEN BY
-- DO NOT REPLY / DO NOT REPLY ALL --  -- Message is from Engagement Center Operations ([facility]) --    ONLY TO BE USED WITHIN A REFILL MEDICATION ENCOUNTER    Med Refill  Is the patient currently having any symptoms?: No/Non-Emergent symptoms    Name of medication requested: See pended med    Is this the first request for the medication in the last 48 hours?: Yes      Patient is requesting a medication refill - medication is on active list    Full name of the provider who ordered the medication: MD Osborne    Clinic site name / Account # for provider: MIRIAM Lo    Preferred Pharmacy: Pharmacy  [id number] - [place], [place] - [address]    Patient confirmed the above pharmacy as correct?  Yes    Caller Information         Type Contact Phone/Fax    03/11/2024 08:56 AM CDT Phone (Incoming) Mansi Maria (Self) [phone] (M)            Alternative phone number: No    Can a detailed message be left?: Yes         Attending Psychiatrist without NP/Trainee

## 2024-03-29 ENCOUNTER — APPOINTMENT (OUTPATIENT)
Dept: ORTHOPEDIC SURGERY | Facility: CLINIC | Age: 39
End: 2024-03-29
Payer: MEDICAID

## 2024-03-29 VITALS
HEART RATE: 89 BPM | TEMPERATURE: 98.3 F | DIASTOLIC BLOOD PRESSURE: 89 MMHG | HEIGHT: 64 IN | WEIGHT: 130 LBS | OXYGEN SATURATION: 98 % | SYSTOLIC BLOOD PRESSURE: 127 MMHG | BODY MASS INDEX: 22.2 KG/M2

## 2024-03-29 DIAGNOSIS — Z86.59 PERSONAL HISTORY OF OTHER MENTAL AND BEHAVIORAL DISORDERS: ICD-10-CM

## 2024-03-29 DIAGNOSIS — M25.561 PAIN IN RIGHT KNEE: ICD-10-CM

## 2024-03-29 DIAGNOSIS — Z82.49 FAMILY HISTORY OF ISCHEMIC HEART DISEASE AND OTHER DISEASES OF THE CIRCULATORY SYSTEM: ICD-10-CM

## 2024-03-29 DIAGNOSIS — Z78.9 OTHER SPECIFIED HEALTH STATUS: ICD-10-CM

## 2024-03-29 DIAGNOSIS — R79.89 OTHER SPECIFIED ABNORMAL FINDINGS OF BLOOD CHEMISTRY: ICD-10-CM

## 2024-03-29 PROCEDURE — 73562 X-RAY EXAM OF KNEE 3: CPT | Mod: RT

## 2024-03-29 PROCEDURE — 99202 OFFICE O/P NEW SF 15 MIN: CPT | Mod: 25

## 2024-03-29 RX ORDER — CLONAZEPAM 2 MG/1
TABLET ORAL
Refills: 0 | Status: ACTIVE | COMMUNITY

## 2024-03-29 RX ORDER — LAMOTRIGINE 100 MG/1
100 TABLET ORAL
Refills: 0 | Status: ACTIVE | COMMUNITY

## 2024-03-29 RX ORDER — BUPROPION HCL 150 MG
150 TABLET,SUSTAINED-RELEASE 12 HR ORAL
Refills: 0 | Status: ACTIVE | COMMUNITY

## 2024-03-29 RX ORDER — VENLAFAXINE 37.5 MG/1
TABLET ORAL
Refills: 0 | Status: ACTIVE | COMMUNITY

## 2024-04-04 ENCOUNTER — APPOINTMENT (OUTPATIENT)
Dept: OBGYN | Facility: CLINIC | Age: 39
End: 2024-04-04
Payer: MEDICAID

## 2024-04-04 VITALS
WEIGHT: 155 LBS | HEART RATE: 98 BPM | HEIGHT: 64 IN | TEMPERATURE: 98.2 F | OXYGEN SATURATION: 100 % | RESPIRATION RATE: 16 BRPM | BODY MASS INDEX: 26.46 KG/M2

## 2024-04-04 DIAGNOSIS — Z01.419 ENCOUNTER FOR GYNECOLOGICAL EXAMINATION (GENERAL) (ROUTINE) W/OUT ABNORMAL FINDINGS: ICD-10-CM

## 2024-04-04 PROCEDURE — 99385 PREV VISIT NEW AGE 18-39: CPT

## 2024-04-04 NOTE — PHYSICAL EXAM
[Soft] : soft [Non-tender] : non-tender [Examination Of The Breasts] : a normal appearance [No Masses] : no breast masses were palpable [Labia Minora] : normal [Labia Majora] : normal [Normal] : normal [Uterine Adnexae] : normal

## 2024-04-04 NOTE — HISTORY OF PRESENT ILLNESS
[FreeTextEntry1] : PIO BIRCH 39 YO, presents for Annual G 0 LMP:  03/18/24 - Regular menses Sexually active  Medication: for Anxiety & depression.  No family history of breast cancer. To do monthly SBE. H/O Ovarian cystectomies with 1 salpingectomy.  No pelvic pain.

## 2024-04-05 LAB
C TRACH RRNA SPEC QL NAA+PROBE: NOT DETECTED
N GONORRHOEA RRNA SPEC QL NAA+PROBE: NOT DETECTED
SOURCE TP AMPLIFICATION: NORMAL

## 2024-04-09 ENCOUNTER — APPOINTMENT (OUTPATIENT)
Dept: OBGYN | Facility: CLINIC | Age: 39
End: 2024-04-09
Payer: MEDICAID

## 2024-04-09 ENCOUNTER — ASOB RESULT (OUTPATIENT)
Age: 39
End: 2024-04-09

## 2024-04-09 LAB — CYTOLOGY CVX/VAG DOC THIN PREP: NORMAL

## 2024-04-09 PROCEDURE — 76830 TRANSVAGINAL US NON-OB: CPT

## 2024-04-11 ENCOUNTER — NON-APPOINTMENT (OUTPATIENT)
Age: 39
End: 2024-04-11

## 2024-04-11 ENCOUNTER — APPOINTMENT (OUTPATIENT)
Dept: OPHTHALMOLOGY | Facility: CLINIC | Age: 39
End: 2024-04-11
Payer: MEDICAID

## 2024-04-11 PROCEDURE — 92004 COMPRE OPH EXAM NEW PT 1/>: CPT

## 2024-04-19 NOTE — BH INPATIENT PSYCHIATRY ASSESSMENT NOTE - MEDICAL RECORD REVIEWED
If you start to have worsening symptoms such as worsening flank pain vomiting or fevers go to the emergency department.    Try calling central scheduling in rescheduling with a different urologist to see if you can be seen sooner.    We will notify you of the wet prep and urine culture results.   Yes

## 2024-04-23 ENCOUNTER — NON-APPOINTMENT (OUTPATIENT)
Age: 39
End: 2024-04-23

## 2024-04-23 ENCOUNTER — APPOINTMENT (OUTPATIENT)
Dept: OPHTHALMOLOGY | Facility: CLINIC | Age: 39
End: 2024-04-23
Payer: MEDICAID

## 2024-04-23 PROCEDURE — 99213 OFFICE O/P EST LOW 20 MIN: CPT

## 2024-04-24 ENCOUNTER — APPOINTMENT (OUTPATIENT)
Dept: OBGYN | Facility: CLINIC | Age: 39
End: 2024-04-24
Payer: MEDICAID

## 2024-04-24 PROCEDURE — 99441: CPT

## 2024-04-30 ENCOUNTER — APPOINTMENT (OUTPATIENT)
Dept: OPHTHALMOLOGY | Facility: CLINIC | Age: 39
End: 2024-04-30

## 2024-05-28 ENCOUNTER — APPOINTMENT (OUTPATIENT)
Dept: ORTHOPEDIC SURGERY | Facility: CLINIC | Age: 39
End: 2024-05-28

## 2024-06-21 ENCOUNTER — APPOINTMENT (OUTPATIENT)
Dept: OBGYN | Facility: CLINIC | Age: 39
End: 2024-06-21

## 2024-06-21 VITALS
DIASTOLIC BLOOD PRESSURE: 92 MMHG | TEMPERATURE: 97.9 F | BODY MASS INDEX: 26.46 KG/M2 | OXYGEN SATURATION: 99 % | WEIGHT: 155 LBS | HEIGHT: 64 IN | SYSTOLIC BLOOD PRESSURE: 130 MMHG | RESPIRATION RATE: 16 BRPM | HEART RATE: 96 BPM

## 2024-06-21 DIAGNOSIS — Z32.00 ENCOUNTER FOR PREGNANCY TEST, RESULT UNKNOWN: ICD-10-CM

## 2024-06-21 PROCEDURE — 99213 OFFICE O/P EST LOW 20 MIN: CPT | Mod: TH

## 2024-06-22 ENCOUNTER — TRANSCRIPTION ENCOUNTER (OUTPATIENT)
Age: 39
End: 2024-06-22

## 2024-06-25 NOTE — HISTORY OF PRESENT ILLNESS
[FreeTextEntry1] : PIO BIRCH 37 YO, presents for Irregular Bleeding. G 0 LMP: 06/03/24 - Regular menses  Sexually active Medication: for Anxiety & depression. On Wellbutrin. Lamictal & Clozapine.  This month was early by 5 days hence concerned.  Patient sexually active- Want's to be tested for pregnancy. Has not missed her menses.  But want's her Urine- UCG Checked today. Refuse to do @ home.  Refusing blood test. Patient very combative- had to get / MA.  Refuse to be examined. Patient is forcing me to order her Urine- UCG.

## 2024-07-01 ENCOUNTER — APPOINTMENT (OUTPATIENT)
Dept: OBGYN | Facility: CLINIC | Age: 39
End: 2024-07-01
Payer: MEDICAID

## 2024-07-01 PROCEDURE — 99213 OFFICE O/P EST LOW 20 MIN: CPT

## 2024-07-11 PROCEDURE — ZZZZZ: CPT

## 2024-07-25 ENCOUNTER — EMERGENCY (EMERGENCY)
Facility: HOSPITAL | Age: 39
LOS: 1 days | Discharge: HOME | End: 2024-07-25
Attending: EMERGENCY MEDICINE
Payer: MEDICAID

## 2024-07-25 VITALS
HEIGHT: 62 IN | WEIGHT: 139.99 LBS | OXYGEN SATURATION: 99 % | HEART RATE: 87 BPM | SYSTOLIC BLOOD PRESSURE: 110 MMHG | TEMPERATURE: 98 F | DIASTOLIC BLOOD PRESSURE: 77 MMHG | RESPIRATION RATE: 16 BRPM

## 2024-07-25 PROCEDURE — 99283 EMERGENCY DEPT VISIT LOW MDM: CPT | Mod: 25

## 2024-07-25 PROCEDURE — 99282 EMERGENCY DEPT VISIT SF MDM: CPT

## 2024-07-25 NOTE — ED PROVIDER NOTE - ATTENDING APP SHARED VISIT CONTRIBUTION OF CARE
I, Kody La MD, Emergency Medicine Attending Physician, personally saw and examined the patient and I personally made/approve the management plan and take responsibility for the patient management.    MDM: 38-year-old female with history of depression, anxiety, bipolar disorder who presents with episode of substernal chest pain that lasted for about 15 minutes and resolved spontaneously.  Onset at rest, did not worsen with exertion, not pleuritic, nonradiating.  Not associated with diaphoresis, shortness of breath, dizziness, nausea.  Denies taking OCP, recent travel, surgery or trauma.  No personal or family history of cardiac disease or blood clots.    ROS: denies trauma, fevers, chills, cough, shortness of breath, abdominal pain, nausea, vomiting, diaphoresis, dizziness, syncope, leg pain/swelling, paresthesia, numbness, or weakness.  Denies SI, HI, hallucinations, delusions.    On examination, patient with stable vitals, well-appearing, in no acute distress. Cardiac examination RRR with no M/R/G, lungs CTAB with W/R/R.  Abdomen is soft and nontender.  There is no calf tenderness or leg swelling. Negative Tyron's sign.  Neurovascularly intact in all 4 extremities with 5/5 strength, normal sensation, equal pulses and brisk capillary refill, soft compartments.  Cranial nerves III-XII intact, no pronator drift, normal speech and gait.  Psych examination shows normal speech content, normal affect, cooperative, calm.     PERC rule: No further evaluation for pulmonary embolism indicated as patient is low risk with Wells' Criteria, and PERC negative: no additional work-up for pulmonary embolism as patient's age < 50, HR < 100, SpO2 > 95, no unilateral leg swelling or hemoptysis or recent surgery/trauma, no prior PE or DVT, and no hormone use.    Will keep patient on cardiac monitor, obtain EKG and troponin to evaluate for possible cardiac abnormality including ACS and arrhythmia.  However patient declined EKG and labs and imaging.    Patient wishes to sign out against medical advice.  I had a detailed discussion regarding the benefits of further evaluation, appropriate treatment and reasons for admission, as well as the alternatives. I also discussed the risks and consequences of signing out AMA, including myocardial infarction, heart attack, heart failure, cardiac arrest worsening of current condition, permanent disability, and even death. The patient  verbalized understanding of the diagnostic and treatment options, the risks, benefits and alternatives, and still wishes to sign out AMA. Patient is awake, alert and oriented to person, place and time and demonstrates the capacity to refuse and direct their own care. I advised the patient that they can and should return immediately if they develop any persistent/worsening/additional symptoms, or if they change their mind, and encouraged them to follow up with primary care doctor and cardiologist as soon as possible.  Patient will take aspirin 81 mg daily with food and avoid exertion until clearance by cardiologist.

## 2024-07-25 NOTE — ED PROVIDER NOTE - CHIEF COMPLAINT
Notified Dr. Rodriguez of pt's refusal to do MRI. Pt requesting food tray   The patient is a 38y Female complaining of chest pain.

## 2024-07-25 NOTE — ED PROVIDER NOTE - NSFOLLOWUPINSTRUCTIONS_ED_ALL_ED_FT
Reasons to come back include but are not limited to chest pain shortness of breath,  confusion lethargy.    I want you to follow-up with either your primary care physician or your cardiologist.  I am giving you cardiology numbers, I am also going to have my  reach out to you in a few days to help you get a cardiology appointment, if you would like to listen to them and try to make scheduling it easier.

## 2024-07-25 NOTE — ED PROVIDER NOTE - NSICDXNOPASTSURGICALHX_GEN_ALL_ED
Discharge Follow-up Appointments/Crisis Survival Plan    Individual Therapy Appointment-  Name: Dr. Gómez  Date/Time: 6/20/17 1:00 PM  Phone:     Medication Management-  Name: Dr. Foreman  Date/Time:   Phone:    You have also been given a packet of additional support groups that you can attend if so desired.   Included in these groups is the handout for our Alumni Groups that you are welcome to attend here at [facility].                                    Crisis Survival Plan:        Coping strategies/activities I will try if I have negative or harmful thoughts:     1. Accept - check reality     2. Breathe - distract     3. It is what it is - I can stand this.     I will call the following people for support or distraction:     1. Name:   Lobo                                                       Phone number: [phone]     2. Name:   Nik                                                       Phone number: [phone]     3. Crisis Line: [phone] or Suicide Prevention Lifeline: 1-794.916.4987     4. Warmline: 861.973.5929     5. Call your Psychiatrist or Therapist. See phone numbers listed above.        In an emergency call [phone]     <-- Click to add NO significant Past Surgical History

## 2024-07-25 NOTE — ED PROVIDER NOTE - CLINICAL SUMMARY MEDICAL DECISION MAKING FREE TEXT BOX
38-year-old female with a history of anxiety depression bipolar on Lamictal and venlafaxine, has not changed doses in quite a while, follows with psychiatrist, patient is coming in after having 15-minute isolated episode  of anterior chest pain without diaphoresis or shortness of breath.  This was done at rest, she took no medication for this.  This is never happened before.  She called 911, the chest pain resolved and the patient would like to leave at this point.  The patient has had no fevers no chills no nausea no vomiting no recent illnesses, no belly pain, no SI/HI.  no history of SI or HI in the past year.  Has never tried to hurt herself or hurt others.  She lives with a family member and that she is safe with. has never seen a cardiologist before.  not a drinker not a smoker    Patient with strange affect, but patient is compliant with the conversation, not aggressive, polite compassionate.   No pressured speech.   No tangents. Normal S1 or S2, bilateral breath sounds without  rhonchi or crackles.  Belly soft nontender.  Patient with no pedal edema    The patient would like to leave because she has not had any chest pain  since arrival.   Low heart score, low concern for SI HI.   Exam and history is not consistent with pulmonary embolism or DVT.   Patient was given the option of standard EKG screening without blood work, and then go from there.  The patient would not like an EKG or any workup.  She would like to follow-up with a cardiologist after that was an option given to her. plan to ama and give cards resources.     Per Appelbaum/Catarinao, the classic four key components to address in a capacity evaluation include:   1) Can pt communicate a choice? y  2) Can pt understand & retain details about choice? y  3) Can pt appreciate situation, tx offered, and possible outcomes (RBAs)?   y   4) Can pt exhibit sound rationalization/reasoning for their ultimate decision?y     The patient wishes to be discharged against medical advice. I have assessed the patient's mental status and  the patient has capacity to make this decision. I have explained the risks of leaving without full treatment, including severe disability and death, which the patient understands and is willing to accept. I have answered all of the patient's questions. I reiterated my medical opinion and advised the patient to return at any time. We discussed the further workup outside of the current visit and return precautions. 38-year-old female with a history of anxiety depression bipolar on Lamictal and venlafaxine, has not changed doses in quite a while, follows with psychiatrist, patient is coming in after having 15-minute isolated episode  of anterior chest pain without diaphoresis or shortness of breath.  This was done at rest, she took no medication for this.  This is never happened before.  She called 911, the chest pain resolved and the patient would like to leave at this point.  The patient has had no fevers no chills no nausea no vomiting no recent illnesses, no belly pain, no SI/HI.  no history of SI or HI in the past year.  Has never tried to hurt herself or hurt others.  She lives with a family member and that she is safe with. has never seen a cardiologist before.  not a drinker not a smoker    Patient is compliant with the conversation, not aggressive, polite compassionate.   No pressured speech.   No tangents. Normal S1 or S2, bilateral breath sounds without  rhonchi or crackles.  Belly soft nontender.  Patient with no pedal edema    The patient would like to leave because she has not had any chest pain  since arrival.    Exam and history is not consistent with pulmonary embolism or DVT.   Patient was recommended to have EKG screening and labs, and then go from there.  The patient would not like an EKG or any workup.  She would like to follow-up with a cardiologist after that was an option given to her. plan to ama and give cards resources.     Per Ammy/Heather, the classic four key components to address in a capacity evaluation include:   1) Can pt communicate a choice? y  2) Can pt understand & retain details about choice? y  3) Can pt appreciate situation, tx offered, and possible outcomes (RBAs)?   y   4) Can pt exhibit sound rationalization/reasoning for their ultimate decision?y     The patient wishes to be discharged against medical advice. I have assessed the patient's mental status and  the patient has capacity to make this decision. I have explained the risks of leaving without full treatment, including severe disability and death, which the patient understands and is willing to accept. I have answered all of the patient's questions. I reiterated my medical opinion and advised the patient to return at any time. We discussed the further workup outside of the current visit and return precautions.

## 2024-07-25 NOTE — ED PROVIDER NOTE - NSFOLLOWUPCLINICS_GEN_ALL_ED_FT
Cardiology at [facility]  Cardiology  [address] [place] [address]  Phone: [phone]  Fax:     Cardiology at [facility]  Cardiology  [address], [address] [place] [address]  Phone: [phone]  Fax:     Cardiology at [facility]  Cardiology  [address] [place] [address]  Phone: [phone]  Fax:     Cardiology at [place] ([facility])  Cardiology  [address] [place] [address]  Phone: [phone]  Fax:     Cardiology at [facility] ([facility])  Cardiology  [address] [facility], [address]  [place] [address]  Phone: [phone]  Fax:     [facility]  Cardiology  [address] [facility], [address]  [place] [address]  Phone: [phone]  Fax:     [facility]  Cardiology  [address] [place] [address]  Phone: [phone]  Fax:     [facility]  Cardiology  [address] [place] [address]  Phone: [phone]  Fax:

## 2024-07-25 NOTE — ED PROVIDER NOTE - PATIENT PORTAL LINK FT
You can access the FollowMyHealth Patient Portal offered by [facility] by registering at the following website: [email or web address]. By joining [employer]’s FollowMyHealth portal, you will also be able to view your health information using other applications (apps) compatible with our system.

## 2024-07-25 NOTE — ED PROVIDER NOTE - NSFOLLOWUPCLINICSTOKEN_GEN_ALL_ED_FT
[id number]: || ||00\01||False;[id number]: || ||00\01||False;[id number]: || ||00\01||False;[id number]: || ||00\01||False;[id number]: || ||00\01||False;[id number]: || ||00\01||False;[id number]: || ||00\01||False;[id number]: || ||00\01||False;

## 2024-07-26 NOTE — ED ADULT NURSE NOTE - OBJECTIVE STATEMENT
Pt evaluated by Dr. Galan and Dr. La. Pt requesting to leave and states symptoms have improved. See Dr. Galan's note. Pt AMA forms discussed with pt by Dr. Galan.

## 2024-07-26 NOTE — ED ADULT NURSE NOTE - PATIENT'S PREFERRED PRONOUN
Discharge Instructions  You may not be sure when your baby is sick and needs to see a doctor, especially if this is your first baby.  DO call your clinic if you are worried about your baby s health.  Most clinics have a 24-hour nurse help line. They are able to answer your questions or reach your doctor 24 hours a day. It is best to call your doctor or clinic instead of the hospital. We are here to help you.    Call 911 if your baby:  Is limp and floppy  Has  stiff arms or legs or repeated jerking movements  Arches his or her back repeatedly  Has a high-pitched cry  Has bluish skin  or looks very pale    Call your baby s doctor or go to the emergency room right away if your baby:  Has a high fever: Rectal temperature of 100.4 degrees F (38 degrees C) or higher or underarm temperature of 99 degree F (37.2 C) or higher.  Has skin that looks yellow, and the baby seems very sleepy.  Has an infection (redness, swelling, pain) around the umbilical cord or circumcised penis OR bleeding that does not stop after a few minutes.    Call your baby s clinic if you notice:  A low rectal temperature of (97.5 degrees F or 36.4 degree C).  Changes in behavior.  For example, a normally quiet baby is very fussy and irritable all day, or an active baby is very sleepy and limp.  Vomiting. This is not spitting up after feedings, which is normal, but actually throwing up the contents of the stomach.  Diarrhea (watery stools) or constipation (hard, dry stools that are difficult to pass).  stools are usually quite soft but should not be watery.  Blood or mucus in the stools.  Coughing or breathing changes (fast breathing, forceful breathing, or noisy breathing after you clear mucus from the nose).  Feeding problems with a lot of spitting up.  Your baby does not want to feed for more than 6 to 8 hours or has fewer diapers than expected in a 24 hour period.  Refer to the feeding log for expected number of wet diapers in the  Her/She first days of life.    If you have any concerns about hurting yourself of the baby, call your doctor right away.      Baby's Birth Weight: 7 lb 9 oz (3430 g)  Baby's Discharge Weight: 3.24 kg (7 lb 2.3 oz)    Recent Labs   Lab Test 22  012   DBIL 0.2   BILITOTAL 4.7       Immunization History   Administered Date(s) Administered    Hep B, Peds or Adolescent 2022       Hearing Screen Date: 22   Hearing Screen, Left Ear: passed  Hearing Screen, Right Ear: passed     Umbilical Cord: drying, no drainage    Pulse Oximetry Screen Result: pass  (right arm): 98 %  (foot): 99 %    Car Seat Testing Results:      Date and Time of  Metabolic Screen: 22     ID Band Number __[id number]______  I have checked to make sure that this is my baby.

## 2024-08-12 PROCEDURE — 99214 OFFICE O/P EST MOD 30 MIN: CPT

## 2024-08-15 ENCOUNTER — NON-APPOINTMENT (OUTPATIENT)
Age: 39
End: 2024-08-15

## 2024-08-15 ENCOUNTER — APPOINTMENT (OUTPATIENT)
Dept: OPHTHALMOLOGY | Facility: CLINIC | Age: 39
End: 2024-08-15
Payer: MEDICAID

## 2024-08-15 PROCEDURE — 92012 INTRM OPH EXAM EST PATIENT: CPT

## 2024-08-16 DIAGNOSIS — Z12.39 ENCOUNTER FOR OTHER SCREENING FOR MALIGNANT NEOPLASM OF BREAST: ICD-10-CM

## 2024-08-23 ENCOUNTER — APPOINTMENT (OUTPATIENT)
Dept: OBGYN | Facility: CLINIC | Age: 39
End: 2024-08-23

## 2024-08-23 ENCOUNTER — APPOINTMENT (OUTPATIENT)
Dept: CARDIOLOGY | Facility: CLINIC | Age: 39
End: 2024-08-23

## 2024-08-23 DIAGNOSIS — N63.0 UNSPECIFIED LUMP IN UNSPECIFIED BREAST: ICD-10-CM

## 2024-08-28 ENCOUNTER — APPOINTMENT (OUTPATIENT)
Dept: NEUROLOGY | Facility: CLINIC | Age: 39
End: 2024-08-28

## 2024-08-28 DIAGNOSIS — Z91.89 OTHER SPECIFIED PERSONAL RISK FACTORS, NOT ELSEWHERE CLASSIFIED: ICD-10-CM

## 2024-08-28 PROCEDURE — 99214 OFFICE O/P EST MOD 30 MIN: CPT | Mod: 95

## 2024-09-03 PROBLEM — Z91.89 AT RISK FOR SEIZURES: Status: ACTIVE | Noted: 2024-09-03
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
## 2024-09-03 NOTE — ASSESSMENT
[FreeTextEntry1] : PIO BIRCH is seen for hospital follow up( 3 years) stable, her results( normal range) explained. will f/u prn

## 2024-09-04 PROCEDURE — 99214 OFFICE O/P EST MOD 30 MIN: CPT | Mod: 95

## 2024-09-05 ENCOUNTER — APPOINTMENT (OUTPATIENT)
Dept: OBGYN | Facility: CLINIC | Age: 39
End: 2024-09-05

## 2024-09-06 ENCOUNTER — APPOINTMENT (OUTPATIENT)
Dept: OBGYN | Facility: CLINIC | Age: 39
End: 2024-09-06
Payer: MEDICAID

## 2024-09-06 DIAGNOSIS — R10.2 PELVIC AND PERINEAL PAIN: ICD-10-CM

## 2024-09-06 DIAGNOSIS — Z31.62 ENCOUNTER FOR FERTILITY PRESERVATION COUNSELING: ICD-10-CM

## 2024-09-06 PROCEDURE — 99442: CPT | Mod: 93

## 2024-09-06 NOTE — REASON FOR VISIT
[Home] : at home, [unfilled] , at the time of the visit. [Medical Office: ([facility])___] : at the medical office located in  [Verbal consent obtained from patient] : the patient, [unfilled]

## 2024-09-06 NOTE — HISTORY OF PRESENT ILLNESS
[FreeTextEntry1] : Discussed breast needle core biopsy results with patient, benign results. Patient expresses wanting to continue care with me. Patient is unsure if she desires to have future fertility but she would like to know if her fallopian tubes patent. She would also like to know about her ovarian reserve.  [facility] lab order and hysterosalpingogram referral added to the system. Patient to follow up after results and imaging is complete.

## 2024-09-11 LAB — ANTI-MUELLERIAN HORMONE: 0.36 NG/ML

## 2024-09-11 NOTE — BH INPATIENT PSYCHIATRY PROGRESS NOTE - PRN MEDS
ambulatory MEDICATIONS  (PRN):  acetaminophen     Tablet .. 650 milliGRAM(s) Oral every 6 hours PRN Mild Pain (1 - 3), Moderate Pain (4 - 6), Severe Pain (7 - 10)  calcium carbonate    500 mG (Tums) Chewable 1 Tablet(s) Chew three times a day PRN Dyspepsia  hydrOXYzine hydrochloride 25 milliGRAM(s) Oral every 6 hours PRN anxiety  LORazepam     Tablet 1 milliGRAM(s) Oral every 6 hours PRN agitation/severe anxiety  LORazepam   Injectable 2 milliGRAM(s) IntraMuscular once PRN severe agitation  ondansetron    Tablet 4 milliGRAM(s) Oral every 6 hours PRN Nausea and/or Vomiting  traZODone 50 milliGRAM(s) Oral at bedtime PRN insomnia

## 2024-09-16 ENCOUNTER — APPOINTMENT (OUTPATIENT)
Dept: OBGYN | Facility: CLINIC | Age: 39
End: 2024-09-16

## 2024-09-20 ENCOUNTER — INPATIENT (INPATIENT)
Facility: HOSPITAL | Age: 39
LOS: 3 days | Discharge: PSYCHIATRIC OR SUBSTANCE USE FACILITY | End: 2024-09-24
Attending: STUDENT IN AN ORGANIZED HEALTH CARE EDUCATION/TRAINING PROGRAM | Admitting: STUDENT IN AN ORGANIZED HEALTH CARE EDUCATION/TRAINING PROGRAM
Payer: MEDICAID

## 2024-09-20 VITALS
SYSTOLIC BLOOD PRESSURE: 146 MMHG | HEIGHT: 62 IN | DIASTOLIC BLOOD PRESSURE: 93 MMHG | OXYGEN SATURATION: 98 % | RESPIRATION RATE: 16 BRPM | TEMPERATURE: 98 F | WEIGHT: 138.89 LBS | HEART RATE: 88 BPM

## 2024-09-20 DIAGNOSIS — F06.1 CATATONIC DISORDER DUE TO KNOWN PHYSIOLOGICAL CONDITION: ICD-10-CM

## 2024-09-20 DIAGNOSIS — R63.0 ANOREXIA: ICD-10-CM

## 2024-09-20 DIAGNOSIS — R63.8 OTHER SYMPTOMS AND SIGNS CONCERNING FOOD AND FLUID INTAKE: ICD-10-CM

## 2024-09-20 LAB
ALBUMIN SERPL ELPH-MCNC: 4.9 G/DL — SIGNIFICANT CHANGE UP (ref 3.3–5)
ALP SERPL-CCNC: 70 U/L — SIGNIFICANT CHANGE UP (ref 40–120)
ALT FLD-CCNC: 10 U/L — SIGNIFICANT CHANGE UP (ref 4–33)
ANION GAP SERPL CALC-SCNC: 21 MMOL/L — HIGH (ref 7–14)
APPEARANCE UR: CLEAR — SIGNIFICANT CHANGE UP
AST SERPL-CCNC: 17 U/L — SIGNIFICANT CHANGE UP (ref 4–32)
BASOPHILS # BLD AUTO: 0.04 K/UL — SIGNIFICANT CHANGE UP (ref 0–0.2)
BASOPHILS NFR BLD AUTO: 0.5 % — SIGNIFICANT CHANGE UP (ref 0–2)
BILIRUB SERPL-MCNC: 0.6 MG/DL — SIGNIFICANT CHANGE UP (ref 0.2–1.2)
BILIRUB UR-MCNC: NEGATIVE — SIGNIFICANT CHANGE UP
BUN SERPL-MCNC: 14 MG/DL — SIGNIFICANT CHANGE UP (ref 7–23)
CALCIUM SERPL-MCNC: 9.4 MG/DL — SIGNIFICANT CHANGE UP (ref 8.4–10.5)
CHLORIDE SERPL-SCNC: 98 MMOL/L — SIGNIFICANT CHANGE UP (ref 98–107)
CO2 SERPL-SCNC: 18 MMOL/L — LOW (ref 22–31)
COLOR SPEC: YELLOW — SIGNIFICANT CHANGE UP
CREAT SERPL-MCNC: 0.76 MG/DL — SIGNIFICANT CHANGE UP (ref 0.5–1.3)
DIFF PNL FLD: ABNORMAL
EGFR: 102 ML/MIN/1.73M2 — SIGNIFICANT CHANGE UP
EOSINOPHIL # BLD AUTO: 0.01 K/UL — SIGNIFICANT CHANGE UP (ref 0–0.5)
EOSINOPHIL NFR BLD AUTO: 0.1 % — SIGNIFICANT CHANGE UP (ref 0–6)
FLUAV AG NPH QL: SIGNIFICANT CHANGE UP
FLUBV AG NPH QL: SIGNIFICANT CHANGE UP
GLUCOSE SERPL-MCNC: 83 MG/DL — SIGNIFICANT CHANGE UP (ref 70–99)
GLUCOSE UR QL: NEGATIVE MG/DL — SIGNIFICANT CHANGE UP
HCG SERPL-ACNC: <1 MIU/ML — SIGNIFICANT CHANGE UP
HCT VFR BLD CALC: 34.2 % — LOW (ref 34.5–45)
HGB BLD-MCNC: 11.3 G/DL — LOW (ref 11.5–15.5)
IANC: 6.08 K/UL — SIGNIFICANT CHANGE UP (ref 1.8–7.4)
IMM GRANULOCYTES NFR BLD AUTO: 0.3 % — SIGNIFICANT CHANGE UP (ref 0–0.9)
KETONES UR-MCNC: >=160 MG/DL
LEUKOCYTE ESTERASE UR-ACNC: ABNORMAL
LYMPHOCYTES # BLD AUTO: 1.3 K/UL — SIGNIFICANT CHANGE UP (ref 1–3.3)
LYMPHOCYTES # BLD AUTO: 16.6 % — SIGNIFICANT CHANGE UP (ref 13–44)
MCHC RBC-ENTMCNC: 29.4 PG — SIGNIFICANT CHANGE UP (ref 27–34)
MCHC RBC-ENTMCNC: 33 GM/DL — SIGNIFICANT CHANGE UP (ref 32–36)
MCV RBC AUTO: 89.1 FL — SIGNIFICANT CHANGE UP (ref 80–100)
MONOCYTES # BLD AUTO: 0.38 K/UL — SIGNIFICANT CHANGE UP (ref 0–0.9)
MONOCYTES NFR BLD AUTO: 4.9 % — SIGNIFICANT CHANGE UP (ref 2–14)
NEUTROPHILS # BLD AUTO: 6.08 K/UL — SIGNIFICANT CHANGE UP (ref 1.8–7.4)
NEUTROPHILS NFR BLD AUTO: 77.6 % — HIGH (ref 43–77)
NITRITE UR-MCNC: NEGATIVE — SIGNIFICANT CHANGE UP
NRBC # BLD: 0 /100 WBCS — SIGNIFICANT CHANGE UP (ref 0–0)
NRBC # FLD: 0 K/UL — SIGNIFICANT CHANGE UP (ref 0–0)
PCP SPEC-MCNC: SIGNIFICANT CHANGE UP
PH UR: 5.5 — SIGNIFICANT CHANGE UP (ref 5–8)
PLATELET # BLD AUTO: 354 K/UL — SIGNIFICANT CHANGE UP (ref 150–400)
POTASSIUM SERPL-MCNC: 4.1 MMOL/L — SIGNIFICANT CHANGE UP (ref 3.5–5.3)
POTASSIUM SERPL-SCNC: 4.1 MMOL/L — SIGNIFICANT CHANGE UP (ref 3.5–5.3)
PROT SERPL-MCNC: 7.9 G/DL — SIGNIFICANT CHANGE UP (ref 6–8.3)
PROT UR-MCNC: 30 MG/DL
RBC # BLD: 3.84 M/UL — SIGNIFICANT CHANGE UP (ref 3.8–5.2)
RBC # FLD: 12.3 % — SIGNIFICANT CHANGE UP (ref 10.3–14.5)
RSV RNA NPH QL NAA+NON-PROBE: SIGNIFICANT CHANGE UP
SARS-COV-2 RNA SPEC QL NAA+PROBE: SIGNIFICANT CHANGE UP
SODIUM SERPL-SCNC: 137 MMOL/L — SIGNIFICANT CHANGE UP (ref 135–145)
SP GR SPEC: 1.03 — SIGNIFICANT CHANGE UP (ref 1–1.03)
TOXICOLOGY SCREEN, DRUGS OF ABUSE, SERUM RESULT: SIGNIFICANT CHANGE UP
TSH SERPL-MCNC: 0.34 UIU/ML — SIGNIFICANT CHANGE UP (ref 0.27–4.2)
UROBILINOGEN FLD QL: 1 MG/DL — SIGNIFICANT CHANGE UP (ref 0.2–1)
WBC # BLD: 7.83 K/UL — SIGNIFICANT CHANGE UP (ref 3.8–10.5)
WBC # FLD AUTO: 7.83 K/UL — SIGNIFICANT CHANGE UP (ref 3.8–10.5)

## 2024-09-20 PROCEDURE — 99223 1ST HOSP IP/OBS HIGH 75: CPT

## 2024-09-20 PROCEDURE — 99285 EMERGENCY DEPT VISIT HI MDM: CPT

## 2024-09-20 RX ORDER — MAG HYDROX/ALUMINUM HYD/SIMETH 200-200-20
30 SUSPENSION, ORAL (FINAL DOSE FORM) ORAL EVERY 4 HOURS
Refills: 0 | Status: DISCONTINUED | OUTPATIENT
Start: 2024-09-20 | End: 2024-09-24

## 2024-09-20 RX ORDER — SODIUM CHLORIDE 0.9 % (FLUSH) 0.9 %
1000 SYRINGE (ML) INJECTION ONCE
Refills: 0 | Status: COMPLETED | OUTPATIENT
Start: 2024-09-20 | End: 2024-09-20

## 2024-09-20 RX ORDER — ACETAMINOPHEN 325 MG
650 TABLET ORAL EVERY 6 HOURS
Refills: 0 | Status: DISCONTINUED | OUTPATIENT
Start: 2024-09-20 | End: 2024-09-24

## 2024-09-20 RX ORDER — ONDANSETRON HCL/PF 4 MG/2 ML
4 VIAL (ML) INJECTION EVERY 8 HOURS
Refills: 0 | Status: DISCONTINUED | OUTPATIENT
Start: 2024-09-20 | End: 2024-09-24

## 2024-09-20 RX ORDER — 5-HYDROXYTRYPTOPHAN (5-HTP) 100 MG
3 TABLET,DISINTEGRATING ORAL AT BEDTIME
Refills: 0 | Status: DISCONTINUED | OUTPATIENT
Start: 2024-09-20 | End: 2024-09-24

## 2024-09-20 RX ORDER — ENOXAPARIN SODIUM 150 MG/ML
40 INJECTION SUBCUTANEOUS EVERY 24 HOURS
Refills: 0 | Status: DISCONTINUED | OUTPATIENT
Start: 2024-09-21 | End: 2024-09-24

## 2024-09-20 RX ADMIN — Medication 2 MILLIGRAM(S): at 21:59

## 2024-09-20 RX ADMIN — Medication 1000 MILLILITER(S): at 12:44

## 2024-09-20 NOTE — ED BEHAVIORAL HEALTH ASSESSMENT NOTE - NSBHMSESPABN_PSY_A_CORE
Soft volume/Slowed rate/Decreased productivity/Increased latency Slowed rate/Decreased productivity/Increased latency

## 2024-09-20 NOTE — ED ADULT NURSE REASSESSMENT NOTE - NS ED NURSE REASSESS COMMENT FT1
22G placed to R AC for IVF. Clean and patent. Pt educated on need for IV and aware of plan of care. PES at bedside for 1:1.

## 2024-09-20 NOTE — H&P ADULT - PROBLEM SELECTOR PLAN 1
New   consulted: " At this time, pt does NOT have capacity to leave against medical advice. Recommend Ativan 2 mg PO/IV TID for catatonia; can restart Caplyta at 21 mg po QHS for mood/psychosis; recommend Haldol 5 mg, Ativan 2 mg PO/IM q6 prn agitation"- PRN not ordered at this time.  Pt will have to bring in own Caplyta 21mg nightly as nonformulary  Holding home effexor 150mg daily, wellbutrin 150mg daily, clonazepam 1mg TID, lamictal 100mg daily as not stated by BH to restart  Monitor Qtc- repeat ekg in AM

## 2024-09-20 NOTE — ED BEHAVIORAL HEALTH ASSESSMENT NOTE - CURRENT MEDICATION
Last took the following meds 4 days ago: Lamictal 100 mg po daily, Effexor  mg po daily, Caplyta 42 mg po QHS, ran out of Klonopin (1 mg po TID)  4 days ago, Wellbutrin  mg po daily

## 2024-09-20 NOTE — ED ADULT NURSE NOTE - OBJECTIVE STATEMENT
ems  states  reported pt not sleeping, eating as per mother.. pt speaking few words only- states "    mom reports pt not taking meds I am not eating,i do not feel myself  '  Patient brought to  area for above complaints. Patient refusing labwork and changing. Spoke with MD and patient agreed. Medical provider evaluated. Labs drawn and sent. Patient is very standoffish when approached, slow and at times indecisive with her actions and words. Patient is quiet and to herself. Patient denies suicidal ideations. Patient evaluated by psychiatry. Refusing medications at this time. MD aware. Will revisit patient for any change of mind.   MAGED Woody

## 2024-09-20 NOTE — H&P ADULT - HISTORY OF PRESENT ILLNESS
38 yr old female with a pmh bipolar/schizophrenia/MDD/anxiety who presents with not taking her medication and not eating for 4 days. Detailed collateral as per  note.   When I spoke with the pt she stated she is eating and will not speak to me without a  presents. She did answer ROS- Denies  headache, dizziness, chest pain, palpitations, SOB, abdominal pain, joint pain, diarrhea/constipation, urinary symptoms.   Vitals: T 98.2, HR 80, /95, RR 16 satting 100% RA
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
## 2024-09-20 NOTE — ED PROVIDER NOTE - CARE PLAN
1 Principal Discharge DX:	Poor appetite   Principal Discharge DX:	Poor appetite  Secondary Diagnosis:	Catatonia
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
## 2024-09-20 NOTE — ED PROVIDER NOTE - OBJECTIVE STATEMENT
38F p/w BIBEMS called by MO due to poor PO intake, not sleeping, not "acting herself".  Pt denies SI/HI.  Pt's meds venlafaxine, clonazepam, buproprion, lamotrigine.  When asked why not eating she says, "I feel guilty".  pt does endorse feeling depressed.  Per EMS h/o bipolar/schizophrenia.  Pt not answering questions much or providing many details.  Pt denies SI/HI/AVH.  Denies dysuria, pain anywhere, injuries or intoxicants.  VS wnl.  On exam tearful, thought blocking, poverty of speech. Pt appears somewhat agitated due to internal stimuli.  Plan check labs, psych clearance.  Psych eval called 935AM.   VS:  unremarkable    GEN - tearful, thought blocking, poverty of speech. Pt appears somewhat agitated due to internal stimuli, but following commands;   A+O x3   HEAD - NC/AT     ENT - PEERL, EOMI, mucous membranes    moist , no discharge      NECK: Neck supple, non-tender without lymphadenopathy, no masses, no JVD  PULM - CTA b/l,  symmetric breath sounds  COR -  normal heart sounds    ABD - , ND, NT, soft,  BACK - no CVA tenderness, nontender spine     EXTREMS - no edema, no deformity, warm and well perfused    SKIN - no rash    or bruising      NEUROLOGIC - alert, face symmetric, speech fluent, sensation nl, motor no focal deficit.

## 2024-09-20 NOTE — ED ADULT TRIAGE NOTE - CHIEF COMPLAINT QUOTE
ems  states  reported pt not sleeping, eating as per mother.. pt speaking few words only- states "    mom reports pt not taking meds I am not eating,i do not feel myself  '

## 2024-09-20 NOTE — ED BEHAVIORAL HEALTH ASSESSMENT NOTE - RISK ASSESSMENT
acute risks include anxiety, not eating, catatonic symptoms, non adherence to treatment. chronic risks include hx depression, prior admissions, prior self injurious behavior, unemployment. protective factors include no suicidal ideation, no suicide attempt, no violence hx, no current substance use, no known access to weapons, supportive family. LOW imminent risk for suicide/violence but due to severity of symptoms interfering with pt ability to meet basic needs (particularly for food) pt is unable to care for self needing inpatient stabilization on emergency status. will mitigate risk further with medication management and milieu therapy. unable to care for self at this time

## 2024-09-20 NOTE — H&P ADULT - NSHPLABSRESULTS_GEN_ALL_CORE
11.3   7.83  )-----------( 354      ( 20 Sep 2024 09:45 )             34.2     137  |  98  |  14  ----------------------------<  83       4.1   |  18[L]  |  0.76    Ca    9.4      20 Sep 2024 09:45    TPro  7.9  /  Alb  4.9  /  TBili  0.6  /  DBili  x   /  AST  17  /  ALT  10  /  AlkPhos  70  09-20      Urinalysis Basic - ( 20 Sep 2024 13:15 )  Color: Yellow / Appearance: Clear / S.030 / pH: 5.5  Gluc: Negative mg/dL / Ketone: >=160 mg/dL  / Bili: Negative / Urobili: 1.0 mg/dL   Blood: Small / Protein: 30 mg/dL / Nitrite: Negative   Leuk Esterase: Trace / RBC: 8 /HPF / WBC 5 /HPF   Sq Epi: 10 /HPF / Bacteria: Moderate /HPF  Hyaline Casts: x/WBC Casts: x          Urinalysis with Rflx Culture (collected 20 Sep 2024 13:15)    EKG interpreted by myself: NSR prolonged Qtc
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
## 2024-09-20 NOTE — ED BEHAVIORAL HEALTH ASSESSMENT NOTE - OTHER PAST PSYCHIATRIC HISTORY (INCLUDE DETAILS REGARDING ONSET, COURSE OF ILLNESS, INPATIENT/OUTPATIENT TREATMENT)
hx of major depressive disorder with psychosis/catatonia, anxiety and post-traumatic stress disorder, one admission Nov 2021 at [facility], follows at [facility] with Dr. Victor, not seen since May 2023. no hx suicide attempt. see HPI

## 2024-09-20 NOTE — H&P ADULT - NSHPREVIEWOFSYSTEMS_GEN_ALL_CORE
REVIEW OF SYSTEMS:    CONSTITUTIONAL: No weakness, fevers or chills  EYES/ENT: No visual changes;  No dysphagia; No sore throat; No rhinorrhea; No sinus pain/pressure  NECK: No pain or stiffness  RESPIRATORY: No cough, wheezing, hemoptysis; No shortness of breath  CARDIOVASCULAR: No chest pain or palpitations; No lower extremity edema  GASTROINTESTINAL: No abdominal or epigastric pain. No nausea, vomiting, or hematemesis; No diarrhea or constipation. No melena or hematochezia.  GENITOURINARY: No dysuria, frequency or hematuria  NEUROLOGICAL: No numbness or weakness  PSYCH: + withdrawn   MSK: ambulates without assistance   SKIN: No itching, burning, rashes, or lesions   All other review of systems is negative unless indicated above.
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
## 2024-09-20 NOTE — ED BEHAVIORAL HEALTH ASSESSMENT NOTE - EMPLOYMENT
Patient is identified as Severe COVID-19 based on hypoxemia with O2 saturations <94% on room air or on ambulation   Initiate standard COVID protocols; COVID-19 testing ,Infection Control notification  and isolation- respiratory, contact and droplet per protocol    Diagnostics: (leukopenia, hyponatremia, hyperferritinemia, elevated troponin, elevated d-dimer, age, and comorbidities are significant predictors of poor clinical outcome)  CBC, CMP, Ferritin, CRP and Portable CXR    Management: Initiate targeted therapy with Remdesivir, 200mg IV x1, followed by 100mg IV daily x5 days total, Dexamethasone PO/IV 6mg daily x10 days and Anticoagulation, Patient admitted to non-critical care unit- Will initiate full dose anticoagulation with Weight based lovenox 1mg/kg IV q12, Maintain oxygen saturations 92-96% via Nasal Cannula  LPM and monitor with continuous/intermittent pulse oximetry.  and Inhaled bronchodilators as needed for shortness of breath.   Unemployed

## 2024-09-20 NOTE — ED PROVIDER NOTE - ATTENDING APP SHARED VISIT CONTRIBUTION OF CARE
Dr Alejandra 38F p/w BIBEMS called by MO due to poor PO intake, not sleeping, not "acting herself".  Pt denies SI/HI.  Pt's meds venlafaxine, clonazepam, buproprion, lamotrigine.  When asked why not eating she says, "I feel guilty".  pt does endorse feeling depressed.  Per EMS h/o bipolar/schizophrenia.  Pt not answering questions much or providing many details.  Pt denies SI/HI/AVH.  Denies dysuria, pain anywhere, injuries or intoxicants.  VS wnl.  On exam tearful, thought blocking, poverty of speech. Pt appears somewhat agitated due to internal stimuli.  Plan check labs, psych clearance.  Psych eval called 935AM.   VS:  unremarkable    GEN - tearful, thought blocking, poverty of speech. Pt appears somewhat agitated due to internal stimuli, but following commands;   A+O x3   HEAD - NC/AT     ENT - PEERL, EOMI, mucous membranes    moist , no discharge      NECK: Neck supple, non-tender without lymphadenopathy, no masses, no JVD  PULM - CTA b/l,  symmetric breath sounds  COR -  normal heart sounds    ABD - , ND, NT, soft,  BACK - no CVA tenderness, nontender spine     EXTREMS - no edema, no deformity, warm and well perfused    SKIN - no rash    or bruising      NEUROLOGIC - alert, face symmetric, speech fluent, sensation nl, motor no focal deficit.

## 2024-09-20 NOTE — H&P ADULT - PROBLEM SELECTOR PLAN 2
New  Decreased po intake for 4 days  Tx for catatonia as above   If does not start eating will need IVF   Calorie count, Nutrition consult

## 2024-09-20 NOTE — H&P ADULT - NSHPADDITIONALINFOADULT_GEN_ALL_CORE
istop Reference #: [id number]    [facility]	09/04/2024	09/19/2024	clonazepam 1 mg tablet	90	30  Prescriber Name Violeta Neely  Prescriber HAMZAH # [id number]  Payment Method Medicaid Dispenser [facility]	B	08/12/2024	08/22/2024	clonazepam 1 mg tablet	90	30  Prescriber Name Violeta Neely  Prescriber HAMZAH # [id number]  Payment Method Medicaid Dispenser [facility]	B	07/24/2024	07/29/2024	clonazepam 1 mg tablet	90	30  Prescriber Name Violeta Neely  Prescriber HAMZAH # [id number]  Payment Method Medicaid Dispenser [facility]	B	06/24/2024	06/27/2024	clonazepam 1 mg tablet	90	30  Prescriber Name Violeta Neely  Prescriber HAMZAH # [id number]  Payment Method Medicaid Dispenser [facility]	B	05/29/2024	05/30/2024	clonazepam 1 mg tablet	90	30  Prescriber Name Violeta Neely  Prescriber HAMZAH # [id number]  Payment Method Medicaid Dispenser [facility]	B	04/29/2024	04/29/2024	clonazepam 1 mg tablet	90	30  Prescriber Name Violeta Neely  Prescriber HAMZAH # [id number]  Payment Method [facility]
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
## 2024-09-21 LAB
ANION GAP SERPL CALC-SCNC: 21 MMOL/L — HIGH (ref 7–14)
BASOPHILS # BLD AUTO: 0.05 K/UL — SIGNIFICANT CHANGE UP (ref 0–0.2)
BASOPHILS NFR BLD AUTO: 0.7 % — SIGNIFICANT CHANGE UP (ref 0–2)
BUN SERPL-MCNC: 8 MG/DL — SIGNIFICANT CHANGE UP (ref 7–23)
CALCIUM SERPL-MCNC: 8.9 MG/DL — SIGNIFICANT CHANGE UP (ref 8.4–10.5)
CHLORIDE SERPL-SCNC: 100 MMOL/L — SIGNIFICANT CHANGE UP (ref 98–107)
CO2 SERPL-SCNC: 16 MMOL/L — LOW (ref 22–31)
CREAT SERPL-MCNC: 0.63 MG/DL — SIGNIFICANT CHANGE UP (ref 0.5–1.3)
EGFR: 116 ML/MIN/1.73M2 — SIGNIFICANT CHANGE UP
EOSINOPHIL # BLD AUTO: 0.04 K/UL — SIGNIFICANT CHANGE UP (ref 0–0.5)
EOSINOPHIL NFR BLD AUTO: 0.5 % — SIGNIFICANT CHANGE UP (ref 0–6)
GLUCOSE SERPL-MCNC: 83 MG/DL — SIGNIFICANT CHANGE UP (ref 70–99)
HCT VFR BLD CALC: 32.5 % — LOW (ref 34.5–45)
HGB BLD-MCNC: 11 G/DL — LOW (ref 11.5–15.5)
IANC: 5.15 K/UL — SIGNIFICANT CHANGE UP (ref 1.8–7.4)
IMM GRANULOCYTES NFR BLD AUTO: 0.3 % — SIGNIFICANT CHANGE UP (ref 0–0.9)
LYMPHOCYTES # BLD AUTO: 1.57 K/UL — SIGNIFICANT CHANGE UP (ref 1–3.3)
LYMPHOCYTES # BLD AUTO: 21 % — SIGNIFICANT CHANGE UP (ref 13–44)
MCHC RBC-ENTMCNC: 29.6 PG — SIGNIFICANT CHANGE UP (ref 27–34)
MCHC RBC-ENTMCNC: 33.8 GM/DL — SIGNIFICANT CHANGE UP (ref 32–36)
MCV RBC AUTO: 87.4 FL — SIGNIFICANT CHANGE UP (ref 80–100)
MONOCYTES # BLD AUTO: 0.66 K/UL — SIGNIFICANT CHANGE UP (ref 0–0.9)
MONOCYTES NFR BLD AUTO: 8.8 % — SIGNIFICANT CHANGE UP (ref 2–14)
NEUTROPHILS # BLD AUTO: 5.15 K/UL — SIGNIFICANT CHANGE UP (ref 1.8–7.4)
NEUTROPHILS NFR BLD AUTO: 68.7 % — SIGNIFICANT CHANGE UP (ref 43–77)
NRBC # BLD: 0 /100 WBCS — SIGNIFICANT CHANGE UP (ref 0–0)
NRBC # FLD: 0 K/UL — SIGNIFICANT CHANGE UP (ref 0–0)
PLATELET # BLD AUTO: 331 K/UL — SIGNIFICANT CHANGE UP (ref 150–400)
POTASSIUM SERPL-MCNC: 3.6 MMOL/L — SIGNIFICANT CHANGE UP (ref 3.5–5.3)
POTASSIUM SERPL-SCNC: 3.6 MMOL/L — SIGNIFICANT CHANGE UP (ref 3.5–5.3)
RBC # BLD: 3.72 M/UL — LOW (ref 3.8–5.2)
RBC # FLD: 11.9 % — SIGNIFICANT CHANGE UP (ref 10.3–14.5)
SODIUM SERPL-SCNC: 137 MMOL/L — SIGNIFICANT CHANGE UP (ref 135–145)
WBC # BLD: 7.49 K/UL — SIGNIFICANT CHANGE UP (ref 3.8–10.5)
WBC # FLD AUTO: 7.49 K/UL — SIGNIFICANT CHANGE UP (ref 3.8–10.5)

## 2024-09-21 PROCEDURE — 99232 SBSQ HOSP IP/OBS MODERATE 35: CPT

## 2024-09-21 RX ORDER — CLONAZEPAM 1 MG
1 TABLET ORAL THREE TIMES A DAY
Refills: 0 | Status: DISCONTINUED | OUTPATIENT
Start: 2024-09-21 | End: 2024-09-22

## 2024-09-21 RX ORDER — SODIUM CHLORIDE IRRIG SOLUTION 0.9 %
1000 SOLUTION, IRRIGATION IRRIGATION
Refills: 0 | Status: DISCONTINUED | OUTPATIENT
Start: 2024-09-21 | End: 2024-09-24

## 2024-09-21 RX ADMIN — Medication 1 MILLIGRAM(S): at 14:07

## 2024-09-21 RX ADMIN — Medication 1 MILLIGRAM(S): at 21:16

## 2024-09-21 RX ADMIN — ENOXAPARIN SODIUM 40 MILLIGRAM(S): 150 INJECTION SUBCUTANEOUS at 17:31

## 2024-09-21 RX ADMIN — Medication 70 MILLILITER(S): at 16:16

## 2024-09-21 RX ADMIN — Medication 70 MILLILITER(S): at 20:58

## 2024-09-21 RX ADMIN — Medication 2 MILLIGRAM(S): at 06:13

## 2024-09-21 NOTE — PROGRESS NOTE ADULT - PROBLEM SELECTOR PLAN 2
Decreased po intake for 4 days  Ketones noted on UA  S/p IVF- will continue for today  Tx for catatonia as above   Calorie count, Nutrition consult
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
## 2024-09-22 LAB
ANION GAP SERPL CALC-SCNC: 16 MMOL/L — HIGH (ref 7–14)
BASOPHILS # BLD AUTO: 0.04 K/UL — SIGNIFICANT CHANGE UP (ref 0–0.2)
BASOPHILS NFR BLD AUTO: 0.5 % — SIGNIFICANT CHANGE UP (ref 0–2)
BUN SERPL-MCNC: 7 MG/DL — SIGNIFICANT CHANGE UP (ref 7–23)
CALCIUM SERPL-MCNC: 9.4 MG/DL — SIGNIFICANT CHANGE UP (ref 8.4–10.5)
CHLORIDE SERPL-SCNC: 100 MMOL/L — SIGNIFICANT CHANGE UP (ref 98–107)
CO2 SERPL-SCNC: 23 MMOL/L — SIGNIFICANT CHANGE UP (ref 22–31)
CREAT SERPL-MCNC: 0.67 MG/DL — SIGNIFICANT CHANGE UP (ref 0.5–1.3)
EGFR: 114 ML/MIN/1.73M2 — SIGNIFICANT CHANGE UP
EOSINOPHIL # BLD AUTO: 0.07 K/UL — SIGNIFICANT CHANGE UP (ref 0–0.5)
EOSINOPHIL NFR BLD AUTO: 0.9 % — SIGNIFICANT CHANGE UP (ref 0–6)
GLUCOSE SERPL-MCNC: 92 MG/DL — SIGNIFICANT CHANGE UP (ref 70–99)
HCT VFR BLD CALC: 33.9 % — LOW (ref 34.5–45)
HGB BLD-MCNC: 11.7 G/DL — SIGNIFICANT CHANGE UP (ref 11.5–15.5)
IANC: 4.42 K/UL — SIGNIFICANT CHANGE UP (ref 1.8–7.4)
IMM GRANULOCYTES NFR BLD AUTO: 0.3 % — SIGNIFICANT CHANGE UP (ref 0–0.9)
LYMPHOCYTES # BLD AUTO: 2.13 K/UL — SIGNIFICANT CHANGE UP (ref 1–3.3)
LYMPHOCYTES # BLD AUTO: 28.8 % — SIGNIFICANT CHANGE UP (ref 13–44)
MCHC RBC-ENTMCNC: 30.1 PG — SIGNIFICANT CHANGE UP (ref 27–34)
MCHC RBC-ENTMCNC: 34.5 GM/DL — SIGNIFICANT CHANGE UP (ref 32–36)
MCV RBC AUTO: 87.1 FL — SIGNIFICANT CHANGE UP (ref 80–100)
MONOCYTES # BLD AUTO: 0.72 K/UL — SIGNIFICANT CHANGE UP (ref 0–0.9)
MONOCYTES NFR BLD AUTO: 9.7 % — SIGNIFICANT CHANGE UP (ref 2–14)
NEUTROPHILS # BLD AUTO: 4.42 K/UL — SIGNIFICANT CHANGE UP (ref 1.8–7.4)
NEUTROPHILS NFR BLD AUTO: 59.8 % — SIGNIFICANT CHANGE UP (ref 43–77)
NRBC # BLD: 0 /100 WBCS — SIGNIFICANT CHANGE UP (ref 0–0)
NRBC # FLD: 0 K/UL — SIGNIFICANT CHANGE UP (ref 0–0)
PLATELET # BLD AUTO: 348 K/UL — SIGNIFICANT CHANGE UP (ref 150–400)
POTASSIUM SERPL-MCNC: 3.4 MMOL/L — LOW (ref 3.5–5.3)
POTASSIUM SERPL-SCNC: 3.4 MMOL/L — LOW (ref 3.5–5.3)
RBC # BLD: 3.89 M/UL — SIGNIFICANT CHANGE UP (ref 3.8–5.2)
RBC # FLD: 12.3 % — SIGNIFICANT CHANGE UP (ref 10.3–14.5)
SODIUM SERPL-SCNC: 139 MMOL/L — SIGNIFICANT CHANGE UP (ref 135–145)
WBC # BLD: 7.4 K/UL — SIGNIFICANT CHANGE UP (ref 3.8–10.5)
WBC # FLD AUTO: 7.4 K/UL — SIGNIFICANT CHANGE UP (ref 3.8–10.5)

## 2024-09-22 PROCEDURE — 93010 ELECTROCARDIOGRAM REPORT: CPT

## 2024-09-22 PROCEDURE — 99232 SBSQ HOSP IP/OBS MODERATE 35: CPT

## 2024-09-22 RX ORDER — CLONAZEPAM 1 MG
1 TABLET ORAL THREE TIMES A DAY
Refills: 0 | Status: DISCONTINUED | OUTPATIENT
Start: 2024-09-22 | End: 2024-09-24

## 2024-09-22 RX ADMIN — Medication 1 MILLIGRAM(S): at 05:49

## 2024-09-22 RX ADMIN — Medication 1 MILLIGRAM(S): at 21:29

## 2024-09-22 RX ADMIN — ENOXAPARIN SODIUM 40 MILLIGRAM(S): 150 INJECTION SUBCUTANEOUS at 18:34

## 2024-09-22 RX ADMIN — Medication 4 MILLIGRAM(S): at 09:42

## 2024-09-22 RX ADMIN — Medication 1 MILLIGRAM(S): at 13:32

## 2024-09-22 NOTE — DIETITIAN INITIAL EVALUATION ADULT - OTHER INFO
Patient is receiving a regular diet order. No reported difficulty chewing or swallowing. Noted one a few bites of breakfast entree, PO <0-25% per observation. No nausea, vomit, diarrhea, constipation. Pt is currently on ondansetron PRN, last BM reported 9/21 per patient. Noted 1+ right ankle edema, no pressure injury per RN flowsheets. Labs on 9/21 all WNL. Dosing weight is a stated weight @ 63.6kg (9/20), patient appeared to be closer to 120-130 lbs from observation. RD unable to retrieve/load [facility] weight record at this time. Noted the most recent weight 7/15/23@112 lbs per 7/18/23 RD assessment. Please obtain reweight to verify changes. Nutrition education deferred at this time. RD to remain available for further nutrition intervention as indicated.  Patient is receiving a regular diet order. No reported difficulty chewing or swallowing. Noted one a few bites of breakfast entree, PO <0-25% per observation. Patient is currently ordered for calories count due to poor oral intake, % PO intake to be reported in RN flowsheet for better assessment. No nausea, vomit, diarrhea, constipation. Pt is currently on ondansetron PRN, last BM reported 9/21 per patient. Noted 1+ right ankle edema, no pressure injury per RN flowsheets. Labs on 9/21 all WNL. Dosing weight is a stated weight @ 63.6kg (9/20), patient appeared to be closer to 120-130 lbs from observation. RD unable to retrieve/load [facility] weight record at this time. Noted the most recent weight 7/15/23@112 lbs per 7/18/23 RD assessment. Please obtain reweight to verify changes. Nutrition education deferred at this time. RD to remain available for further nutrition intervention as indicated.

## 2024-09-22 NOTE — DIETITIAN INITIAL EVALUATION ADULT - REASON FOR ADMISSION
Loss of appetite  Per chart review, Patient is a 38 year old female with a history of PTSD, depression with psychotic features brought in by family for concern for catatonia.

## 2024-09-22 NOTE — DIETITIAN INITIAL EVALUATION ADULT - PERTINENT MEDS FT
MEDICATIONS  (STANDING):  clonazePAM  Tablet 1 milliGRAM(s) Oral three times a day  enoxaparin Injectable 40 milliGRAM(s) SubCutaneous every 24 hours  lactated ringers. 1000 milliLiter(s) (70 mL/Hr) IV Continuous <Continuous>    MEDICATIONS  (PRN):  acetaminophen     Tablet .. 650 milliGRAM(s) Oral every 6 hours PRN Temp greater or equal to 38C (100.4F), Mild Pain (1 - 3)  aluminum hydroxide/magnesium hydroxide/simethicone Suspension 30 milliLiter(s) Oral every 4 hours PRN Dyspepsia  LORazepam   Injectable 2 milliGRAM(s) IV Push every 8 hours PRN Agitation  melatonin 3 milliGRAM(s) Oral at bedtime PRN Insomnia  ondansetron Injectable 4 milliGRAM(s) IV Push every 8 hours PRN Nausea and/or Vomiting

## 2024-09-22 NOTE — DIETITIAN INITIAL EVALUATION ADULT - PERTINENT LABORATORY DATA
09-21    137  |  100  |  8   ----------------------------<  83  3.6   |  16[L]  |  0.63    Ca    8.9      21 Sep 2024 05:20

## 2024-09-22 NOTE — DIETITIAN INITIAL EVALUATION ADULT - REASON
Patient appeared thin and nervious at time, Nutrition Focused Physical Exam not applicable 2/2 psychological condition at this time

## 2024-09-22 NOTE — DIETITIAN INITIAL EVALUATION ADULT - ADD RECOMMEND
1. Recommend diet change to lacto-vegetarian diet, diet excluding milk and cheese, only allow yogurt  2. Recommend Orgain (11 oz= 220 kcal, 16 gm protein) once daily  3. Please obtain dosing weight  4. Nursing to document PO in RN flow sheets and monitor weekly weights.  5. Continue to monitor skin integrity, bowel regimen, and nutrition pertinent labs.
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
## 2024-09-22 NOTE — DIETITIAN INITIAL EVALUATION ADULT - ORAL INTAKE PTA/DIET HISTORY
Patient is A&Ox2-3 at baseline. Patient is able to communicate some words and sentences with writer today 2/2 catatonia. Information is mostly collected per comprehensive chart review due to patient is a limited historian. Confirmed NKFA, no food intolerance. Food preference includes no meat/fish/dairy, okay with yogurt as primary animal protein source. Patient prefers "low carb diet" during conversation.

## 2024-09-22 NOTE — PROGRESS NOTE ADULT - PROBLEM SELECTOR PLAN 2
Decreased po intake for 4 days  Ketones noted on UA  C/w IVF until PO intake improves  Tx for catatonia as above   Calorie count, Nutrition consult

## 2024-09-23 LAB — SARS-COV-2 RNA SPEC QL NAA+PROBE: SIGNIFICANT CHANGE UP

## 2024-09-23 PROCEDURE — 99232 SBSQ HOSP IP/OBS MODERATE 35: CPT

## 2024-09-23 PROCEDURE — 99233 SBSQ HOSP IP/OBS HIGH 50: CPT

## 2024-09-23 RX ORDER — VENLAFAXINE HCL 75 MG
37.5 TABLET ORAL DAILY
Refills: 0 | Status: DISCONTINUED | OUTPATIENT
Start: 2024-09-23 | End: 2024-09-24

## 2024-09-23 RX ADMIN — Medication 1 MILLIGRAM(S): at 21:22

## 2024-09-23 RX ADMIN — ENOXAPARIN SODIUM 40 MILLIGRAM(S): 150 INJECTION SUBCUTANEOUS at 17:16

## 2024-09-23 RX ADMIN — Medication 20 MILLIEQUIVALENT(S): at 14:52

## 2024-09-23 RX ADMIN — Medication 1 MILLIGRAM(S): at 05:11

## 2024-09-23 RX ADMIN — Medication 70 MILLILITER(S): at 07:45

## 2024-09-23 RX ADMIN — Medication 1 MILLIGRAM(S): at 14:52

## 2024-09-23 NOTE — PROGRESS NOTE ADULT - PROBLEM SELECTOR PLAN 2
Decreased po intake for 4 days  Ketones noted on UA. Denies  sx , lower suspicion for UTI   C/w IVF until PO intake improves  Tx for catatonia as above   Calorie count, Nutrition consult      DVT: Lovenox  DIET: Regular  DISPO: Likely inpatient psych    Discussed w/ mom at bedside

## 2024-09-23 NOTE — BH CONSULTATION LIAISON PROGRESS NOTE - NSBHCHARTREVIEWLAB_PSY_A_CORE FT
11.0   7.49  )-----------( 331      ( 21 Sep 2024 05:20 )             32.5     09-21    137  |  100  |  8   ----------------------------<  83  3.6   |  16[L]  |  0.63    Ca    8.9      21 Sep 2024 05:20    TPro  7.9  /  Alb  4.9  /  TBili  0.6  /  DBili  x   /  AST  17  /  ALT  10  /  AlkPhos  70  09-20  
                      11.7   7.40  )-----------( 348      ( 22 Sep 2024 13:30 )             33.9   09-22    139  |  100  |  7   ----------------------------<  92  3.4[L]   |  23  |  0.67    Ca    9.4      22 Sep 2024 13:30    
                      11.0   7.49  )-----------( 331      ( 21 Sep 2024 05:20 )             32.5     09-21    137  |  100  |  8   ----------------------------<  83  3.6   |  16[L]  |  0.63    Ca    8.9      21 Sep 2024 05:20    TPro  7.9  /  Alb  4.9  /  TBili  0.6  /  DBili  x   /  AST  17  /  ALT  10  /  AlkPhos  70  09-20

## 2024-09-23 NOTE — BH CONSULTATION LIAISON PROGRESS NOTE - MSE UNSTRUCTURED FT
Mental Status Exam:  [place]: well groomed, fair hygiene     Behavior: guarded  Motor: no tremors, EPS, or rigidity, no catalepsy  Gait: did not assess, pt in bed  Speech: soft, decreased spontaneity, increased latency   Mood: "not good"  Affect: anxious, dysthymic  Thought process: thought blocked, impoverished, non-linear  Thought Content: + paranoia  Perception: appears internally preoccupied  SI: denies  HI: denies  Insight: impaired but possibly improving  Judgment: impaired, but possibly improving    Cognitive Exam:  Orientation: AOx3  Recall: intact  Attention: intact  Abstraction: intact
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
## 2024-09-24 ENCOUNTER — INPATIENT (INPATIENT)
Facility: HOSPITAL | Age: 39
LOS: 27 days | Discharge: HOME | End: 2024-10-22
Attending: PSYCHIATRY & NEUROLOGY | Admitting: PSYCHIATRY & NEUROLOGY
Payer: MEDICAID

## 2024-09-24 ENCOUNTER — TRANSCRIPTION ENCOUNTER (OUTPATIENT)
Age: 39
End: 2024-09-24

## 2024-09-24 VITALS — RESPIRATION RATE: 14 BRPM | WEIGHT: 132.06 LBS | TEMPERATURE: 98 F | HEIGHT: 64 IN

## 2024-09-24 VITALS
TEMPERATURE: 98 F | HEART RATE: 74 BPM | OXYGEN SATURATION: 99 % | DIASTOLIC BLOOD PRESSURE: 75 MMHG | SYSTOLIC BLOOD PRESSURE: 108 MMHG | RESPIRATION RATE: 18 BRPM

## 2024-09-24 DIAGNOSIS — F29 UNSPECIFIED PSYCHOSIS NOT DUE TO A SUBSTANCE OR KNOWN PHYSIOLOGICAL CONDITION: ICD-10-CM

## 2024-09-24 PROCEDURE — 99239 HOSP IP/OBS DSCHRG MGMT >30: CPT

## 2024-09-24 PROCEDURE — 93010 ELECTROCARDIOGRAM REPORT: CPT

## 2024-09-24 RX ORDER — VENLAFAXINE HCL 150 MG
37.5 CAPSULE, EXT RELEASE 24 HR ORAL DAILY
Refills: 0 | Status: DISCONTINUED | OUTPATIENT
Start: 2024-09-24 | End: 2024-09-27

## 2024-09-24 RX ORDER — HALOPERIDOL DECANOATE 50 MG/ML
5 INJECTION INTRAMUSCULAR EVERY 6 HOURS
Refills: 0 | Status: DISCONTINUED | OUTPATIENT
Start: 2024-09-24 | End: 2024-09-24

## 2024-09-24 RX ORDER — CLONAZEPAM 1 MG
1 TABLET ORAL THREE TIMES A DAY
Refills: 0 | Status: DISCONTINUED | OUTPATIENT
Start: 2024-09-24 | End: 2024-10-01

## 2024-09-24 RX ORDER — LUMATEPERONE 42 MG/1
1 CAPSULE ORAL
Refills: 0 | DISCHARGE

## 2024-09-24 RX ORDER — LORAZEPAM 2 MG
2 TABLET ORAL EVERY 6 HOURS
Refills: 0 | Status: DISCONTINUED | OUTPATIENT
Start: 2024-09-24 | End: 2024-10-01

## 2024-09-24 RX ORDER — VENLAFAXINE HCL 75 MG
1 TABLET ORAL
Qty: 0 | Refills: 0 | DISCHARGE
Start: 2024-09-24

## 2024-09-24 RX ORDER — DIPHENHYDRAMINE HCL 12.5MG/5ML
50 ELIXIR ORAL EVERY 6 HOURS
Refills: 0 | Status: DISCONTINUED | OUTPATIENT
Start: 2024-09-24 | End: 2024-09-24

## 2024-09-24 RX ORDER — IBUPROFEN 200 MG
400 TABLET ORAL EVERY 6 HOURS
Refills: 0 | Status: DISCONTINUED | OUTPATIENT
Start: 2024-09-24 | End: 2024-10-16

## 2024-09-24 RX ORDER — VENLAFAXINE HCL 150 MG
37.5 CAPSULE, EXT RELEASE 24 HR ORAL DAILY
Refills: 0 | Status: DISCONTINUED | OUTPATIENT
Start: 2024-09-24 | End: 2024-09-24

## 2024-09-24 RX ORDER — MELATONIN 5 MG
3 TABLET ORAL AT BEDTIME
Refills: 0 | Status: DISCONTINUED | OUTPATIENT
Start: 2024-09-24 | End: 2024-10-22

## 2024-09-24 RX ADMIN — Medication 70 MILLILITER(S): at 11:50

## 2024-09-24 RX ADMIN — Medication 1 MILLIGRAM(S): at 16:04

## 2024-09-24 RX ADMIN — Medication 1 MILLIGRAM(S): at 22:09

## 2024-09-24 RX ADMIN — Medication 30 MILLILITER(S): at 17:43

## 2024-09-24 RX ADMIN — Medication 1 MILLIGRAM(S): at 05:03

## 2024-09-24 RX ADMIN — Medication 4 MILLIGRAM(S): at 12:53

## 2024-09-24 RX ADMIN — Medication 37.5 MILLIGRAM(S): at 11:50

## 2024-09-24 NOTE — BH PATIENT PROFILE - ALLERGIES
Allergies:-  Drug Allergies Not Recorded  latex       Allergies:-  No Known Drug Allergies  latex
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
## 2024-09-24 NOTE — PROGRESS NOTE ADULT - PROBLEM SELECTOR PROBLEM 1
Catatonia associated with another mental disorder

## 2024-09-24 NOTE — BH CONSULTATION LIAISON PROGRESS NOTE - CURRENT MEDICATION
MEDICATIONS  (STANDING):  clonazePAM Oral Disintegrating Tablet 1 milliGRAM(s) Oral three times a day  enoxaparin Injectable 40 milliGRAM(s) SubCutaneous every 24 hours  lactated ringers. 1000 milliLiter(s) (70 mL/Hr) IV Continuous <Continuous>  venlafaxine 37.5 milliGRAM(s) Oral daily    MEDICATIONS  (PRN):  acetaminophen     Tablet .. 650 milliGRAM(s) Oral every 6 hours PRN Temp greater or equal to 38C (100.4F), Mild Pain (1 - 3)  aluminum hydroxide/magnesium hydroxide/simethicone Suspension 30 milliLiter(s) Oral every 4 hours PRN Dyspepsia  LORazepam   Injectable 2 milliGRAM(s) IV Push every 8 hours PRN Agitation or if patient refuses Klonopin  melatonin 3 milliGRAM(s) Oral at bedtime PRN Insomnia  ondansetron Injectable 4 milliGRAM(s) IV Push every 8 hours PRN Nausea and/or Vomiting  
MEDICATIONS  (STANDING):  clonazePAM Oral Disintegrating Tablet 1 milliGRAM(s) Oral three times a day  enoxaparin Injectable 40 milliGRAM(s) SubCutaneous every 24 hours  lactated ringers. 1000 milliLiter(s) (70 mL/Hr) IV Continuous <Continuous>    MEDICATIONS  (PRN):  acetaminophen     Tablet .. 650 milliGRAM(s) Oral every 6 hours PRN Temp greater or equal to 38C (100.4F), Mild Pain (1 - 3)  aluminum hydroxide/magnesium hydroxide/simethicone Suspension 30 milliLiter(s) Oral every 4 hours PRN Dyspepsia  LORazepam   Injectable 2 milliGRAM(s) IV Push every 8 hours PRN Agitation or if patient refuses Klonopin  melatonin 3 milliGRAM(s) Oral at bedtime PRN Insomnia  ondansetron Injectable 4 milliGRAM(s) IV Push every 8 hours PRN Nausea and/or Vomiting  
MEDICATIONS  (STANDING):  enoxaparin Injectable 40 milliGRAM(s) SubCutaneous every 24 hours  LORazepam   Injectable 2 milliGRAM(s) IV Push every 8 hours    MEDICATIONS  (PRN):  acetaminophen     Tablet .. 650 milliGRAM(s) Oral every 6 hours PRN Temp greater or equal to 38C (100.4F), Mild Pain (1 - 3)  aluminum hydroxide/magnesium hydroxide/simethicone Suspension 30 milliLiter(s) Oral every 4 hours PRN Dyspepsia  melatonin 3 milliGRAM(s) Oral at bedtime PRN Insomnia  ondansetron Injectable 4 milliGRAM(s) IV Push every 8 hours PRN Nausea and/or Vomiting  
MEDICATIONS  (STANDING):  clonazePAM  Tablet 1 milliGRAM(s) Oral three times a day  enoxaparin Injectable 40 milliGRAM(s) SubCutaneous every 24 hours  lactated ringers. 1000 milliLiter(s) (70 mL/Hr) IV Continuous <Continuous>    MEDICATIONS  (PRN):  acetaminophen     Tablet .. 650 milliGRAM(s) Oral every 6 hours PRN Temp greater or equal to 38C (100.4F), Mild Pain (1 - 3)  aluminum hydroxide/magnesium hydroxide/simethicone Suspension 30 milliLiter(s) Oral every 4 hours PRN Dyspepsia  LORazepam   Injectable 2 milliGRAM(s) IV Push every 8 hours PRN Agitation  melatonin 3 milliGRAM(s) Oral at bedtime PRN Insomnia  ondansetron Injectable 4 milliGRAM(s) IV Push every 8 hours PRN Nausea and/or Vomiting

## 2024-09-24 NOTE — PROGRESS NOTE ADULT - TIME BILLING
reviewing laboratory data, consultants' recommendations, documentation in [place], performing medically appropriate examinations/evaluations, discussion with patient/family/RN/ACP/Residents and interdisciplinary staff (such as , social workers, etc), counseling patient/family/care giver, ordering medically appropriate medication, tests, or procedures
reviewing laboratory data, consultants' recommendations, documentation in [place], performing medically appropriate examinations/evaluations, discussion with patient/family/RN/ACP/Residents and interdisciplinary staff (such as , social workers, etc), counseling patient/family/care giver, ordering medically appropriate medication, tests, or procedures

## 2024-09-24 NOTE — DISCHARGE NOTE PROVIDER - NSDCMRMEDTOKEN_GEN_ALL_CORE_FT
clonazePAM 1 mg oral tablet: 1 tab(s) orally 3 times a day MDD: 3mg  venlafaxine 37.5 mg oral tablet: 1 tab(s) orally once a day

## 2024-09-24 NOTE — BH CONSULTATION LIAISON PROGRESS NOTE - NSBHCONSULTMEDAGITATION_PSY_A_CORE FT
lorazepam 2mg PO/IM/IV q8h

## 2024-09-24 NOTE — BH CONSULTATION LIAISON PROGRESS NOTE - NSBHATTESTBILLING_PSY_A_CORE
[id number] OBS or IP - moderate complexity OR 35-49 mins
Non-billable

## 2024-09-24 NOTE — BH CONSULTATION LIAISON PROGRESS NOTE - NSICDXBHPRIMARYDX_PSY_ALL_CORE
Catatonia associated with another mental disorder   F06.1  

## 2024-09-24 NOTE — BH CONSULTATION LIAISON PROGRESS NOTE - NSBHATTESTTYPEVISIT_PSY_A_CORE
Attending Only
Resident/Fellow with telephonic supervision

## 2024-09-24 NOTE — BH CONSULTATION LIAISON PROGRESS NOTE - NSBHMSESPABN_PSY_A_CORE
Slowed rate/Decreased productivity/Increased latency
Soft volume
Slowed rate/Decreased productivity/Increased latency

## 2024-09-24 NOTE — PROGRESS NOTE ADULT - REASON FOR ADMISSION
Catatonia resulting in pt not eating

## 2024-09-24 NOTE — PROGRESS NOTE ADULT - SUBJECTIVE AND OBJECTIVE BOX
Angie Lee  [facility]  Pager #[phone]  Available on Microsoft Teams    Patient is a 38y old  Female who presents with a chief complaint of Loss of appetite  Per chart review, Patient is a 38 year old female with a history of PTSD, depression with psychotic features brought in by family for concern for catatonia.   (22 Sep 2024 11:37)      SUBJECTIVE / OVERNIGHT EVENTS: Patient seen and examined at bedside. Pt ate a very tiny amount of her lunch, states she doesn't really know what is in the food. Says she is eating a little better today but that it is a slow process.    ADDITIONAL REVIEW OF SYSTEMS:    MEDICATIONS  (STANDING):  clonazePAM  Tablet 1 milliGRAM(s) Oral three times a day  enoxaparin Injectable 40 milliGRAM(s) SubCutaneous every 24 hours  lactated ringers. 1000 milliLiter(s) (70 mL/Hr) IV Continuous <Continuous>    MEDICATIONS  (PRN):  acetaminophen     Tablet .. 650 milliGRAM(s) Oral every 6 hours PRN Temp greater or equal to 38C (100.4F), Mild Pain (1 - 3)  aluminum hydroxide/magnesium hydroxide/simethicone Suspension 30 milliLiter(s) Oral every 4 hours PRN Dyspepsia  LORazepam   Injectable 2 milliGRAM(s) IV Push every 8 hours PRN Agitation  melatonin 3 milliGRAM(s) Oral at bedtime PRN Insomnia  ondansetron Injectable 4 milliGRAM(s) IV Push every 8 hours PRN Nausea and/or Vomiting      CAPILLARY BLOOD GLUCOSE        I&O's Summary    21 Sep 2024 07:01  -  22 Sep 2024 07:00  --------------------------------------------------------  IN: 610 mL / OUT: 0 mL / NET: 610 mL        PHYSICAL EXAM:    Vital Signs Last 24 Hrs  T(C): 36.8 (22 Sep 2024 11:38), Max: 36.8 (21 Sep 2024 21:52)  T(F): 98.3 (22 Sep 2024 11:38), Max: 98.3 (22 Sep 2024 11:38)  HR: 89 (22 Sep 2024 11:38) (84 - 90)  BP: 127/69 (22 Sep 2024 11:38) (127/69 - 136/79)  BP(mean): --  RR: 18 (22 Sep 2024 11:38) (18 - 18)  SpO2: 100% (22 Sep 2024 11:38) (100% - 100%)    Parameters below as of 22 Sep 2024 11:38  Patient On (Oxygen Delivery Method): room air    CONSTITUTIONAL: NAD, well-developed, well-groomed  EYES: Conjunctiva and sclera clear  ENMT: Moist oral mucosa  RESPIRATORY: Normal respiratory effort; lungs are clear to auscultation bilaterally  CARDIOVASCULAR: Regular rate and rhythm, normal S1 and S2, no murmur/rub/gallop; No lower extremity edema  ABDOMEN: Soft, nontender to palpation, normoactive bowel sounds  PSYCH: A+O to person, place  NEUROLOGY: Tremor of b/l arms  SKIN: No rashes; no palpable lesions    LABS:                        11.7   7.40  )-----------( 348      ( 22 Sep 2024 13:30 )             33.9     09-22    139  |  100  |  7   ----------------------------<  92  3.4[L]   |  23  |  0.67    Ca    9.4      22 Sep 2024 13:30            Urinalysis Basic - ( 22 Sep 2024 13:30 )    Color: x / Appearance: x / SG: x / pH: x  Gluc: 92 mg/dL / Ketone: x  / Bili: x / Urobili: x   Blood: x / Protein: x / Nitrite: x   Leuk Esterase: x / RBC: x / WBC x   Sq Epi: x / Non Sq Epi: x / Bacteria: x        Urinalysis with Rflx Culture (collected 20 Sep 2024 13:15)        RADIOLOGY & ADDITIONAL TESTS: No new imaging  Results Reviewed: Yes  Imaging Personally Reviewed:  Electrocardiogram Personally Reviewed:    COORDINATION OF CARE:  Care Discussed with Consultants/Other Providers [Y/N]:  Prior or Outpatient Records Reviewed [Y/N]:  
Isis Garland        Patient is a 38y old  Female who presents with a chief complaint of Catatonia resulting in pt not eating (23 Sep 2024 13:05)      SUBJECTIVE / OVERNIGHT EVENTS: No acute overnight events. This morning pt appears calm, no complaints. Wants to go home    MEDICATIONS  (STANDING):  clonazePAM Oral Disintegrating Tablet 1 milliGRAM(s) Oral three times a day  enoxaparin Injectable 40 milliGRAM(s) SubCutaneous every 24 hours  lactated ringers. 1000 milliLiter(s) (70 mL/Hr) IV Continuous <Continuous>  venlafaxine 37.5 milliGRAM(s) Oral daily    MEDICATIONS  (PRN):  acetaminophen     Tablet .. 650 milliGRAM(s) Oral every 6 hours PRN Temp greater or equal to 38C (100.4F), Mild Pain (1 - 3)  aluminum hydroxide/magnesium hydroxide/simethicone Suspension 30 milliLiter(s) Oral every 4 hours PRN Dyspepsia  LORazepam   Injectable 2 milliGRAM(s) IV Push every 8 hours PRN Agitation or if patient refuses Klonopin  melatonin 3 milliGRAM(s) Oral at bedtime PRN Insomnia  ondansetron Injectable 4 milliGRAM(s) IV Push every 8 hours PRN Nausea and/or Vomiting    Allergies    No Known Allergies    Intolerances        Vital Signs Last 24 Hrs  T(C): 36.8 (24 Sep 2024 10:45), Max: 36.8 (24 Sep 2024 10:45)  T(F): 98.2 (24 Sep 2024 10:45), Max: 98.2 (24 Sep 2024 10:45)  HR: 74 (24 Sep 2024 10:45) (68 - 79)  BP: 108/75 (24 Sep 2024 10:45) (108/75 - 135/82)  BP(mean): --  RR: 18 (24 Sep 2024 10:45) (17 - 18)  SpO2: 99% (24 Sep 2024 10:45) (97% - 99%)    Parameters below as of 24 Sep 2024 10:45  Patient On (Oxygen Delivery Method): room air      Daily     Daily   CAPILLARY BLOOD GLUCOSE        I&O's Summary      PHYSICAL EXAM:  GENERAL: NAD  HEAD:  Atraumatic, Normocephalic  EYES: EOMI,  conjunctiva and sclera clear  NECK: Supple  CHEST/LUNG: Clear to auscultation bilaterally; No wheeze, crackles or rhonchi   HEART: Regular rate and rhythm; Normal S1 S2, No murmurs, rubs, or gallops  ABDOMEN: Soft, Nontender, Nondistended; Bowel sounds present  EXTREMITIES:  2+ Peripheral Pulses, No edema  PSYCH: AAOx3, slow to respond   NEUROLOGY: non-focal  SKIN: Warm and dry      LABS:                        11.7   7.40  )-----------( 348      ( 22 Sep 2024 13:30 )             33.9     Hgb Trend: 11.7<--, 11.0<--, 11.3<--  09-22    139  |  100  |  7   ----------------------------<  92  3.4[L]   |  23  |  0.67    Ca    9.4      22 Sep 2024 13:30      Creatinine Trend: 0.67<--, 0.63<--, 0.76<--          Urinalysis Basic - ( 22 Sep 2024 13:30 )    Color: x / Appearance: x / SG: x / pH: x  Gluc: 92 mg/dL / Ketone: x  / Bili: x / Urobili: x   Blood: x / Protein: x / Nitrite: x   Leuk Esterase: x / RBC: x / WBC x   Sq Epi: x / Non Sq Epi: x / Bacteria: x        RADIOLOGY & ADDITIONAL TESTS:    Imaging Personally Reviewed.    Consultant(s) Notes Reviewed.    Care Discussed with Consultants/Other Providers.      
Angie Lee  [facility]  Pager #[phone]  Available on Microsoft Teams    Patient is a 38y old  Female who presents with a chief complaint of Catatonia resulting in pt not eating (20 Sep 2024 19:52)      SUBJECTIVE / OVERNIGHT EVENTS: Patient seen and examined at bedside. Patient seen at  speaking to someone on the phone. Later saw patient in her room, staring at her lunch tray. Did not eat anything, says many nonsensical things about not knowing things, making decisions, importance of family, etc.    ADDITIONAL REVIEW OF SYSTEMS:    MEDICATIONS  (STANDING):  clonazePAM  Tablet 1 milliGRAM(s) Oral three times a day  enoxaparin Injectable 40 milliGRAM(s) SubCutaneous every 24 hours    MEDICATIONS  (PRN):  acetaminophen     Tablet .. 650 milliGRAM(s) Oral every 6 hours PRN Temp greater or equal to 38C (100.4F), Mild Pain (1 - 3)  aluminum hydroxide/magnesium hydroxide/simethicone Suspension 30 milliLiter(s) Oral every 4 hours PRN Dyspepsia  LORazepam   Injectable 2 milliGRAM(s) IV Push every 8 hours PRN Agitation  melatonin 3 milliGRAM(s) Oral at bedtime PRN Insomnia  ondansetron Injectable 4 milliGRAM(s) IV Push every 8 hours PRN Nausea and/or Vomiting      CAPILLARY BLOOD GLUCOSE        I&O's Summary    20 Sep 2024 07:01  -  21 Sep 2024 07:00  --------------------------------------------------------  IN: 600 mL / OUT: 0 mL / NET: 600 mL        PHYSICAL EXAM:    Vital Signs Last 24 Hrs  T(C): 36.8 (21 Sep 2024 12:40), Max: 36.9 (21 Sep 2024 05:15)  T(F): 98.3 (21 Sep 2024 12:40), Max: 98.4 (21 Sep 2024 05:15)  HR: 82 (21 Sep 2024 12:40) (80 - 98)  BP: 147/74 (21 Sep 2024 12:40) (123/95 - 147/74)  BP(mean): --  RR: 18 (21 Sep 2024 12:40) (16 - 18)  SpO2: 100% (21 Sep 2024 12:40) (100% - 100%)    Parameters below as of 21 Sep 2024 12:40  Patient On (Oxygen Delivery Method): room air    CONSTITUTIONAL: NAD, well-developed, well-groomed  EYES: Conjunctiva and sclera clear  ENMT: Moist oral mucosa  RESPIRATORY: Normal respiratory effort; lungs are clear to auscultation bilaterally  CARDIOVASCULAR: Regular rate and rhythm, normal S1 and S2, no murmur/rub/gallop; No lower extremity edema  ABDOMEN: Soft, nontender to palpation, normoactive bowel sounds  PSYCH: A+O to person, place  NEUROLOGY: Tremor of b/l arms  SKIN: No rashes; no palpable lesions    LABS:                        11.0   7.49  )-----------( 331      ( 21 Sep 2024 05:20 )             32.5     09-21    137  |  100  |  8   ----------------------------<  83  3.6   |  16[L]  |  0.63    Ca    8.9      21 Sep 2024 05:20    TPro  7.9  /  Alb  4.9  /  TBili  0.6  /  DBili  x   /  AST  17  /  ALT  10  /  AlkPhos  70  09-20      Urinalysis Basic - ( 21 Sep 2024 05:20 )    Color: x / Appearance: x / SG: x / pH: x  Gluc: 83 mg/dL / Ketone: x  / Bili: x / Urobili: x   Blood: x / Protein: x / Nitrite: x   Leuk Esterase: x / RBC: x / WBC x   Sq Epi: x / Non Sq Epi: x / Bacteria: x    Urinalysis with Rflx Culture (collected 20 Sep 2024 13:15)      RADIOLOGY & ADDITIONAL TESTS: No new imaging  Results Reviewed: Yes  Imaging Personally Reviewed:  Electrocardiogram Personally Reviewed:    COORDINATION OF CARE:  Care Discussed with Consultants/Other Providers [Y/N]:  Prior or Outpatient Records Reviewed [Y/N]:  
Isis Garland        Patient is a 38y old  Female who presents with a chief complaint of Catatonia resulting in pt not eating (22 Sep 2024 14:53)      SUBJECTIVE / OVERNIGHT EVENTS: No acute overnight events. This morning pt doing "okay." States she feels "sad but grateful" about being in the hospital. Wants to go home. Otherwise no complaints. Reports episode of nausea yesterday and this morning which pt states happened because "I ate too much." Otherwise no complaints. Denies fevers, chills, vomiting. CP/SOB, Dysuria, inc urinary frequency.    MEDICATIONS  (STANDING):  clonazePAM Oral Disintegrating Tablet 1 milliGRAM(s) Oral three times a day  enoxaparin Injectable 40 milliGRAM(s) SubCutaneous every 24 hours  lactated ringers. 1000 milliLiter(s) (70 mL/Hr) IV Continuous <Continuous>  potassium chloride    Tablet ER 20 milliEquivalent(s) Oral once    MEDICATIONS  (PRN):  acetaminophen     Tablet .. 650 milliGRAM(s) Oral every 6 hours PRN Temp greater or equal to 38C (100.4F), Mild Pain (1 - 3)  aluminum hydroxide/magnesium hydroxide/simethicone Suspension 30 milliLiter(s) Oral every 4 hours PRN Dyspepsia  LORazepam   Injectable 2 milliGRAM(s) IV Push every 8 hours PRN Agitation or if patient refuses Klonopin  melatonin 3 milliGRAM(s) Oral at bedtime PRN Insomnia  ondansetron Injectable 4 milliGRAM(s) IV Push every 8 hours PRN Nausea and/or Vomiting    Allergies    No Known Allergies    Intolerances        Vital Signs Last 24 Hrs  T(C): 36.6 (23 Sep 2024 11:11), Max: 36.8 (22 Sep 2024 20:04)  T(F): 97.9 (23 Sep 2024 11:11), Max: 98.2 (22 Sep 2024 20:04)  HR: 81 (23 Sep 2024 11:11) (75 - 87)  BP: 115/76 (23 Sep 2024 11:11) (115/76 - 129/90)  BP(mean): --  RR: 18 (23 Sep 2024 11:11) (17 - 18)  SpO2: 98% (23 Sep 2024 11:11) (98% - 100%)    Parameters below as of 23 Sep 2024 11:11  Patient On (Oxygen Delivery Method): room air      Daily     Daily   CAPILLARY BLOOD GLUCOSE        I&O's Summary    22 Sep 2024 07:01  -  23 Sep 2024 07:00  --------------------------------------------------------  IN: 840 mL / OUT: 0 mL / NET: 840 mL        PHYSICAL EXAM:  GENERAL: NAD  HEAD:  Atraumatic, Normocephalic  EYES: EOMI,  conjunctiva and sclera clear  NECK: Supple  CHEST/LUNG: Clear to auscultation bilaterally; No wheeze, crackles or rhonchi   HEART: Regular rate and rhythm; Normal S1 S2, No murmurs, rubs, or gallops  ABDOMEN: Soft, Nontender, Nondistended; Bowel sounds present  EXTREMITIES:  2+ Peripheral Pulses, No edema  PSYCH: AAOx3, slow to respond   NEUROLOGY: non-focal  SKIN: Warm and dry    LABS:                        11.7   7.40  )-----------( 348      ( 22 Sep 2024 13:30 )             33.9     Hgb Trend: 11.7<--, 11.0<--, 11.3<--  09-22    139  |  100  |  7   ----------------------------<  92  3.4[L]   |  23  |  0.67    Ca    9.4      22 Sep 2024 13:30      Creatinine Trend: 0.67<--, 0.63<--, 0.76<--          Urinalysis Basic - ( 22 Sep 2024 13:30 )    Color: x / Appearance: x / SG: x / pH: x  Gluc: 92 mg/dL / Ketone: x  / Bili: x / Urobili: x   Blood: x / Protein: x / Nitrite: x   Leuk Esterase: x / RBC: x / WBC x   Sq Epi: x / Non Sq Epi: x / Bacteria: x        RADIOLOGY & ADDITIONAL TESTS:    Imaging Personally Reviewed.    Consultant(s) Notes Reviewed.    Care Discussed with Consultants/Other Providers.

## 2024-09-24 NOTE — BH PATIENT PROFILE - HOME MEDICATIONS
venlafaxine 37.5 mg oral tablet , 1 tab(s) orally once a day  clonazePAM 1 mg oral tablet , 1 tab(s) orally 3 times a day MDD: 3mg
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
## 2024-09-24 NOTE — BH CONSULTATION LIAISON PROGRESS NOTE - NSBHCHARTREVIEWVS_PSY_A_CORE FT
Vital Signs Last 24 Hrs  T(C): 36.6 (23 Sep 2024 11:11), Max: 36.8 (22 Sep 2024 20:04)  T(F): 97.9 (23 Sep 2024 11:11), Max: 98.2 (22 Sep 2024 20:04)  HR: 81 (23 Sep 2024 11:11) (75 - 87)  BP: 115/76 (23 Sep 2024 11:11) (115/76 - 129/90)  BP(mean): --  RR: 18 (23 Sep 2024 11:11) (17 - 18)  SpO2: 98% (23 Sep 2024 11:11) (98% - 100%)    Parameters below as of 23 Sep 2024 11:11  Patient On (Oxygen Delivery Method): room air    
Vital Signs Last 24 Hrs  T(C): 36.8 (21 Sep 2024 12:40), Max: 36.9 (21 Sep 2024 05:15)  T(F): 98.3 (21 Sep 2024 12:40), Max: 98.4 (21 Sep 2024 05:15)  HR: 82 (21 Sep 2024 12:40) (80 - 98)  BP: 147/74 (21 Sep 2024 12:40) (123/95 - 147/74)  BP(mean): --  RR: 18 (21 Sep 2024 12:40) (16 - 18)  SpO2: 100% (21 Sep 2024 12:40) (100% - 100%)    Parameters below as of 21 Sep 2024 12:40  Patient On (Oxygen Delivery Method): room air    
Vital Signs Last 24 Hrs  T(C): 36.8 (22 Sep 2024 11:38), Max: 36.8 (21 Sep 2024 12:40)  T(F): 98.3 (22 Sep 2024 11:38), Max: 98.3 (21 Sep 2024 12:40)  HR: 89 (22 Sep 2024 11:38) (82 - 90)  BP: 127/69 (22 Sep 2024 11:38) (127/69 - 147/74)  BP(mean): --  RR: 18 (22 Sep 2024 11:38) (18 - 18)  SpO2: 100% (22 Sep 2024 11:38) (100% - 100%)    Parameters below as of 22 Sep 2024 11:38  Patient On (Oxygen Delivery Method): room air    
Vital Signs Last 24 Hrs  T(C): 36.8 (24 Sep 2024 10:45), Max: 36.8 (24 Sep 2024 10:45)  T(F): 98.2 (24 Sep 2024 10:45), Max: 98.2 (24 Sep 2024 10:45)  HR: 74 (24 Sep 2024 10:45) (68 - 79)  BP: 108/75 (24 Sep 2024 10:45) (108/75 - 135/82)  BP(mean): --  RR: 18 (24 Sep 2024 10:45) (17 - 18)  SpO2: 99% (24 Sep 2024 10:45) (97% - 99%)    Parameters below as of 24 Sep 2024 10:45  Patient On (Oxygen Delivery Method): room air

## 2024-09-24 NOTE — DISCHARGE NOTE NURSING/CASE MANAGEMENT/SOCIAL WORK - PATIENT PORTAL LINK FT
You can access the FollowMyHealth Patient Portal offered by [facility] by registering at the following website: [email or web address]. By joining [facility]’s FollowMyHealth portal, you will also be able to view your health information using other applications (apps) compatible with our system.

## 2024-09-24 NOTE — PROGRESS NOTE ADULT - PROBLEM SELECTOR PLAN 2
Decreased po intake for 4 days  Ketones noted on UA. Denies  sx , lower suspicion for UTI   C/w IVF until PO intake improves  Tx for catatonia as above   Calorie count, Nutrition consult      DVT: Lovenox  DIET: Regular  DISPO: Likely inpatient psych    Discussed w/ mom at bedside Decreased po intake for 4 days  Ketones noted on UA. Denies  sx , lower suspicion for UTI   s/p IVF  Encourage hydration   Tx for catatonia as above   Calorie count, Nutrition consult      DVT: Lovenox  DIET: Regular  DISPO: Likely inpatient psych    Discussed w/ mom at bedside

## 2024-09-24 NOTE — DISCHARGE NOTE NURSING/CASE MANAGEMENT/SOCIAL WORK - NSDCPEFALRISK_GEN_ALL_CORE
For information on Fall & Injury Prevention, visit: [email or web address] OR  [email or web address] OR  https://www.cdc.gov/steadi/patient.html
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
## 2024-09-24 NOTE — DISCHARGE NOTE PROVIDER - NSDCCPCAREPLAN_GEN_ALL_CORE_FT
PRINCIPAL DISCHARGE DIAGNOSIS  Diagnosis: Catatonia associated with another mental disorder  Assessment and Plan of Treatment: You were admitted for psychosis and catatonia. There was also concern you had decreased oral and were refusing your medications. The psych team was consulted and you were started on effexor 37.5mg daily and clonazepam 1mg three times daily. Please Follow up with psych regarding resuming Caplyta. Please continue to take your medications as prescribed following your discharge and follow up with psych outpatient for further management.
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
## 2024-09-24 NOTE — PROGRESS NOTE ADULT - ASSESSMENT
38 year old female with a history of PTSD, depression with psychotic features brought in by family for concern for catatonia.

## 2024-09-24 NOTE — DISCHARGE NOTE PROVIDER - ATTENDING DISCHARGE PHYSICAL EXAMINATION:
GENERAL: NAD  HEAD:  Atraumatic, Normocephalic  EYES: EOMI,  conjunctiva and sclera clear  NECK: Supple  CHEST/LUNG: Clear to auscultation bilaterally; No wheeze, crackles or rhonchi   HEART: Regular rate and rhythm; Normal S1 S2, No murmurs, rubs, or gallops  ABDOMEN: Soft, Nontender, Nondistended; Bowel sounds present  EXTREMITIES:  2+ Peripheral Pulses, No edema  PSYCH: AAOx3, slow to respond   NEUROLOGY: non-focal  SKIN: Warm and dry

## 2024-09-24 NOTE — BH CONSULTATION LIAISON PROGRESS NOTE - NSBHMSETHTPROC_PSY_A_CORE
Tangential/Thought blocking/Impaired reasoning
Circumstantial/Tangential/Perseverative/Impaired reasoning
Circumstantial/Tangential/Perseverative/Impaired reasoning

## 2024-09-24 NOTE — BH CONSULTATION LIAISON PROGRESS NOTE - NSBHFUPINTERVALCCFT_PSY_A_CORE
"I don't want to be selfish"
f/u catatonia and medication management
f/u catatonia and medication management
I want my mother here
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
## 2024-09-25 DIAGNOSIS — R63.8 OTHER SYMPTOMS AND SIGNS CONCERNING FOOD AND FLUID INTAKE: ICD-10-CM

## 2024-09-25 DIAGNOSIS — F33.9 MAJOR DEPRESSIVE DISORDER, RECURRENT, UNSPECIFIED: ICD-10-CM

## 2024-09-25 PROCEDURE — 99223 1ST HOSP IP/OBS HIGH 75: CPT

## 2024-09-25 RX ADMIN — Medication 1 MILLIGRAM(S): at 12:44

## 2024-09-25 RX ADMIN — Medication 37.5 MILLIGRAM(S): at 08:22

## 2024-09-25 RX ADMIN — Medication 400 MILLIGRAM(S): at 22:17

## 2024-09-25 RX ADMIN — Medication 400 MILLIGRAM(S): at 23:17

## 2024-09-25 RX ADMIN — Medication 1 MILLIGRAM(S): at 20:21

## 2024-09-25 RX ADMIN — Medication 1 MILLIGRAM(S): at 08:22
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
## 2024-09-25 NOTE — BH INPATIENT PSYCHIATRY ASSESSMENT NOTE - OTHER
Paranoid, asks NP if she is writing something and wants to see what she wrote, preoccupied about her rights slow gait

## 2024-09-25 NOTE — BH INPATIENT PSYCHIATRY ASSESSMENT NOTE - CURRENT MEDICATION
MEDICATIONS  (STANDING):  clonazePAM  Tablet 1 milliGRAM(s) Oral three times a day  venlafaxine XR 37.5 milliGRAM(s) Oral daily    MEDICATIONS  (PRN):  ibuprofen  Tablet. 400 milliGRAM(s) Oral every 6 hours PRN Mild Pain (1 - 3), Moderate Pain (4 - 6), Severe Pain (7 - 10)  LORazepam     Tablet 2 milliGRAM(s) Oral every 6 hours PRN agitation  LORazepam   Injectable 2 milliGRAM(s) IntraMuscular every 6 hours PRN agitation  melatonin. 3 milliGRAM(s) Oral at bedtime PRN Insomnia

## 2024-09-25 NOTE — CONSULT NOTE ADULT - TIME BILLING
Time-based billing (NON-critical care).     78 minutes spent on total encounter; more than 50% of the visit was spent counseling and / or coordinating care by the attending physician.  The necessity of the time spent during the encounter on this date of service was due to:     review of laboratory data, radiology results, documentation in [place], discussion with patient

## 2024-09-25 NOTE — CONSULT NOTE ADULT - SUBJECTIVE AND OBJECTIVE BOX
HPI:   Patient is a 37yo F with PMH of MDD with psychosis/catatonia, anxiety, PTSD, and bulimia who presented initially to [facility] with concern for catatonia, exhibiting paranoid behaviors particularly regarding food. She was seen by psychiatry while at [facility] and benzodiazepines were titrated. She was recommended for transfer to [facility] for further inpatient psychiatric optimization. She was transferred after her oral intake had improved.     Today she reports feeling very anxious and some nausea, which she attributes to reflux. States she has been eating somewhat, a little bit this morning. Denies any other acute complaints.      PAST MEDICAL & SURGICAL HISTORY:  Bipolar disorder      Depression      Anxiety      No significant past surgical history          Review of Systems:   CONSTITUTIONAL: No fever, weight loss, or fatigue  EYES: No eye pain, visual disturbances, or discharge  ENMT:  No difficulty hearing, tinnitus, vertigo; No sinus or throat pain  NECK: No pain or stiffness  RESPIRATORY: No cough, wheezing, chills or hemoptysis; No shortness of breath  CARDIOVASCULAR: No chest pain, palpitations, dizziness, or leg swelling  GASTROINTESTINAL: No abdominal or epigastric pain. No vomiting, or hematemesis; No diarrhea or constipation. No melena or hematochezia. +nausea  GENITOURINARY: No dysuria, frequency, hematuria, or incontinence  NEUROLOGICAL: No headaches, memory loss, loss of strength, numbness, or tremors  SKIN: No itching, burning, rashes, or lesions   LYMPH NODES: No enlarged glands  ENDOCRINE: No heat or cold intolerance; No hair loss  MUSCULOSKELETAL: No joint pain or swelling; No muscle, back, or extremity pain  HEME/LYMPH: No easy bruising, or bleeding gums  ALLERY AND IMMUNOLOGIC: No hives or eczema    Allergies    latex (Pruritus)  No Known Drug Allergies    Intolerances        Social History: does not offer    FAMILY HISTORY:  Family history of epilepsy (Grandparent)        MEDICATIONS  (STANDING):  clonazePAM  Tablet 1 milliGRAM(s) Oral three times a day  venlafaxine XR 37.5 milliGRAM(s) Oral daily    MEDICATIONS  (PRN):  ibuprofen  Tablet. 400 milliGRAM(s) Oral every 6 hours PRN Mild Pain (1 - 3), Moderate Pain (4 - 6), Severe Pain (7 - 10)  LORazepam     Tablet 2 milliGRAM(s) Oral every 6 hours PRN agitation  LORazepam   Injectable 2 milliGRAM(s) IntraMuscular every 6 hours PRN agitation  melatonin. 3 milliGRAM(s) Oral at bedtime PRN Insomnia      Vital Signs Last 24 Hrs  T(C): 36.3 (25 Sep 2024 08:21), Max: 36.7 (24 Sep 2024 19:02)  T(F): 97.3 (25 Sep 2024 08:21), Max: 98 (24 Sep 2024 19:02)  HR: --  BP: --  BP(mean): --  RR: 18 (25 Sep 2024 08:21) (14 - 18)  SpO2: --      CAPILLARY BLOOD GLUCOSE            PHYSICAL EXAM:  GENERAL: NAD, well-developed  HEAD:  Atraumatic, Normocephalic  EYES: EOMI, conjunctiva and sclera clear  NECK: Supple, No JVD  CHEST/LUNG: Clear to auscultation bilaterally; No wheeze  HEART: Regular rate and rhythm; No murmurs, rubs, or gallops  ABDOMEN: Soft, Nontender, Nondistended; Bowel sounds present  EXTREMITIES:  2+ Peripheral Pulses, No clubbing, cyanosis, or edema  NEUROLOGY: non-focal  SKIN: No rashes or lesions    LABS:                    EKG(personally reviewed):    RADIOLOGY & ADDITIONAL TESTS:    Imaging Personally Reviewed:    Consultant(s) Notes Reviewed:      Care Discussed with Consultants/Other Providers:

## 2024-09-25 NOTE — BH INPATIENT PSYCHIATRY ASSESSMENT NOTE - NSBHMSETHTPROC_PSY_A_CORE
Tangential/Thought blocking/Impaired reasoning Disorganized/Circumstantial/Tangential/Thought blocking/Impaired reasoning
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
## 2024-09-25 NOTE — BH INPATIENT PSYCHIATRY ASSESSMENT NOTE - NSBHMETABOLIC_PSY_ALL_CORE_FT
BMI: BMI (kg/m2): 22.7 (09-24-24 @ 19:02)  HbA1c:   Glucose:   BP: --Vital Signs Last 24 Hrs  T(C): 36.3 (09-25-24 @ 08:21), Max: 36.7 (09-24-24 @ 19:02)  T(F): 97.3 (09-25-24 @ 08:21), Max: 98 (09-24-24 @ 19:02)  HR: --  BP: --  BP(mean): --  RR: 18 (09-25-24 @ 08:21) (14 - 18)  SpO2: --    Orthostatic VS  09-25-24 @ 08:21  Lying BP: --/-- HR: --  Sitting BP: 106/63 HR: 81  Standing BP: 104/72 HR: 85  Site: --  Mode: --  Orthostatic VS  09-24-24 @ 19:02  Lying BP: --/-- HR: --  Sitting BP: 123/80 HR: 81  Standing BP: 131/88 HR: 84  Site: --  Mode: --    Lipid Panel:  BMI: BMI (kg/m2): 22.7 (09-24-24 @ 19:02)  HbA1c:   Glucose:   BP: --Vital Signs Last 24 Hrs  T(C): 36.3 (09-25-24 @ 08:21), Max: 36.3 (09-25-24 @ 08:21)  T(F): 97.3 (09-25-24 @ 08:21), Max: 97.3 (09-25-24 @ 08:21)  HR: --  BP: --  BP(mean): --  RR: 18 (09-25-24 @ 08:21) (18 - 18)  SpO2: --    Orthostatic VS  09-25-24 @ 08:21  Lying BP: --/-- HR: --  Sitting BP: 106/63 HR: 81  Standing BP: 104/72 HR: 85  Site: --  Mode: --  Orthostatic VS  09-24-24 @ 19:02  Lying BP: --/-- HR: --  Sitting BP: 123/80 HR: 81  Standing BP: 131/88 HR: 84  Site: --  Mode: --    Lipid Panel:

## 2024-09-25 NOTE — BH INPATIENT PSYCHIATRY ASSESSMENT NOTE - RISK ASSESSMENT
Risk Assessment: Immediate risk is minimized by inpatient admission to safe environment with appropriate supervision and limited access to lethal means. Future risk to be minimized by treatment of acute catatonic/psychotic/mood symptoms, maximizing outpatient supports, providing patient education, discussing emergency procedures, and ensuring close follow-up.    Acute risk factors include: single, active psychosis, active mood episode, active substance use, noncompliant with treatment, unable to care for self secondary to psychiatric illness.    Chronic risk factors for suicide include: h/o prior psychiatric admissions, diagnosis of depression, PTSD, hx of concussion, h/o trauma , hx of NSSIB  Protective factors include: Young, healthy, denies SI/I/P, no history of suicide attempts, supportive mother, stable housing, engaged in treatment      Based on risk assessment evaluation, patient does not appear to be at acute risk of harm to self or others at this time, DOES NOT require 1:1 CO.     Risk Assessment: Immediate risk is minimized by inpatient admission to safe environment with appropriate supervision and limited access to lethal means. Future risk to be minimized by treatment of acute catatonic/psychotic/mood symptoms, maximizing outpatient supports, providing patient education, discussing emergency procedures, and ensuring close follow-up.    Acute risk factors include: single, active psychosis, active mood episode, active substance use, noncompliant with treatment, unable to care for self secondary to psychiatric illness.    Chronic risk factors for suicide include: h/o prior psychiatric admissions, diagnosis of depression, PTSD, hx of concussion, h/o trauma , hx of NSSIB  Protective factors include: Young, healthy, denies SI/I/P, no history of suicide attempts, supportive mother, stable housing, engaged in treatment      Based on risk assessment evaluation, patient does not appear to be at acute risk of harm to self or others at this time, DOES NOT require 1:1 CO.

## 2024-09-25 NOTE — BH INPATIENT PSYCHIATRY ASSESSMENT NOTE - NSBHCHARTREVIEWVS_PSY_A_CORE FT
Vital Signs Last 24 Hrs  T(C): 36.3 (09-25-24 @ 08:21), Max: 36.7 (09-24-24 @ 19:02)  T(F): 97.3 (09-25-24 @ 08:21), Max: 98 (09-24-24 @ 19:02)  HR: --  BP: --  BP(mean): --  RR: 18 (09-25-24 @ 08:21) (14 - 18)  SpO2: --    Orthostatic VS  09-25-24 @ 08:21  Lying BP: --/-- HR: --  Sitting BP: 106/63 HR: 81  Standing BP: 104/72 HR: 85  Site: --  Mode: --  Orthostatic VS  09-24-24 @ 19:02  Lying BP: --/-- HR: --  Sitting BP: 123/80 HR: 81  Standing BP: 131/88 HR: 84  Site: --  Mode: --   Vital Signs Last 24 Hrs  T(C): 36.3 (09-25-24 @ 08:21), Max: 36.3 (09-25-24 @ 08:21)  T(F): 97.3 (09-25-24 @ 08:21), Max: 97.3 (09-25-24 @ 08:21)  HR: --  BP: --  BP(mean): --  RR: 18 (09-25-24 @ 08:21) (18 - 18)  SpO2: --    Orthostatic VS  09-25-24 @ 08:21  Lying BP: --/-- HR: --  Sitting BP: 106/63 HR: 81  Standing BP: 104/72 HR: 85  Site: --  Mode: --  Orthostatic VS  09-24-24 @ 19:02  Lying BP: --/-- HR: --  Sitting BP: 123/80 HR: 81  Standing BP: 131/88 HR: 84  Site: --  Mode: --

## 2024-09-25 NOTE — DIETITIAN INITIAL EVALUATION ADULT - ORAL INTAKE PTA/DIET HISTORY
Pt reports after she stopped medication, she was overeating and purging sometimes and other times restricting.
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
## 2024-09-25 NOTE — BH INPATIENT PSYCHIATRY ASSESSMENT NOTE - NSBHATTESTCOMMENTATTENDFT_PSY_A_CORE
Care was discussed and reviewed in the interdisciplinary treatment team.  I, Yanna Lu MD, have reviewed and verified the documentation.  I independently performed the documented medical decision making.      Patient was seen and evaluated today at bedside. Patient seemed withdrawn, guarded, paranoid and detached. Patient tells me that she doesn't have any motivation and has been sleeping most of the day. Denies any SI and has been able to contract for safety.

## 2024-09-25 NOTE — BH SOCIAL WORK INITIAL PSYCHOSOCIAL EVALUATION - OTHER PAST PSYCHIATRIC HISTORY (INCLUDE DETAILS REGARDING ONSET, COURSE OF ILLNESS, INPATIENT/OUTPATIENT TREATMENT)
The patient is a 37 y/o single  female, domiciled with mother in [place]; unemployed; no dependents; past psychiatric history of psychotic depression with catatonia and post-traumatic stress disorder, two prior psych admissions (most recently July 2023 for MDD with catatonia), follows at [facility] outpatient with Dr. Neely, no hx suicide attempt, hx SIB by cutting, no known hx of violence, denies substance use (per record remote history of alcohol use), hx trauma, BIB EMS activated by mother with concern for catatonia.

## 2024-09-25 NOTE — BH INPATIENT PSYCHIATRY ASSESSMENT NOTE - PAST PSYCHOTROPIC MEDICATION
according to pts outpatient psychiatrist, citalopram for years (?dose, ineffective), Prozac (ineffective), Wellbutrin (worked for mood/concentration but with concern for worsening AH) Lexapro (ineffective), Zyprexa (weight gain), Latuda (fatigue), abilify (weight gain), seroquel (weight gain), propranolol, gabapentin, trazodone

## 2024-09-25 NOTE — BH INPATIENT PSYCHIATRY ASSESSMENT NOTE - NSICDXBHSECONDARYDX_PSY_ALL_CORE
MDD (major depressive disorder), recurrent, with catatonic features   F33.9  Decreased oral intake   R63.8
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
## 2024-09-25 NOTE — BH SOCIAL WORK INITIAL PSYCHOSOCIAL EVALUATION - NSBHFINANCESOCIAL_PSY_ALL_CORE
Left mess for pt to return call medication she is asking for is missing  
Medication refill sent to pharmacy as indicated within the origianal request.     
Patient, Aleksandra Anne calling for medication refill: hydroxyzine 25mg. Medication(s) set up as pending orders from medication list.    Caller has been advised that their call does not guarantee an immediate refill. This refill will be reviewed within 24-48 hours by a qualified provider who will determine whether he or she can refill the medication.    Patient has contacted the pharmacy?  Yes    Patient advised he or she will receive a call back.    Additional information:     Patient is completely out of medications    Patient’s preferred pharmacy has been noted and populated.         
None

## 2024-09-25 NOTE — DIETITIAN INITIAL EVALUATION ADULT - OTHER INFO
Pt is a 37 y/o female with PPH of MDD with psychosis/catatonia, anxiety, PTSD. PMHx: Bulimia. Pt is now admitted to [facility] for catatonia.  Met Pt in [address] [place] [address]. States had some milk this morning . Describes she has GERD and feels she does not digest well some foods. Lactose intolerance, avoids milk and cheese but can consume yogurt. Avoids red meat and fish. Pt also states has eating disorder (binging/puring/restrciting) but unclear if it has been diagnosed/treated before. Food preferences explored with plenty of encouragement, they will be implemented.   Discussed with Pt Importance of nutrition in adults and adequate balanced meals. Pt verbalized fair understanding.   Pt unsure of UBW. Pt amenable for po supplement Orgain Shake twice daily (440 kcal and 32 g protein)   Encouraged po intake. Pt verbalized fair understanding.
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
## 2024-09-25 NOTE — BH INPATIENT PSYCHIATRY ASSESSMENT NOTE - DETAILS
unable to elaborate but states it is related to a past hospital admission here experiences weight gain on zyprexa, abilify, seroquel  experienced fatigue on latuda Family Hx: states there is a family hx of postpartum depression on both sides of family but declines to comment further, grandmother with seizures

## 2024-09-25 NOTE — CONSULT NOTE ADULT - ASSESSMENT
Patient is a 39yo F with PMH of MDD with psychosis/catatonia, anxiety, PTSD, and bulimia who is now admitted to [facility] for catatonia.    #MDD with catatonia  #PTSD  #Bulimia  - plan and meds per psych    #Poor po intake  - continue to closely monitor po intake while hospitalized  - if concern for poor po intake, may consider transfer for medical hospitalization

## 2024-09-25 NOTE — PSYCHIATRIC REHAB INITIAL EVALUATION - NSBHPRRECOMMEND_PSY_ALL_CORE
The writer attempted to orient the patient to the unit 2W, as well as the role/ function of  and Inpatient Psychiatric Rehabilitation programming, however, the patient was sleeping. The patient BIBEMS activated by her mother with concern for catatonia. Writer identified an apt goal for the patient defined in the  plan of care. The patient’s Psychiatric Rehabilitation goal is to identify 2 coping skills that assist with focus on reality for her psychotic symptoms goal. Psychiatric Rehabilitation staff will meet with patient weekly to review progress towards identified goal.

## 2024-09-25 NOTE — BH SOCIAL WORK INITIAL PSYCHOSOCIAL EVALUATION - NSHIGHRISKBEHFT_PSY_ALL_CORE
hx of SIB by cutting  The patient has not eaten any food in 4 days. No water yesterday or today.  Not taken meds in 4 days.

## 2024-09-26 PROCEDURE — 99232 SBSQ HOSP IP/OBS MODERATE 35: CPT

## 2024-09-26 RX ORDER — LUMATEPERONE 42 MG/1
42 CAPSULE ORAL
Refills: 0 | Status: DISCONTINUED | OUTPATIENT
Start: 2024-09-26 | End: 2024-10-01

## 2024-09-26 RX ORDER — ONDANSETRON HYDROCHLORIDE 2 MG/ML
4 INJECTION, SOLUTION INTRAMUSCULAR; INTRAVENOUS ONCE
Refills: 0 | Status: COMPLETED | OUTPATIENT
Start: 2024-09-26 | End: 2024-09-26

## 2024-09-26 RX ADMIN — Medication 1 MILLIGRAM(S): at 20:13

## 2024-09-26 RX ADMIN — Medication 37.5 MILLIGRAM(S): at 08:18

## 2024-09-26 RX ADMIN — Medication 3 MILLIGRAM(S): at 01:13

## 2024-09-26 RX ADMIN — Medication 1 MILLIGRAM(S): at 13:15

## 2024-09-26 RX ADMIN — LUMATEPERONE 42 MILLIGRAM(S): 42 CAPSULE ORAL at 17:00

## 2024-09-26 RX ADMIN — Medication 1 MILLIGRAM(S): at 08:17

## 2024-09-26 RX ADMIN — ONDANSETRON HYDROCHLORIDE 4 MILLIGRAM(S): 2 INJECTION, SOLUTION INTRAMUSCULAR; INTRAVENOUS at 18:28

## 2024-09-26 NOTE — BH INPATIENT PSYCHIATRY PROGRESS NOTE - OTHER
slow gait tearful at times Paranoid, thinks the food is unsafe and only trusts closed containers. Appears to be ruminating about when she was at her best self, stating she is having flashbacks to when she was better and going to classes, describes feeling "incomplacent" (which is not a word but opposite of complacent is dissatisfied)

## 2024-09-26 NOTE — BH INPATIENT PSYCHIATRY PROGRESS NOTE - NSBHMETABOLIC_PSY_ALL_CORE_FT
BMI: BMI (kg/m2): 22.7 (09-24-24 @ 19:02)  HbA1c:   Glucose:   BP: --Vital Signs Last 24 Hrs  T(C): 36.8 (09-26-24 @ 07:50), Max: 36.8 (09-26-24 @ 07:50)  T(F): 98.2 (09-26-24 @ 07:50), Max: 98.2 (09-26-24 @ 07:50)  HR: --  BP: --  BP(mean): --  RR: --  SpO2: --    Orthostatic VS  09-26-24 @ 07:50  Lying BP: --/-- HR: --  Sitting BP: 113/73 HR: 86  Standing BP: 120/77 HR: 91  Site: --  Mode: --  Orthostatic VS  09-25-24 @ 08:21  Lying BP: --/-- HR: --  Sitting BP: 106/63 HR: 81  Standing BP: 104/72 HR: 85  Site: --  Mode: --  Orthostatic VS  09-24-24 @ 19:02  Lying BP: --/-- HR: --  Sitting BP: 123/80 HR: 81  Standing BP: 131/88 HR: 84  Site: --  Mode: --    Lipid Panel:  BMI: BMI (kg/m2): 22.7 (09-24-24 @ 19:02)  HbA1c:   Glucose:   BP: --Vital Signs Last 24 Hrs  T(C): 36.8 (09-26-24 @ 07:50), Max: 36.8 (09-26-24 @ 07:50)  T(F): 98.2 (09-26-24 @ 07:50), Max: 98.2 (09-26-24 @ 07:50)  HR: --  BP: --  BP(mean): --  RR: --  SpO2: --    Orthostatic VS  09-26-24 @ 07:50  Lying BP: --/-- HR: --  Sitting BP: 113/73 HR: 86  Standing BP: 120/77 HR: 91  Site: --  Mode: --  Orthostatic VS  09-25-24 @ 08:21  Lying BP: --/-- HR: --  Sitting BP: 106/63 HR: 81  Standing BP: 104/72 HR: 85  Site: --  Mode: --    Lipid Panel:

## 2024-09-26 NOTE — BH INPATIENT PSYCHIATRY PROGRESS NOTE - NSBHCHARTREVIEWVS_PSY_A_CORE FT
Vital Signs Last 24 Hrs  T(C): 36.8 (09-26-24 @ 07:50), Max: 36.8 (09-26-24 @ 07:50)  T(F): 98.2 (09-26-24 @ 07:50), Max: 98.2 (09-26-24 @ 07:50)  HR: --  BP: --  BP(mean): --  RR: --  SpO2: --    Orthostatic VS  09-26-24 @ 07:50  Lying BP: --/-- HR: --  Sitting BP: 113/73 HR: 86  Standing BP: 120/77 HR: 91  Site: --  Mode: --  Orthostatic VS  09-25-24 @ 08:21  Lying BP: --/-- HR: --  Sitting BP: 106/63 HR: 81  Standing BP: 104/72 HR: 85  Site: --  Mode: --  Orthostatic VS  09-24-24 @ 19:02  Lying BP: --/-- HR: --  Sitting BP: 123/80 HR: 81  Standing BP: 131/88 HR: 84  Site: --  Mode: --   Vital Signs Last 24 Hrs  T(C): 36.8 (09-26-24 @ 07:50), Max: 36.8 (09-26-24 @ 07:50)  T(F): 98.2 (09-26-24 @ 07:50), Max: 98.2 (09-26-24 @ 07:50)  HR: --  BP: --  BP(mean): --  RR: --  SpO2: --    Orthostatic VS  09-26-24 @ 07:50  Lying BP: --/-- HR: --  Sitting BP: 113/73 HR: 86  Standing BP: 120/77 HR: 91  Site: --  Mode: --  Orthostatic VS  09-25-24 @ 08:21  Lying BP: --/-- HR: --  Sitting BP: 106/63 HR: 81  Standing BP: 104/72 HR: 85  Site: --  Mode: --

## 2024-09-26 NOTE — BH INPATIENT PSYCHIATRY PROGRESS NOTE - PRN MEDS
MEDICATIONS  (PRN):  ibuprofen  Tablet. 400 milliGRAM(s) Oral every 6 hours PRN Mild Pain (1 - 3), Moderate Pain (4 - 6), Severe Pain (7 - 10)  LORazepam     Tablet 2 milliGRAM(s) Oral every 6 hours PRN agitation  LORazepam   Injectable 2 milliGRAM(s) IntraMuscular every 6 hours PRN agitation  melatonin. 3 milliGRAM(s) Oral at bedtime PRN Insomnia

## 2024-09-26 NOTE — BH INPATIENT PSYCHIATRY PROGRESS NOTE - CURRENT MEDICATION
MEDICATIONS  (STANDING):  clonazePAM  Tablet 1 milliGRAM(s) Oral three times a day  lumateperone tosylate 42 milliGRAM(s) Oral with dinner  venlafaxine XR 37.5 milliGRAM(s) Oral daily    MEDICATIONS  (PRN):  ibuprofen  Tablet. 400 milliGRAM(s) Oral every 6 hours PRN Mild Pain (1 - 3), Moderate Pain (4 - 6), Severe Pain (7 - 10)  LORazepam     Tablet 2 milliGRAM(s) Oral every 6 hours PRN agitation  LORazepam   Injectable 2 milliGRAM(s) IntraMuscular every 6 hours PRN agitation  melatonin. 3 milliGRAM(s) Oral at bedtime PRN Insomnia

## 2024-09-26 NOTE — BH INPATIENT PSYCHIATRY PROGRESS NOTE - NSBHFUPINTERVALHXFT_PSY_A_CORE
f/u MDD with psychotic features and catatonia. VSS. No acute events overnight.  Pt seen this AM, appearing distant, staring, guarded, continued latent responses. She states she was able to fall asleep but woke up during the night. She states she felt hungry but did not eat. States she vomited today after breakfast, states she barely ate breakfast this AM and induced vomiting afterward because she does not trust the food here and also is concerned with her weight. States that she trusts closed containers much more. Has induced vomiting 3x since being here. Details that she has had adverse effects of weight gain with medications in the past. She describes that she is feeling "incomplacent" because   I make decisions, then walk away, then get stuck." She denies SI/HI, denies hopelessnes, denies AVH. States she hears a voice inside her head that is her own thoughts that tell her that getting help is good. Admits that she is having "flashbacks" to "thinking about a time where I was in a better place, going to classes." Is alert and oriented to month, year, location.

## 2024-09-26 NOTE — BH INPATIENT PSYCHIATRY PROGRESS NOTE - NSBHCONSBHPROVDETAILS_PSY_A_CORE  FT
Emailed pts outpatient psychiatrist Violeta Ortiz to inquire about pts treatment and diagnoses on 09/25/2024 at 12:30 PM who confirmed pts medications and diagnoses

## 2024-09-26 NOTE — BH INPATIENT PSYCHIATRY PROGRESS NOTE - NSBHATTESTCOMMENTATTENDFT_PSY_A_CORE
Care was discussed and reviewed in the interdisciplinary treatment team.  I, Yanna Lu MD, have reviewed and verified the documentation.  I independently performed the documented medical decision making.
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
## 2024-09-26 NOTE — BH INPATIENT PSYCHIATRY PROGRESS NOTE - NSBHATTESTBILLING_PSY_A_CORE
99222-Initial OBS or IP - moderate complexity OR 55-74 mins 26286-Fmkfkolgol OBS or IP - moderate complexity OR 35-49 mins

## 2024-09-27 LAB
A1C WITH ESTIMATED AVERAGE GLUCOSE RESULT: 4.8 % — SIGNIFICANT CHANGE UP (ref 4–5.6)
ALBUMIN SERPL ELPH-MCNC: 4.3 G/DL — SIGNIFICANT CHANGE UP (ref 3.3–5)
ALP SERPL-CCNC: 64 U/L — SIGNIFICANT CHANGE UP (ref 40–120)
ALT FLD-CCNC: 6 U/L — SIGNIFICANT CHANGE UP (ref 4–33)
ANION GAP SERPL CALC-SCNC: 14 MMOL/L — SIGNIFICANT CHANGE UP (ref 7–14)
AST SERPL-CCNC: 14 U/L — SIGNIFICANT CHANGE UP (ref 4–32)
BILIRUB SERPL-MCNC: 0.3 MG/DL — SIGNIFICANT CHANGE UP (ref 0.2–1.2)
BUN SERPL-MCNC: 10 MG/DL — SIGNIFICANT CHANGE UP (ref 7–23)
CALCIUM SERPL-MCNC: 9.3 MG/DL — SIGNIFICANT CHANGE UP (ref 8.4–10.5)
CHLORIDE SERPL-SCNC: 102 MMOL/L — SIGNIFICANT CHANGE UP (ref 98–107)
CHOLEST SERPL-MCNC: 167 MG/DL — SIGNIFICANT CHANGE UP
CO2 SERPL-SCNC: 25 MMOL/L — SIGNIFICANT CHANGE UP (ref 22–31)
CREAT SERPL-MCNC: 0.86 MG/DL — SIGNIFICANT CHANGE UP (ref 0.5–1.3)
EGFR: 88 ML/MIN/1.73M2 — SIGNIFICANT CHANGE UP
ESTIMATED AVERAGE GLUCOSE: 91 — SIGNIFICANT CHANGE UP
GLUCOSE SERPL-MCNC: 102 MG/DL — HIGH (ref 70–99)
HDLC SERPL-MCNC: 42 MG/DL — LOW
LIPID PNL WITH DIRECT LDL SERPL: 109 MG/DL — HIGH
MAGNESIUM SERPL-MCNC: 2 MG/DL — SIGNIFICANT CHANGE UP (ref 1.6–2.6)
NON HDL CHOLESTEROL: 125 MG/DL — SIGNIFICANT CHANGE UP
PHOSPHATE SERPL-MCNC: 3.9 MG/DL — SIGNIFICANT CHANGE UP (ref 2.5–4.5)
POTASSIUM SERPL-MCNC: 3.9 MMOL/L — SIGNIFICANT CHANGE UP (ref 3.5–5.3)
POTASSIUM SERPL-SCNC: 3.9 MMOL/L — SIGNIFICANT CHANGE UP (ref 3.5–5.3)
PROT SERPL-MCNC: 7.1 G/DL — SIGNIFICANT CHANGE UP (ref 6–8.3)
SODIUM SERPL-SCNC: 141 MMOL/L — SIGNIFICANT CHANGE UP (ref 135–145)
TRIGL SERPL-MCNC: 82 MG/DL — SIGNIFICANT CHANGE UP

## 2024-09-27 PROCEDURE — 99232 SBSQ HOSP IP/OBS MODERATE 35: CPT

## 2024-09-27 RX ORDER — VENLAFAXINE HCL 150 MG
75 CAPSULE, EXT RELEASE 24 HR ORAL DAILY
Refills: 0 | Status: DISCONTINUED | OUTPATIENT
Start: 2024-09-28 | End: 2024-10-02

## 2024-09-27 RX ADMIN — Medication 1 MILLIGRAM(S): at 08:30

## 2024-09-27 RX ADMIN — Medication 37.5 MILLIGRAM(S): at 08:30

## 2024-09-27 RX ADMIN — Medication 1 MILLIGRAM(S): at 20:28

## 2024-09-27 RX ADMIN — Medication 1 MILLIGRAM(S): at 13:44

## 2024-09-27 NOTE — BH INPATIENT PSYCHIATRY PROGRESS NOTE - NSBHATTESTBILLING_PSY_A_CORE
99222-Initial OBS or IP - moderate complexity OR 55-74 mins 23225-Ghtkpoucsw OBS or IP - moderate complexity OR 35-49 mins

## 2024-09-27 NOTE — BH INPATIENT PSYCHIATRY PROGRESS NOTE - NSBHMSETHTPROC_PSY_A_CORE
Patient reports developing itchy skin and "small mounds" on B/L arms since first Keytruda infusion. These mounds are non-reddened. However, since her second Keytruda infusion she has developed itchy, "rough, prickly mounds" on the back of her thighs. She also reported itching at the infusion site. Patient has not taken any anti-histamines, like Benadryl. She as been applying CeraVe lotion with no relief. Disorganized/Circumstantial/Tangential/Thought blocking Disorganized/Tangential/Thought blocking

## 2024-09-27 NOTE — BH INPATIENT PSYCHIATRY PROGRESS NOTE - OTHER
Paranoid, thinks the food is unsafe and only trusts closed containers. Appears to be ruminating about when she was at her best self, stating she is having flashbacks to when she was better and going to classes, describes feeling "incomplacent" (which is not a word but opposite of complacent is dissatisfied) tearful at times slow gait continued disorganization; pt makes vague statements such as "being coherent is good." no delusional thought content elicited today

## 2024-09-27 NOTE — BH INPATIENT PSYCHIATRY PROGRESS NOTE - CURRENT MEDICATION
MEDICATIONS  (STANDING):  clonazePAM  Tablet 1 milliGRAM(s) Oral three times a day  lumateperone tosylate 42 milliGRAM(s) Oral with dinner  venlafaxine XR 37.5 milliGRAM(s) Oral daily    MEDICATIONS  (PRN):  ibuprofen  Tablet. 400 milliGRAM(s) Oral every 6 hours PRN Mild Pain (1 - 3), Moderate Pain (4 - 6), Severe Pain (7 - 10)  LORazepam     Tablet 2 milliGRAM(s) Oral every 6 hours PRN agitation  LORazepam   Injectable 2 milliGRAM(s) IntraMuscular every 6 hours PRN agitation  melatonin. 3 milliGRAM(s) Oral at bedtime PRN Insomnia   MEDICATIONS  (STANDING):  clonazePAM  Tablet 1 milliGRAM(s) Oral three times a day  lumateperone tosylate 42 milliGRAM(s) Oral with dinner    MEDICATIONS  (PRN):  ibuprofen  Tablet. 400 milliGRAM(s) Oral every 6 hours PRN Mild Pain (1 - 3), Moderate Pain (4 - 6), Severe Pain (7 - 10)  LORazepam     Tablet 2 milliGRAM(s) Oral every 6 hours PRN agitation  LORazepam   Injectable 2 milliGRAM(s) IntraMuscular every 6 hours PRN agitation  melatonin. 3 milliGRAM(s) Oral at bedtime PRN Insomnia

## 2024-09-27 NOTE — BH INPATIENT PSYCHIATRY PROGRESS NOTE - NSBHCHARTREVIEWVS_PSY_A_CORE FT
no Vital Signs Last 24 Hrs  T(C): 36.7 (09-27-24 @ 07:45), Max: 36.7 (09-27-24 @ 07:45)  T(F): 98 (09-27-24 @ 07:45), Max: 98 (09-27-24 @ 07:45)  HR: --  BP: --  BP(mean): --  RR: --  SpO2: 98% (09-27-24 @ 07:45) (98% - 98%)    Orthostatic VS  09-27-24 @ 07:45  Lying BP: --/-- HR: --  Sitting BP: 119/65 HR: 83  Standing BP: 107/69 HR: 92  Site: --  Mode: --  Orthostatic VS  09-26-24 @ 07:50  Lying BP: --/-- HR: --  Sitting BP: 113/73 HR: 86  Standing BP: 120/77 HR: 91  Site: --  Mode: --

## 2024-09-27 NOTE — BH INPATIENT PSYCHIATRY PROGRESS NOTE - NSBHMETABOLIC_PSY_ALL_CORE_FT
BMI: BMI (kg/m2): 22.7 (09-24-24 @ 19:02)  HbA1c:   Glucose:   BP: --Vital Signs Last 24 Hrs  T(C): 36.7 (09-27-24 @ 07:45), Max: 36.7 (09-27-24 @ 07:45)  T(F): 98 (09-27-24 @ 07:45), Max: 98 (09-27-24 @ 07:45)  HR: --  BP: --  BP(mean): --  RR: --  SpO2: 98% (09-27-24 @ 07:45) (98% - 98%)    Orthostatic VS  09-27-24 @ 07:45  Lying BP: --/-- HR: --  Sitting BP: 119/65 HR: 83  Standing BP: 107/69 HR: 92  Site: --  Mode: --  Orthostatic VS  09-26-24 @ 07:50  Lying BP: --/-- HR: --  Sitting BP: 113/73 HR: 86  Standing BP: 120/77 HR: 91  Site: --  Mode: --    Lipid Panel:  BMI: BMI (kg/m2): 22.7 (09-24-24 @ 19:02)  HbA1c: A1C with Estimated Average Glucose Result: 4.8 % (09-27-24 @ 08:30)    Glucose:   BP: --Vital Signs Last 24 Hrs  T(C): 36.7 (09-27-24 @ 07:45), Max: 36.7 (09-27-24 @ 07:45)  T(F): 98 (09-27-24 @ 07:45), Max: 98 (09-27-24 @ 07:45)  HR: --  BP: --  BP(mean): --  RR: --  SpO2: 98% (09-27-24 @ 07:45) (98% - 98%)    Orthostatic VS  09-27-24 @ 07:45  Lying BP: --/-- HR: --  Sitting BP: 119/65 HR: 83  Standing BP: 107/69 HR: 92  Site: --  Mode: --  Orthostatic VS  09-26-24 @ 07:50  Lying BP: --/-- HR: --  Sitting BP: 113/73 HR: 86  Standing BP: 120/77 HR: 91  Site: --  Mode: --    Lipid Panel: Date/Time: 09-27-24 @ 08:30  Cholesterol, Serum: 167  LDL Cholesterol Calculated: 109  HDL Cholesterol, Serum: 42  Total Cholesterol/HDL Ration Measurement: --  Triglycerides, Serum: 82
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
## 2024-09-27 NOTE — BH INPATIENT PSYCHIATRY PROGRESS NOTE - NSBHMSESPABN_PSY_A_CORE
Soft volume/Slowed rate/Increased latency increased latency, however improved from previous exam./Soft volume/Slowed rate/Increased latency

## 2024-09-27 NOTE — BH INPATIENT PSYCHIATRY PROGRESS NOTE - NSBHFUPINTERVALHXFT_PSY_A_CORE
f/u MDD with catatonia and psychotic features. VSS. NAEON. Labs ordered this AM due to pt purging.  Pt seen f/u MDD with catatonia and psychotic features. VSS. RAPHAEL. Labs ordered this AM due to pt purging, reviewed and were WNL.  Pt seen this AM and states she feels "not good today." She describes that she was nauseous last night and received Zofran which gave her a headache. She states she has not purged since we last saw her yesterday however admits feeling urges. States that she ate a small lunch and dinner yesterday. States this am she ate cereal. Took Caplyta yesterday which she states made her sleepy. Pt encouraged to allow herself to adjust to being on medications since she has not been on them in at least a couple of weeks. Pt denies any SI or HI. Pt is disorganized and difficult to ascertain if she is experiencing AVH; she describes having thoughts in her head that tell her to get through tough times but describes it as her own voice/thoughts. When asked about hearing external voices that other people don't seem to hear, pt states, "that's a good question. when I hear a voice, I go respond to it and see who is calling me."  f/u MDD with catatonia and psychotic features. VSS. NAEON. Labs ordered,   due to pt purging, reviewed and were WNL.  Pt seen this AM and states she feels "not good today." She describes that she was nauseous last night and received Zofran which gave her a headache. She states she has not purged since we last saw her yesterday however admits feeling urges. States that she ate a small lunch and dinner yesterday. States this am she ate cereal. Took Caplyta yesterday which she states made her sleepy. Pt encouraged to allow herself to adjust to being on medications since she has not been on them in at least a couple of weeks. Pt denies any SI or HI. Pt is disorganized and difficult to ascertain if she is experiencing AVH; she describes having thoughts in her head that tell her to get through tough times but describes it as her own voice/thoughts. When asked about hearing external voices that other people don't seem to hear, pt states, "that's a good question. when I hear a voice, I go respond to it and see who is calling me."

## 2024-09-28 PROCEDURE — 99232 SBSQ HOSP IP/OBS MODERATE 35: CPT

## 2024-09-28 RX ORDER — POLYETHYLENE GLYCOL 3350 17 G/17G
17 POWDER, FOR SOLUTION ORAL
Refills: 0 | Status: DISCONTINUED | OUTPATIENT
Start: 2024-09-28 | End: 2024-10-22

## 2024-09-28 RX ORDER — SENNA 187 MG
2 TABLET ORAL AT BEDTIME
Refills: 0 | Status: DISCONTINUED | OUTPATIENT
Start: 2024-09-28 | End: 2024-10-22

## 2024-09-28 RX ADMIN — Medication 75 MILLIGRAM(S): at 08:43

## 2024-09-28 RX ADMIN — Medication 1 MILLIGRAM(S): at 12:44

## 2024-09-28 RX ADMIN — Medication 1 MILLIGRAM(S): at 20:15

## 2024-09-28 RX ADMIN — Medication 1 MILLIGRAM(S): at 08:43

## 2024-09-28 NOTE — BH INPATIENT PSYCHIATRY PROGRESS NOTE - NSBHMETABOLIC_PSY_ALL_CORE_FT
BMI: BMI (kg/m2): 22.7 (09-24-24 @ 19:02)  HbA1c: A1C with Estimated Average Glucose Result: 4.8 % (09-27-24 @ 08:30)    Glucose:   BP: --Vital Signs Last 24 Hrs  T(C): 37 (09-28-24 @ 07:51), Max: 37 (09-28-24 @ 07:51)  T(F): 98.6 (09-28-24 @ 07:51), Max: 98.6 (09-28-24 @ 07:51)  HR: --  BP: --  BP(mean): --  RR: --  SpO2: --    Orthostatic VS  09-28-24 @ 07:51  Lying BP: --/-- HR: --  Sitting BP: 107/67 HR: 77  Standing BP: 105/67 HR: 84  Site: --  Mode: --  Orthostatic VS  09-27-24 @ 07:45  Lying BP: --/-- HR: --  Sitting BP: 119/65 HR: 83  Standing BP: 107/69 HR: 92  Site: --  Mode: --    Lipid Panel: Date/Time: 09-27-24 @ 08:30  Cholesterol, Serum: 167  LDL Cholesterol Calculated: 109  HDL Cholesterol, Serum: 42  Total Cholesterol/HDL Ration Measurement: --  Triglycerides, Serum: 82

## 2024-09-28 NOTE — BH INPATIENT PSYCHIATRY PROGRESS NOTE - NSBHFUPINTERVALHXFT_PSY_A_CORE
chart reviewed including pertinent labs. Case discussed with nursing staff. patient unable to engage meaningfully in interview and appears thought blocked, internally preoccupied. she makes strange nonsensical statements in attempts to answer my questions. dread mccabe

## 2024-09-28 NOTE — BH INPATIENT PSYCHIATRY PROGRESS NOTE - CURRENT MEDICATION
MEDICATIONS  (STANDING):  clonazePAM  Tablet 1 milliGRAM(s) Oral three times a day  lumateperone tosylate 42 milliGRAM(s) Oral with dinner  venlafaxine XR 75 milliGRAM(s) Oral daily    MEDICATIONS  (PRN):  ibuprofen  Tablet. 400 milliGRAM(s) Oral every 6 hours PRN Mild Pain (1 - 3), Moderate Pain (4 - 6), Severe Pain (7 - 10)  LORazepam     Tablet 2 milliGRAM(s) Oral every 6 hours PRN agitation  LORazepam   Injectable 2 milliGRAM(s) IntraMuscular every 6 hours PRN agitation  melatonin. 3 milliGRAM(s) Oral at bedtime PRN Insomnia  polyethylene glycol 3350 17 Gram(s) Oral two times a day PRN constipation  senna 2 Tablet(s) Oral at bedtime PRN constipation

## 2024-09-28 NOTE — BH INPATIENT PSYCHIATRY PROGRESS NOTE - NSBHCHARTREVIEWVS_PSY_A_CORE FT
Vital Signs Last 24 Hrs  T(C): 37 (09-28-24 @ 07:51), Max: 37 (09-28-24 @ 07:51)  T(F): 98.6 (09-28-24 @ 07:51), Max: 98.6 (09-28-24 @ 07:51)  HR: --  BP: --  BP(mean): --  RR: --  SpO2: --    Orthostatic VS  09-28-24 @ 07:51  Lying BP: --/-- HR: --  Sitting BP: 107/67 HR: 77  Standing BP: 105/67 HR: 84  Site: --  Mode: --  Orthostatic VS  09-27-24 @ 07:45  Lying BP: --/-- HR: --  Sitting BP: 119/65 HR: 83  Standing BP: 107/69 HR: 92  Site: --  Mode: --   nasal cannula
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
## 2024-09-28 NOTE — BH INPATIENT PSYCHIATRY PROGRESS NOTE - PRN MEDS
MEDICATIONS  (PRN):  ibuprofen  Tablet. 400 milliGRAM(s) Oral every 6 hours PRN Mild Pain (1 - 3), Moderate Pain (4 - 6), Severe Pain (7 - 10)  LORazepam     Tablet 2 milliGRAM(s) Oral every 6 hours PRN agitation  LORazepam   Injectable 2 milliGRAM(s) IntraMuscular every 6 hours PRN agitation  melatonin. 3 milliGRAM(s) Oral at bedtime PRN Insomnia  polyethylene glycol 3350 17 Gram(s) Oral two times a day PRN constipation  senna 2 Tablet(s) Oral at bedtime PRN constipation

## 2024-09-28 NOTE — BH INPATIENT PSYCHIATRY PROGRESS NOTE - OTHER
slow gait continued disorganization; pt makes vague statements such as "being coherent is good." no delusional thought content elicited today

## 2024-09-29 PROCEDURE — 99232 SBSQ HOSP IP/OBS MODERATE 35: CPT

## 2024-09-29 RX ADMIN — Medication 1 MILLIGRAM(S): at 08:25

## 2024-09-29 RX ADMIN — Medication 1 MILLIGRAM(S): at 20:06

## 2024-09-29 RX ADMIN — Medication 75 MILLIGRAM(S): at 08:25

## 2024-09-29 RX ADMIN — Medication 1 MILLIGRAM(S): at 13:39

## 2024-09-29 NOTE — BH INPATIENT PSYCHIATRY PROGRESS NOTE - NSBHFUPINTERVALHXFT_PSY_A_CORE
Patient reports mood to be “not good.” She reports feeling slow, stuck, having difficulty expressing thoughts. She denies SI. she expresses incoherent thoughts that are loosely associated with one another.
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
## 2024-09-29 NOTE — BH INPATIENT PSYCHIATRY PROGRESS NOTE - NSBHCHARTREVIEWVS_PSY_A_CORE FT
Vital Signs Last 24 Hrs  T(C): 37.2 (09-29-24 @ 08:44), Max: 37.2 (09-29-24 @ 08:44)  T(F): 98.9 (09-29-24 @ 08:44), Max: 98.9 (09-29-24 @ 08:44)  HR: --  BP: --  BP(mean): --  RR: --  SpO2: --    Orthostatic VS  09-29-24 @ 08:44  Lying BP: --/-- HR: --  Sitting BP: 117/68 HR: 79  Standing BP: 107/89 HR: 87  Site: --  Mode: --  Orthostatic VS  09-28-24 @ 07:51  Lying BP: --/-- HR: --  Sitting BP: 107/67 HR: 77  Standing BP: 105/67 HR: 84  Site: --  Mode: --

## 2024-09-29 NOTE — BH INPATIENT PSYCHIATRY PROGRESS NOTE - NSBHMETABOLIC_PSY_ALL_CORE_FT
BMI: BMI (kg/m2): 22.7 (09-24-24 @ 19:02)  HbA1c: A1C with Estimated Average Glucose Result: 4.8 % (09-27-24 @ 08:30)    Glucose:   BP: --Vital Signs Last 24 Hrs  T(C): 37.2 (09-29-24 @ 08:44), Max: 37.2 (09-29-24 @ 08:44)  T(F): 98.9 (09-29-24 @ 08:44), Max: 98.9 (09-29-24 @ 08:44)  HR: --  BP: --  BP(mean): --  RR: --  SpO2: --    Orthostatic VS  09-29-24 @ 08:44  Lying BP: --/-- HR: --  Sitting BP: 117/68 HR: 79  Standing BP: 107/89 HR: 87  Site: --  Mode: --  Orthostatic VS  09-28-24 @ 07:51  Lying BP: --/-- HR: --  Sitting BP: 107/67 HR: 77  Standing BP: 105/67 HR: 84  Site: --  Mode: --    Lipid Panel: Date/Time: 09-27-24 @ 08:30  Cholesterol, Serum: 167  LDL Cholesterol Calculated: 109  HDL Cholesterol, Serum: 42  Total Cholesterol/HDL Ration Measurement: --  Triglycerides, Serum: 82

## 2024-09-30 PROCEDURE — 99232 SBSQ HOSP IP/OBS MODERATE 35: CPT

## 2024-09-30 RX ADMIN — Medication 1 MILLIGRAM(S): at 12:36

## 2024-09-30 RX ADMIN — Medication 400 MILLIGRAM(S): at 22:06

## 2024-09-30 RX ADMIN — Medication 1 MILLIGRAM(S): at 20:23

## 2024-09-30 RX ADMIN — Medication 75 MILLIGRAM(S): at 09:04

## 2024-09-30 RX ADMIN — Medication 1 MILLIGRAM(S): at 09:04

## 2024-09-30 RX ADMIN — Medication 400 MILLIGRAM(S): at 21:07

## 2024-09-30 NOTE — BH INPATIENT PSYCHIATRY PROGRESS NOTE - NSBHATTESTCOMMENTATTENDFT_PSY_A_CORE
Care was discussed and reviewed in the interdisciplinary treatment team.  I, Yanna Lu MD, have reviewed and verified the documentation.  I independently performed the documented medical decision making.      Patient's mother demanded for this writer to meet with her and the patient was present during this interaction. Mother was demanding for the patient to be discharge today and tells me that the patient doesn't feel safe in the unit. As per mother the patient lost a book that had personal information. I discuss with the them that the nursing staff is aware of this and they are taking action. Patient then started to demand to have a POA signed. I discuss with the patient that we do not have this kind of document available and she will need to do this after leaving the hospital. They both verbalized good understanding and patient agreed to stay in the hospital.

## 2024-09-30 NOTE — BH INPATIENT PSYCHIATRY PROGRESS NOTE - NSBHMETABOLIC_PSY_ALL_CORE_FT
BMI: BMI (kg/m2): 22.7 (09-24-24 @ 19:02)  HbA1c: A1C with Estimated Average Glucose Result: 4.8 % (09-27-24 @ 08:30)    Glucose:   BP: --Vital Signs Last 24 Hrs  T(C): 37.1 (09-30-24 @ 07:57), Max: 37.1 (09-30-24 @ 07:57)  T(F): 98.8 (09-30-24 @ 07:57), Max: 98.8 (09-30-24 @ 07:57)  HR: --  BP: --  BP(mean): --  RR: --  SpO2: --    Orthostatic VS  09-30-24 @ 07:57  Lying BP: --/-- HR: --  Sitting BP: 109/69 HR: 75  Standing BP: 101/76 HR: 77  Site: --  Mode: --  Orthostatic VS  09-29-24 @ 08:44  Lying BP: --/-- HR: --  Sitting BP: 117/68 HR: 79  Standing BP: 107/89 HR: 87  Site: --  Mode: --    Lipid Panel: Date/Time: 09-27-24 @ 08:30  Cholesterol, Serum: 167  LDL Cholesterol Calculated: 109  HDL Cholesterol, Serum: 42  Total Cholesterol/HDL Ration Measurement: --  Triglycerides, Serum: 82

## 2024-09-30 NOTE — BH INPATIENT PSYCHIATRY PROGRESS NOTE - NSBHFUPINTERVALHXFT_PSY_A_CORE
f/u MDD with catatonia and psychotic features, VSS, no acute events overnight, over weekend refused Caplyta.   f/u MDD with catatonia and psychotic features, discussed in treatment team meeting, VSS, no acute events overnight, over weekend refused Caplyta.  	Pt seen this AM in interview room, disorganization of thought and speech making it difficult to conduct a linear interview. Pt states that she would like to wait to start the interview until an NP is in the room. She states she would like to speak with a  about her concerns. Asks if SW is an advocate. She states she went to some groups over the weekend but did not stay the entire time because they felt "suffocating." States her appetite is small and is only eating small amounts of food, is still concerned that food is contaminated. States she ate an extra pancake this AM but this does not align with her diet because it has too much sugar. Denies purging, states she has felt nauseous but has not vomited. Feels her medications are helpful, states she has better impulse control. States that the Effexor is helping her to join groups and is helping her mood. Then interjects and asks, "Is this a teaching hospital?" States she feels "somewhat safe" while on the unit, feels safer when she withdraws to her room and lays down. Feels unsafe when she is "waiting for the agenda." Denies SI/HI, denies AVH.

## 2024-09-30 NOTE — BH INPATIENT PSYCHIATRY PROGRESS NOTE - NSBHMSESPABN_PSY_A_CORE
increased latency, however improved from previous exam./Soft volume/Slowed rate/Increased latency increased latency, however improved from previous exam. more spontaneous speech than prior exam/Soft volume/Slowed rate/Increased latency

## 2024-09-30 NOTE — BH INPATIENT PSYCHIATRY PROGRESS NOTE - NSBHCHARTREVIEWVS_PSY_A_CORE FT
Vital Signs Last 24 Hrs  T(C): 37.1 (09-30-24 @ 07:57), Max: 37.1 (09-30-24 @ 07:57)  T(F): 98.8 (09-30-24 @ 07:57), Max: 98.8 (09-30-24 @ 07:57)  HR: --  BP: --  BP(mean): --  RR: --  SpO2: --    Orthostatic VS  09-30-24 @ 07:57  Lying BP: --/-- HR: --  Sitting BP: 109/69 HR: 75  Standing BP: 101/76 HR: 77  Site: --  Mode: --  Orthostatic VS  09-29-24 @ 08:44  Lying BP: --/-- HR: --  Sitting BP: 117/68 HR: 79  Standing BP: 107/89 HR: 87  Site: --  Mode: --

## 2024-09-30 NOTE — BH INPATIENT PSYCHIATRY PROGRESS NOTE - NSBHMSETHTCONTENT_PSY_A_CORE
Preoccupations/Ruminations/Guilt paranoid delusions that food is contaminated, also feels that she needs an advocate, appears uneasy about presence of students/Delusions/Preoccupations/Ruminations/Guilt
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
## 2024-10-01 PROCEDURE — 99232 SBSQ HOSP IP/OBS MODERATE 35: CPT

## 2024-10-01 RX ORDER — LORAZEPAM 2 MG
2 TABLET ORAL EVERY 6 HOURS
Refills: 0 | Status: DISCONTINUED | OUTPATIENT
Start: 2024-10-01 | End: 2024-10-08

## 2024-10-01 RX ORDER — LORAZEPAM 2 MG
2 TABLET ORAL THREE TIMES A DAY
Refills: 0 | Status: DISCONTINUED | OUTPATIENT
Start: 2024-10-01 | End: 2024-10-08

## 2024-10-01 RX ORDER — LUMATEPERONE 42 MG/1
42 CAPSULE ORAL
Refills: 0 | Status: DISCONTINUED | OUTPATIENT
Start: 2024-10-01 | End: 2024-10-09

## 2024-10-01 RX ADMIN — Medication 75 MILLIGRAM(S): at 09:27

## 2024-10-01 RX ADMIN — Medication 2 MILLIGRAM(S): at 13:09

## 2024-10-01 RX ADMIN — Medication 1 MILLIGRAM(S): at 09:27

## 2024-10-01 RX ADMIN — LUMATEPERONE 42 MILLIGRAM(S): 42 CAPSULE ORAL at 21:16

## 2024-10-01 NOTE — CHART NOTE - NSCHARTNOTEFT_GEN_A_CORE
Pt is a 39 y/o female with PPH of MDD with psychosis/catatonia, anxiety, PTSD. PMHx: Bulimia. Pt is admitted to [facility] for catatonia. Nutrition reconsulted for Assessment/Education.     Met with patient on the unit today. Patient with difficulty in expressing her words 2/2 catatonia but able to answer to most writer's questions slowly. As per chart and RN, patient states her appetite remains poor and patient is still concerned that food is contaminated. As per patient, she continues to have poor PO intake at meals, only had soy milk and yogurt this AM, has been able to keep food down without purging the past 2 days and denies having N/V during encounter today. 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 Patient requests for sealed pre-packaged Halal/Kosher meals added to her diet -- honored and communicated to [employer]. No other food preferences obtained today. Otherwise, no reports of chewing/swallowing difficulties on current diet. Denies GI distress (nausea/vomiting/diarrhea/ constipation) at this time. Declines Ensure supplements, c/w Orgain shake x2 daily as previously implemented. Writer encouraged po intake and nutrition supplements as tolerated, patient will require reinforcement on PO intake. Case d/w RN and team on the unit.       Diet : Diet, Regular:   No Beef  No Fish  No Pork (09-25-24 @ 14:12) [Active]  Diet, Regular (09-25-24 @ 14:07) [Active]    + Orgain x2 daily (440 kcal, 32 gm protein).       Patient reports [ ] nausea  [ ] vomiting [ ] diarrhea [ ] constipation  [ ] chewing problems [ ] swallowing issues  [X] other: Pt denies GI distress at this time    PO intake:  [X] < 50%  [ ] 50-75%  [ ] %  [ ] other :    Height (cm): 162.6 (09-24-24)  Weight (kg): 59.9 (09-24-24)  60.1kg (9/28/24) -- stable  BMI (kg/m2): 22.7 (9/28/24)    Skin: no pressure injuries     Edema: no edema    Pertinent Medications: MEDICATIONS  (STANDING):  LORazepam     Tablet 2 milliGRAM(s) Oral three times a day  lumateperone tosylate 42 milliGRAM(s) Oral with dinner  venlafaxine XR 75 milliGRAM(s) Oral daily    MEDICATIONS  (PRN):  ibuprofen  Tablet. 400 milliGRAM(s) Oral every 6 hours PRN Mild Pain (1 - 3), Moderate Pain (4 - 6), Severe Pain (7 - 10)  LORazepam     Tablet 2 milliGRAM(s) Oral every 6 hours PRN agitation  LORazepam   Injectable 2 milliGRAM(s) IntraMuscular every 6 hours PRN agitation  melatonin. 3 milliGRAM(s) Oral at bedtime PRN Insomnia  polyethylene glycol 3350 17 Gram(s) Oral two times a day PRN constipation  senna 2 Tablet(s) Oral at bedtime PRN constipation    Pertinent Labs:   Glucose: 102 mg/dL (09.27.24 @ 08:30)    HbA1c: A1C with Estimated Average Glucose Result: 4.8 % (09-27-24 @ 08:30)    Lipid Panel: Date/Time: 09-27-24 @ 08:30  Cholesterol, Serum: 167  LDL Cholesterol Calculated: 109  HDL Cholesterol, Serum: 42  Total Cholesterol/HDL Ration Measurement: --  Triglycerides, Serum: 82        Estimated Needs:   [X] no change since previous assessment  [ ] recalculated:       Previous Nutrition Diagnosis: Inadequate Oral Intake    Nutrition Diagnosis is [X] ongoing  [ ] resolved [ ] not applicable        New Nutrition Diagnosis: [X] not applicable      Recommendations:  1. Continue current diet order per MD.   2. Will send Halal/Kosher pre-packaged meals as per pt's request.  3. c/w Orgain x2 daily (440 kcal, 32 gm protein).   4. May consider daily MVI for micronutrient coverage if no medical contraindications per MD's discretion.   5. Encourage po intake as tolerated and honor food preferences PRN.   6. Monitor PO intake/tolerance, weights, labs, GI, and skin integrity.     -- Moustapha Eisenberg, MS, RDN, CDN, Pager # [phone]

## 2024-10-01 NOTE — BH INPATIENT PSYCHIATRY PROGRESS NOTE - NSBHCHARTREVIEWVS_PSY_A_CORE FT
Vital Signs Last 24 Hrs  T(C): 37.2 (10-01-24 @ 07:57), Max: 37.2 (10-01-24 @ 07:57)  T(F): 98.9 (10-01-24 @ 07:57), Max: 98.9 (10-01-24 @ 07:57)  HR: --  BP: --  BP(mean): --  RR: 17 (10-01-24 @ 07:57) (17 - 17)  SpO2: --    Orthostatic VS  10-01-24 @ 07:57  Lying BP: --/-- HR: --  Sitting BP: 106/69 HR: 84  Standing BP: 104/77 HR: 82  Site: --  Mode: --  Orthostatic VS  09-30-24 @ 07:57  Lying BP: --/-- HR: --  Sitting BP: 109/69 HR: 75  Standing BP: 101/76 HR: 77  Site: --  Mode: --

## 2024-10-01 NOTE — BH INPATIENT PSYCHIATRY PROGRESS NOTE - NSBHMSETHTPROC_PSY_A_CORE
concrete-when asked if she hears voices that others may not hear, states "I don't know what others are thinking." and when asked about visual hallucinations, pt states, "I know that this is a chair" and touches the chair she is sitting on/Disorganized/Tangential/Thought blocking/Impaired reasoning
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
## 2024-10-01 NOTE — BH INPATIENT PSYCHIATRY PROGRESS NOTE - CURRENT MEDICATION
MEDICATIONS  (STANDING):  clonazePAM  Tablet 1 milliGRAM(s) Oral three times a day  lumateperone tosylate 42 milliGRAM(s) Oral with dinner  venlafaxine XR 75 milliGRAM(s) Oral daily    MEDICATIONS  (PRN):  ibuprofen  Tablet. 400 milliGRAM(s) Oral every 6 hours PRN Mild Pain (1 - 3), Moderate Pain (4 - 6), Severe Pain (7 - 10)  LORazepam     Tablet 2 milliGRAM(s) Oral every 6 hours PRN agitation  LORazepam   Injectable 2 milliGRAM(s) IntraMuscular every 6 hours PRN agitation  melatonin. 3 milliGRAM(s) Oral at bedtime PRN Insomnia  polyethylene glycol 3350 17 Gram(s) Oral two times a day PRN constipation  senna 2 Tablet(s) Oral at bedtime PRN constipation   MEDICATIONS  (STANDING):  LORazepam     Tablet 2 milliGRAM(s) Oral three times a day  lumateperone tosylate 42 milliGRAM(s) Oral <User Schedule>  venlafaxine XR 75 milliGRAM(s) Oral daily    MEDICATIONS  (PRN):  ibuprofen  Tablet. 400 milliGRAM(s) Oral every 6 hours PRN Mild Pain (1 - 3), Moderate Pain (4 - 6), Severe Pain (7 - 10)  LORazepam     Tablet 2 milliGRAM(s) Oral every 6 hours PRN agitation  LORazepam   Injectable 2 milliGRAM(s) IntraMuscular every 6 hours PRN agitation  melatonin. 3 milliGRAM(s) Oral at bedtime PRN Insomnia  polyethylene glycol 3350 17 Gram(s) Oral two times a day PRN constipation  senna 2 Tablet(s) Oral at bedtime PRN constipation

## 2024-10-01 NOTE — BH INPATIENT PSYCHIATRY PROGRESS NOTE - NSBHFUPINTERVALHXFT_PSY_A_CORE
f/u MDD with catatonia and psychotic features, discussed in treatment team meeting, VSS, no acute events overnight, has been refusing Caplyta since 9/27  Pt seen in interview room this AM. States her mood is "incomplacent. " States she slept "too much." States she ate 10% of breakfast and similar amounts of lunch and dinner yesterday. She continues to state that she trusts closed/sealed containers much more because she knows what is in them. Describes that at home, she can wash and prepare her own food so she knows it is clean, but here, there are workers preparing it. She rationalizes that the food made by the workers here "should not be different because it is the same for everyone." Denies being afraid to eat. Admits nausea after meals but denies recent purging or vomiting. States she sometimes eats in her room because she has feelings of pressure when she eats in the dining room, feels when she does not say hello or good morning to her peers she is being rude to them. Shares that "sometimes, I do freeze. Upon realization, I remember cognitive therapy and breathe and relax." Regarding her medications, pt states that Klonopin makes her sleepy but stops her impulses. She states she has an "allergy" to Caplyta and when asked to elaborate states that it gives her a severe headache and nausea. Is amenable to switch klonopin to ativan and change caplyta dosing to nighttime. Denies SI/HI, denies AVH, denies hopelessness. Is going to groups. States she looks forward to healing.

## 2024-10-01 NOTE — BH INPATIENT PSYCHIATRY PROGRESS NOTE - NSBHATTESTBILLONSITE_PSY_A_CORE
Now resolved s/p fluid resuscitation and increased steroids  Now that BP is normal can stop IVF from a hypotension/endocrine standpoint. However, defer to primary/nephrology on whether or not IVF are needed for REILLY or any other non-endocrine reason     Attending to bill

## 2024-10-01 NOTE — BH INPATIENT PSYCHIATRY PROGRESS NOTE - NSBHMETABOLIC_PSY_ALL_CORE_FT
BMI: BMI (kg/m2): 22.7 (09-24-24 @ 19:02)  HbA1c: A1C with Estimated Average Glucose Result: 4.8 % (09-27-24 @ 08:30)    Glucose:   BP: --Vital Signs Last 24 Hrs  T(C): 37.2 (10-01-24 @ 07:57), Max: 37.2 (10-01-24 @ 07:57)  T(F): 98.9 (10-01-24 @ 07:57), Max: 98.9 (10-01-24 @ 07:57)  HR: --  BP: --  BP(mean): --  RR: 17 (10-01-24 @ 07:57) (17 - 17)  SpO2: --    Orthostatic VS  10-01-24 @ 07:57  Lying BP: --/-- HR: --  Sitting BP: 106/69 HR: 84  Standing BP: 104/77 HR: 82  Site: --  Mode: --  Orthostatic VS  09-30-24 @ 07:57  Lying BP: --/-- HR: --  Sitting BP: 109/69 HR: 75  Standing BP: 101/76 HR: 77  Site: --  Mode: --    Lipid Panel: Date/Time: 09-27-24 @ 08:30  Cholesterol, Serum: 167  LDL Cholesterol Calculated: 109  HDL Cholesterol, Serum: 42  Total Cholesterol/HDL Ration Measurement: --  Triglycerides, Serum: 82

## 2024-10-02 PROCEDURE — 99232 SBSQ HOSP IP/OBS MODERATE 35: CPT

## 2024-10-02 RX ORDER — VENLAFAXINE HCL 150 MG
112.5 CAPSULE, EXT RELEASE 24 HR ORAL DAILY
Refills: 0 | Status: DISCONTINUED | OUTPATIENT
Start: 2024-10-03 | End: 2024-10-07

## 2024-10-02 RX ADMIN — LUMATEPERONE 42 MILLIGRAM(S): 42 CAPSULE ORAL at 20:12

## 2024-10-02 RX ADMIN — Medication 2 MILLIGRAM(S): at 00:37

## 2024-10-02 RX ADMIN — Medication 2 MILLIGRAM(S): at 09:23

## 2024-10-02 RX ADMIN — Medication 3 MILLIGRAM(S): at 00:35

## 2024-10-02 RX ADMIN — Medication 2 MILLIGRAM(S): at 13:11

## 2024-10-02 RX ADMIN — Medication 75 MILLIGRAM(S): at 09:23

## 2024-10-02 RX ADMIN — Medication 400 MILLIGRAM(S): at 15:16

## 2024-10-02 NOTE — BH INPATIENT PSYCHIATRY PROGRESS NOTE - NSBHMSEPERCEPT_PSY_A_CORE
references a voice in her head that she describes as inner monologue/No abnormalities references a voice in her head that she describes as inner monologue/No abnormalities/Other

## 2024-10-02 NOTE — BH INPATIENT PSYCHIATRY PROGRESS NOTE - NSBHMSESPABN_PSY_A_CORE
Soft volume/Slowed rate/Decreased productivity/Increased latency latency improved from yesterday's interview--some spontaneous speech, some immediate responses/Soft volume/Slowed rate/Decreased productivity/Increased latency

## 2024-10-02 NOTE — BH INPATIENT PSYCHIATRY PROGRESS NOTE - OTHER
slow gait catalepsy denied AVH however came up to nursing station asking if someone called her name (they didn't) so possible AH

## 2024-10-02 NOTE — BH INPATIENT PSYCHIATRY PROGRESS NOTE - NSBHFUPINTERVALHXFT_PSY_A_CORE
f/u MDD with catatonia and psychotic features. Discussed in tx team meeting. VSS. No acute events overnight. Took her PM Caplyta, refused scheduled Ativan however took PRN Ativan.  Pt seen this AM preferring to lay in bed during interview as she states she has a headache which she attributes to the Caplyta. Is requesting Effexor for her headache. Pt is provided psychoeducation on the indications for her 3 scheduled medications. Communicated that Caplyta is an antipsychotic and may be helpful for pts feelings of paranoia including worries that food is contaminated and that she is unsafe here. Pt states, "it is not working for that" however then reasons, "but I've only taken it for a couple of days." Pt was encouraged to increase PO intake to help with tolerance of medications. She states she has not been eating much or drinking enough. Was updated that nutritionist is putting in orders for more foods that she is comofrtable with such as closed containers. Pt expressed understanding. f/u MDD with catatonia and psychotic features. Discussed in tx team meeting. VSS. No acute events overnight. Took her PM Caplyta, refused scheduled Ativan however took PRN Ativan.  Pt seen this AM preferring to lay in bed during interview as she states she has a headache which she attributes to the Caplyta. Is requesting Effexor for her headache. Pt is provided psychoeducation on the indications for her 3 scheduled medications. Relayed that Caplyta is an antipsychotic and may be helpful for pts feelings of paranoia including worries that food is contaminated and that she is unsafe here. Pt states, "it is not working for that" however, then reasons, "but I've only taken it for a couple of days." Pt was encouraged to increase PO intake to help with tolerance of medications as well as validated that medications may take time to have effect. She states she has not been eating much or drinking enough. Was updated that nutritionist is putting in orders for more foods that she is comfortable with such as closed containers. Pt expressed understanding. Pt expresses awareness that she has been catatonic in the past but when compared to now, pt states, "I don't know how that was compared to now, I haven't thought about it." Pt was asked about interest in ECT and immediately states, "no thank you." Throughout interview, asks to speak with doctor or NP regarding any medications or treatment options. Declines having any questions about or interest in ECT. Denies SI, HI, hopelessness, AVH. Was encouraged to try to increase PO intake and attend at least 1 group today. When nursing came at end of interview to administer morning meds, pt states, "Maybe it's too much." f/u MDD with catatonia and psychotic features. Discussed in tx team meeting. VSS. No acute events overnight. Took her PM Caplyta, refused scheduled Ativan however took PRN Ativan.  Pt seen this AM preferring to lay in bed during interview as she states she has a headache which she attributes to the Caplyta. Is requesting Effexor for her headache. Pt is provided psychoeducation on the indications for her 3 scheduled medications. Relayed that Caplyta is an antipsychotic and may be helpful for pts feelings of paranoia including worries that food is contaminated and that she is unsafe here. Pt states, "it is not working for that" however, then reasons, "but I've only taken it for a couple of days." Pt was encouraged to increase PO intake to help with tolerance of medications as well as validated that medications may take time to have effect. She states she has not been eating much or drinking enough. Was updated that nutritionist is putting in orders for more foods that she is comfortable with such as closed containers. Pt expressed understanding. Pt expresses awareness that she has been catatonic in the past but when compared to now, pt states, "I don't know how that was compared to now, I haven't thought about it." Pt was asked about interest in ECT and immediately states, "no thank you." Throughout interview, asks to speak with doctor or NP regarding any medications or treatment options. Declines having any questions about or interest in ECT. Denies SI, HI, hopelessness, AVH. Was encouraged to try to increase PO intake and attend at least 1 group today. When nursing came at end of interview to administer morning meds, pt states, "Maybe it's too much." Later NP spoke to patient, discussed medications and treatment.

## 2024-10-02 NOTE — BH INPATIENT PSYCHIATRY PROGRESS NOTE - NSBHMETABOLIC_PSY_ALL_CORE_FT
BMI: BMI (kg/m2): 22.7 (09-24-24 @ 19:02)  HbA1c: A1C with Estimated Average Glucose Result: 4.8 % (09-27-24 @ 08:30)    Glucose:   BP: --Vital Signs Last 24 Hrs  T(C): 36.6 (10-02-24 @ 08:46), Max: 36.6 (10-02-24 @ 08:46)  T(F): 97.9 (10-02-24 @ 08:46), Max: 97.9 (10-02-24 @ 08:46)  HR: --  BP: --  BP(mean): --  RR: 18 (10-02-24 @ 08:46) (18 - 18)  SpO2: --    Orthostatic VS  10-02-24 @ 08:46  Lying BP: --/-- HR: --  Sitting BP: 100/61 HR: 79  Standing BP: 108/64 HR: 70  Site: --  Mode: --  Orthostatic VS  10-01-24 @ 07:57  Lying BP: --/-- HR: --  Sitting BP: 106/69 HR: 84  Standing BP: 104/77 HR: 82  Site: --  Mode: --    Lipid Panel: Date/Time: 09-27-24 @ 08:30  Cholesterol, Serum: 167  LDL Cholesterol Calculated: 109  HDL Cholesterol, Serum: 42  Total Cholesterol/HDL Ration Measurement: --  Triglycerides, Serum: 82
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
## 2024-10-02 NOTE — BH INPATIENT PSYCHIATRY PROGRESS NOTE - CURRENT MEDICATION
MEDICATIONS  (STANDING):  LORazepam     Tablet 2 milliGRAM(s) Oral three times a day  lumateperone tosylate 42 milliGRAM(s) Oral <User Schedule>  venlafaxine XR 75 milliGRAM(s) Oral daily    MEDICATIONS  (PRN):  ibuprofen  Tablet. 400 milliGRAM(s) Oral every 6 hours PRN Mild Pain (1 - 3), Moderate Pain (4 - 6), Severe Pain (7 - 10)  LORazepam     Tablet 2 milliGRAM(s) Oral every 6 hours PRN agitation  LORazepam   Injectable 2 milliGRAM(s) IntraMuscular every 6 hours PRN agitation  melatonin. 3 milliGRAM(s) Oral at bedtime PRN Insomnia  polyethylene glycol 3350 17 Gram(s) Oral two times a day PRN constipation  senna 2 Tablet(s) Oral at bedtime PRN constipation   MEDICATIONS  (STANDING):  LORazepam     Tablet 2 milliGRAM(s) Oral three times a day  lumateperone tosylate 42 milliGRAM(s) Oral <User Schedule>    MEDICATIONS  (PRN):  ibuprofen  Tablet. 400 milliGRAM(s) Oral every 6 hours PRN Mild Pain (1 - 3), Moderate Pain (4 - 6), Severe Pain (7 - 10)  LORazepam     Tablet 2 milliGRAM(s) Oral every 6 hours PRN agitation  LORazepam   Injectable 2 milliGRAM(s) IntraMuscular every 6 hours PRN agitation  melatonin. 3 milliGRAM(s) Oral at bedtime PRN Insomnia  polyethylene glycol 3350 17 Gram(s) Oral two times a day PRN constipation  senna 2 Tablet(s) Oral at bedtime PRN constipation

## 2024-10-02 NOTE — BH INPATIENT PSYCHIATRY PROGRESS NOTE - NSBHCHARTREVIEWVS_PSY_A_CORE FT
Vital Signs Last 24 Hrs  T(C): 36.6 (10-02-24 @ 08:46), Max: 36.6 (10-02-24 @ 08:46)  T(F): 97.9 (10-02-24 @ 08:46), Max: 97.9 (10-02-24 @ 08:46)  HR: --  BP: --  BP(mean): --  RR: 18 (10-02-24 @ 08:46) (18 - 18)  SpO2: --    Orthostatic VS  10-02-24 @ 08:46  Lying BP: --/-- HR: --  Sitting BP: 100/61 HR: 79  Standing BP: 108/64 HR: 70  Site: --  Mode: --  Orthostatic VS  10-01-24 @ 07:57  Lying BP: --/-- HR: --  Sitting BP: 106/69 HR: 84  Standing BP: 104/77 HR: 82  Site: --  Mode: --

## 2024-10-03 PROCEDURE — 99233 SBSQ HOSP IP/OBS HIGH 50: CPT

## 2024-10-03 RX ADMIN — Medication 112.5 MILLIGRAM(S): at 09:00

## 2024-10-03 RX ADMIN — Medication 2 MILLIGRAM(S): at 09:00

## 2024-10-03 RX ADMIN — Medication 2 MILLIGRAM(S): at 20:43

## 2024-10-03 RX ADMIN — LUMATEPERONE 42 MILLIGRAM(S): 42 CAPSULE ORAL at 20:43

## 2024-10-03 RX ADMIN — Medication 2 MILLIGRAM(S): at 13:05

## 2024-10-03 NOTE — BH INPATIENT PSYCHIATRY PROGRESS NOTE - NSBHFUPINTERVALHXFT_PSY_A_CORE
f/u MDD with psychotic features and catatonia, discussed in team meeting today, no vital signs taken this AM, no acute events overnight, declined PM and AM Ativan  Pt seen this AM briefly by medical student, to which pt replies, "can I speak with a psychiatrist and not just you?" and declined to speak with medical student further. As such, pt met later in conference room with MD, pharmacist, and medical student. Pt appreciative that MD is meeting with her. Pt given opportunity to ask pharmacist questions regarding her medications however does not ask any. Pt states she has some goals: to be on as little medications as possible, establish care with a psychologist, to get a job, and to be able to get out of bed. Pt expresses desire to be on lower doses of her medications. Was provided psychoeducation regarding the indication for her current medications at current doses and expressed understanding of the plan. Stated she notices she is still "freezing" sometimes. Pt states she feels Caplyta gives her a headache and Ativan and Caplyta together make her sleepy and nauseous which is why she declined PM ativan last night. Pt was provided psychoeducation regarding why these medications are dosed qhs--they may help her to be able to sleep and she has PRN medications for nausea and HA in case she is experiencing side effects. Regarding ROME forms of antipsychotics pt shakes her head no and expressively states she is afraid of needles and does not want this. Additionally asked about ECT and pt declines and states she is afraid of having seizures. Is amenable to receiving more information about ECT. Does not want to increase Effexor further as of now, although she acknowledges previously being on a higher dose she wants to see how this dose works for now. Denies SI, HI, AVH. States she has been eating "slowly" and is nauseous but keeping it down.

## 2024-10-03 NOTE — BH INPATIENT PSYCHIATRY PROGRESS NOTE - NSBHMETABOLIC_PSY_ALL_CORE_FT
BMI: BMI (kg/m2): 22.7 (09-24-24 @ 19:02)  HbA1c: A1C with Estimated Average Glucose Result: 4.8 % (09-27-24 @ 08:30)    Glucose:   BP: --Vital Signs Last 24 Hrs  T(C): --  T(F): --  HR: --  BP: --  BP(mean): --  RR: --  SpO2: --    Orthostatic VS  10-02-24 @ 08:46  Lying BP: --/-- HR: --  Sitting BP: 100/61 HR: 79  Standing BP: 108/64 HR: 70  Site: --  Mode: --    Lipid Panel: Date/Time: 09-27-24 @ 08:30  Cholesterol, Serum: 167  LDL Cholesterol Calculated: 109  HDL Cholesterol, Serum: 42  Total Cholesterol/HDL Ration Measurement: --  Triglycerides, Serum: 82

## 2024-10-03 NOTE — BH INPATIENT PSYCHIATRY PROGRESS NOTE - NSBHCHARTREVIEWVS_PSY_A_CORE FT
Vital Signs Last 24 Hrs  T(C): --  T(F): --  HR: --  BP: --  BP(mean): --  RR: --  SpO2: --    Orthostatic VS  10-02-24 @ 08:46  Lying BP: --/-- HR: --  Sitting BP: 100/61 HR: 79  Standing BP: 108/64 HR: 70  Site: --  Mode: --

## 2024-10-03 NOTE — BH INPATIENT PSYCHIATRY PROGRESS NOTE - OTHER
denied AVH however came up to nursing station asking if someone called her name (they didn't) so possible AH slow gait catalepsy, posturing

## 2024-10-03 NOTE — BH INPATIENT PSYCHIATRY PROGRESS NOTE - NSBHMSESPABN_PSY_A_CORE
latency improved from yesterday's interview--some spontaneous speech, some immediate responses. improves throughout interview, especially when pt is talking about treatments she does not want like ECT or increased medication dosages/Soft volume/Slowed rate/Decreased productivity/Increased latency

## 2024-10-03 NOTE — BH INPATIENT PSYCHIATRY PROGRESS NOTE - NSBHATTESTBILLING_PSY_A_CORE
54010-[id number] OBS or IP - moderate complexity OR 35-49 mins 72245-Qwxcxeeorx OBS or IP - high complexity OR 50-79 mins

## 2024-10-03 NOTE — BH INPATIENT PSYCHIATRY PROGRESS NOTE - NSBHMSETHTCONTENT_PSY_A_CORE
paranoid delusions that food is contaminated, paranoid about talking to student when NP or MD are not present/Delusions

## 2024-10-03 NOTE — BH INPATIENT PSYCHIATRY PROGRESS NOTE - NSBHATTESTTYPEVISIT_PSY_A_CORE
On-site Attending with Resident/Fellow/Student and ERIC (99XXX codes) Attending with Resident/Fellow/Student

## 2024-10-03 NOTE — BH INPATIENT PSYCHIATRY PROGRESS NOTE - ADDITIONAL DETAILS / COMMENTS
observed to be staring, posturing (held door handle and froze while opening door), slow movements and gait, standing and staring in the halls throughout the day

## 2024-10-03 NOTE — BH INPATIENT PSYCHIATRY PROGRESS NOTE - CURRENT MEDICATION
MEDICATIONS  (STANDING):  LORazepam     Tablet 2 milliGRAM(s) Oral three times a day  lumateperone tosylate 42 milliGRAM(s) Oral <User Schedule>  venlafaxine .5 milliGRAM(s) Oral daily    MEDICATIONS  (PRN):  ibuprofen  Tablet. 400 milliGRAM(s) Oral every 6 hours PRN Mild Pain (1 - 3), Moderate Pain (4 - 6), Severe Pain (7 - 10)  LORazepam     Tablet 2 milliGRAM(s) Oral every 6 hours PRN agitation  LORazepam   Injectable 2 milliGRAM(s) IntraMuscular every 6 hours PRN agitation  melatonin. 3 milliGRAM(s) Oral at bedtime PRN Insomnia  polyethylene glycol 3350 17 Gram(s) Oral two times a day PRN constipation  senna 2 Tablet(s) Oral at bedtime PRN constipation

## 2024-10-04 PROCEDURE — 99232 SBSQ HOSP IP/OBS MODERATE 35: CPT

## 2024-10-04 RX ADMIN — LUMATEPERONE 42 MILLIGRAM(S): 42 CAPSULE ORAL at 20:17

## 2024-10-04 RX ADMIN — Medication 400 MILLIGRAM(S): at 17:17

## 2024-10-04 RX ADMIN — Medication 2 MILLIGRAM(S): at 16:17

## 2024-10-04 RX ADMIN — Medication 2 MILLIGRAM(S): at 08:56

## 2024-10-04 RX ADMIN — Medication 2 MILLIGRAM(S): at 20:17

## 2024-10-04 RX ADMIN — Medication 112.5 MILLIGRAM(S): at 08:56

## 2024-10-04 RX ADMIN — Medication 400 MILLIGRAM(S): at 16:17

## 2024-10-04 NOTE — BH INPATIENT PSYCHIATRY PROGRESS NOTE - NSBHATTESTCOMMENTATTENDFT_PSY_A_CORE
Care was discussed and reviewed in the interdisciplinary treatment team.  I, Yanna Lu MD, have reviewed and verified the documentation.  I independently performed the documented medical decision making.  Patient was seen and evaluated with the MD student. Patient remains disorganized and catatonic. She is illogical and is difficult to understand. Patient has been more compliant with the treatment.

## 2024-10-04 NOTE — BH INPATIENT PSYCHIATRY PROGRESS NOTE - NSBHMETABOLIC_PSY_ALL_CORE_FT
BMI: BMI (kg/m2): 22.7 (09-24-24 @ 19:02)  HbA1c: A1C with Estimated Average Glucose Result: 4.8 % (09-27-24 @ 08:30)    Glucose:   BP: --Vital Signs Last 24 Hrs  T(C): 37.2 (10-04-24 @ 08:14), Max: 37.2 (10-04-24 @ 08:14)  T(F): 98.9 (10-04-24 @ 08:14), Max: 98.9 (10-04-24 @ 08:14)  HR: --  BP: --  BP(mean): --  RR: 18 (10-04-24 @ 08:14) (18 - 18)  SpO2: --    Orthostatic VS  10-04-24 @ 08:14  Lying BP: --/-- HR: --  Sitting BP: 119/79 HR: 102  Standing BP: 108/68 HR: 109  Site: --  Mode: --  Orthostatic VS  10-03-24 @ 18:42  Lying BP: --/-- HR: --  Sitting BP: 114/83 HR: 84  Standing BP: 107/74 HR: 88  Site: --  Mode: --    Lipid Panel: Date/Time: 09-27-24 @ 08:30  Cholesterol, Serum: 167  LDL Cholesterol Calculated: 109  HDL Cholesterol, Serum: 42  Total Cholesterol/HDL Ration Measurement: --  Triglycerides, Serum: 82

## 2024-10-04 NOTE — BH INPATIENT PSYCHIATRY PROGRESS NOTE - NSBHMSETHTPROC_PSY_A_CORE
Disorganized/Tangential/Thought blocking/Impaired reasoning quite disorganized--speech is at times nonsensical, thoughts disconnected/Disorganized/Tangential/Thought blocking/Impaired reasoning

## 2024-10-04 NOTE — BH INPATIENT PSYCHIATRY PROGRESS NOTE - NSBHMSEEYE_PSY_A_CORE
initially staring with 0 eye contact but as interview goes on, pt has fair eye contact/Fair minimal eye contact throughout interview/Poor

## 2024-10-04 NOTE — BH INPATIENT PSYCHIATRY PROGRESS NOTE - NSBHFUPINTERVALHXFT_PSY_A_CORE
f/u MDD with psychotic features and catatonia. discussed in team meeting, VSS, no acute events overnight. took all scheduled medications  	Pt seen this AM by medical student as she was standing still, staring, in front of nursing station with fresh towels, gown, and socks, as if she had just received these items. Pt w f/u MDD with psychotic features and catatonia. discussed in team meeting, VSS, no acute events overnight. took all scheduled medications  	Pt seen this AM by medical student as she was standing still, staring, in front of nursing station with fresh towels, gown, and socks, as if she had just received these items. Pt was asked if she would like to meet in conference room rather than  hallway and declines, stating she wants to meet with doctor privately. Pt refers to me as NP student, role clarified. Pt asked what my qualifications are and I explained my level of training thus far. Pt appears more comfortable afterward and continued discussion in hallway. Pt asks for her medications to be decreased. She states that the combination of Ativan and Caplyta at night makes her sleep too much (however pt was ambulating on unit at 7am today). Pt shares that they made it difficult for her to get out of bed and tend to her daily activities. Provided psychoeducation on indication of ativan for catatonia and goal to remove this medication once catatonia resolves. Pt expressed understanding. Admits continued nausea but denies vomiting. States she has been trying to eat more but feels she is more thirsty than hungry. Asked if she feels more comfortable with new diet with more closed containers and pt states, "that is difficult to answer." Denies SI, HI, hopelessness, or AVH.  	Later pt was met with MD where she describes that she slept too much and that she should have waited to do her laundry. She is disorganized and difficult to follow but makes mention that she feels "the medication is working but it's me knowing "when we got to get up we got to get up."" Denies feeling side effects of medications. Regarding her "freezing" episodes pt states, "it's not freezing, it's just me adjusting to the layout of the hospital."

## 2024-10-04 NOTE — BH INPATIENT PSYCHIATRY PROGRESS NOTE - NSBHMSETHTCONTENT_PSY_A_CORE
paranoid delusions that food is contaminated, paranoid about talking to student when NP or MD are not present/Delusions paranoid delusions that food is contaminated, paranoid about talking to student when NP or MD are not present--asking what their qualifications are/Delusions

## 2024-10-04 NOTE — BH INPATIENT PSYCHIATRY PROGRESS NOTE - OTHER
catalepsy, posturing slow gait denied AVH however came up to nursing station asking if someone called her name (they didn't) so possible AH denied AVH

## 2024-10-04 NOTE — BH INPATIENT PSYCHIATRY PROGRESS NOTE - ADDITIONAL DETAILS / COMMENTS
observed to be staring, posturing (held door handle and froze while opening door), slow movements and gait, standing and staring in the halls throughout the day observed to be staring, slow movements and gait, standing and staring in the halls throughout the day

## 2024-10-04 NOTE — BH INPATIENT PSYCHIATRY PROGRESS NOTE - NSBHCHARTREVIEWVS_PSY_A_CORE FT
Vital Signs Last 24 Hrs  T(C): 37.2 (10-04-24 @ 08:14), Max: 37.2 (10-04-24 @ 08:14)  T(F): 98.9 (10-04-24 @ 08:14), Max: 98.9 (10-04-24 @ 08:14)  HR: --  BP: --  BP(mean): --  RR: 18 (10-04-24 @ 08:14) (18 - 18)  SpO2: --    Orthostatic VS  10-04-24 @ 08:14  Lying BP: --/-- HR: --  Sitting BP: 119/79 HR: 102  Standing BP: 108/68 HR: 109  Site: --  Mode: --  Orthostatic VS  10-03-24 @ 18:42  Lying BP: --/-- HR: --  Sitting BP: 114/83 HR: 84  Standing BP: 107/74 HR: 88  Site: --  Mode: --

## 2024-10-04 NOTE — BH INPATIENT PSYCHIATRY PROGRESS NOTE - NSBHMSESPABN_PSY_A_CORE
latency improved from yesterday's interview--some spontaneous speech, some immediate responses. improves throughout interview, especially when pt is talking about treatments she does not want like ECT or increased medication dosages/Soft volume/Slowed rate/Decreased productivity/Increased latency latency improving from initial exams--some spontaneous speech, some immediate responses./Soft volume/Slowed rate/Decreased productivity/Increased latency
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
## 2024-10-05 RX ADMIN — Medication 2 MILLIGRAM(S): at 13:25

## 2024-10-05 RX ADMIN — LUMATEPERONE 42 MILLIGRAM(S): 42 CAPSULE ORAL at 21:25

## 2024-10-05 RX ADMIN — Medication 112.5 MILLIGRAM(S): at 08:49

## 2024-10-05 RX ADMIN — Medication 2 MILLIGRAM(S): at 20:40

## 2024-10-05 RX ADMIN — Medication 2 MILLIGRAM(S): at 08:49

## 2024-10-06 RX ADMIN — Medication 2 MILLIGRAM(S): at 21:23

## 2024-10-06 RX ADMIN — Medication 112.5 MILLIGRAM(S): at 08:13

## 2024-10-06 RX ADMIN — Medication 2 MILLIGRAM(S): at 13:13

## 2024-10-06 RX ADMIN — LUMATEPERONE 42 MILLIGRAM(S): 42 CAPSULE ORAL at 21:23

## 2024-10-06 RX ADMIN — Medication 2 MILLIGRAM(S): at 08:13

## 2024-10-07 PROCEDURE — 99232 SBSQ HOSP IP/OBS MODERATE 35: CPT

## 2024-10-07 RX ORDER — VENLAFAXINE HCL 150 MG
150 CAPSULE, EXT RELEASE 24 HR ORAL DAILY
Refills: 0 | Status: DISCONTINUED | OUTPATIENT
Start: 2024-10-07 | End: 2024-10-22

## 2024-10-07 RX ADMIN — LUMATEPERONE 42 MILLIGRAM(S): 42 CAPSULE ORAL at 21:27

## 2024-10-07 RX ADMIN — Medication 2 MILLIGRAM(S): at 20:13

## 2024-10-07 RX ADMIN — Medication 2 MILLIGRAM(S): at 08:30

## 2024-10-07 RX ADMIN — Medication 112.5 MILLIGRAM(S): at 08:30

## 2024-10-07 RX ADMIN — Medication 2 MILLIGRAM(S): at 12:51

## 2024-10-07 NOTE — BH INPATIENT PSYCHIATRY PROGRESS NOTE - NSBHCHARTREVIEWVS_PSY_A_CORE FT
Vital Signs Last 24 Hrs  T(C): 36.5 (10-07-24 @ 07:32), Max: 36.5 (10-07-24 @ 07:32)  T(F): 97.7 (10-07-24 @ 07:32), Max: 97.7 (10-07-24 @ 07:32)  HR: --  BP: --  BP(mean): --  RR: 19 (10-07-24 @ 07:32) (19 - 19)  SpO2: --    Orthostatic VS  10-07-24 @ 07:32  Lying BP: --/-- HR: --  Sitting BP: 129/82 HR: 101  Standing BP: 132/79 HR: 110  Site: --  Mode: --  Orthostatic VS  10-06-24 @ 08:14  Lying BP: --/-- HR: --  Sitting BP: 118/74 HR: 98  Standing BP: 104/71 HR: 108  Site: --  Mode: electronic

## 2024-10-07 NOTE — BH INPATIENT PSYCHIATRY PROGRESS NOTE - NSBHMETABOLIC_PSY_ALL_CORE_FT
BMI: BMI (kg/m2): 22.7 (09-24-24 @ 19:02)  HbA1c: A1C with Estimated Average Glucose Result: 4.8 % (09-27-24 @ 08:30)    Glucose:   BP: --Vital Signs Last 24 Hrs  T(C): 36.5 (10-07-24 @ 07:32), Max: 36.5 (10-07-24 @ 07:32)  T(F): 97.7 (10-07-24 @ 07:32), Max: 97.7 (10-07-24 @ 07:32)  HR: --  BP: --  BP(mean): --  RR: 19 (10-07-24 @ 07:32) (19 - 19)  SpO2: --    Orthostatic VS  10-07-24 @ 07:32  Lying BP: --/-- HR: --  Sitting BP: 129/82 HR: 101  Standing BP: 132/79 HR: 110  Site: --  Mode: --  Orthostatic VS  10-06-24 @ 08:14  Lying BP: --/-- HR: --  Sitting BP: 118/74 HR: 98  Standing BP: 104/71 HR: 108  Site: --  Mode: electronic    Lipid Panel: Date/Time: 09-27-24 @ 08:30  Cholesterol, Serum: 167  LDL Cholesterol Calculated: 109  HDL Cholesterol, Serum: 42  Total Cholesterol/HDL Ration Measurement: --  Triglycerides, Serum: 82

## 2024-10-07 NOTE — BH INPATIENT PSYCHIATRY PROGRESS NOTE - NSBHMSETHTCONTENT_PSY_A_CORE
paranoid delusions that food is contaminated, paranoid about new NP--checks if she is an NP from this hospital/Delusions

## 2024-10-07 NOTE — BH INPATIENT PSYCHIATRY PROGRESS NOTE - NSBHFUPINTERVALHXFT_PSY_A_CORE
f/u MDD with catatonia and psychotic features, discussed in team meeting, VSS, no acute events overnight  	Pt seen this AM with medical student and NP. She shares that her weekend was "not good" because she slept too much. She asks if her PM Ativan can be decreased or moved, was provided psychoeducation regarding need for Ativan as pt is catatonic, pt expresses understanding. She acknowledges that she has caught herself "stuck in tasks" and "freezing" and describes that she has thoughts in her head about what to do next, was provided education that this is consistent with catatonia. She shares she is getting used to her medication. She feels that it is helping to decrease her racing thoughts. She additionally states the Caplyta is helping with her paranoia. States she feels safe in the hospital. Regarding her feelings that food was contaminated, pt states she is grateful that she has something to eat and that she has more closed containers now. She mentioned that she was on ROME Abilify in the past, which helped, but caused increased weight gain which made her vomit more and negatively impacted her mood. She states that she has not been eating enough, states she drank too much water but then when asked further she states, "I am not swallowing enough." Admits that she has wanted to vomit but has tried walking instead. Denies vomiting today or over the weekend. States she feels she wants to vomit when she feels "out of control." Denies SI, hopelessness, HI or AVH. Has been attending groups.  	Regarding medication changes, pt initially unsure about increasing Effexor today however becomes amenable after psychoeducation. States that she "made the wrong decision" about discontinuing her meds 3-4 months ago. Pt encouraged for in the future when she has thoughts of switching/self-titrating her meds while outside of the hospital, to contact Dr. Neely. f/u MDD with catatonia and psychotic features, discussed in team meeting, VSS, no acute events overnight  	Pt seen this AM with medical student and NP. She shares that her weekend was "not good" because she slept too much. She asks if her PM Ativan can be decreased or moved, was provided psychoeducation regarding need for Ativan as pt is catatonic, pt expresses understanding. She acknowledges that she has caught herself "stuck in tasks" and "freezing" and describes that she has thoughts in her head about what to do next, was provided education that this is consistent with catatonia. She shares she is getting used to her medication. She feels that it is helping to decrease her racing thoughts. She additionally states the Caplyta is helping with her paranoia. States she feels safe in the hospital. Regarding her feelings that food was contaminated, pt states she is grateful that she has something to eat and that she has more closed containers now. She mentioned that she was on ROME Abilify in the past, which helped, but caused increased weight gain which made her vomit more and negatively impacted her mood. She states that she has not been eating enough, states she drank too much water but then when asked further she states, "I am not swallowing enough." Admits that she has wanted to vomit but has tried walking instead. Denies vomiting today or over the weekend. States she feels she wants to vomit when she feels "out of control." Denies SI, hopelessness, HI or AVH. Has been attending groups.  	Regarding medication changes, pt initially unsure about increasing Effexor today however becomes amenable after psychoeducation. States that she "made the wrong decision" about discontinuing her meds 3-4 months ago. Pt encouraged for in the future when she has thoughts of switching/self-titrating her meds while outside of the hospital, to contact Dr. Neely.    Initially with latency of speech, but at times it reverts to normal RRR, then returns to increased latency. Denies SIIP/HIIP/A/VH. Disorganized and very circumstantial. At one point, pt became paranoid and asked of NP, "You're a NP at this hospital right?" Pt reminded that NP has treated her in the past. When asked why she asks this question, pt stated she was "just curious" with a superficial smile. Pt agrees to increase in effexor to 150 mg PO QD. Pt reluctant to change caplyta to another antipsychotic at this time. Pt states she does not want to be on "too many medications."

## 2024-10-07 NOTE — BH INPATIENT PSYCHIATRY PROGRESS NOTE - ADDITIONAL DETAILS / COMMENTS
observed to be staring, slow movements and gait, standing and staring in the halls throughout the day
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
## 2024-10-07 NOTE — BH INPATIENT PSYCHIATRY PROGRESS NOTE - NSBHMSETHTPROC_PSY_A_CORE
quite disorganized--speech is at times nonsensical, thoughts disconnected/Disorganized/Tangential/Thought blocking/Normal reasoning quite disorganized--speech is at times nonsensical, thoughts disconnected/Disorganized/Circumstantial/Tangential/Thought blocking

## 2024-10-07 NOTE — BH INPATIENT PSYCHIATRY PROGRESS NOTE - NSBHMSESPABN_PSY_A_CORE
latency improving from initial exams--some spontaneous speech, some immediate responses./Soft volume/Slowed rate/Decreased productivity/Increased latency

## 2024-10-07 NOTE — BH INPATIENT PSYCHIATRY PROGRESS NOTE - CURRENT MEDICATION
MEDICATIONS  (STANDING):  LORazepam     Tablet 2 milliGRAM(s) Oral three times a day  lumateperone tosylate 42 milliGRAM(s) Oral <User Schedule>  venlafaxine .5 milliGRAM(s) Oral daily    MEDICATIONS  (PRN):  ibuprofen  Tablet. 400 milliGRAM(s) Oral every 6 hours PRN Mild Pain (1 - 3), Moderate Pain (4 - 6), Severe Pain (7 - 10)  LORazepam     Tablet 2 milliGRAM(s) Oral every 6 hours PRN agitation  LORazepam   Injectable 2 milliGRAM(s) IntraMuscular every 6 hours PRN agitation  melatonin. 3 milliGRAM(s) Oral at bedtime PRN Insomnia  polyethylene glycol 3350 17 Gram(s) Oral two times a day PRN constipation  senna 2 Tablet(s) Oral at bedtime PRN constipation   MEDICATIONS  (STANDING):  LORazepam     Tablet 2 milliGRAM(s) Oral three times a day  lumateperone tosylate 42 milliGRAM(s) Oral <User Schedule>  venlafaxine  milliGRAM(s) Oral daily    MEDICATIONS  (PRN):  ibuprofen  Tablet. 400 milliGRAM(s) Oral every 6 hours PRN Mild Pain (1 - 3), Moderate Pain (4 - 6), Severe Pain (7 - 10)  LORazepam     Tablet 2 milliGRAM(s) Oral every 6 hours PRN agitation  LORazepam   Injectable 2 milliGRAM(s) IntraMuscular every 6 hours PRN agitation  melatonin. 3 milliGRAM(s) Oral at bedtime PRN Insomnia  polyethylene glycol 3350 17 Gram(s) Oral two times a day PRN constipation  senna 2 Tablet(s) Oral at bedtime PRN constipation

## 2024-10-07 NOTE — BH INPATIENT PSYCHIATRY PROGRESS NOTE - NSBHATTESTBILLONSITE_PSY_A_CORE
Detail Level: Zone Patient Specific Counseling (Will Not Stick From Patient To Patient): Discussed alternating selsun blue Detail Level: Detailed Attending to bill ERIC to bill

## 2024-10-07 NOTE — BH INPATIENT PSYCHIATRY PROGRESS NOTE - NSBHATTESTTYPEVISIT_PSY_A_CORE
On-site Attending with Resident/Fellow/Student and ERIC (99XXX codes) On-site Attending supervising ERIC (99XXX codes)

## 2024-10-07 NOTE — BH INPATIENT PSYCHIATRY PROGRESS NOTE - NSBHATTESTBILLING_PSY_A_CORE
[id number] OBS or IP - moderate complexity OR 35-49 mins [id number] OBS or IP - high complexity OR 50-79 mins

## 2024-10-08 PROCEDURE — 99232 SBSQ HOSP IP/OBS MODERATE 35: CPT

## 2024-10-08 RX ORDER — LORAZEPAM 2 MG
2 TABLET ORAL EVERY 6 HOURS
Refills: 0 | Status: DISCONTINUED | OUTPATIENT
Start: 2024-10-08 | End: 2024-10-15

## 2024-10-08 RX ORDER — LORAZEPAM 2 MG
2 TABLET ORAL THREE TIMES A DAY
Refills: 0 | Status: DISCONTINUED | OUTPATIENT
Start: 2024-10-08 | End: 2024-10-11

## 2024-10-08 RX ORDER — HALOPERIDOL DECANOATE 50 MG/ML
5 INJECTION INTRAMUSCULAR EVERY 6 HOURS
Refills: 0 | Status: DISCONTINUED | OUTPATIENT
Start: 2024-10-08 | End: 2024-10-22

## 2024-10-08 RX ADMIN — Medication 2 MILLIGRAM(S): at 12:16

## 2024-10-08 RX ADMIN — HALOPERIDOL DECANOATE 5 MILLIGRAM(S): 50 INJECTION INTRAMUSCULAR at 12:48

## 2024-10-08 RX ADMIN — Medication 400 MILLIGRAM(S): at 21:42

## 2024-10-08 RX ADMIN — Medication 400 MILLIGRAM(S): at 20:06

## 2024-10-08 RX ADMIN — Medication 2 MILLIGRAM(S): at 20:05

## 2024-10-08 RX ADMIN — Medication 150 MILLIGRAM(S): at 09:04

## 2024-10-08 RX ADMIN — Medication 2 MILLIGRAM(S): at 09:04

## 2024-10-08 RX ADMIN — LUMATEPERONE 42 MILLIGRAM(S): 42 CAPSULE ORAL at 20:05
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
## 2024-10-08 NOTE — BH INPATIENT PSYCHIATRY PROGRESS NOTE - CURRENT MEDICATION
MEDICATIONS  (STANDING):  LORazepam     Tablet 2 milliGRAM(s) Oral three times a day  lumateperone tosylate 42 milliGRAM(s) Oral <User Schedule>  venlafaxine  milliGRAM(s) Oral daily    MEDICATIONS  (PRN):  haloperidol     Tablet 5 milliGRAM(s) Oral every 6 hours PRN agitation/anxiety  ibuprofen  Tablet. 400 milliGRAM(s) Oral every 6 hours PRN Mild Pain (1 - 3), Moderate Pain (4 - 6), Severe Pain (7 - 10)  LORazepam     Tablet 2 milliGRAM(s) Oral every 6 hours PRN agitation  LORazepam   Injectable 2 milliGRAM(s) IntraMuscular every 6 hours PRN agitation  melatonin. 3 milliGRAM(s) Oral at bedtime PRN Insomnia  polyethylene glycol 3350 17 Gram(s) Oral two times a day PRN constipation  senna 2 Tablet(s) Oral at bedtime PRN constipation

## 2024-10-08 NOTE — BH INPATIENT PSYCHIATRY PROGRESS NOTE - NSBHMETABOLIC_PSY_ALL_CORE_FT
BMI: BMI (kg/m2): 22.7 (09-24-24 @ 19:02)  HbA1c: A1C with Estimated Average Glucose Result: 4.8 % (09-27-24 @ 08:30)    Glucose:   BP: --Vital Signs Last 24 Hrs  T(C): 37.1 (10-08-24 @ 08:33), Max: 37.1 (10-08-24 @ 08:33)  T(F): 98.7 (10-08-24 @ 08:33), Max: 98.7 (10-08-24 @ 08:33)  HR: --  BP: --  BP(mean): --  RR: --  SpO2: --    Orthostatic VS  10-08-24 @ 08:33  Lying BP: --/-- HR: --  Sitting BP: 129/77 HR: 98  Standing BP: 114/69 HR: 105  Site: --  Mode: --  Orthostatic VS  10-07-24 @ 07:32  Lying BP: --/-- HR: --  Sitting BP: 129/82 HR: 101  Standing BP: 132/79 HR: 110  Site: --  Mode: --    Lipid Panel: Date/Time: 09-27-24 @ 08:30  Cholesterol, Serum: 167  LDL Cholesterol Calculated: 109  HDL Cholesterol, Serum: 42  Total Cholesterol/HDL Ration Measurement: --  Triglycerides, Serum: 82

## 2024-10-08 NOTE — BH INPATIENT PSYCHIATRY PROGRESS NOTE - NSBHCHARTREVIEWVS_PSY_A_CORE FT
Vital Signs Last 24 Hrs  T(C): 37.1 (10-08-24 @ 08:33), Max: 37.1 (10-08-24 @ 08:33)  T(F): 98.7 (10-08-24 @ 08:33), Max: 98.7 (10-08-24 @ 08:33)  HR: --  BP: --  BP(mean): --  RR: --  SpO2: --    Orthostatic VS  10-08-24 @ 08:33  Lying BP: --/-- HR: --  Sitting BP: 129/77 HR: 98  Standing BP: 114/69 HR: 105  Site: --  Mode: --  Orthostatic VS  10-07-24 @ 07:32  Lying BP: --/-- HR: --  Sitting BP: 129/82 HR: 101  Standing BP: 132/79 HR: 110  Site: --  Mode: --

## 2024-10-08 NOTE — BH INPATIENT PSYCHIATRY PROGRESS NOTE - PRN MEDS
MEDICATIONS  (PRN):  haloperidol     Tablet 5 milliGRAM(s) Oral every 6 hours PRN agitation/anxiety  ibuprofen  Tablet. 400 milliGRAM(s) Oral every 6 hours PRN Mild Pain (1 - 3), Moderate Pain (4 - 6), Severe Pain (7 - 10)  LORazepam     Tablet 2 milliGRAM(s) Oral every 6 hours PRN agitation  LORazepam   Injectable 2 milliGRAM(s) IntraMuscular every 6 hours PRN agitation  melatonin. 3 milliGRAM(s) Oral at bedtime PRN Insomnia  polyethylene glycol 3350 17 Gram(s) Oral two times a day PRN constipation  senna 2 Tablet(s) Oral at bedtime PRN constipation

## 2024-10-08 NOTE — BH INPATIENT PSYCHIATRY PROGRESS NOTE - OTHER
denied AVH  slow gait however improved from prior exam general paranoia--wary of medications and would like a pamphlet about a potential new medication. no specific delusional thought content elicited today--does not mention food contamination when we talked about eating difficulties.  paranoid at times  catalepsy, posturing

## 2024-10-08 NOTE — BH INPATIENT PSYCHIATRY PROGRESS NOTE - NSBHMSETHTPROC_PSY_A_CORE
disorganized, but improving-speech is at times difficult to follow, thoughts disconnected/Disorganized/Circumstantial/Tangential/Thought blocking

## 2024-10-08 NOTE — BH INPATIENT PSYCHIATRY PROGRESS NOTE - ADDITIONAL DETAILS / COMMENTS
observed to be staring, slow movements and gait, standing and staring in the halls throughout the day for long periods of time

## 2024-10-08 NOTE — BH CONSULTATION LIAISON PROGRESS NOTE - NSBHMSEKNOW_PSY_A_CORE
Normal
[FreeTextEntry1] : Left leg endovenous laser ablation 
[D/C IV on discharge] : D/C IV on discharge
[Resume diet] : resume diet
Normal
[Dressing checked for bleeding] : Dressing checked for bleeding
[Vital signs on admission the q 15 mins x2] : Vital signs on admission the q 15 mins x2
Normal

## 2024-10-08 NOTE — BH INPATIENT PSYCHIATRY PROGRESS NOTE - NSBHFUPINTERVALHXFT_PSY_A_CORE
f/u MDD with catatonia and psychotic features, pt discussed in team meeting, VSS, no acute events overnight, took all scheduled medications  	Pt seen this AM in conference room with NP Duc. When approaching pt for interview she was standing still and staring, barefoot, with socks in-hand, outside of her doorway. Pt initially states that she is not doing well but when further explored she states, "Today is a good day to talk about medications, family, travel, and how to get better." She states she felt "dragged" this AM and felt tired, asking team permission to nap today. She demonstrates awareness that her Effexor was increased this morning and states "that is good to sustain." Expresses that she would like some guided, structured journaling time to express her thoughts. She additionally complains of a headache and is encouraged to take Tylenol. Pt expresses reservation about tylenol and worries if it is a blood thinner, provided appropriate education. Pt states she would like to stay away from medications in pill form as much as possible. She is asked about thoughts of switching Caplyta to Invega. Pt expresses resistance stating she has been on Caplyta and requests a pamphlet with the medication information about Invega, asks if it is a new medication/has been around for a while.  	Regarding catatonic sx, pt states that she has been "stuck" when she has options in front of her. Regarding eating, she states she feels she has "not been being a good girl" because she has not been eating enough, states she has been drinking Silk and other dairy items. Feels that she knows she needs to eat but she hears "No" when food is in front of her--asked to clarify who is telling her no and she states it was a poor choice of words. Admits that difficulty eating is caused by combination of nausea, fear of weight gain, and feeling "stuck" with decisions.   	Pt mentioned having "seizures" but does not provide much detail, states it was a while ago and she was not on medication for it--per 2021 Sep note "admitted from [facility] after medical admission for seizure workup, what mother recognized as seizure activity more likely catatonia, cleared by neuro.  Patient's reported sx with associated history point towards MDD w/ psychosis vs bipolar depression w/ psychosis or schizoaffective as possible etiology." f/u MDD with catatonia and psychotic features, pt discussed in team meeting, VSS, no acute events overnight, took all scheduled medications  	Pt seen this AM in conference room with NP Duc. When approaching pt for interview she was standing still and staring, barefoot, with socks in-hand, outside of her doorway. When redirected to put on her socks, pt was able to do this relatively quickly and follow treatment team to interview room. Pt initially states that she is not doing well but when further explored she states, "Today is a good day to talk about medications, family, travel, and how to get better." She states she felt "dragged" this AM and felt tired, asking team permission to nap today. She demonstrates awareness that her Effexor was increased this morning and states "that is good to sustain." Expresses that she would like some guided, structured journaling time to express her thoughts. She additionally complains of a headache and is encouraged to take Tylenol and increase PO intake (concern for dehydration induced HA). Pt expresses reservation about tylenol and worries if it is a blood thinner, provided appropriate education. Pt states she would like to stay away from medications in pill form as much as possible. She is asked about thoughts of switching Caplyta to Invega. Pt expresses resistance stating she has been on Caplyta and requests a pamphlet with the medication information about Invega, asks if it is a new medication/has been around for a while. Nursing asked to provide pt with print out for invega. Pt expressed that she does not like needles and did not want medications in injectable forms though ROME was not brought up today.   	Regarding catatonic sx, pt states that she has been "stuck" when she has options in front of her. Regarding eating, she states she feels she has "not been being a good girl" because she has not been eating enough, states she has been drinking Silk and other dairy items. Feels that she knows she needs to eat but she hears "No" when food is in front of her--asked to clarify who is telling her no and she states it was a poor choice of words. Admits that difficulty eating is caused by combination of nausea, fear of weight gain, and feeling "stuck" with decisions.   	Pt mentioned having "seizures" but does not provide much detail, states it was a while ago and she was not on medication for it--per 2021 Sep note "admitted from [facility] after medical admission for seizure workup, what mother recognized as seizure activity more likely catatonia, cleared by neuro.  Patient's reported sx with associated history point towards MDD w/ psychosis vs bipolar depression w/ psychosis or schizoaffective as possible etiology."    Discussed with Dr. Mitchell - advised not to increase ativan out of caution for respiratory suppression with increased dosing. Dr. Mitchell advised to give pt haldol 5 mg PO PRN to address nursing report that pt has been standing in one spot for ~1 hour and requiring redirection. Haldol PRN ordered - awaiting response

## 2024-10-09 PROCEDURE — 99232 SBSQ HOSP IP/OBS MODERATE 35: CPT

## 2024-10-09 RX ORDER — PALIPERIDONE 6 MG/1
3 TABLET, EXTENDED RELEASE ORAL AT BEDTIME
Refills: 0 | Status: DISCONTINUED | OUTPATIENT
Start: 2024-10-09 | End: 2024-10-22

## 2024-10-09 RX ADMIN — Medication 2 MILLIGRAM(S): at 12:49

## 2024-10-09 RX ADMIN — Medication 400 MILLIGRAM(S): at 22:40

## 2024-10-09 RX ADMIN — Medication 150 MILLIGRAM(S): at 08:56

## 2024-10-09 RX ADMIN — Medication 2 MILLIGRAM(S): at 08:56

## 2024-10-09 RX ADMIN — PALIPERIDONE 3 MILLIGRAM(S): 6 TABLET, EXTENDED RELEASE ORAL at 20:48

## 2024-10-09 RX ADMIN — Medication 2 MILLIGRAM(S): at 20:48

## 2024-10-09 RX ADMIN — Medication 400 MILLIGRAM(S): at 21:40
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
## 2024-10-09 NOTE — BH INPATIENT PSYCHIATRY PROGRESS NOTE - NSBHATTESTCOMMENTATTENDFT_PSY_A_CORE
Care was discussed and reviewed in the interdisciplinary treatment team.  I, Yanna Lu MD, have reviewed and verified the documentation.  I independently performed the documented medical decision making.      Patient seemed to be doing better after a one time dose of Haldol that she received yesterday. She is more animated and her speech is more fluent. Patient is in agreement with initiating Invega. We will continue to educated the patient about the benefits of the ROME and offer this to her later on once she is more stable on the Invega. Care was discussed and reviewed in the interdisciplinary treatment team.  I, Yanna Lu MD, have reviewed and verified the documentation.  I independently performed the documented medical decision making.      Patient seemed to be doing better after a one time dose of Haldol that she received yesterday. She is more animated and her speech is more fluent. Patient is in agreement with initiating Invega. We will continue to educated the patient about the benefits of the ROME and offer this to her later on once she is more stable on the Invega..

## 2024-10-09 NOTE — BH INPATIENT PSYCHIATRY PROGRESS NOTE - CURRENT MEDICATION
MEDICATIONS  (STANDING):  LORazepam     Tablet 2 milliGRAM(s) Oral three times a day  paliperidone ER 3 milliGRAM(s) Oral at bedtime  venlafaxine  milliGRAM(s) Oral daily    MEDICATIONS  (PRN):  haloperidol     Tablet 5 milliGRAM(s) Oral every 6 hours PRN agitation/anxiety  ibuprofen  Tablet. 400 milliGRAM(s) Oral every 6 hours PRN Mild Pain (1 - 3), Moderate Pain (4 - 6), Severe Pain (7 - 10)  LORazepam     Tablet 2 milliGRAM(s) Oral every 6 hours PRN agitation  LORazepam   Injectable 2 milliGRAM(s) IntraMuscular every 6 hours PRN agitation  melatonin. 3 milliGRAM(s) Oral at bedtime PRN Insomnia  polyethylene glycol 3350 17 Gram(s) Oral two times a day PRN constipation  senna 2 Tablet(s) Oral at bedtime PRN constipation

## 2024-10-09 NOTE — BH INPATIENT PSYCHIATRY PROGRESS NOTE - NSBHFUPINTERVALHXFT_PSY_A_CORE
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  	Pt seen in conference room with medical student and NP Duc. Pt shares that she ate more today and is feeling less worried about contaminants in her food. Shares she was able to eat eggs and potatoes today, her main concern was there being too much salt in the food. Pt is asked about her progress so far, feels that she is getting better. She shares that she would not like ECT and feels it is too extreme, felt very paranoid when she had 24hr EEG electrodes on during previous unremarkable seizure workup and does not want shocks to her brain. Pt states that receiving Haldol yesterday made her feel way too sleepy and she had to lay down. Was asked about thoughts regarding invega, pt asks to have a pamphlet and asks how long this drug has been around, if it causes weight gain. After providing psychoeducation, is amenable to replacing caplyta with invega starting tonight. Describes her mood as "sad and down" and becomes tearful. Denies any SI, HI, hopelessness, or AVH. 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  	Pt seen in conference room with medical student and NP Duc. Pt shares that she ate more today and is feeling less worried about contaminants in her food. Shares she was able to eat eggs and potatoes today, her main concern was there being too much salt in the food. Pt is asked about her progress so far, feels that she is getting better. She shares that she would not like ECT and feels it is too extreme, felt very paranoid when she had 24hr EEG electrodes on during previous unremarkable seizure workup and does not want shocks to her brain. Pt states that receiving Haldol yesterday made her feel way too sleepy and she had to lay down. Was asked about thoughts regarding invega, pt asks to have a pamphlet (provided) and asks how long this drug has been around, if it causes weight gain. After providing psychoeducation, is amenable to replacing caplyta with invega starting tonight. Describes her mood as "sad and down" and becomes tearful. Denies any SI, HI, hopelessness, or AVH.

## 2024-10-09 NOTE — BH INPATIENT PSYCHIATRY PROGRESS NOTE - NSBHCHARTREVIEWVS_PSY_A_CORE FT
Vital Signs Last 24 Hrs  T(C): 37.1 (10-09-24 @ 08:06), Max: 37.1 (10-09-24 @ 08:06)  T(F): 98.7 (10-09-24 @ 08:06), Max: 98.7 (10-09-24 @ 08:06)  HR: --  BP: --  BP(mean): --  RR: --  SpO2: --    Orthostatic VS  10-09-24 @ 08:06  Lying BP: --/-- HR: --  Sitting BP: 121/78 HR: 96  Standing BP: 107/69 HR: 107  Site: --  Mode: --  Orthostatic VS  10-08-24 @ 08:33  Lying BP: --/-- HR: --  Sitting BP: 129/77 HR: 98  Standing BP: 114/69 HR: 105  Site: --  Mode: --

## 2024-10-09 NOTE — BH INPATIENT PSYCHIATRY PROGRESS NOTE - OTHER
denied AVH  catalepsy, posturing, improved from yesterday paranoid at times  general paranoia--wary of medications and would like a pamphlet about a potential new medication. no specific delusional thought content elicited today-states her thoughts about the food being contaminated are less--not as concerned slow gait, however improved from prior exam

## 2024-10-09 NOTE — BH INPATIENT PSYCHIATRY PROGRESS NOTE - NSBHMETABOLIC_PSY_ALL_CORE_FT
BMI: BMI (kg/m2): 22.7 (09-24-24 @ 19:02)  HbA1c: A1C with Estimated Average Glucose Result: 4.8 % (09-27-24 @ 08:30)    Glucose:   BP: --Vital Signs Last 24 Hrs  T(C): 37.1 (10-09-24 @ 08:06), Max: 37.1 (10-09-24 @ 08:06)  T(F): 98.7 (10-09-24 @ 08:06), Max: 98.7 (10-09-24 @ 08:06)  HR: --  BP: --  BP(mean): --  RR: --  SpO2: --    Orthostatic VS  10-09-24 @ 08:06  Lying BP: --/-- HR: --  Sitting BP: 121/78 HR: 96  Standing BP: 107/69 HR: 107  Site: --  Mode: --  Orthostatic VS  10-08-24 @ 08:33  Lying BP: --/-- HR: --  Sitting BP: 129/77 HR: 98  Standing BP: 114/69 HR: 105  Site: --  Mode: --    Lipid Panel: Date/Time: 09-27-24 @ 08:30  Cholesterol, Serum: 167  LDL Cholesterol Calculated: 109  HDL Cholesterol, Serum: 42  Total Cholesterol/HDL Ration Measurement: --  Triglycerides, Serum: 82

## 2024-10-10 PROCEDURE — 99232 SBSQ HOSP IP/OBS MODERATE 35: CPT

## 2024-10-10 RX ADMIN — Medication 2 MILLIGRAM(S): at 08:36

## 2024-10-10 RX ADMIN — Medication 2 MILLIGRAM(S): at 12:07

## 2024-10-10 RX ADMIN — Medication 400 MILLIGRAM(S): at 18:27

## 2024-10-10 RX ADMIN — Medication 2 MILLIGRAM(S): at 20:03

## 2024-10-10 RX ADMIN — PALIPERIDONE 3 MILLIGRAM(S): 6 TABLET, EXTENDED RELEASE ORAL at 20:03

## 2024-10-10 RX ADMIN — Medication 150 MILLIGRAM(S): at 08:35

## 2024-10-10 NOTE — BH INPATIENT PSYCHIATRY PROGRESS NOTE - NSBHCHARTREVIEWVS_PSY_A_CORE FT
Vital Signs Last 24 Hrs  T(C): 37.2 (10-10-24 @ 07:19), Max: 37.2 (10-10-24 @ 07:19)  T(F): 99 (10-10-24 @ 07:19), Max: 99 (10-10-24 @ 07:19)  HR: --  BP: --  BP(mean): --  RR: 18 (10-10-24 @ 07:19) (18 - 18)  SpO2: --    Orthostatic VS  10-10-24 @ 07:19  Lying BP: --/-- HR: --  Sitting BP: 125/79 HR: 102  Standing BP: 115/76 HR: 107  Site: --  Mode: --  Orthostatic VS  10-09-24 @ 08:06  Lying BP: --/-- HR: --  Sitting BP: 121/78 HR: 96  Standing BP: 107/69 HR: 107  Site: --  Mode: --
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
## 2024-10-10 NOTE — BH INPATIENT PSYCHIATRY PROGRESS NOTE - NSBHMSETHTPROC_PSY_A_CORE
somewhat perseverative regarding knowing the weight-gain data for her antipsychotic medication/Linear/Perseverative

## 2024-10-10 NOTE — BH INPATIENT PSYCHIATRY PROGRESS NOTE - ADDITIONAL DETAILS / COMMENTS
no catatonic behavior observed today. remarkably improved. socializing with peers, ambulating around unit, coloring

## 2024-10-10 NOTE — BH INPATIENT PSYCHIATRY PROGRESS NOTE - OTHER
denied AVH , does not appear IP no catalepsy or posturing observed today not observed to be paranoid today, denies any paranoia. Preoccupied with weight gain on new med and being on as little meds as possible

## 2024-10-10 NOTE — BH INPATIENT PSYCHIATRY PROGRESS NOTE - NSBHFUPINTERVALHXFT_PSY_A_CORE
f/u MDD with psychotic features and catatonia. pt discussed in team meeting. VSS, RAPHAEL, took all scheduled medications.  	Pt seen this AM in conference room, stating she is feeling well, slept well last night and did not wake up groggy this AM. She denies SI, HI, AVH, or hopelessness. Describes her mood as "feeling ok." Pt states she no longer is paranoid, and was able to eat increased amount of breakfast this AM including unsealed food. She states she feels the current medication regimen has helped her improve and she asks about need for Ativan. Pt provided psychoeducation regarding the indication for ativan and plans to start ativan taper as catatonia has improved. Pt expresses understanding. Pt additionally expresses concern about weight gain and requests information regarding its likelihood to cause weight gain vs other meds she has been on in the past causing wt gain such as abilify. Pt shares she may not want to take medication if it causes weight gain. Pt is counseled to contact her outpatient psychiatrist Dr. Neely if there are any concerns about her medications and wanting to modify them once she is discharged due to her history of self-discontinuing or tapering her meds in the past leading to hospital admissions. Pt expresses understanding and states she will do this and acknowledges that she has not done it in the past. Continues to request the data as "I will look it up myself when I leave," and "I will maybe ask Dr Neely to give me the information." Dr Neely Emailed regarding this today at 1150AM. Pt is discharge focused and agreeable to go to groups and continue her meds. f/u MDD with psychotic features and catatonia. pt discussed in team meeting. VSS, RAPHAEL, took all scheduled medications.  	Pt seen this AM in conference room, stating she is feeling well, slept well last night and did not wake up groggy this AM. She denies SI, HI, AVH, or hopelessness. Describes her mood as "feeling ok." Pt states she no longer is paranoid, and was able to eat increased amount of breakfast this AM including unsealed food. She states she feels the current medication regimen has helped her improve and she asks about need for Ativan. Pt provided psychoeducation regarding the indication for ativan and plans to start ativan taper as catatonia has improved. Pt expresses understanding. Pt additionally expresses concern about weight gain and requests information regarding its likelihood to cause weight gain vs other meds she has been on in the past causing wt gain such as abilify. Pt shares she may not want to take medication if it causes weight gain. Pt is counseled to contact her outpatient psychiatrist Dr. Neely if there are any concerns about her medications and wanting to modify them once she is discharged due to her history of self-discontinuing or tapering her meds in the past leading to hospital admissions. Pt expresses understanding and states she will do this and acknowledges that she has not done it in the past. Continues to request the data as "I will look it up myself when I leave," and "I will maybe ask Dr Neely to give me the information." Dr Neely Emailed regarding this today at 1150AM. Pt is discharge focused and agreeable to go to groups and continue her meds. Will obtain weight gain information for ivega for pt education     Pt noted to have fluid speech, no latency, observed to walk without pauses or "freezing," not observed to  place and stare, observed to be social with select peers at breakfast. Pt states she slept welll and did not wake up groggy today. Asking about ativan taper. Will monitor pt another day before taper as pt has been observed to have improved sxs only this morning. Catatonia sxs improving, but now pt is concerned with weight gain despite extensive education provided.

## 2024-10-10 NOTE — BH INPATIENT PSYCHIATRY PROGRESS NOTE - NSCGIIMPROVESX_PSY_ALL_CORE
2 = Much improved - notably better with signficant reduction of symptoms; increase in the level of functioning but some symptoms remain 3 = Minimally improved - slightly better with little or no clinically meaningful reduction of symptoms.  Represents very little change in basic clinical status, level of care, or functional capacity.

## 2024-10-10 NOTE — BH INPATIENT PSYCHIATRY PROGRESS NOTE - NSBHMETABOLIC_PSY_ALL_CORE_FT
BMI: BMI (kg/m2): 22.7 (09-24-24 @ 19:02)  HbA1c: A1C with Estimated Average Glucose Result: 4.8 % (09-27-24 @ 08:30)    Glucose:   BP: --Vital Signs Last 24 Hrs  T(C): 37.2 (10-10-24 @ 07:19), Max: 37.2 (10-10-24 @ 07:19)  T(F): 99 (10-10-24 @ 07:19), Max: 99 (10-10-24 @ 07:19)  HR: --  BP: --  BP(mean): --  RR: 18 (10-10-24 @ 07:19) (18 - 18)  SpO2: --    Orthostatic VS  10-10-24 @ 07:19  Lying BP: --/-- HR: --  Sitting BP: 125/79 HR: 102  Standing BP: 115/76 HR: 107  Site: --  Mode: --  Orthostatic VS  10-09-24 @ 08:06  Lying BP: --/-- HR: --  Sitting BP: 121/78 HR: 96  Standing BP: 107/69 HR: 107  Site: --  Mode: --    Lipid Panel: Date/Time: 09-27-24 @ 08:30  Cholesterol, Serum: 167  LDL Cholesterol Calculated: 109  HDL Cholesterol, Serum: 42  Total Cholesterol/HDL Ration Measurement: --  Triglycerides, Serum: 82

## 2024-10-11 PROCEDURE — 99232 SBSQ HOSP IP/OBS MODERATE 35: CPT

## 2024-10-11 RX ORDER — LORAZEPAM 2 MG
1.5 TABLET ORAL THREE TIMES A DAY
Refills: 0 | Status: DISCONTINUED | OUTPATIENT
Start: 2024-10-11 | End: 2024-10-14

## 2024-10-11 RX ADMIN — PALIPERIDONE 3 MILLIGRAM(S): 6 TABLET, EXTENDED RELEASE ORAL at 20:43

## 2024-10-11 RX ADMIN — Medication 1.5 MILLIGRAM(S): at 14:07

## 2024-10-11 RX ADMIN — Medication 400 MILLIGRAM(S): at 22:34

## 2024-10-11 RX ADMIN — Medication 1.5 MILLIGRAM(S): at 20:43

## 2024-10-11 RX ADMIN — Medication 2 MILLIGRAM(S): at 09:40

## 2024-10-11 RX ADMIN — Medication 400 MILLIGRAM(S): at 07:05

## 2024-10-11 RX ADMIN — Medication 150 MILLIGRAM(S): at 09:40

## 2024-10-11 NOTE — BH INPATIENT PSYCHIATRY PROGRESS NOTE - NSBHMETABOLIC_PSY_ALL_CORE_FT
BMI: BMI (kg/m2): 22.7 (09-24-24 @ 19:02)  HbA1c: A1C with Estimated Average Glucose Result: 4.8 % (09-27-24 @ 08:30)    Glucose:   BP: --Vital Signs Last 24 Hrs  T(C): 36.7 (10-11-24 @ 08:00), Max: 36.7 (10-11-24 @ 08:00)  T(F): 98 (10-11-24 @ 08:00), Max: 98 (10-11-24 @ 08:00)  HR: --  BP: --  BP(mean): --  RR: 18 (10-11-24 @ 08:00) (18 - 18)  SpO2: --    Orthostatic VS  10-11-24 @ 08:00  Lying BP: --/-- HR: --  Sitting BP: 130/86 HR: 93  Standing BP: 129/88 HR: 97  Site: --  Mode: --  Orthostatic VS  10-10-24 @ 07:19  Lying BP: --/-- HR: --  Sitting BP: 125/79 HR: 102  Standing BP: 115/76 HR: 107  Site: --  Mode: --    Lipid Panel: Date/Time: 09-27-24 @ 08:30  Cholesterol, Serum: 167  LDL Cholesterol Calculated: 109  HDL Cholesterol, Serum: 42  Total Cholesterol/HDL Ration Measurement: --  Triglycerides, Serum: 82

## 2024-10-11 NOTE — BH INPATIENT PSYCHIATRY PROGRESS NOTE - OTHER
not observed to be paranoid today, denies any paranoia. Preoccupied with weight gain on new med and being on as little meds as possible no catalepsy or posturing observed today denied AVH , does not appear IP somewhat perseverative regarding weight-gain effect of antipsychotic medication but still accepted the med    becomes nonlinear and difficult to follow when explaining certain things such as desire for afternoon dosing of ativan, but for the most part is linear talkative with slight increase in pace

## 2024-10-11 NOTE — BH INPATIENT PSYCHIATRY PROGRESS NOTE - NSBHMSETHTPROC_PSY_A_CORE
somewhat perseverative regarding knowing the weight-gain data for her antipsychotic medication/Linear/Perseverative Perseverative/Normal reasoning/Other Perseverative/Impaired reasoning/Other

## 2024-10-11 NOTE — BH INPATIENT PSYCHIATRY PROGRESS NOTE - NSBHMSESPEECH_PSY_A_CORE
Normal volume, rate, productivity, spontaneity and articulation no latency. fluent and spontaneous speech/Normal volume, rate, productivity, spontaneity and articulation no latency. fluent and spontaneous speech/Abnormal as indicated, otherwise normal...

## 2024-10-11 NOTE — BH INPATIENT PSYCHIATRY PROGRESS NOTE - NSBHCHARTREVIEWVS_PSY_A_CORE FT
Vital Signs Last 24 Hrs  T(C): 36.7 (10-11-24 @ 08:00), Max: 36.7 (10-11-24 @ 08:00)  T(F): 98 (10-11-24 @ 08:00), Max: 98 (10-11-24 @ 08:00)  HR: --  BP: --  BP(mean): --  RR: 18 (10-11-24 @ 08:00) (18 - 18)  SpO2: --    Orthostatic VS  10-11-24 @ 08:00  Lying BP: --/-- HR: --  Sitting BP: 130/86 HR: 93  Standing BP: 129/88 HR: 97  Site: --  Mode: --  Orthostatic VS  10-10-24 @ 07:19  Lying BP: --/-- HR: --  Sitting BP: 125/79 HR: 102  Standing BP: 115/76 HR: 107  Site: --  Mode: --
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
## 2024-10-11 NOTE — BH INPATIENT PSYCHIATRY PROGRESS NOTE - ADDITIONAL DETAILS / COMMENTS
no catatonic behavior observed today. remarkably improved. socializing with peers, ambulating around unit, coloring no catatonic behavior observed today. remarkably improved. socializing with peers, ambulating around unit, coloring.  Improved insight today, is able to clearly explain her thought process prior to admission and while catatonic and acknowledges it was unrealistic and illogical and that she was confused and paranoid. Also demonstrates insight into the reasons for her complex relationship with food and body image. However, seems to not be entirely convinced about necessity of medications and possible adjustment to medications.

## 2024-10-11 NOTE — BH INPATIENT PSYCHIATRY PROGRESS NOTE - NSBHMSEAFFQUAL_PSY_A_CORE
Euthymic is bright, cheerful, making jokes and smiling, somewhat sarcastic and overly cheery when describing her difficult upbringing as a happy American family, spontaneously greeting staff members. not elevated, but is highly cheerful and expressive/Euthymic

## 2024-10-11 NOTE — BH INPATIENT PSYCHIATRY PROGRESS NOTE - CURRENT MEDICATION
MEDICATIONS  (STANDING):  LORazepam     Tablet 2 milliGRAM(s) Oral three times a day  paliperidone ER 3 milliGRAM(s) Oral at bedtime  venlafaxine  milliGRAM(s) Oral daily    MEDICATIONS  (PRN):  haloperidol     Tablet 5 milliGRAM(s) Oral every 6 hours PRN agitation/anxiety  ibuprofen  Tablet. 400 milliGRAM(s) Oral every 6 hours PRN Mild Pain (1 - 3), Moderate Pain (4 - 6), Severe Pain (7 - 10)  LORazepam     Tablet 2 milliGRAM(s) Oral every 6 hours PRN agitation  LORazepam   Injectable 2 milliGRAM(s) IntraMuscular every 6 hours PRN agitation  melatonin. 3 milliGRAM(s) Oral at bedtime PRN Insomnia  polyethylene glycol 3350 17 Gram(s) Oral two times a day PRN constipation  senna 2 Tablet(s) Oral at bedtime PRN constipation   MEDICATIONS  (STANDING):  LORazepam     Tablet 1.5 milliGRAM(s) Oral three times a day  paliperidone ER 3 milliGRAM(s) Oral at bedtime  venlafaxine  milliGRAM(s) Oral daily    MEDICATIONS  (PRN):  haloperidol     Tablet 5 milliGRAM(s) Oral every 6 hours PRN agitation/anxiety  ibuprofen  Tablet. 400 milliGRAM(s) Oral every 6 hours PRN Mild Pain (1 - 3), Moderate Pain (4 - 6), Severe Pain (7 - 10)  LORazepam     Tablet 2 milliGRAM(s) Oral every 6 hours PRN agitation  LORazepam   Injectable 2 milliGRAM(s) IntraMuscular every 6 hours PRN agitation  melatonin. 3 milliGRAM(s) Oral at bedtime PRN Insomnia  polyethylene glycol 3350 17 Gram(s) Oral two times a day PRN constipation  senna 2 Tablet(s) Oral at bedtime PRN constipation

## 2024-10-11 NOTE — BH INPATIENT PSYCHIATRY PROGRESS NOTE - NSBHFUPINTERVALHXFT_PSY_A_CORE
f/u MDD with psychotic features and catatonia, pt discussed in team meeting, VSS, no acute events overnight, accepted all scheduled psychotropic medications  	Pt seen this AM in conference room by medical student and NP Duc. Pt shares that it is crazy that she got better so quickly and that she does not feel that she is freezing anymore, has a clearer mind and is able to make decisions without feeling confused about what to do next. She described that previously, she was standing confused in the bathroom wondering if she was allowed to flush the toilet. She also describes that prior to admission, she decided to discontinue her medications on her own because she felt paranoid. She states that shortly after she began hearing voices telling her to not take medications, heard voices f/u MDD with psychotic features and catatonia, pt discussed in team meeting, VSS, no acute events overnight, accepted all scheduled psychotropic medications  	Pt seen this AM in conference room by medical student and NP Duc. Denies any earache. Pt shares that it is crazy that she got better so quickly and that she does not feel that she is freezing anymore, has a clearer mind and is able to make decisions without feeling confused about what to do next. She described that previously, she was standing confused in the bathroom wondering if she was allowed to flush the toilet. She also describes that prior to admission, she decided to discontinue her medications on her own because she felt paranoid. She states that shortly after she began hearing voices telling her to not take medications, heard voices and went to look outside and no one was there. This caused her to withdraw and stay inside the house. Even while here she believed certain voices were "autotune."  	Pt brings up concern about weight gain on Invega because the study we gave her regarding weight gain said Invega has "moderate" weight gain effects. Pt provided psychoeducation on antipsychotic medications, difference between "older" first gen antipsychotics and "newer" 2nd gen antipsychotics and their side effects. Pt expresses understanding and ex. She expresses that she and Dr. Neely worked hard to find Caplyta which did not cause her to gain weight. However, pt admits that she still had paranoid thoughts on Caplyta and agrees that it may not be the best choice for her. Agreed to give Invega a try and consult with Dr Neely if she is dissatisfied.   	Regarding pts decreased PO intake when she was more catatonic, she is now able to describe that she was hesitant to eat because of paranoia and complicated relationship with food since growing up because her sister compared herself to pt and pt also was not as thin as peers at school and was negatively impacted by portrayal of women's bodies in the media. She additionally explains how her father left the home while she was young, makes sarcastic joke about it being a happy [place] family. Shares these factors led her to have difficult relationship with body image and food. States that today and yesterday she has been doing better with eating, yesterday she noticed increased "ravaging" appetite and caught her self eating quickly, expresses negative feelings regarding this. Pt was encouraged to give herself lucero as she may have had incr appetite from poor PO intake while catatonic.  	Regarding meds, she does not want to increase Effexor because she states she is "not depressed" and her mood is good. Asks about Wellbutrin in addition  because she has heard that this can help people lose weight and improve their mood, states she has been on it before. (According to email from Chin, Wellbutrin should not be restarted because it worsened her psychosis.) Is amenable to starting Ativan taper but expresses concern about wanting something in the afternoon rather than bedtime because she feels anxious in afternoon. Additionally jokes that she is going to party this weekend. f/u MDD with psychotic features and catatonia, pt discussed in team meeting, VSS, no acute events overnight, accepted all scheduled psychotropic medications  	Pt seen this AM in conference room by medical student and NP Duc. Denies any earache now after drinking a cup of tea overnight. Pt shares that it is crazy that she got better so quickly and that she does not feel that she is freezing anymore, has a clearer mind and is able to make decisions without feeling confused about what to do next. She described that previously, she was standing confused in the bathroom wondering if she was allowed to flush the toilet. She also describes that prior to admission, she decided to discontinue her medications on her own because she felt paranoid. She states that shortly after she began hearing voices telling her to not take medications, heard voices and went to look outside and no one was there. This caused her to withdraw and stay inside the house. Even while here she believed certain voices were "autotune."  	Pt brings up concern about weight gain on Invega because the study we gave her regarding weight gain said Invega has "moderate" weight gain effects. Pt provided psychoeducation on antipsychotic medications, difference between "older" first gen antipsychotics and "newer" 2nd gen antipsychotics and their side effects. Pt expresses understanding and she expresses that she and Dr. Neely worked hard to find Caplyta which did not cause her to gain weight. However, pt admits that she still had paranoid thoughts on Caplyta and agrees that it may not be the best choice for her. Agreed to give Invega a try and consult with Dr Neely if she is dissatisfied.   	Regarding pts decreased PO intake when she was more catatonic, she is now able to describe that she was hesitant to eat because of paranoia and complicated relationship with food since growing up because her sister compared herself to pt and pt also was not as thin as peers at school and was negatively impacted by portrayal of women's bodies in the media. She additionally explains how her father left the home while she was young, makes sarcastic joke about it being a happy [place] family. Shares these factors led her to have difficult relationship with body image and food. States that today and yesterday she has been doing better with eating, yesterday she noticed increased "ravaging" appetite and caught her self eating quickly, expresses negative feelings regarding this. Pt was encouraged to give herself lucero as she may have had incr appetite from poor PO intake while catatonic.  	Regarding meds, she does not want to increase Effexor because she states she is "not depressed" and her mood is good. Asks about Wellbutrin in addition  because she has heard that this can help people lose weight and improve their mood, states she has been on it before. (According to email from Chin, Wellbutrin should not be restarted because it worsened her psychosis.) Is amenable to starting Ativan taper but expresses concern about wanting something in the afternoon rather than bedtime because she feels anxious in afternoon. Additionally jokes that she is going to party this weekend.     Pt appears somewhat more elevated today, talkative, with slightly fast pace. Will monitor for claudia/hypomania. Decrease ativan to 1.5 mg PO TID and nursing asked to give 1 PM dose closer to 2PM per pt request. Pt states she feels more anxiety btwn lunch and dinner.

## 2024-10-12 RX ADMIN — Medication 1.5 MILLIGRAM(S): at 09:12

## 2024-10-12 RX ADMIN — Medication 1.5 MILLIGRAM(S): at 20:18

## 2024-10-12 RX ADMIN — Medication 400 MILLIGRAM(S): at 13:13

## 2024-10-12 RX ADMIN — Medication 1.5 MILLIGRAM(S): at 13:13

## 2024-10-12 RX ADMIN — PALIPERIDONE 3 MILLIGRAM(S): 6 TABLET, EXTENDED RELEASE ORAL at 20:18

## 2024-10-12 RX ADMIN — Medication 150 MILLIGRAM(S): at 09:12

## 2024-10-13 RX ADMIN — Medication 1.5 MILLIGRAM(S): at 12:43

## 2024-10-13 RX ADMIN — Medication 1.5 MILLIGRAM(S): at 20:19

## 2024-10-13 RX ADMIN — PALIPERIDONE 3 MILLIGRAM(S): 6 TABLET, EXTENDED RELEASE ORAL at 20:19

## 2024-10-13 RX ADMIN — Medication 150 MILLIGRAM(S): at 08:50

## 2024-10-13 RX ADMIN — Medication 1.5 MILLIGRAM(S): at 08:50

## 2024-10-14 PROCEDURE — 99232 SBSQ HOSP IP/OBS MODERATE 35: CPT

## 2024-10-14 RX ORDER — CLONAZEPAM 1 MG
1 TABLET ORAL THREE TIMES A DAY
Refills: 0 | Status: DISCONTINUED | OUTPATIENT
Start: 2024-10-14 | End: 2024-10-21

## 2024-10-14 RX ORDER — CLONAZEPAM 1 MG
1 TABLET ORAL ONCE
Refills: 0 | Status: DISCONTINUED | OUTPATIENT
Start: 2024-10-14 | End: 2024-10-14

## 2024-10-14 RX ADMIN — Medication 1 MILLIGRAM(S): at 20:13

## 2024-10-14 RX ADMIN — Medication 150 MILLIGRAM(S): at 08:49

## 2024-10-14 RX ADMIN — PALIPERIDONE 3 MILLIGRAM(S): 6 TABLET, EXTENDED RELEASE ORAL at 20:14

## 2024-10-14 RX ADMIN — Medication 1 MILLIGRAM(S): at 13:30

## 2024-10-14 RX ADMIN — Medication 1.5 MILLIGRAM(S): at 08:48

## 2024-10-14 NOTE — BH INPATIENT PSYCHIATRY PROGRESS NOTE - NSBHMETABOLIC_PSY_ALL_CORE_FT
BMI: BMI (kg/m2): 22.7 (09-24-24 @ 19:02)  HbA1c: A1C with Estimated Average Glucose Result: 4.8 % (09-27-24 @ 08:30)    Glucose:   BP: --Vital Signs Last 24 Hrs  T(C): 36.7 (10-14-24 @ 08:25), Max: 36.7 (10-14-24 @ 08:25)  T(F): 98.1 (10-14-24 @ 08:25), Max: 98.1 (10-14-24 @ 08:25)  HR: --  BP: --  BP(mean): --  RR: --  SpO2: --    Orthostatic VS  10-14-24 @ 08:25  Lying BP: --/-- HR: --  Sitting BP: 119/75 HR: 85  Standing BP: 116/77 HR: 90  Site: --  Mode: --  Orthostatic VS  10-13-24 @ 08:51  Lying BP: --/-- HR: --  Sitting BP: 127/76 HR: 93  Standing BP: 112/67 HR: 97  Site: --  Mode: --    Lipid Panel: Date/Time: 09-27-24 @ 08:30  Cholesterol, Serum: 167  LDL Cholesterol Calculated: 109  HDL Cholesterol, Serum: 42  Total Cholesterol/HDL Ration Measurement: --  Triglycerides, Serum: 82

## 2024-10-14 NOTE — BH INPATIENT PSYCHIATRY PROGRESS NOTE - ADDITIONAL DETAILS / COMMENTS
no catatonic behavior observed today. remarkably improved. socializing with peers, ambulating around unit

## 2024-10-14 NOTE — BH INPATIENT PSYCHIATRY PROGRESS NOTE - NSBHMSESPEECH_PSY_A_CORE
no latency. fluent and spontaneous speech/Normal volume, rate, productivity, spontaneity and articulation

## 2024-10-14 NOTE — BH INPATIENT PSYCHIATRY PROGRESS NOTE - NSBHFUPINTERVALHXFT_PSY_A_CORE
f/u MDD with psychotic features and catatonia, pt discussed in team meeting, RAPHAEL TUCKER, accepted all scheduled psychotropic medications  	Pt seen this AM in conference room, shares that she had some hiccups over the weekend. She expresses that she did not realize that all 3 doses of ativan would be decreased to 1.5 mg, thought only one of them would be decreased to 1.5 mg. She describes feeling increased anxiety and paranoia with the decrease of Ativan. She describes that this relates to her past trauma, where she felt unsafe, jumpy, and out of control, and certain triggers occurred over the weekend that caused her to feel this way. She describes originally being rxed Klonopin for this trauma-related anxiety and it helping, asks if she can be switched back to Klonopin. Additionally, shares that she had 3 episodes of freezing over the weekend during decision-making moments. She describes that normally, she is a very organized person and likes keeping her room orderly, but states there is an internal "voice" that is her own inner monologue that drives her to have difficulty making decisions/tells her that she does not need to do these things. She feels the voice is more of her own inner monologue and denies it being external. States the last time she heard an "outside voice" or an auditory hallucination was prior to admission when she was off of her medication.   	Regarding PO intake, pt states that she had "ravaging hunger" over the weekend which caused her to eat a lot. Afterward, she self-induced vomiting 2-3x over the weekend. She did this because she became concerned about weight gain due to the amt that she ate and due to the possibility that Invega could cause weight gain. Additionally describes feeling remorseful that she is here again because she stopped her medications. She shares she knows that purging can be dangerous, describes the effects to teeth and esophagus, describes that she knows she does not want to do this and she hopes that telling writer this will not impact her discharge. Pt encouraged that being honest is beneficial to her treatment and expresses understanding.  	Pt shares her mood is "steady" and rates it as a 6-7/10 with 10 being the best she has ever felt. Denies SI/HI, denies hopelessness. Expresses plan to take her medications outpatient and will remind herself by putting sticky notes on her mirror, setting alarms, and putting her medication on her nightstand, getting a new pill box. Expresses feeling stigma about people seeing her take pills. Declines any interest in invega sustenna IM.  	Lastly updates writer on her PMHx--about 1wk before admission she visited her OBGYN because she felt her periods were closer together and she also had a R breast mass which was biopsied and a marker was placed to monitor, she states she will f/u in 3 mos from that visit. f/u MDD with psychotic features and catatonia, pt discussed in team meeting, RAPHAEL TUCKER, accepted all scheduled psychotropic medications  	Pt seen this AM in conference room, shares that she had some hiccups over the weekend. She expresses that she did not realize that all 3 doses of ativan would be decreased to 1.5 mg, thought only one of them would be decreased to 1.5 mg. She describes feeling increased anxiety and paranoia with the decrease of Ativan. She describes that this relates to her past trauma, where she felt unsafe, jumpy, and out of control, and certain triggers occurred over the weekend that caused her to feel this way. Pt states she is better able to reality test her paranoia and knows these are not reality based thoughts. She describes originally being rxed Klonopin for this trauma-related anxiety and it helping, asks if she can be switched back to Klonopin. Additionally, shares that she had 3 episodes of freezing over the weekend during decision-making moments. She describes that normally, she is a very organized person and likes keeping her room orderly, but states there is an internal "voice" that is her own inner monologue that drives her to have difficulty making decisions/tells her that she does not need to do these things. She feels the voice is more of her own inner monologue and denies it being external. States the last time she heard an "outside voice" or an auditory hallucination was prior to admission when she was off of her medication.   	Regarding PO intake, pt states that she had "ravaging hunger" over the weekend which caused her to eat a lot. Afterward, she self-induced vomiting 2-3x over the weekend. She did this because she became concerned about weight gain due to the amt that she ate and due to the possibility that Invega could cause weight gain. Additionally describes feeling remorseful that she is here again because she stopped her medications. She shares she knows that purging can be dangerous, describes the effects to teeth and esophagus, describes that she knows she does not want to do this and she hopes that telling writer this will not impact her discharge. Pt encouraged that being honest is beneficial to her treatment and expresses understanding.  	Pt shares her mood is "steady" and rates it as a 6-7/10 with 10 being the best she has ever felt. Denies SI/HI, denies hopelessness. Expresses plan to take her medications outpatient and will remind herself by putting sticky notes on her mirror, setting alarms, and putting her medication on her nightstand, getting a new pill box. Expresses feeling stigma about people seeing her take pills. Declines any interest in invega sustenna IM.  	Lastly updates writer on her PMHx--about 1wk before admission she visited her OBGYN because she felt her periods were closer together and she also had a R breast mass which was biopsied and a marker was placed to monitor, she states she will f/u in 3 mos from that visit.

## 2024-10-14 NOTE — BH INPATIENT PSYCHIATRY PROGRESS NOTE - NSBHCHARTREVIEWVS_PSY_A_CORE FT
Vital Signs Last 24 Hrs  T(C): 36.7 (10-14-24 @ 08:25), Max: 36.7 (10-14-24 @ 08:25)  T(F): 98.1 (10-14-24 @ 08:25), Max: 98.1 (10-14-24 @ 08:25)  HR: --  BP: --  BP(mean): --  RR: --  SpO2: --    Orthostatic VS  10-14-24 @ 08:25  Lying BP: --/-- HR: --  Sitting BP: 119/75 HR: 85  Standing BP: 116/77 HR: 90  Site: --  Mode: --  Orthostatic VS  10-13-24 @ 08:51  Lying BP: --/-- HR: --  Sitting BP: 127/76 HR: 93  Standing BP: 112/67 HR: 97  Site: --  Mode: --

## 2024-10-14 NOTE — BH INPATIENT PSYCHIATRY PROGRESS NOTE - OTHER
denied AVH, description consistent with internal monologue , does not appear IP some difficulty expressing her thoughts  is goal-oriented, honest with struggles of preoccupation about weight gain on Invega and her difficulty with purging  not observed to be paranoid today, denies any current paranoia.  no catalepsy or posturing observed today some difficulty expressing her thoughts at times

## 2024-10-14 NOTE — BH INPATIENT PSYCHIATRY PROGRESS NOTE - NSBHFUPINTERVALCCFT_PSY_A_CORE
"I had some hiccups over the weekend" How Severe Is Your Scar?: severe Is This A New Presentation, Or A Follow-Up?: Scar

## 2024-10-14 NOTE — BH INPATIENT PSYCHIATRY PROGRESS NOTE - CURRENT MEDICATION
MEDICATIONS  (STANDING):  LORazepam     Tablet 1.5 milliGRAM(s) Oral three times a day  paliperidone ER 3 milliGRAM(s) Oral at bedtime  venlafaxine  milliGRAM(s) Oral daily    MEDICATIONS  (PRN):  haloperidol     Tablet 5 milliGRAM(s) Oral every 6 hours PRN agitation/anxiety  ibuprofen  Tablet. 400 milliGRAM(s) Oral every 6 hours PRN Mild Pain (1 - 3), Moderate Pain (4 - 6), Severe Pain (7 - 10)  LORazepam     Tablet 2 milliGRAM(s) Oral every 6 hours PRN agitation  LORazepam   Injectable 2 milliGRAM(s) IntraMuscular every 6 hours PRN agitation  melatonin. 3 milliGRAM(s) Oral at bedtime PRN Insomnia  polyethylene glycol 3350 17 Gram(s) Oral two times a day PRN constipation  senna 2 Tablet(s) Oral at bedtime PRN constipation   MEDICATIONS  (STANDING):  clonazePAM  Tablet 1 milliGRAM(s) Oral three times a day  paliperidone ER 3 milliGRAM(s) Oral at bedtime  venlafaxine  milliGRAM(s) Oral daily    MEDICATIONS  (PRN):  haloperidol     Tablet 5 milliGRAM(s) Oral every 6 hours PRN agitation/anxiety  ibuprofen  Tablet. 400 milliGRAM(s) Oral every 6 hours PRN Mild Pain (1 - 3), Moderate Pain (4 - 6), Severe Pain (7 - 10)  LORazepam     Tablet 2 milliGRAM(s) Oral every 6 hours PRN agitation  LORazepam   Injectable 2 milliGRAM(s) IntraMuscular every 6 hours PRN agitation  melatonin. 3 milliGRAM(s) Oral at bedtime PRN Insomnia  polyethylene glycol 3350 17 Gram(s) Oral two times a day PRN constipation  senna 2 Tablet(s) Oral at bedtime PRN constipation

## 2024-10-14 NOTE — BH INPATIENT PSYCHIATRY PROGRESS NOTE - NSBHMSEAFFQUAL_PSY_A_CORE
is bright, cheerful, making jokes and smiling, somewhat sarcastic and overly cheery when describing her difficult upbringing as a happy [place] family, spontaneously greeting staff members. not elevated, but is highly cheerful and expressive/Euthymic
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
## 2024-10-15 PROCEDURE — 99232 SBSQ HOSP IP/OBS MODERATE 35: CPT

## 2024-10-15 RX ORDER — LORAZEPAM 2 MG
2 TABLET ORAL EVERY 6 HOURS
Refills: 0 | Status: DISCONTINUED | OUTPATIENT
Start: 2024-10-15 | End: 2024-10-22

## 2024-10-15 RX ADMIN — Medication 1 MILLIGRAM(S): at 20:49

## 2024-10-15 RX ADMIN — Medication 400 MILLIGRAM(S): at 18:16

## 2024-10-15 RX ADMIN — PALIPERIDONE 3 MILLIGRAM(S): 6 TABLET, EXTENDED RELEASE ORAL at 20:49

## 2024-10-15 RX ADMIN — Medication 1 MILLIGRAM(S): at 12:04

## 2024-10-15 RX ADMIN — Medication 150 MILLIGRAM(S): at 08:01

## 2024-10-15 RX ADMIN — Medication 400 MILLIGRAM(S): at 02:42

## 2024-10-15 RX ADMIN — Medication 1 MILLIGRAM(S): at 08:01
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
## 2024-10-15 NOTE — BH INPATIENT PSYCHIATRY PROGRESS NOTE - NSBHFUPINTERVALHXFT_PSY_A_CORE
f/u MDD with psychotic features and catatonia, pt discussed with treatment team, no interval events reported overnight. Pt states that the change to klonopin is "better" and feels her anxiety is being addressed for a longer period with the klonopin. Pt states that she is always thinking about food, but is making healthy choices of eating whole fruits and salads. Pt reports +HA x 2 days but attributes this to not having her glasses on the unit. Pt states she will ask her mother to bring her glasses. Pt denies SIIP, HIIP, A/VH. Pt states the paranoia is "under control" today and denies purging. Dispo planning discussed and pt would like individual therapy and is on waitlist for individual therapy in [facility]. Pt agrees to go to groups as an outpt while waiting for individual therapy - will refer to [facility] group when stabilized on recent changes to her medications.  f/u MDD with psychotic features and catatonia, pt discussed with treatment team, no interval events reported overnight. Pt states that the change to Klonopin is "better" and feels her anxiety is being addressed for a longer period with the Klonopin. Pt states that she is always thinking about food, but is making healthy choices of eating whole fruits and salads. Pt reports +HA x 2 days but attributes this to not having her glasses on the unit. Pt states she will ask her mother to bring her glasses. Pt denies SIIP, HIIP, A/VH. Pt states the paranoia is "under control" today and denies purging. Dispo planning discussed and pt would like individual therapy and is on wait list for individual therapy in [facility]. Pt agrees to go to groups as an outpt while waiting for individual therapy - will refer to [facility] when stabilized on recent changes to her medications.

## 2024-10-15 NOTE — BH INPATIENT PSYCHIATRY PROGRESS NOTE - NSBHMETABOLIC_PSY_ALL_CORE_FT
BMI: BMI (kg/m2): 22.7 (09-24-24 @ 19:02)  HbA1c: A1C with Estimated Average Glucose Result: 4.8 % (09-27-24 @ 08:30)    Glucose:   BP: --Vital Signs Last 24 Hrs  T(C): 36.9 (10-15-24 @ 07:15), Max: 36.9 (10-15-24 @ 07:15)  T(F): 98.5 (10-15-24 @ 07:15), Max: 98.5 (10-15-24 @ 07:15)  HR: --  BP: --  BP(mean): --  RR: 17 (10-15-24 @ 07:15) (17 - 17)  SpO2: --    Orthostatic VS  10-15-24 @ 07:15  Lying BP: --/-- HR: --  Sitting BP: 113/68 HR: 85  Standing BP: 108/74 HR: 95  Site: --  Mode: --  Orthostatic VS  10-14-24 @ 08:25  Lying BP: --/-- HR: --  Sitting BP: 119/75 HR: 85  Standing BP: 116/77 HR: 90  Site: --  Mode: --    Lipid Panel: Date/Time: 09-27-24 @ 08:30  Cholesterol, Serum: 167  LDL Cholesterol Calculated: 109  HDL Cholesterol, Serum: 42  Total Cholesterol/HDL Ration Measurement: --  Triglycerides, Serum: 82

## 2024-10-15 NOTE — BH INPATIENT PSYCHIATRY PROGRESS NOTE - CURRENT MEDICATION
MEDICATIONS  (STANDING):  clonazePAM  Tablet 1 milliGRAM(s) Oral three times a day  paliperidone ER 3 milliGRAM(s) Oral at bedtime  venlafaxine  milliGRAM(s) Oral daily    MEDICATIONS  (PRN):  haloperidol     Tablet 5 milliGRAM(s) Oral every 6 hours PRN agitation/anxiety  ibuprofen  Tablet. 400 milliGRAM(s) Oral every 6 hours PRN Mild Pain (1 - 3), Moderate Pain (4 - 6), Severe Pain (7 - 10)  LORazepam     Tablet 2 milliGRAM(s) Oral every 6 hours PRN agitation  LORazepam   Injectable 2 milliGRAM(s) IntraMuscular every 6 hours PRN agitation  melatonin. 3 milliGRAM(s) Oral at bedtime PRN Insomnia  polyethylene glycol 3350 17 Gram(s) Oral two times a day PRN constipation  senna 2 Tablet(s) Oral at bedtime PRN constipation

## 2024-10-15 NOTE — BH INPATIENT PSYCHIATRY PROGRESS NOTE - NSBHMSEMOOD_PSY_A_CORE
subjectively described as "steady," rates it as a 6-7/10 with 10 being the best she has ever felt/Anxious

## 2024-10-15 NOTE — BH INPATIENT PSYCHIATRY PROGRESS NOTE - OTHER
is goal-oriented, honest with struggles of preoccupation about weight gain on Invega and her difficulty with purging  not observed to be paranoid today, denies any current paranoia.  denied AVH, description consistent with internal monologue , does not appear IP

## 2024-10-15 NOTE — BH INPATIENT PSYCHIATRY PROGRESS NOTE - NSBHCHARTREVIEWVS_PSY_A_CORE FT
Vital Signs Last 24 Hrs  T(C): 36.9 (10-15-24 @ 07:15), Max: 36.9 (10-15-24 @ 07:15)  T(F): 98.5 (10-15-24 @ 07:15), Max: 98.5 (10-15-24 @ 07:15)  HR: --  BP: --  BP(mean): --  RR: 17 (10-15-24 @ 07:15) (17 - 17)  SpO2: --    Orthostatic VS  10-15-24 @ 07:15  Lying BP: --/-- HR: --  Sitting BP: 113/68 HR: 85  Standing BP: 108/74 HR: 95  Site: --  Mode: --  Orthostatic VS  10-14-24 @ 08:25  Lying BP: --/-- HR: --  Sitting BP: 119/75 HR: 85  Standing BP: 116/77 HR: 90  Site: --  Mode: --

## 2024-10-16 ENCOUNTER — APPOINTMENT (OUTPATIENT)
Age: 39
End: 2024-10-16

## 2024-10-16 PROCEDURE — D7140: CPT

## 2024-10-16 PROCEDURE — 99238 HOSP IP/OBS DSCHRG MGMT 30/<: CPT

## 2024-10-16 RX ORDER — ACETAMINOPHEN 500 MG
650 TABLET ORAL EVERY 6 HOURS
Refills: 0 | Status: DISCONTINUED | OUTPATIENT
Start: 2024-10-16 | End: 2024-10-22

## 2024-10-16 RX ORDER — IBUPROFEN 200 MG
400 TABLET ORAL
Refills: 0 | Status: DISCONTINUED | OUTPATIENT
Start: 2024-10-16 | End: 2024-10-18

## 2024-10-16 RX ORDER — BENZOCAINE 200 MG/G
1 GEL ORAL EVERY 4 HOURS
Refills: 0 | Status: DISCONTINUED | OUTPATIENT
Start: 2024-10-16 | End: 2024-10-22

## 2024-10-16 RX ORDER — IBUPROFEN 200 MG
400 TABLET ORAL ONCE
Refills: 0 | Status: COMPLETED | OUTPATIENT
Start: 2024-10-16 | End: 2024-10-16

## 2024-10-16 RX ADMIN — Medication 1 MILLIGRAM(S): at 08:07

## 2024-10-16 RX ADMIN — Medication 400 MILLIGRAM(S): at 11:04

## 2024-10-16 RX ADMIN — Medication 2 TABLET(S): at 02:56

## 2024-10-16 RX ADMIN — Medication 400 MILLIGRAM(S): at 11:52

## 2024-10-16 RX ADMIN — Medication 400 MILLIGRAM(S): at 12:00

## 2024-10-16 RX ADMIN — Medication 400 MILLIGRAM(S): at 20:08

## 2024-10-16 RX ADMIN — Medication 1 MILLIGRAM(S): at 20:08

## 2024-10-16 RX ADMIN — Medication 400 MILLIGRAM(S): at 12:52

## 2024-10-16 RX ADMIN — Medication 150 MILLIGRAM(S): at 08:07

## 2024-10-16 RX ADMIN — Medication 1 MILLIGRAM(S): at 12:47

## 2024-10-16 RX ADMIN — PALIPERIDONE 3 MILLIGRAM(S): 6 TABLET, EXTENDED RELEASE ORAL at 20:08

## 2024-10-16 RX ADMIN — BENZOCAINE 1 SPRAY(S): 200 GEL ORAL at 11:06

## 2024-10-16 RX ADMIN — Medication 400 MILLIGRAM(S): at 02:13

## 2024-10-16 NOTE — BH INPATIENT PSYCHIATRY PROGRESS NOTE - NSBHMSEMOOD_PSY_A_CORE
subjectively described as "steady," rates it as a 6-7/10 with 10 being the best she has ever felt/Anxious subjectively described as "steady," rates it as a 7/10 with 10 being the best she has ever felt/Normal

## 2024-10-16 NOTE — BH INPATIENT PSYCHIATRY PROGRESS NOTE - NSCGISEVERILLNESS_PSY_ALL_CORE
3 = Mildly ill – clearly established symptoms with minimal, if any, distress or difficulty in social and occupational function 4 = Moderately ill – overt symptoms causing noticeable, but modest, functional impairment or distress; symptom level may warrant medication

## 2024-10-16 NOTE — BH INPATIENT PSYCHIATRY PROGRESS NOTE - NSBHMSEAFFQUAL_PSY_A_CORE
is bright, cheerful, making jokes and smiling, somewhat sarcastic and overly cheery when describing her difficult upbringing as a happy [place] family, spontaneously greeting staff members. not elevated, but is highly cheerful and expressive/Euthymic smiles appropriately/Euthymic
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
## 2024-10-16 NOTE — BH INPATIENT PSYCHIATRY DISCHARGE NOTE - NSBHFUPINTERVALHXFT_PSY_A_CORE
Discharge Progress Note Date and Time: 10-22-24 @ 12:50  Pt seen for MDD with catatonia and psychotic features. Chart reviewed and case discussed with treatment team. No interval events reported overnight. Pt seen with Dr. Mitchell present. Pt states she is feeling "good" and excited for discharge today. Pt adamantly denies SIIP, HIIP, A/VH, or paranoia. Pt is future oriented and states that her increase in appetite has now plateaued and she is trying to be optimistic that she will not gain an excessive amount of weight on the invega. Pt does state that the invega has greatly improved her paranoia and mood. Pt educated on the use of 911, 988, or going to the nearest ED if safety concerns should arise in the community; pt verbalized  understanding. Pt discharged to her mother's home - pt's mother did not express any safety concerns regarding discharge

## 2024-10-16 NOTE — BH TREATMENT PLAN - NSTXPSYCHOINTERMD_PSY_ALL_CORE
psychopharmacology--caplyta started 9/26/24 and dc 10/9,  invega started 10/9
psychopharmacology
psychopharmacology--caplyta started 9/26/24 and dc 10/9,  invega started 10/9
psychopharmacology--caplyta started 9/26/24

## 2024-10-16 NOTE — BH TREATMENT PLAN - NSCMSPTSTRENGTHS_PSY_ALL_CORE
Intact family/Strong support system/Supportive family
Intact family/Strong support system/Supportive family
Future/goal oriented
Future/goal oriented
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
## 2024-10-16 NOTE — BH INPATIENT PSYCHIATRY DISCHARGE NOTE - NSDCMRMEDTOKEN_GEN_ALL_CORE_FT
clonazePAM 1 mg oral tablet: 1 tab(s) orally 3 times a day MDD: 3mg  venlafaxine 37.5 mg oral tablet: 1 tab(s) orally once a day   clonazePAM 1 mg oral tablet: 1 tab(s) orally 3 times a day MDD: 3 mg  paliperidone 3 mg oral tablet, extended release: 1 tab(s) orally once a day (at bedtime)  venlafaxine 150 mg oral capsule, extended release: 1 cap(s) orally once a day

## 2024-10-16 NOTE — BH TREATMENT PLAN - NSTXDISORGGOAL_PSY_ALL_CORE
Will identify 2 coping skills that assist in organizing
Will make at least 3 goal and reality oriented statements during therapy
Will make at least 3 goal and reality oriented statements during therapy
SHAKIR
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
## 2024-10-16 NOTE — BH TREATMENT PLAN - NSTXPSYCHOINTERPR_PSY_ALL_CORE
Over the past week, Pt made minimal progress towards Psych Rehab goal. Pt was unable to identify any coping skills. Pt continues to be thought block and minimally verbal during the session but cooperative. Pt reports feeling tired with low mood. Pt has been complaint with meds. Pt attended 50% of groups and mostly leaves early from groups. Pt minimally participates with a lot of encouragement. Pt is visible in the unit.  Over the next seven days, Psychiatric rehabilitation staff will continue to provide encouragement, support, psychotherapy, and psychoeducation to assist the Pt in the progression of her treatment goal.
During time of engagement, the patient presented with a calm mood. Patient was dressed in her personal clothes and was groomed.  Upon review, the patient has made progress towards her specified goal to identify 2 coping skills that assist with focus on reality for her psychotic symptoms goal. The patient attended 65% of groups this week. Psychiatric Rehabilitation staff will continue to support patient via 1:1 engagement and encourage programming attendance in effort to facilitate continued progress towards goal. The writer completed the patient’s safety plan during this session.
Writer identified an apt goal for the patient defined in the  plan of care. The patient’s Psychiatric Rehabilitation goal is to identify 2 coping skills that assist with focus on reality for her psychotic symptoms goal. Psychiatric Rehabilitation staff will meet with patient weekly to review progress towards identified goal.
The writer attempted to assess for the patient’s progress in the past seven days, however, the patient minimally engaged with the writer. Per the treatment team, the patient has made no changes towards her specified goal to identify 2 coping skills that assist with focus on reality for her psychotic symptoms goal. The patient attended 23% of groups in the past seven days. The patient minimally adheres to her medication treatment. Psychiatric Rehabilitation staff will continue to support patient via 1:1 engagement and encourage programming attendance in effort to facilitate continued progress towards goal.

## 2024-10-16 NOTE — BH INPATIENT PSYCHIATRY DISCHARGE NOTE - HOSPITAL COURSE
Pt initially catatonic on admission, standing in place, staring at her food, with poor PO intake, a great deal of speech latency, paucity of thought, internal preoccupation, requiring a great deal of redirection. Pt was continued on ativan 2 mg PO TID from medical floor. Pt with paranoia that her food was being tampered with and would only eat sealed foods. Outpt Caplyta was re-started and pt's PO intake improved slightly, but pt remained paranoid. Caplyta was discontinued and invega 3 mg PO QHS was started instead. Pt's catatonia quickly resolved with this and pt began to increase her PO intake. Pt admitted to paranoia on Caplyta and agrees invega was more effective for this. Pt reported ativan was oversedating and this was tapered to 1.5 mg PO TID, but pt then stated this was not holding her anxiety. Ativan was changed to klonopin 1 mg PO TID instead. Pt then began to fixate on weight gain with invega despite a great deal of education. Pt declined ROME invega. Pt began to socialize, attend groups, actively participate, became more verbose, and less paranoid. Pt provided with medication education including, but not limited to, possible side effects like sedation, dizziness, change in liver function levels, weight gain, N/V, GI upset, EPS, TD, NMS, and metabolic changes; pt verbalized understanding. Pt instructed not to drive or use hazardous machinery or materials while on this medication; pt verbalized understanding. On day of discharge, pt denied SIIP, HIIP, A/VH, IOR, paranoia, thought insertion/withdrawal/broadcasting, magical thinking, special abilities, mind reading, Pent[employer]alism preoccupation, sxs of claudia, depression, or anxiety. Pt educated on use of 911 in cases of emergency and to go to the nearest ED if feeling unsafe in the community. Pt verbalized understanding. Pt provided with numbers for Suicide Prevention and [facility] Well and educated on their use; pt verbalized understanding. Pt was educated on the adverse effects these medications may have on a fetus and provided with birth control education; pt verbalized understanding    Pt was discharged on: klonopin 1 mg PO TID, invega 3 mg PO QHS, and effexor 150 mg PO QD Pt initially catatonic on admission, standing in place, staring at her food, with poor PO intake, a great deal of speech latency, paucity of thought, internal preoccupation, requiring a great deal of redirection. Pt was continued on ativan 2 mg PO TID from medical floor. Pt with paranoia that her food was being tampered with and would only eat sealed foods. Outpt Caplyta was re-started and pt's PO intake improved slightly, but pt remained paranoid. Caplyta was discontinued and invega 3 mg PO QHS was started instead. Pt's catatonia quickly resolved with this and pt began to increase her PO intake. Pt admitted to paranoia on Caplyta and agrees invega was more effective for this. Pt reported ativan was oversedating and this was tapered to 1.5 mg PO TID, but pt then stated this was not holding her anxiety. Ativan was changed to klonopin 1 mg PO TID instead. Pt then began to fixate on weight gain with invega despite a great deal of education. Pt declined ROME invega. Pt began to socialize, attend groups, actively participate, became more verbose, and less paranoid. Pt provided with medication education including, but not limited to, possible side effects like sedation, dizziness, change in liver function levels, weight gain, N/V, GI upset, EPS, TD, NMS, and metabolic changes; pt verbalized understanding. Pt instructed not to drive or use hazardous machinery or materials while on this medication; pt verbalized understanding. On day of discharge, pt denied SIIP, HIIP, A/VH, IOR, paranoia, thought insertion/withdrawal/broadcasting, magical thinking, special abilities, mind reading, Protestant preoccupation, sxs of claudia, depression, or anxiety. Pt educated on use of 911 in cases of emergency and to go to the nearest ED if feeling unsafe in the community. Pt verbalized understanding. Pt provided with numbers for Suicide Prevention and [facility] Well and educated on their use; pt verbalized understanding. Pt was educated on the adverse effects these medications may have on a fetus and provided with birth control education; pt verbalized understanding    Pt was discharged on: klonopin 1 mg PO TID, invega 3 mg PO QHS, and effexor 150 mg PO QD (pt reports she has a supply of klonopin and effexor at home and only requires Rx for invega)

## 2024-10-16 NOTE — BH TREATMENT PLAN - NSTXPSYCHOINTERRN_PSY_ALL_CORE
maintain safe environment  encourage med compliance  assure ADLs,nutrition and hydration needs are beeing met
Pt will be able to identify AH and let staff know in order to manage them through either medication or coping skills
Pt will be able to identify AH and let staff know in order to manage them through either medication or coping skills
maintain safe environment  encourage med compliance  assure ADLs,nutrition and hydration needs are beeing met

## 2024-10-16 NOTE — BH INPATIENT PSYCHIATRY DISCHARGE NOTE - DETAILS
Family Hx: states there is a family hx of postpartum depression on both sides of family but declines to comment further, grandmother with seizures unable to elaborate but states it is related to a past hospital admission here

## 2024-10-16 NOTE — BH TREATMENT PLAN - NSTXDCOTHRINTERSW_PSY_ALL_CORE
SW will meet with Patient to provide supportive psychotherapy and psychoeducation towards Patient's goal. SW will encourage Patient to increase involvement in d/c planning as symptoms decrease. SW meet with multidisciplinary team daily for updates on Patient's goal progress.
SW completed the initial  admission assessment.
SW will meet with Patient to provide supportive psychotherapy and psychoeducation towards Patient's goal. SW will encourage Patient to increase involvement in d/c planning as symptoms decrease. SW meet with multidisciplinary team daily for updates on Patient's goal progress.
SW will meet with Patient to provide supportive psychotherapy and psychoeducation towards Patient's goal. SW will encourage Patient to increase involvement in d/c planning as symptoms decrease. SW meet with multidisciplinary team daily for updates on Patient's goal progress.

## 2024-10-16 NOTE — BH INPATIENT PSYCHIATRY PROGRESS NOTE - CURRENT MEDICATION
MEDICATIONS  (STANDING):  clonazePAM  Tablet 1 milliGRAM(s) Oral three times a day  paliperidone ER 3 milliGRAM(s) Oral at bedtime  venlafaxine  milliGRAM(s) Oral daily    MEDICATIONS  (PRN):  haloperidol     Tablet 5 milliGRAM(s) Oral every 6 hours PRN agitation/anxiety  ibuprofen  Tablet. 400 milliGRAM(s) Oral every 6 hours PRN Mild Pain (1 - 3), Moderate Pain (4 - 6), Severe Pain (7 - 10)  LORazepam     Tablet 2 milliGRAM(s) Oral every 6 hours PRN agitation  LORazepam   Injectable 2 milliGRAM(s) IntraMuscular every 6 hours PRN agitation  melatonin. 3 milliGRAM(s) Oral at bedtime PRN Insomnia  polyethylene glycol 3350 17 Gram(s) Oral two times a day PRN constipation  senna 2 Tablet(s) Oral at bedtime PRN constipation   MEDICATIONS  (STANDING):  clonazePAM  Tablet 1 milliGRAM(s) Oral three times a day  ibuprofen  Tablet. 400 milliGRAM(s) Oral once  ibuprofen  Tablet. 400 milliGRAM(s) Oral two times a day  paliperidone ER 3 milliGRAM(s) Oral at bedtime  venlafaxine  milliGRAM(s) Oral daily    MEDICATIONS  (PRN):  benzocaine 20% Spray 1 Spray(s) Topical every 4 hours PRN tooth pain  haloperidol     Tablet 5 milliGRAM(s) Oral every 6 hours PRN agitation/anxiety  LORazepam     Tablet 2 milliGRAM(s) Oral every 6 hours PRN agitation  LORazepam   Injectable 2 milliGRAM(s) IntraMuscular every 6 hours PRN agitation  melatonin. 3 milliGRAM(s) Oral at bedtime PRN Insomnia  polyethylene glycol 3350 17 Gram(s) Oral two times a day PRN constipation  senna 2 Tablet(s) Oral at bedtime PRN constipation   MEDICATIONS  (STANDING):  clonazePAM  Tablet 1 milliGRAM(s) Oral three times a day  ibuprofen  Tablet. 400 milliGRAM(s) Oral two times a day  paliperidone ER 3 milliGRAM(s) Oral at bedtime  venlafaxine  milliGRAM(s) Oral daily    MEDICATIONS  (PRN):  benzocaine 20% Spray 1 Spray(s) Topical every 4 hours PRN tooth pain  haloperidol     Tablet 5 milliGRAM(s) Oral every 6 hours PRN agitation/anxiety  LORazepam     Tablet 2 milliGRAM(s) Oral every 6 hours PRN agitation  LORazepam   Injectable 2 milliGRAM(s) IntraMuscular every 6 hours PRN agitation  melatonin. 3 milliGRAM(s) Oral at bedtime PRN Insomnia  polyethylene glycol 3350 17 Gram(s) Oral two times a day PRN constipation  senna 2 Tablet(s) Oral at bedtime PRN constipation

## 2024-10-16 NOTE — BH INPATIENT PSYCHIATRY PROGRESS NOTE - PRN MEDS
MEDICATIONS  (PRN):  haloperidol     Tablet 5 milliGRAM(s) Oral every 6 hours PRN agitation/anxiety  ibuprofen  Tablet. 400 milliGRAM(s) Oral every 6 hours PRN Mild Pain (1 - 3), Moderate Pain (4 - 6), Severe Pain (7 - 10)  LORazepam     Tablet 2 milliGRAM(s) Oral every 6 hours PRN agitation  LORazepam   Injectable 2 milliGRAM(s) IntraMuscular every 6 hours PRN agitation  melatonin. 3 milliGRAM(s) Oral at bedtime PRN Insomnia  polyethylene glycol 3350 17 Gram(s) Oral two times a day PRN constipation  senna 2 Tablet(s) Oral at bedtime PRN constipation   MEDICATIONS  (PRN):  benzocaine 20% Spray 1 Spray(s) Topical every 4 hours PRN tooth pain  haloperidol     Tablet 5 milliGRAM(s) Oral every 6 hours PRN agitation/anxiety  LORazepam     Tablet 2 milliGRAM(s) Oral every 6 hours PRN agitation  LORazepam   Injectable 2 milliGRAM(s) IntraMuscular every 6 hours PRN agitation  melatonin. 3 milliGRAM(s) Oral at bedtime PRN Insomnia  polyethylene glycol 3350 17 Gram(s) Oral two times a day PRN constipation  senna 2 Tablet(s) Oral at bedtime PRN constipation

## 2024-10-16 NOTE — BH TREATMENT PLAN - NSTXDCOTHRGOAL_PSY_ALL_CORE
Patient's symptoms of catatonia will resolve and she will return to baseline functioning.

## 2024-10-16 NOTE — BH TREATMENT PLAN - NSBHPRIMARYDX_PSY_ALL_CORE
MDD (major depressive disorder), recurrent, severe, with psychosis    

## 2024-10-16 NOTE — BH TREATMENT PLAN - NSTXDCOTHRINTERMD_PSY_ALL_CORE
Med management --klonopin started 9/24 and discontinued 10/1, ativan started 10/1
Med management --klonopin started 9/24 and discontinued 10/1, ativan started 10/1--> switched to klonopin on 10/14
Med management --klonopin started 9/24 and discontinued 10/1, ativan started 10/1
Med management

## 2024-10-16 NOTE — BH INPATIENT PSYCHIATRY DISCHARGE NOTE - NSBHDCMEDICALFT_PSY_A_CORE
pt seen by dental consult for tooth pain on 10/16/24 pt seen by dental consult for tooth pain on 10/16/24, had R lower molar extracted and 2 dissolvable sutures placed

## 2024-10-16 NOTE — BH INPATIENT PSYCHIATRY DISCHARGE NOTE - NSDCPROCEDURESFT_PSY_ALL_CORE
pt c/o dental pain (lower right molarx2) and was seen by dental consult pt c/o dental pain (lower right molarx2) and was seen by dental consult, had tooth extracted and 2 dissolvable sutures placed

## 2024-10-16 NOTE — BH TREATMENT PLAN - NSTXDEPRESINTERRN_PSY_ALL_CORE
provide one on one time with RN q shift x 10 minutes and encourage to verbalize thoughts and feelings  assure ADLs are met
provide one on one time with RN q shift x 10 minutes and encourage to verbalize thoughts and feelings  assure ADLs are met

## 2024-10-16 NOTE — BH INPATIENT PSYCHIATRY PROGRESS NOTE - NSCGIIMPROVESX_PSY_ALL_CORE
1 - Very much improved - nearly all better; good level of functioning; minimal symptoms; represents a very substantial change 3 = Minimally improved - slightly better with little or no clinically meaningful reduction of symptoms.  Represents very little change in basic clinical status, level of care, or functional capacity.

## 2024-10-16 NOTE — BH INPATIENT PSYCHIATRY PROGRESS NOTE - OTHER
denied AVH, description consistent with internal monologue , does not appear IP is goal-oriented, honest with struggles of preoccupation about weight gain on Invega and her difficulty with purging  not observed to be paranoid today, denies any current paranoia.

## 2024-10-16 NOTE — BH INPATIENT PSYCHIATRY PROGRESS NOTE - NSBHMETABOLIC_PSY_ALL_CORE_FT
BMI: BMI (kg/m2): 22.7 (09-24-24 @ 19:02)  HbA1c: A1C with Estimated Average Glucose Result: 4.8 % (09-27-24 @ 08:30)    Glucose:   BP: --Vital Signs Last 24 Hrs  T(C): 37.2 (10-16-24 @ 08:18), Max: 37.2 (10-16-24 @ 08:18)  T(F): 98.9 (10-16-24 @ 08:18), Max: 98.9 (10-16-24 @ 08:18)  HR: --  BP: --  BP(mean): --  RR: --  SpO2: --    Orthostatic VS  10-16-24 @ 08:18  Lying BP: --/-- HR: --  Sitting BP: 107/66 HR: 86  Standing BP: 102/68 HR: 92  Site: --  Mode: --  Orthostatic VS  10-15-24 @ 07:15  Lying BP: --/-- HR: --  Sitting BP: 113/68 HR: 85  Standing BP: 108/74 HR: 95  Site: --  Mode: --    Lipid Panel: Date/Time: 09-27-24 @ 08:30  Cholesterol, Serum: 167  LDL Cholesterol Calculated: 109  HDL Cholesterol, Serum: 42  Total Cholesterol/HDL Ration Measurement: --  Triglycerides, Serum: 82

## 2024-10-16 NOTE — BH TREATMENT PLAN - NSTXDEPRESINTERMD_PSY_ALL_CORE
psychopharmacology--effexor started 9/25/24, ativan started 10/1/24--> switched to klonopin on 10/14
psychopharmacology--effexor started 9/25/24, ativan started 10/1/24
psychopharmacology
psychopharmacology--effexor started 9/25/24, ativan started 10/1/24

## 2024-10-16 NOTE — BH INPATIENT PSYCHIATRY PROGRESS NOTE - NSBHCHARTREVIEWVS_PSY_A_CORE FT
Vital Signs Last 24 Hrs  T(C): 37.2 (10-16-24 @ 08:18), Max: 37.2 (10-16-24 @ 08:18)  T(F): 98.9 (10-16-24 @ 08:18), Max: 98.9 (10-16-24 @ 08:18)  HR: --  BP: --  BP(mean): --  RR: --  SpO2: --    Orthostatic VS  10-16-24 @ 08:18  Lying BP: --/-- HR: --  Sitting BP: 107/66 HR: 86  Standing BP: 102/68 HR: 92  Site: --  Mode: --  Orthostatic VS  10-15-24 @ 07:15  Lying BP: --/-- HR: --  Sitting BP: 113/68 HR: 85  Standing BP: 108/74 HR: 95  Site: --  Mode: --

## 2024-10-16 NOTE — BH INPATIENT PSYCHIATRY PROGRESS NOTE - NSBHMSESPEECH_PSY_A_CORE
no latency. fluent and spontaneous speech/Normal volume, rate, productivity, spontaneity and articulation Normal volume, rate, productivity, spontaneity and articulation

## 2024-10-16 NOTE — BH INPATIENT PSYCHIATRY DISCHARGE NOTE - NSBHDCHANDOFFFT_PSY_ALL_CORE
[facility] email sent to Dr. An including discharge summary, medication list, this writer's contact [phone] for any further questions or concerns
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
## 2024-10-16 NOTE — BH INPATIENT PSYCHIATRY DISCHARGE NOTE - ADULT OR CHILD PROTECTIVE SERVICES INVOLVEMENT
Gastroenterology Clinic Visit    Pb Kim  [id number]  Chief Complaint   Patient presents with   Silvia Curl Consultation     Rectal bleeding x couple months. Usually has this problem when she is stressed out. Excess gas. Notices discharge when she has the gas (mainly at night). Uses Metamucil. HPI: 69 y.o. female presents to the clinic with history of intermittent rectal bleeding since last few months. Bleeding happens only during  bowel movement. No abdominal pain nausea or vomiting. Some soft stool which resolved after patient took fiber supplement Metamucil. Ports occasional excess flatulence and had noticed some mucousy discharge. No history  of any constipation or change in stool caliber. History of voluntary weight loss. No history of fever or chills, no urinary symptoms. Social history does not smoke drinks wine very occasionally, family history is negative for GI neoplasm. Review of Systems   Constitutional: Negative. HENT: Negative. Eyes: Negative. Respiratory: Negative. Cardiovascular: Negative. Gastrointestinal: Positive for blood in stool. Negative for abdominal distention, abdominal pain, anal bleeding, constipation, diarrhea, nausea, rectal pain and vomiting. Endocrine: Negative. Genitourinary: Negative. Musculoskeletal: Positive for back pain. Skin: Negative. Allergic/Immunologic: Negative for food allergies. Neurological: Negative. Hematological: Negative. Psychiatric/Behavioral: Negative.          Past Medical History:   Diagnosis Date    Hypertension     meds > 10 yrs / denies TIA or stroke    Osteoarthritis     DJD right hip    Parkinsonism ([facility] [address]       Past Surgical History:   Procedure Laterality Date     SECTION  1988    DILATION AND CURETTAGE OF UTERUS      AUB    KNEE SURGERY Left     arthrotomy    TOTAL HIP ARTHROPLASTY Right 2017    RIGHT HIP TOTAL HIP ARTHROPLASTY WILLIAM MDM SPINAL  performed by Bonnie Boo MD at [facility]     Current Outpatient Medications on File Prior to Visit   Medication Sig Dispense Refill    Psyllium (METAMUCIL FIBER PO) Take by mouth      Famotidine (PEPCID PO) Take by mouth      spironolactone-hydrochlorothiazide (ALDACTAZIDE) 25-25 MG per tablet Take 2 tablets by mouth daily 180 tablet 1    aspirin 81 MG EC tablet Take 1 tablet by mouth 2 times daily 60 tablet 0    carbidopa-levodopa (SINEMET)  MG per tablet Take 1 tablet by mouth 2 times daily 180 tablet 3     No current facility-administered medications on file prior to visit. Family History   Problem Relation Age of Onset    High Blood Pressure Mother     Emphysema Father     Other Sister         fibromyalgia    Stroke Brother     Heart Disease Brother     Stroke Sister         brain aneurysm at age 43    Other Brother         drowning accident at age 39      Social History     Socioeconomic History    Marital status:      Spouse name: None    Number of children: None    Years of education: None    Highest education level: None   Occupational History    None   Social Needs    Financial resource strain: Not hard at all   [place] insecurity     Worry: Never true     Inability: Never true    Transportation needs     Medical: No     Non-medical: No   Tobacco Use    Smoking status: Never Smoker    Smokeless tobacco: Never Used   Substance and Sexual Activity    Alcohol use:  Yes     Alcohol/week: 1.0 standard drinks     Types: 1 Glasses of wine per week    Drug use: No    Sexual activity: None   Lifestyle    Physical activity     Days per week: None     Minutes per session: None    Stress: None   Relationships    Social connections     Talks on phone: None     Gets together: None     Attends Sabianism service: None     Active member of club or organization: None     Attends meetings of clubs or organizations: None     Relationship status: None    Intimate partner violence     Fear of current or ex partner: None     Emotionally abused: None     Physically abused: None     Forced sexual activity: None   Other Topics Concern    None   Social History Narrative    None       Blood pressure 118/78, pulse 91, resp. rate 14, height 5' 1\" (1.549 m), weight 196 lb (88.9 kg), last menstrual period 08/10/2007, SpO2 98 %, not currently breastfeeding. Physical Exam  Constitutional:       Appearance: She is well-developed. HENT:      Head: Normocephalic and atraumatic. Eyes:      Conjunctiva/sclera: Conjunctivae normal.      Pupils: Pupils are equal, round, and reactive to light. Neck:      Musculoskeletal: Normal range of motion. Cardiovascular:      Rate and Rhythm: Normal rate. Pulmonary:      Effort: Pulmonary effort is normal.   Abdominal:      General: Bowel sounds are normal.      Palpations: Abdomen is soft. Comments: Soft nontender no palpable mass   Musculoskeletal: Normal range of motion. Skin:     General: Skin is warm. Neurological:      Mental Status: She is alert. Laboratory, Pathology, Radiology reviewed indetail with relevant important investigations summarized below:  Lab Results   Component Value Date    WBC 12.3 (H) 08/19/2017    HGB 11.4 (L) 08/19/2017    HCT 34.9 (L) 08/19/2017    MCV 89.9 08/19/2017     08/19/2017     Lab Results   Component Value Date    ALT 8 11/25/2019    AST 15 11/25/2019    ALKPHOS 73 11/25/2019    BILITOT 0.3 11/25/2019       No results found. Endoscopic investigations:     Assessmentand Plan:  69 y.o. female with history of rectal bleeding with mucousy discharge. Rule out hemorrhoids diverticulosis, IBD or neoplasm or vascular ectasia. We will schedule a colonoscopy   Diagnosis Orders   1. Rectal bleeding  Endoscopy, colon, diagnostic     Return in about 5 weeks (around 12/3/2020).     Jose Elias Draper MD   Staff Gastroenterologist  [facility]    Please note this report has been partially produced using speech recognition software and may cause contain errors related to thatsystem including grammar, punctuation and spelling as well as words and phrases that may seem inappropriate. If there are questions or concerns please feel free to contact me to clarify. No

## 2024-10-16 NOTE — BH INPATIENT PSYCHIATRY PROGRESS NOTE - NSBHATTESTBILLING_PSY_A_CORE
99284-Emergency Department visit - moderate complexitytion 41873-Anjifsgufu OBS or IP - moderate complexity OR 35-49 mins

## 2024-10-16 NOTE — BH TREATMENT PLAN - NSTXCAREGIVERPARTICIPATE_PSY_P_CORE
Discharge and education instructions reviewed. Patient verbalized understanding, teach-back successful. Patient denied questions at this time. No acute distress noted. Patient instructed to follow-up as noted - return to emergency department if symptoms worsen. Patient verbalized understanding. Discharged per EDMD with discharge instructions.           Eber Dumont RN  11/17/23 [id number]
Family/Caregiver participated in identification of needs/problems/goals for treatment
Family/Caregiver participated in identification of needs/problems/goals for treatment

## 2024-10-16 NOTE — BH INPATIENT PSYCHIATRY PROGRESS NOTE - ADDITIONAL DETAILS / COMMENTS
no catatonic behavior observed today. remarkably improved. socializing with peers, ambulating around unit catatonia resolved. socializing with peers, ambulating around unit

## 2024-10-17 PROCEDURE — 99232 SBSQ HOSP IP/OBS MODERATE 35: CPT

## 2024-10-17 RX ADMIN — Medication 150 MILLIGRAM(S): at 08:21

## 2024-10-17 RX ADMIN — Medication 400 MILLIGRAM(S): at 08:21

## 2024-10-17 RX ADMIN — Medication 400 MILLIGRAM(S): at 09:21

## 2024-10-17 RX ADMIN — POLYETHYLENE GLYCOL 3350 17 GRAM(S): 17 POWDER, FOR SOLUTION ORAL at 15:48

## 2024-10-17 RX ADMIN — Medication 400 MILLIGRAM(S): at 20:46

## 2024-10-17 RX ADMIN — Medication 1 MILLIGRAM(S): at 20:46

## 2024-10-17 RX ADMIN — Medication 1 MILLIGRAM(S): at 08:21

## 2024-10-17 RX ADMIN — PALIPERIDONE 3 MILLIGRAM(S): 6 TABLET, EXTENDED RELEASE ORAL at 20:47

## 2024-10-17 RX ADMIN — Medication 650 MILLIGRAM(S): at 12:35

## 2024-10-17 RX ADMIN — Medication 650 MILLIGRAM(S): at 13:35

## 2024-10-17 NOTE — BH INPATIENT PSYCHIATRY PROGRESS NOTE - NSBHCHARTREVIEWVS_PSY_A_CORE FT
Vital Signs Last 24 Hrs  T(C): 36.7 (10-17-24 @ 08:27), Max: 36.7 (10-17-24 @ 08:27)  T(F): 98 (10-17-24 @ 08:27), Max: 98 (10-17-24 @ 08:27)  HR: --  BP: --  BP(mean): --  RR: --  SpO2: --    Orthostatic VS  10-17-24 @ 08:27  Lying BP: --/-- HR: --  Sitting BP: 110/63 HR: 80  Standing BP: 103/75 HR: 80  Site: --  Mode: --  Orthostatic VS  10-16-24 @ 08:18  Lying BP: --/-- HR: --  Sitting BP: 107/66 HR: 86  Standing BP: 102/68 HR: 92  Site: --  Mode: --

## 2024-10-17 NOTE — BH INPATIENT PSYCHIATRY PROGRESS NOTE - PRN MEDS
MEDICATIONS  (PRN):  acetaminophen     Tablet .. 650 milliGRAM(s) Oral every 6 hours PRN Moderate Pain (4 - 6)  benzocaine 20% Spray 1 Spray(s) Topical every 4 hours PRN tooth pain  haloperidol     Tablet 5 milliGRAM(s) Oral every 6 hours PRN agitation/anxiety  LORazepam     Tablet 2 milliGRAM(s) Oral every 6 hours PRN agitation  LORazepam   Injectable 2 milliGRAM(s) IntraMuscular every 6 hours PRN agitation  melatonin. 3 milliGRAM(s) Oral at bedtime PRN Insomnia  polyethylene glycol 3350 17 Gram(s) Oral two times a day PRN constipation  senna 2 Tablet(s) Oral at bedtime PRN constipation
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
## 2024-10-17 NOTE — BH INPATIENT PSYCHIATRY PROGRESS NOTE - CURRENT MEDICATION
MEDICATIONS  (STANDING):  clonazePAM  Tablet 1 milliGRAM(s) Oral three times a day  ibuprofen  Tablet. 400 milliGRAM(s) Oral two times a day  paliperidone ER 3 milliGRAM(s) Oral at bedtime  venlafaxine  milliGRAM(s) Oral daily    MEDICATIONS  (PRN):  acetaminophen     Tablet .. 650 milliGRAM(s) Oral every 6 hours PRN Moderate Pain (4 - 6)  benzocaine 20% Spray 1 Spray(s) Topical every 4 hours PRN tooth pain  haloperidol     Tablet 5 milliGRAM(s) Oral every 6 hours PRN agitation/anxiety  LORazepam     Tablet 2 milliGRAM(s) Oral every 6 hours PRN agitation  LORazepam   Injectable 2 milliGRAM(s) IntraMuscular every 6 hours PRN agitation  melatonin. 3 milliGRAM(s) Oral at bedtime PRN Insomnia  polyethylene glycol 3350 17 Gram(s) Oral two times a day PRN constipation  senna 2 Tablet(s) Oral at bedtime PRN constipation

## 2024-10-17 NOTE — BH INPATIENT PSYCHIATRY PROGRESS NOTE - NSBHFUPINTERVALHXFT_PSY_A_CORE
f/u MDD with psychotic features and catatonia, pt discussed with treatment team, VSS, no acute events overnight, pt accepted all scheduled psychotropic medications. Pt seen by dental overnight and had tooth extraction with two dissolving sutures placed. Pt states she did not need PRN pain meds overnight and that the area feels much better. Pt not as focused on food today and states her appetite is closer to normal. Pt able to state that she knows she is not as physically active on the unit. Pt with some residual paranoia that she is better able to reality test today; decline further increase in invega for this given recent changes in meds. Pt denies any SI, HI, hopelessness, or AVH. States she has attended all groups. Projected discharge next week

## 2024-10-17 NOTE — BH INPATIENT PSYCHIATRY PROGRESS NOTE - NSBHMETABOLIC_PSY_ALL_CORE_FT
BMI: BMI (kg/m2): 22.7 (09-24-24 @ 19:02)  HbA1c: A1C with Estimated Average Glucose Result: 4.8 % (09-27-24 @ 08:30)    Glucose:   BP: --Vital Signs Last 24 Hrs  T(C): 36.7 (10-17-24 @ 08:27), Max: 36.7 (10-17-24 @ 08:27)  T(F): 98 (10-17-24 @ 08:27), Max: 98 (10-17-24 @ 08:27)  HR: --  BP: --  BP(mean): --  RR: --  SpO2: --    Orthostatic VS  10-17-24 @ 08:27  Lying BP: --/-- HR: --  Sitting BP: 110/63 HR: 80  Standing BP: 103/75 HR: 80  Site: --  Mode: --  Orthostatic VS  10-16-24 @ 08:18  Lying BP: --/-- HR: --  Sitting BP: 107/66 HR: 86  Standing BP: 102/68 HR: 92  Site: --  Mode: --    Lipid Panel: Date/Time: 09-27-24 @ 08:30  Cholesterol, Serum: 167  LDL Cholesterol Calculated: 109  HDL Cholesterol, Serum: 42  Total Cholesterol/HDL Ration Measurement: --  Triglycerides, Serum: 82

## 2024-10-17 NOTE — BH INPATIENT PSYCHIATRY PROGRESS NOTE - NSBHMSEMOOD_PSY_A_CORE
subjectively described as "steady," rates it as a 7/10 with 10 being the best she has ever felt/Normal

## 2024-10-18 PROCEDURE — 99232 SBSQ HOSP IP/OBS MODERATE 35: CPT

## 2024-10-18 RX ORDER — VENLAFAXINE HCL 150 MG
1 CAPSULE, EXT RELEASE 24 HR ORAL
Qty: 14 | Refills: 0
Start: 2024-10-18 | End: 2024-10-31

## 2024-10-18 RX ORDER — IBUPROFEN 200 MG
400 TABLET ORAL EVERY 6 HOURS
Refills: 0 | Status: DISCONTINUED | OUTPATIENT
Start: 2024-10-18 | End: 2024-10-22

## 2024-10-18 RX ORDER — PALIPERIDONE 6 MG/1
1 TABLET, EXTENDED RELEASE ORAL
Qty: 14 | Refills: 0
Start: 2024-10-18 | End: 2024-10-31

## 2024-10-18 RX ORDER — CLONAZEPAM 1 MG
1 TABLET ORAL
Qty: 42 | Refills: 0
Start: 2024-10-18 | End: 2024-10-31

## 2024-10-18 RX ADMIN — Medication 2 TABLET(S): at 21:38

## 2024-10-18 RX ADMIN — Medication 1 MILLIGRAM(S): at 12:58

## 2024-10-18 RX ADMIN — Medication 400 MILLIGRAM(S): at 08:12

## 2024-10-18 RX ADMIN — PALIPERIDONE 3 MILLIGRAM(S): 6 TABLET, EXTENDED RELEASE ORAL at 20:37

## 2024-10-18 RX ADMIN — Medication 1 MILLIGRAM(S): at 20:37

## 2024-10-18 RX ADMIN — Medication 1 MILLIGRAM(S): at 08:12

## 2024-10-18 RX ADMIN — Medication 150 MILLIGRAM(S): at 08:13

## 2024-10-18 NOTE — BH INPATIENT PSYCHIATRY PROGRESS NOTE - NSBHMSEMOOD_PSY_A_CORE
subjectively described as "steady," rates it as a 7/10 with 10 being the best she has ever felt/Normal subjectively described as "fine"/Normal

## 2024-10-18 NOTE — BH INPATIENT PSYCHIATRY PROGRESS NOTE - NSBHATTESTTYPESTAFF_PSY_A_CORE
Student
Student
Resident/Student
Student

## 2024-10-18 NOTE — BH INPATIENT PSYCHIATRY PROGRESS NOTE - NSBHMETABOLIC_PSY_ALL_CORE_FT
BMI: BMI (kg/m2): 22.7 (09-24-24 @ 19:02)  HbA1c: A1C with Estimated Average Glucose Result: 4.8 % (09-27-24 @ 08:30)    Glucose:   BP: --Vital Signs Last 24 Hrs  T(C): 36.8 (10-18-24 @ 07:33), Max: 36.8 (10-18-24 @ 07:33)  T(F): 98.3 (10-18-24 @ 07:33), Max: 98.3 (10-18-24 @ 07:33)  HR: --  BP: --  BP(mean): --  RR: 18 (10-18-24 @ 07:33) (18 - 18)  SpO2: --    Orthostatic VS  10-18-24 @ 07:33  Lying BP: --/-- HR: --  Sitting BP: 113/66 HR: 79  Standing BP: 107/69 HR: 80  Site: --  Mode: --  Orthostatic VS  10-17-24 @ 08:27  Lying BP: --/-- HR: --  Sitting BP: 110/63 HR: 80  Standing BP: 103/75 HR: 80  Site: --  Mode: --    Lipid Panel: Date/Time: 09-27-24 @ 08:30  Cholesterol, Serum: 167  LDL Cholesterol Calculated: 109  HDL Cholesterol, Serum: 42  Total Cholesterol/HDL Ration Measurement: --  Triglycerides, Serum: 82
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
## 2024-10-18 NOTE — BH INPATIENT PSYCHIATRY PROGRESS NOTE - ADDITIONAL DETAILS / COMMENTS
catatonia resolved. socializing with peers, ambulating around unit catatonia resolved. socializing with peers, ambulating around unit, attending groups

## 2024-10-18 NOTE — BH INPATIENT PSYCHIATRY PROGRESS NOTE - NSBHFUPINTERVALHXFT_PSY_A_CORE
f/u MDD with psychotic features and catatonia, pt discussed in team meeting, RAPHAEL TUCKER, accepted all scheduled psychotropic medications, documented restful sleep overnight  	Pt seen this AM and preferred to talk in her room because she does not like that others can hear through the conference room finch. Pt shares that she feels well rested, her appetite is okay, and she describes her mood as "fine." She states that she is no longer having tooth pain and feels better after extraction. Feels that her current scheduled motrin and prn tylenol are adequately alleviating any residual pain. Pt shares that she is feeling constipated and uncomfortable , states miralax was not helpful, states that Senna was much more helpful for her in the past. Pt describes small, hard, difficult to pass stools, which is not normal for her outside of the hospital. Pt provided education regarding constipation, especially in hospital setting, and encouraged to try senna again tonight f/u MDD with psychotic features and catatonia, pt discussed in team meeting, RAPHAEL TUCKER, accepted all scheduled psychotropic medications, documented restful sleep overnight  	Pt seen this AM and preferred to talk in her room because she does not like that others can hear through the conference room finch. Pt shares that she feels well rested, her appetite is okay, and denies any purging or restricting. She describes her mood as "fine." She states that she is no longer having tooth pain and feels better after extraction. Feels that her current scheduled Motrin and prn Tylenol have adequately alleviated any residual pain. Pt shares that she is feeling constipated and uncomfortable , states Miralax was not helpful, states that Senna was much more helpful for her in the past. Pt describes small, hard, difficult to pass stools, which is not normal for her outside of the hospital. Pt provided education regarding constipation, especially in hospital setting, and encouraged to try senna again tonight and expressed understanding. Pt is discharge focused and shares that she plans to continue her medications and follow up with Dr. Neely outpatient.  	Pt denies feeling paranoid or any feelings that she is being watched, surveilled, controlled, etc. Denies AVH or hearing "outside voices." Denies SI or HI, denies hopelessness. f/u MDD with psychotic features and catatonia, pt discussed in team meeting, RAPHAEL TUCKER, accepted all scheduled psychotropic medications, documented restful sleep overnight  	Pt seen this AM and preferred to talk in her room because she does not like that others can hear through the conference room finch. Pt shares that she feels well rested, her appetite is okay, and denies any purging or restricting. She describes her mood as "fine." She states that she is no longer having tooth pain and feels better after extraction. Feels that her current scheduled Motrin and prn Tylenol have adequately alleviated any residual pain. Pt shares that she is feeling constipated and uncomfortable , states Miralax was not helpful, states that Senna was much more helpful for her in the past. Pt describes small, hard, difficult to pass stools, which is not normal for her outside of the hospital. Pt provided education regarding constipation, especially in hospital setting, and encouraged to try senna again tonight and expressed understanding. Pt is discharge focused and shares that she plans to continue her medications and follow up with Dr. Neely outpatient.  	Pt denies feeling paranoid or any feelings that she is being watched, surveilled, controlled, etc. Denies AVH or hearing "outside voices." Denies SI or HI, denies hopelessness. Pt reports having adequate supply of klonopin at home and states she will not need this upon discharge (confirmed with [facility])

## 2024-10-18 NOTE — BH INPATIENT PSYCHIATRY PROGRESS NOTE - OTHER
denied AVH, description consistent with internal monologue , does not appear IP is goal-oriented, honest with struggles of preoccupation about weight gain on Invega and her difficulty with purging  not observed to be paranoid today, denies any current paranoia.  denies AVH, does not appear IP is goal-oriented, discharge focused, not observed to be paranoid today, denies any current paranoia.

## 2024-10-18 NOTE — BH INPATIENT PSYCHIATRY PROGRESS NOTE - CURRENT MEDICATION
MEDICATIONS  (STANDING):  clonazePAM  Tablet 1 milliGRAM(s) Oral three times a day  ibuprofen  Tablet. 400 milliGRAM(s) Oral two times a day  paliperidone ER 3 milliGRAM(s) Oral at bedtime  venlafaxine  milliGRAM(s) Oral daily    MEDICATIONS  (PRN):  acetaminophen     Tablet .. 650 milliGRAM(s) Oral every 6 hours PRN Moderate Pain (4 - 6)  benzocaine 20% Spray 1 Spray(s) Topical every 4 hours PRN tooth pain  haloperidol     Tablet 5 milliGRAM(s) Oral every 6 hours PRN agitation/anxiety  LORazepam     Tablet 2 milliGRAM(s) Oral every 6 hours PRN agitation  LORazepam   Injectable 2 milliGRAM(s) IntraMuscular every 6 hours PRN agitation  melatonin. 3 milliGRAM(s) Oral at bedtime PRN Insomnia  polyethylene glycol 3350 17 Gram(s) Oral two times a day PRN constipation  senna 2 Tablet(s) Oral at bedtime PRN constipation   MEDICATIONS  (STANDING):  clonazePAM  Tablet 1 milliGRAM(s) Oral three times a day  paliperidone ER 3 milliGRAM(s) Oral at bedtime  venlafaxine  milliGRAM(s) Oral daily    MEDICATIONS  (PRN):  acetaminophen     Tablet .. 650 milliGRAM(s) Oral every 6 hours PRN Moderate Pain (4 - 6)  benzocaine 20% Spray 1 Spray(s) Topical every 4 hours PRN tooth pain  haloperidol     Tablet 5 milliGRAM(s) Oral every 6 hours PRN agitation/anxiety  ibuprofen  Tablet. 400 milliGRAM(s) Oral every 6 hours PRN Moderate Pain (4 - 6)  LORazepam     Tablet 2 milliGRAM(s) Oral every 6 hours PRN agitation  LORazepam   Injectable 2 milliGRAM(s) IntraMuscular every 6 hours PRN agitation  melatonin. 3 milliGRAM(s) Oral at bedtime PRN Insomnia  polyethylene glycol 3350 17 Gram(s) Oral two times a day PRN constipation  senna 2 Tablet(s) Oral at bedtime PRN constipation

## 2024-10-18 NOTE — BH INPATIENT PSYCHIATRY PROGRESS NOTE - PRN MEDS
MEDICATIONS  (PRN):  acetaminophen     Tablet .. 650 milliGRAM(s) Oral every 6 hours PRN Moderate Pain (4 - 6)  benzocaine 20% Spray 1 Spray(s) Topical every 4 hours PRN tooth pain  haloperidol     Tablet 5 milliGRAM(s) Oral every 6 hours PRN agitation/anxiety  LORazepam     Tablet 2 milliGRAM(s) Oral every 6 hours PRN agitation  LORazepam   Injectable 2 milliGRAM(s) IntraMuscular every 6 hours PRN agitation  melatonin. 3 milliGRAM(s) Oral at bedtime PRN Insomnia  polyethylene glycol 3350 17 Gram(s) Oral two times a day PRN constipation  senna 2 Tablet(s) Oral at bedtime PRN constipation   MEDICATIONS  (PRN):  acetaminophen     Tablet .. 650 milliGRAM(s) Oral every 6 hours PRN Moderate Pain (4 - 6)  benzocaine 20% Spray 1 Spray(s) Topical every 4 hours PRN tooth pain  haloperidol     Tablet 5 milliGRAM(s) Oral every 6 hours PRN agitation/anxiety  ibuprofen  Tablet. 400 milliGRAM(s) Oral every 6 hours PRN Moderate Pain (4 - 6)  LORazepam     Tablet 2 milliGRAM(s) Oral every 6 hours PRN agitation  LORazepam   Injectable 2 milliGRAM(s) IntraMuscular every 6 hours PRN agitation  melatonin. 3 milliGRAM(s) Oral at bedtime PRN Insomnia  polyethylene glycol 3350 17 Gram(s) Oral two times a day PRN constipation  senna 2 Tablet(s) Oral at bedtime PRN constipation

## 2024-10-18 NOTE — BH INPATIENT PSYCHIATRY PROGRESS NOTE - NSBHCHARTREVIEWVS_PSY_A_CORE FT
Vital Signs Last 24 Hrs  T(C): 36.8 (10-18-24 @ 07:33), Max: 36.8 (10-18-24 @ 07:33)  T(F): 98.3 (10-18-24 @ 07:33), Max: 98.3 (10-18-24 @ 07:33)  HR: --  BP: --  BP(mean): --  RR: 18 (10-18-24 @ 07:33) (18 - 18)  SpO2: --    Orthostatic VS  10-18-24 @ 07:33  Lying BP: --/-- HR: --  Sitting BP: 113/66 HR: 79  Standing BP: 107/69 HR: 80  Site: --  Mode: --  Orthostatic VS  10-17-24 @ 08:27  Lying BP: --/-- HR: --  Sitting BP: 110/63 HR: 80  Standing BP: 103/75 HR: 80  Site: --  Mode: --

## 2024-10-19 RX ADMIN — Medication 1 MILLIGRAM(S): at 20:28

## 2024-10-19 RX ADMIN — Medication 1 MILLIGRAM(S): at 09:06

## 2024-10-19 RX ADMIN — Medication 150 MILLIGRAM(S): at 09:06

## 2024-10-19 RX ADMIN — PALIPERIDONE 3 MILLIGRAM(S): 6 TABLET, EXTENDED RELEASE ORAL at 20:28

## 2024-10-19 RX ADMIN — Medication 1 MILLIGRAM(S): at 12:40

## 2024-10-20 RX ADMIN — Medication 1 MILLIGRAM(S): at 20:05

## 2024-10-20 RX ADMIN — Medication 150 MILLIGRAM(S): at 08:41

## 2024-10-20 RX ADMIN — Medication 1 MILLIGRAM(S): at 08:41

## 2024-10-20 RX ADMIN — PALIPERIDONE 3 MILLIGRAM(S): 6 TABLET, EXTENDED RELEASE ORAL at 20:04

## 2024-10-20 RX ADMIN — Medication 3 MILLIGRAM(S): at 02:20

## 2024-10-20 RX ADMIN — Medication 650 MILLIGRAM(S): at 10:33

## 2024-10-20 RX ADMIN — Medication 1 MILLIGRAM(S): at 12:25

## 2024-10-21 PROCEDURE — 99232 SBSQ HOSP IP/OBS MODERATE 35: CPT

## 2024-10-21 RX ADMIN — Medication 150 MILLIGRAM(S): at 08:27

## 2024-10-21 RX ADMIN — Medication 3 MILLIGRAM(S): at 03:13

## 2024-10-21 RX ADMIN — Medication 1 MILLIGRAM(S): at 13:40

## 2024-10-21 RX ADMIN — Medication 1 MILLIGRAM(S): at 08:28

## 2024-10-21 RX ADMIN — Medication 2 TABLET(S): at 21:36

## 2024-10-21 RX ADMIN — PALIPERIDONE 3 MILLIGRAM(S): 6 TABLET, EXTENDED RELEASE ORAL at 20:21

## 2024-10-21 RX ADMIN — Medication 1 MILLIGRAM(S): at 20:21

## 2024-10-21 NOTE — BH INPATIENT PSYCHIATRY PROGRESS NOTE - NSTXDISORGDATETRGT_PSY_ALL_CORE
10-Oct-2024
23-Oct-2024
10-Oct-2024

## 2024-10-21 NOTE — BH INPATIENT PSYCHIATRY PROGRESS NOTE - NSBHMSETHTASSOC_PSY_A_CORE
Spinal Block    Patient location during procedure: OR  End time: 1/4/2024 11:25 AM  Reason for block: procedure for pain, post-op pain management, primary anesthetic and at surgeon's request  Staffing  Performed: resident/CRNA and anesthesiologist   Anesthesiologist: Henry Pastor MD  Resident/CRNA: Nadeem Foster APRN - CRNA  Performed by: Nadeem Foster APRN - CRNA  Authorized by: Henry Pastor MD    Spinal Block  Patient position: sitting  Prep: ChloraPrep  Patient monitoring: cardiac monitor, continuous pulse ox, frequent blood pressure checks and oxygen  Approach: midline  Location: L4/L5  Provider prep: mask and sterile gloves  Local infiltration: lidocaine  Needle  Needle type: Quincke   Needle gauge: 22 G  Needle length: 3.5 in  Assessment  Sensory level: T6  Swirl obtained: Yes  CSF: clear  Attempts: 2  Hemodynamics: stable  Preanesthetic Checklist  Completed: patient identified, IV checked, site marked, risks and benefits discussed, surgical/procedural consents, equipment checked, pre-op evaluation, timeout performed, anesthesia consent given, oxygen available, monitors applied/VS acknowledged, fire risk safety assessment completed and verbalized and blood product R/B/A discussed and consented           Normal

## 2024-10-21 NOTE — BH INPATIENT PSYCHIATRY PROGRESS NOTE - NSTXPSYCHOPROGRES_PSY_ALL_CORE
No Change
No Change
Improving
No Change
Improving
Improving
No Change
Improving
No Change
No Change
Improving
No Change
No Change
Improving
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
## 2024-10-21 NOTE — BH INPATIENT PSYCHIATRY PROGRESS NOTE - NSTXPSYCHODATETRGT_PSY_ALL_CORE
22-Oct-2024
22-Oct-2024
15-Oct-2024
15-Oct-2024
02-Oct-2024
22-Oct-2024
15-Oct-2024
02-Oct-2024
10-Oct-2024
02-Oct-2024
15-Oct-2024
22-Oct-2024
22-Oct-2024
02-Oct-2024
08-Oct-2024
02-Oct-2024
02-Oct-2024
10-Oct-2024
10-Oct-2024
08-Oct-2024

## 2024-10-21 NOTE — BH INPATIENT PSYCHIATRY PROGRESS NOTE - NSBHATTESTATTENDBILLTIME_PSY_A_CORE
I independently performed the documented

## 2024-10-21 NOTE — BH INPATIENT PSYCHIATRY PROGRESS NOTE - NSDCCRITERIA_PSY_ALL_CORE
Neonatology
CGI<3
Neonatology
Neonatology
CGI<3
Neonatology
CGI<3

## 2024-10-21 NOTE — BH INPATIENT PSYCHIATRY PROGRESS NOTE - NSICDXBHPRIMARYDX_PSY_ALL_CORE
MDD (major depressive disorder), recurrent, severe, with psychosis   F33.3  

## 2024-10-21 NOTE — BH INPATIENT PSYCHIATRY PROGRESS NOTE - NSTXDISORGPROGRES_PSY_ALL_CORE
Improving
No Change
No Change
Improving
No Change
Improving

## 2024-10-21 NOTE — BH INPATIENT PSYCHIATRY PROGRESS NOTE - NSTXPSYCHOGOAL_PSY_ALL_CORE
Will identify 2 coping skills that assist with focus on reality
Will identify 2 coping skills that assist with focus on reality
Will engage in a 15 minute conversation with no irrational content
Will identify 2 coping skills that assist with focus on reality
Will identify 2 coping skills that assist with focus on reality
Will engage in a 15 minute conversation with no irrational content
Will identify 2 coping skills that assist with focus on reality
Will engage in a 15 minute conversation with no irrational content
Will identify 2 coping skills that assist with focus on reality

## 2024-10-21 NOTE — BH INPATIENT PSYCHIATRY PROGRESS NOTE - NSBHCHARTREVIEWVS_PSY_A_CORE FT
Vital Signs Last 24 Hrs  T(C): 36.7 (10-21-24 @ 08:22), Max: 36.7 (10-21-24 @ 08:22)  T(F): 98.1 (10-21-24 @ 08:22), Max: 98.1 (10-21-24 @ 08:22)  HR: --  BP: --  BP(mean): --  RR: --  SpO2: --    Orthostatic VS  10-21-24 @ 08:22  Lying BP: --/-- HR: --  Sitting BP: 116/76 HR: 80  Standing BP: 115/74 HR: 87  Site: --  Mode: --  Orthostatic VS  10-20-24 @ 07:37  Lying BP: --/-- HR: --  Sitting BP: 101/67 HR: 78  Standing BP: 99/64 HR: 86  Site: --  Mode: --

## 2024-10-21 NOTE — BH INPATIENT PSYCHIATRY PROGRESS NOTE - NSTXDCOTHRGOAL_PSY_ALL_CORE
Patient's symptoms of catatonia will resolve and she will return to baseline functioning.

## 2024-10-21 NOTE — BH INPATIENT PSYCHIATRY PROGRESS NOTE - NSTXDCOTHRDATEEST_PSY_ALL_CORE
25-Sep-2024
09-Oct-2024
09-Oct-2024
16-Oct-2024
02-Oct-2024
25-Sep-2024
25-Sep-2024
09-Oct-2024
25-Sep-2024
09-Oct-2024
25-Sep-2024
02-Oct-2024
25-Sep-2024
16-Oct-2024
25-Sep-2024
09-Oct-2024
16-Oct-2024
09-Oct-2024
02-Oct-2024
02-Oct-2024

## 2024-10-21 NOTE — BH INPATIENT PSYCHIATRY PROGRESS NOTE - NSTXDCOTHRINTERMD_PSY_ALL_CORE
Med management --klonopin started 9/24 and discontinued 10/1, ativan started 10/1
Med management --klonopin started 9/24 and discontinued 10/1, ativan started 10/1--> switched to klonopin on 10/14
Med management 
Med management --klonopin started 9/24 and discontinued 10/1, ativan started 10/1
Med management --klonopin started 9/24 and discontinued 10/1, ativan started 10/1--> switched to klonopin on 10/14
Med management 
Med management --klonopin started 9/24 and discontinued 10/1, ativan started 10/1
Med management --klonopin started 9/24 and discontinued 10/1, ativan started 10/1--> switched to klonopin on 10/14
Med management --klonopin started 9/24 and discontinued 10/1, ativan started 10/1--> switched to klonopin on 10/14
Med management 
Med management --klonopin started 9/24 and discontinued 10/1, ativan started 10/1--> switched to klonopin on 10/14
Med management --klonopin started 9/24 and discontinued 10/1, ativan started 10/1
Med management --klonopin started 9/24 and discontinued 10/1, ativan started 10/1--> switched to klonopin on 10/14

## 2024-10-21 NOTE — BH INPATIENT PSYCHIATRY PROGRESS NOTE - NSTXDISORGINTERMD_PSY_ALL_CORE
caplyta started 9/26/24 and dc 10/9, invega started 10/9
caplyta started 9/26/24 and dc 10/9, invega started 10/9
caplyta started 9/26/24
caplyta started 9/26/24 and dc 10/9, invega started 10/9
caplyta started 9/26/24
caplyta started 9/26/24 and dc 10/9, invega started 10/9
caplyta started 9/26/24 and dc 10/9, invega started 10/9
caplyta started 9/26/24
caplyta started 9/26/24
caplyta started 9/26/24 and dc 10/9, invega started 10/9
caplyta started 9/26/24
caplyta started 9/26/24
caplyta started 9/26/24 and dc 10/9, invega started 10/9

## 2024-10-21 NOTE — BH INPATIENT PSYCHIATRY PROGRESS NOTE - NSTXDEPRESDATETRGT_PSY_ALL_CORE
10-Oct-2024
03-Oct-2024
10-Oct-2024
03-Oct-2024
10-Oct-2024
10-Oct-2024
03-Oct-2024
10-Oct-2024
03-Oct-2024
10-Oct-2024
03-Oct-2024
10-Oct-2024
03-Oct-2024
23-Oct-2024
10-Oct-2024
10-Oct-2024

## 2024-10-21 NOTE — BH INPATIENT PSYCHIATRY PROGRESS NOTE - NSBHTIMEACTIVITIESPERFORMED_PSY_A_CORE
med management, med education, dispo planning 
medication management, education, dispo planning 
dental consult, med changes, med education, assessment 
psychoed, extensive medication education 
med education, psychoed, support 
dental consult, med changes, med education, assessment 
med changes, med education, assessment
med management, med education, dispo planning 
medication changes, med education, dispo planning, psychoed 
med education, med changes, nutrition education, psychoed, therapy 
medication management, education, dispo planning

## 2024-10-21 NOTE — BH INPATIENT PSYCHIATRY PROGRESS NOTE - NSBHFUPMEDSE_PSY_A_CORE
None known
Yes
None known
None known
Yes
None known

## 2024-10-21 NOTE — BH INPATIENT PSYCHIATRY PROGRESS NOTE - NSTXPROBDEPRES_PSY_ALL_CORE
DEPRESSIVE SYMPTOMS

## 2024-10-21 NOTE — BH INPATIENT PSYCHIATRY PROGRESS NOTE - NSTXPSYCHOINTERMD_PSY_ALL_CORE
psychopharmacology--caplyta started 9/26/24
psychopharmacology--caplyta started 9/26/24 and dc 10/9,  invega started 10/9
psychopharmacology
psychopharmacology
psychopharmacology--caplyta started 9/26/24 and dc 10/9,  invega started 10/9
psychopharmacology--caplyta started 9/26/24
psychopharmacology
psychopharmacology--caplyta started 9/26/24 and dc 10/9,  invega started 10/9
psychopharmacology
psychopharmacology--caplyta started 9/26/24
psychopharmacology--caplyta started 9/26/24 and dc 10/9,  invega started 10/9
psychopharmacology--caplyta started 9/26/24 and dc 10/9,  invega started 10/9
psychopharmacology
psychopharmacology--caplyta started 9/26/24 and dc 10/9,  invega started 10/9
psychopharmacology--caplyta started 9/26/24 and dc 10/9,  invega started 10/9
psychopharmacology--caplyta started 9/26/24
psychopharmacology--caplyta started 9/26/24
psychopharmacology--caplyta started 9/26/24 and dc 10/9,  invega started 10/9
psychopharmacology--caplyta started 9/26/24
psychopharmacology--caplyta started 9/26/24 and dc 10/9,  invega started 10/9

## 2024-10-21 NOTE — BH INPATIENT PSYCHIATRY PROGRESS NOTE - NSBHMETABOLIC_PSY_ALL_CORE_FT
BMI: BMI (kg/m2): 22.7 (09-24-24 @ 19:02)  HbA1c: A1C with Estimated Average Glucose Result: 4.8 % (09-27-24 @ 08:30)    Glucose:   BP: --Vital Signs Last 24 Hrs  T(C): 36.7 (10-21-24 @ 08:22), Max: 36.7 (10-21-24 @ 08:22)  T(F): 98.1 (10-21-24 @ 08:22), Max: 98.1 (10-21-24 @ 08:22)  HR: --  BP: --  BP(mean): --  RR: --  SpO2: --    Orthostatic VS  10-21-24 @ 08:22  Lying BP: --/-- HR: --  Sitting BP: 116/76 HR: 80  Standing BP: 115/74 HR: 87  Site: --  Mode: --  Orthostatic VS  10-20-24 @ 07:37  Lying BP: --/-- HR: --  Sitting BP: 101/67 HR: 78  Standing BP: 99/64 HR: 86  Site: --  Mode: --    Lipid Panel: Date/Time: 09-27-24 @ 08:30  Cholesterol, Serum: 167  LDL Cholesterol Calculated: 109  HDL Cholesterol, Serum: 42  Total Cholesterol/HDL Ration Measurement: --  Triglycerides, Serum: 82   BMI: BMI (kg/m2): 22.2 (10-12-24 @ 08:46)  HbA1c: A1C with Estimated Average Glucose Result: 4.8 % (09-27-24 @ 08:30)    Glucose:   BP: --Vital Signs Last 24 Hrs  T(C): 36.7 (10-21-24 @ 08:22), Max: 36.7 (10-21-24 @ 08:22)  T(F): 98.1 (10-21-24 @ 08:22), Max: 98.1 (10-21-24 @ 08:22)  HR: --  BP: --  BP(mean): --  RR: --  SpO2: --    Orthostatic VS  10-21-24 @ 08:22  Lying BP: --/-- HR: --  Sitting BP: 116/76 HR: 80  Standing BP: 115/74 HR: 87  Site: --  Mode: --  Orthostatic VS  10-20-24 @ 07:37  Lying BP: --/-- HR: --  Sitting BP: 101/67 HR: 78  Standing BP: 99/64 HR: 86  Site: --  Mode: --    Lipid Panel: Date/Time: 09-27-24 @ 08:30  Cholesterol, Serum: 167  LDL Cholesterol Calculated: 109  HDL Cholesterol, Serum: 42  Total Cholesterol/HDL Ration Measurement: --  Triglycerides, Serum: 82

## 2024-10-21 NOTE — BH INPATIENT PSYCHIATRY PROGRESS NOTE - NS ED B[facility] REVIEW OF ED CHART AVAILABLE LABS REVIEWED
None available
Yes
None available

## 2024-10-21 NOTE — BH INPATIENT PSYCHIATRY PROGRESS NOTE - NSBHMSEGAIT_PSY_A_CORE
Other
Normal gait / station
Other
Normal gait / station
Other
Normal gait / station
Other
Normal gait / station
Other
Other
Normal gait / station

## 2024-10-21 NOTE — BH INPATIENT PSYCHIATRY PROGRESS NOTE - NSTXDEPRESINTERMD_PSY_ALL_CORE
psychopharmacology
psychopharmacology--effexor started 9/25/24, ativan started 10/1/24--> switched to klonopin on 10/14
psychopharmacology
psychopharmacology--effexor started 9/25/24, ativan started 10/1/24--> switched to klonopin on 10/14
psychopharmacology
psychopharmacology--effexor started 9/25/24, ativan started 10/1/24--> switched to klonopin on 10/14
psychopharmacology--effexor started 9/25/24, ativan started 10/1/24--> switched to klonopin on 10/14
psychopharmacology--effexor started 9/25/24, ativan started 10/1/24
psychopharmacology
psychopharmacology--effexor started 9/25/24, ativan started 10/1/24
psychopharmacology
psychopharmacology--effexor started 9/25/24, ativan started 10/1/24
psychopharmacology--effexor started 9/25/24, ativan started 10/1/24--> switched to klonopin on 10/14
psychopharmacology--effexor started 9/25/24, ativan started 10/1/24--> switched to klonopin on 10/14
psychopharmacology--effexor started 9/25/24, ativan started 10/1/24

## 2024-10-21 NOTE — BH INPATIENT PSYCHIATRY PROGRESS NOTE - NSTXDEPRESDATEEST_PSY_ALL_CORE
25-Sep-2024

## 2024-10-21 NOTE — BH INPATIENT PSYCHIATRY PROGRESS NOTE - PRN MEDS
MEDICATIONS  (PRN):  acetaminophen     Tablet .. 650 milliGRAM(s) Oral every 6 hours PRN Moderate Pain (4 - 6)  benzocaine 20% Spray 1 Spray(s) Topical every 4 hours PRN tooth pain  haloperidol     Tablet 5 milliGRAM(s) Oral every 6 hours PRN agitation/anxiety  ibuprofen  Tablet. 400 milliGRAM(s) Oral every 6 hours PRN Moderate Pain (4 - 6)  LORazepam     Tablet 2 milliGRAM(s) Oral every 6 hours PRN agitation  LORazepam   Injectable 2 milliGRAM(s) IntraMuscular every 6 hours PRN agitation  melatonin. 3 milliGRAM(s) Oral at bedtime PRN Insomnia  polyethylene glycol 3350 17 Gram(s) Oral two times a day PRN constipation  senna 2 Tablet(s) Oral at bedtime PRN constipation

## 2024-10-21 NOTE — BH INPATIENT PSYCHIATRY PROGRESS NOTE - CURRENT MEDICATION
MEDICATIONS  (STANDING):  clonazePAM  Tablet 1 milliGRAM(s) Oral three times a day  paliperidone ER 3 milliGRAM(s) Oral at bedtime  venlafaxine  milliGRAM(s) Oral daily    MEDICATIONS  (PRN):  acetaminophen     Tablet .. 650 milliGRAM(s) Oral every 6 hours PRN Moderate Pain (4 - 6)  benzocaine 20% Spray 1 Spray(s) Topical every 4 hours PRN tooth pain  haloperidol     Tablet 5 milliGRAM(s) Oral every 6 hours PRN agitation/anxiety  ibuprofen  Tablet. 400 milliGRAM(s) Oral every 6 hours PRN Moderate Pain (4 - 6)  LORazepam     Tablet 2 milliGRAM(s) Oral every 6 hours PRN agitation  LORazepam   Injectable 2 milliGRAM(s) IntraMuscular every 6 hours PRN agitation  melatonin. 3 milliGRAM(s) Oral at bedtime PRN Insomnia  polyethylene glycol 3350 17 Gram(s) Oral two times a day PRN constipation  senna 2 Tablet(s) Oral at bedtime PRN constipation

## 2024-10-21 NOTE — BH INPATIENT PSYCHIATRY PROGRESS NOTE - NS ED BHA REVIEW OF ED CHART AVAILABLE INVESTIGATIONS REVIEWED
None available
None available
Home
None available

## 2024-10-21 NOTE — BH INPATIENT PSYCHIATRY PROGRESS NOTE - NSBHFUPINTERVALHXFT_PSY_A_CORE
Pt seen for MDD with psychotic features and catatonia. Chart reviewed and case discussed with treatment team. No interval events reported overnight. Pt denies feeling paranoid or any feelings that she is being watched, surveilled, controlled, etc. Denies AVH or hearing "outside voices." Denies SI or HI, denies hopelessness. Pt states that she is concerned re: insurance not paying for her admission and a clerical error indicating her admission date was different. SW will outreach resource management/patient access to help pt resolve this issue. Pt otherwise states she is looking forward to discharge tomorrow. +BM overnight. Pt reports having adequate supply of klonopin  and effexor at home and states she will not need these upon discharge (confirmed with [facility]).

## 2024-10-21 NOTE — BH INPATIENT PSYCHIATRY PROGRESS NOTE - NSBHATTESTATTENDPERFORM_PSY_A_CORE
Medical Decision Making

## 2024-10-21 NOTE — BH INPATIENT PSYCHIATRY PROGRESS NOTE - NSBHATTESTATTENDBILLTIME2_PSY_A_CORE
I have reviewed and verified the documentation.

## 2024-10-21 NOTE — BH INPATIENT PSYCHIATRY PROGRESS NOTE - NSTXPSYCHODATEEST_PSY_ALL_CORE
25-Sep-2024

## 2024-10-21 NOTE — BH INPATIENT PSYCHIATRY PROGRESS NOTE - NSTXDEPRESGOAL_PSY_ALL_CORE
Exhibit improvements in self-grooming, hygiene, sleep and appetite

## 2024-10-21 NOTE — BH INPATIENT PSYCHIATRY PROGRESS NOTE - NSTXDCOTHRDATETRGT_PSY_ALL_CORE
02-Oct-2024
09-Oct-2024
16-Oct-2024
02-Oct-2024
16-Oct-2024
23-Oct-2024
16-Oct-2024
02-Oct-2024
23-Oct-2024
09-Oct-2024
02-Oct-2024
16-Oct-2024
16-Oct-2024
02-Oct-2024
09-Oct-2024
16-Oct-2024
02-Oct-2024
02-Oct-2024
09-Oct-2024
23-Oct-2024

## 2024-10-21 NOTE — BH INPATIENT PSYCHIATRY PROGRESS NOTE - NSTXDISORGGOAL_PSY_ALL_CORE
Will make at least 3 goal and reality oriented statements during therapy
Will identify 2 coping skills that assist in organizing
Will identify 2 coping skills that assist in organizing
Will make at least 3 goal and reality oriented statements during therapy
Will identify 2 coping skills that assist in organizing
Will make at least 3 goal and reality oriented statements during therapy
Will identify 2 coping skills that assist in organizing

## 2024-10-22 VITALS — WEIGHT: 132.06 LBS

## 2024-10-22 RX ORDER — CLONAZEPAM 1 MG
1 TABLET ORAL THREE TIMES A DAY
Refills: 0 | Status: DISCONTINUED | OUTPATIENT
Start: 2024-10-22 | End: 2024-10-22

## 2024-10-22 RX ORDER — NALOXONE HYDROCHLORIDE 1 MG/ML
1 INJECTION, SOLUTION INTRAMUSCULAR; INTRAVENOUS; SUBCUTANEOUS ONCE
Refills: 0 | Status: DISCONTINUED | OUTPATIENT
Start: 2024-10-22 | End: 2024-10-22

## 2024-10-22 RX ADMIN — Medication 150 MILLIGRAM(S): at 09:17

## 2024-10-22 RX ADMIN — Medication 1 MILLIGRAM(S): at 09:34

## 2024-10-22 NOTE — BH DISCHARGE NOTE NURSING/SOCIAL WORK/PSYCH REHAB - DISCHARGE INSTRUCTIONS AFTERCARE APPOINTMENTS
PALLIATIVE CARE SOCIAL WORK FOCUS NOTE    POC activated as patient is currently intubated. Daughter Eva is primary agent (original primary agent wife Kaci is ).     CARRIE Sarah, APSW  Palliative Care   [phone]       In order to check the location, date, or time of your aftercare appointment, please refer to your Discharge Instructions Document given to you upon leaving the hospital.  If you have lost the instructions please call [phone]

## 2024-10-22 NOTE — BH DISCHARGE NOTE NURSING/SOCIAL WORK/PSYCH REHAB - NSDCPRRECOMMEND_PSY_ALL_CORE
Upon discharge, it is recommended that patient continue to attend a structured and supportive intervention to sustain noted improvements.

## 2024-10-22 NOTE — BH DISCHARGE NOTE NURSING/SOCIAL WORK/PSYCH REHAB - NSCDUDCCRISIS_PSY_A_CORE
[facility]  1 (285) [facility]-WELL ([phone])  Text "WELL" to 67049  Website: www.[employer].com.[employer]National Suicide Prevention Lifeline 6 (682) 423-7791/.  [employer]  1 (247) LIFENET (074-9528)/.  [facility]  [address] [address] [phone]   Hours:  Monday through Friday from 9 AM to 3 PM/988 Suicide and Crisis Lifeline

## 2024-10-22 NOTE — BH DISCHARGE NOTE NURSING/SOCIAL WORK/PSYCH REHAB - PATIENT PORTAL LINK FT
You can access the FollowMyHealth Patient Portal offered by [facility] by registering at the following website: [email or web address]. By joining [facility]’s FollowMyHealth portal, you will also be able to view your health information using other applications (apps) compatible with our system.

## 2024-12-05 PROCEDURE — 90833 PSYTX W PT W E/M 30 MIN: CPT

## 2024-12-05 PROCEDURE — 99214 OFFICE O/P EST MOD 30 MIN: CPT

## 2024-12-24 PROBLEM — R79.89 ELEVATED PLATELET COUNT: Status: RESOLVED | Noted: 2024-03-29 | Resolved: 2024-12-24

## 2024-12-30 ENCOUNTER — APPOINTMENT (OUTPATIENT)
Dept: OBGYN | Facility: CLINIC | Age: 39
End: 2024-12-30

## 2025-02-03 PROCEDURE — 90836 PSYTX W PT W E/M 45 MIN: CPT

## 2025-02-03 PROCEDURE — 99214 OFFICE O/P EST MOD 30 MIN: CPT

## 2025-03-19 PROCEDURE — 90833 PSYTX W PT W E/M 30 MIN: CPT

## 2025-03-19 PROCEDURE — 99214 OFFICE O/P EST MOD 30 MIN: CPT

## 2025-03-26 NOTE — BH SOCIAL WORK INITIAL PSYCHOSOCIAL EVALUATION - FAMILY NEEDS
Surgical Oncology Follow Up       [facility] [place]  [address]  [place] [address]    Montserrat Sumner  1952  [id number]  [facility] [place]  [address]  [place] [address]    1. IPMN (intraductal papillary mucinous neoplasm)  Assessment & Plan:  72-year-old female with what appears to be main duct IPMN. On my review of the films, her dilatation is under 1 cm. We discussed that this is a worrisome feature. We discussed with main duct IPMN, the risk of malignancy in her lifetime is 50 to 70%. With sidebranch IPMN, the risk is 10 to 20%. There is also a less than 10% risk of malignancy developing elsewhere in the pancreas. It is unclear why she had stones and sludge in her pancreatic duct, and if this could be contributing to her pancreatic dilatation.  Her most recent MRI/MRCP is stable.  She is having her stent removed tomorrow.  We will await the results of the stent removal/ERCP.  I did discuss that given her age with the main duct dilatation, there is some data to suggest continued observation rather than surgical intervention. If there is significant progression of the dilatation I would consider surgical intervention. We did discuss this could be either pancreaticoduodenectomy or potentially even total pancreatectomy based on the imaging. She and her  are agreeable to this plan. All of their questions were answered.   Orders:  -     MRI abdomen w wo contrast and mrcp; Future; Expected date: 09/26/2025  -     BUN; Future; Expected date: 09/15/2025  -     Creatinine, serum; Future; Expected date: 09/15/2025         Chief Complaint   Patient presents with    Office Visit       Return in about 6 months (around 9/26/2025) for Ofice visit short, Imaging - See orders.      Oncology History    No history exists.       Staging: Duct IPMN with 9 mm  dilatation  Treatment history: EUS January 2025  CEA of the dilated duct fluid was 8337 ng/mL.    Amylase was 161,893 U/L.  Glucose was 8.4 mg/dL.  A second sample yielded similar results.    No GNAS or KRAS amplification.  This was statistically indolent   Current treatment: Observation  Disease status: Stable    History of Present Illness: Patient returns in follow-up.  She continues to feel well.  MRI from March 4, 2025 revealed a 1.7 cm bile duct.  There was pancreatic duct dilatation that was 9 mm involving the entire duct.  I personally reviewed the films.    Review of Systems  Complete ROS Surg Onc:   Complete ROS Surg Onc:   Constitutional: The patient denies new or recent history of general fatigue, no recent weight loss, no change in appetite.   Eyes: No complaints of visual problems, no scleral icterus.   ENT: no complaints of ear pain, no hoarseness, no difficulty swallowing,  no tinnitus and no new masses in head, oral cavity, or neck.   Cardiovascular: No complaints of chest pain, no palpitations, no ankle edema.   Respiratory: No complaints of shortness of breath, no cough.   Gastrointestinal: No complaints of jaundice, no bloody stools, no pale stools.   Genitourinary: No complaints of dysuria, no hematuria, no nocturia, no frequent urination, no urethral discharge.   Musculoskeletal: No complaints of weakness, paralysis, joint stiffness or arthralgias.  Integumentary: No complaints of rash, no new lesions.   Neurological: No complaints of convulsions, no seizures, no dizziness.   Hematologic/Lymphatic: No complaints of easy bruising.   Endocrine:  No hot or cold intolerance.  No polydipsia, polyphagia, or polyuria.  Allergy/immunology:  No environmental allergies.  No food allergies.  Not immunocompromised.  Skin:  No pallor or rash.  No wound.        Patient Active Problem List   Diagnosis    Hypertension    Hypothyroid    CAD (coronary artery disease)    History of coronary artery bypass graft     "Centrilobular emphysema (HCC)    Anxiety    Opioid dependence (HCC)    Cigarette nicotine dependence without complication    Lung nodule    Cerebral infarct (HCC)    Cocaine abuse (HCC)    Chronic pain syndrome    Obesity (BMI 30-39.9)    Abnormal CT scan    Intractable abdominal pain    Prediabetes    COPD exacerbation (HCC)    Gastroesophageal reflux disease    Dysphagia    Hyperlipidemia    Oxygen dependent at night only    Sleep disorder    Bile duct abnormality    Postmenopausal    Bilateral cataracts    Aneurysm of ascending aorta without rupture (HCC)    Marijuana smoker    Acute bronchitis    Chronic bilateral low back pain without sciatica    Shortness of breath    S/P ERCP    Primary osteoarthritis of both knees    Sacroiliitis (HCC)    Tobacco use    Osteoarthritis of spine with radiculopathy, lumbar region    Intractable back pain    IPMN (intraductal papillary mucinous neoplasm)    Dilation of pancreatic duct    Right hip pain    Nocturnal hypoxemia due to emphysema  (HCC)    Tracheobronchitis    Subacute cough     Past Medical History:   Diagnosis Date    Anxiety     Chronic pain 03/06/2023    lower abdomen and back    Colon polyp     COPD (chronic obstructive pulmonary disease) (HCC)     \"passes out\" with O2 levels dropping    Disease of thyroid gland     GERD (gastroesophageal reflux disease)     History of transfusion     with aneurysm repair-autologous-self and daughter    Hyperlipidemia     Hypertension     Teeth missing     Wears glasses     For reading     Past Surgical History:   Procedure Laterality Date    BACK SURGERY      x2 herniated, crushed disc in the lumbar, ablation    CHOLECYSTECTOMY      COLONOSCOPY      EPIDURAL BLOCK INJECTION N/A 2/5/2025    Procedure: L1-L2 LUMBAR EPIDURAL STEROID INJECTION;  Surgeon: Nj Maddox DO;  Location: [facility] MAIN OR;  Service: Pain Management     FRACTURE SURGERY Left     hip-pinned    HYSTERECTOMY      salpingo-oophorectomy    AR INJECT SI " JOINT ARTHRGRPHY&/ANES/STEROID W/PATRICIA Bilateral 12/18/2024    Procedure: BILATERAL SACROILIAC JOINT INJECTION;  Surgeon: Nj Maddox DO;  Location: [facility] MAIN OR;  Service: Pain Management     THORACIC AORTIC ANEURYSM REPAIR  09/2016    ascending thoracic aortic repair with RCA bypass with SVG x 1    TONSILLECTOMY      UPPER GASTROINTESTINAL ENDOSCOPY       Family History   Problem Relation Age of Onset    Breast cancer Mother 60     Social History     Socioeconomic History    Marital status: /Civil Union     Spouse name: Not on file    Number of children: Not on file    Years of education: Not on file    Highest education level: Not on file   Occupational History    Not on file   Tobacco Use    Smoking status: Every Day     Current packs/day: 0.25     Average packs/day: 0.3 packs/day for 57.2 years (14.3 ttl pk-yrs)     Types: Cigarettes     Start date: 1968     Passive exposure: Past    Smokeless tobacco: Never    Tobacco comments:     Currently smoking 5-6 cigarettes daily   Vaping Use    Vaping status: Never Used   Substance and Sexual Activity    Alcohol use: Yes     Comment: occasionally    Drug use: Yes     Types: Marijuana, Cocaine     Comment: yes still Marijuana as of 9/5/24-2 times weekly    Sexual activity: Not Currently     Partners: Male     Birth control/protection: Post-menopausal   Other Topics Concern    Not on file   Social History Narrative    Not on file     Social Drivers of Health     Financial Resource Strain: Not on file   Food Insecurity: No Food Insecurity (4/25/2023)    Hunger Vital Sign     Worried About Running Out of Food in the Last Year: Never true     Ran Out of Food in the Last Year: Never true   Transportation Needs: No Transportation Needs (4/25/2023)    PRAPARE - Transportation     Lack of Transportation (Medical): No     Lack of Transportation (Non-Medical): No   Physical Activity: Not on file   Stress: Not on file   Social Connections: Not on file   Intimate  Partner Violence: Not on file   Housing Stability: Low Risk  (4/25/2023)    Housing Stability Vital Sign     Unable to Pay for Housing in the Last Year: No     Number of Places Lived in the Last Year: 2     Unstable Housing in the Last Year: No       Current Outpatient Medications:     acetylcysteine (MUCOMYST) 200 mg/mL nebulizer solution, Use 2 ml and add to 1 vial of your albuterol twice a day as needed for thick mucous, Disp: 120 mL, Rfl: 3    albuterol (2.5 mg/3 mL) 0.083 % nebulizer solution, Take 3 mL (2.5 mg total) by nebulization 4 (four) times a day as needed for wheezing or shortness of breath, Disp: 360 mL, Rfl: 7    albuterol (Proventil HFA) 90 mcg/act inhaler, Inhale 2 puffs every 4 (four) hours as needed for wheezing, Disp: 6.7 g, Rfl: 7    ALPRAZolam (XANAX) 0.5 mg tablet, Take 0.5 mg by mouth 2 (two) times a day, Disp: , Rfl:     amLODIPine (NORVASC) 5 mg tablet, Take 1 tablet (5 mg total) by mouth daily, Disp: 90 tablet, Rfl: 0    aspirin 81 mg chewable tablet, Chew 81 mg daily, Disp: , Rfl:     atorvastatin (LIPITOR) 40 mg tablet, Take 1 tablet (40 mg total) by mouth every morning, Disp: 90 tablet, Rfl: 3    b complex vitamins capsule, Take 1 capsule by mouth once a week, Disp: , Rfl:     benzonatate (TESSALON PERLES) 100 mg capsule, Take 1 capsule (100 mg total) by mouth 3 (three) times a day as needed for cough, Disp: 30 capsule, Rfl: 3    buprenorphine-naloxone (Suboxone) 4-1 MG, every morning Wafer, Disp: , Rfl:     celecoxib (CeleBREX) 200 mg capsule, Take 1 capsule (200 mg total) by mouth daily, Disp: 90 capsule, Rfl: 1    cyclobenzaprine (FLEXERIL) 10 mg tablet, Take 1 tablet (10 mg total) by mouth 3 (three) times a day as needed for muscle spasms, Disp: 90 tablet, Rfl: 1    DULoxetine (CYMBALTA) 60 mg delayed release capsule, Take 1 capsule (60 mg total) by mouth daily, Disp: 90 capsule, Rfl: 1    esomeprazole (NexIUM) 40 MG capsule, Take 1 capsule (40 mg total) by mouth 2 (two) times a  day before meals, Disp: 180 capsule, Rfl: 1    fluticasone-umeclidinium-vilanterol (Trelegy Ellipta) 200-62.5-25 mcg/actuation AEPB inhaler, Inhale 1 puff daily Rinse mouth after use., Disp: 60 blister, Rfl: 11    levothyroxine 100 mcg tablet, Take 1 tablet (100 mcg total) by mouth every morning, Disp: 90 tablet, Rfl: 0    losartan (COZAAR) 25 mg tablet, Take 1 tablet (25 mg total) by mouth every morning, Disp: 90 tablet, Rfl: 0    metoprolol succinate (TOPROL-XL) 50 mg 24 hr tablet, Take 1 tablet (50 mg total) by mouth every morning, Disp: 30 tablet, Rfl: 0    predniSONE 10 mg tablet, Take 4 tablets x4 days, 3 tablets x4 days, 2 tablets x4 days, 1 tablet x4 days, Disp: 50 tablet, Rfl: 0    predniSONE 10 mg tablet, Take 4 tabs x 7 days then 3 tabs x 7 days then 2 tabs x 7 days then stay on 1 tab daily, Disp: 100 tablet, Rfl: 1    sucralfate (CARAFATE) 1 g tablet, Take 1 tablet (1 g total) by mouth 4 (four) times a day, Disp: 120 tablet, Rfl: 1    traZODone (DESYREL) 50 mg tablet, Take 100 mg by mouth daily at bedtime, Disp: , Rfl:     venlafaxine (EFFEXOR-XR) 150 mg 24 hr capsule, Take 1 capsule by mouth in the morning, Disp: , Rfl:     venlafaxine (EFFEXOR-XR) 75 mg 24 hr capsule, Take 1 capsule by mouth in the morning, Disp: , Rfl:     ALPRAZolam (XANAX) 0.5 mg tablet, Take 1 tablet (0.5 mg total) by mouth 1 (one) time for 1 dose, Disp: 1 tablet, Rfl: 0    naloxone (NARCAN) 4 mg/0.1 mL nasal spray, Administer 1 spray into a nostril. If no response after 2-3 minutes, give another dose in the other nostril using a new spray. (Patient not taking: Reported on 2/21/2025), Disp: 1 each, Rfl: 1    oxyCODONE (Roxicodone) 5 immediate release tablet, Take 1 tablet (5 mg total) by mouth every 6 (six) hours as needed for moderate pain Max Daily Amount: 20 mg (Patient not taking: Reported on 2/21/2025), Disp: 20 tablet, Rfl: 0    oxygen gas, Inhale continuous as needed 2.5 L (Patient not taking: Reported on 2/26/2025), Disp: ,  Rfl:   No Known Allergies  Vitals:    03/26/25 1408   BP: 136/80   Pulse: 81   Resp: 14   Temp: 97.5 °F (36.4 °C)   SpO2: 98%       Physical Exam  Constitutional: General appearance: The Patient is well-developed and well-nourished who appears the stated age in no acute distress. Patient is pleasant and talkative.     HEENT:  Normocephalic.  Sclerae are anicteric. Mucous membranes are moist. Neck is supple without adenopathy. No JVD.     Abdomen: Abdomen is soft, non-tender, non-distended and without masses.     Extremities: There is no clubbing or cyanosis. There is no edema.  Symmetric.  Neuro: Grossly nonfocal. Gait-she is in a wheelchair.     Lymphatic: No evidence of cervical adenopathy bilaterally.   Skin: Warm, anicteric.    Psych:  Patient is pleasant and talkative.  Breasts:        Pathology:  [unfilled]    Labs:      Imaging  MRI abdomen w wo contrast and mrcp  Result Date: 3/7/2025  Narrative: MRI OF THE ABDOMEN WITH AND WITHOUT CONTRAST WITH MRCP INDICATION: 72 years / Female. D49.0: Neoplasm of unspecified behavior of digestive system. Surgical and oncology note from 2/4/2025 was reviewed. Patient has chronic abdominal pain. Imaging demonstrated main pancreatic ductal dilatation and common bile duct dilatation. ERCP demonstrated mucoid material at the ampulla. Findings likely represent main duct IPMN. COMPARISON: Abdomen MR from 9/18/2024. TECHNIQUE: Multiplanar/multisequence MRI of the abdomen with 3D MRCP was performed before and after administration of contrast. IV Contrast: 8 mL of Gadobutrol injection FINDINGS: LOWER CHEST: Unremarkable. LIVER: Normal in size and configuration. No suspicious mass. Patent hepatic and portal veins. BILE DUCTS: Again seen is intra and extrahepatic biliary ductal dilatation with common bile duct at 1.7 cm. There is new pneumobilia likely due to interval sphincterotomy. GALLBLADDER: Post cholecystectomy. PANCREAS: Main pancreatic duct dilation > 5mm without other  causes of MPD obstruction, suggestive of main duct IPMN: -Dilated main duct location: Entire main pancreattic duct. -Maximum main duct diameter: 9 mm, unchanged from prior. (Series 7 images 23-31.) -Associated nodules: No. -Management recommendation: Surgical consultation/management. Management and follow up recommendations for cystic pancreatic lesions are based on Institutional consensus and international evidence-based Kyoto guidelines for the management of intraductal papillary mucinous neoplasm of the pancreas. Pancreatology 24 (2024) 255-270. ADRENAL GLANDS: Unremarkable. SPLEEN: Normal. KIDNEYS/PROXIMAL URETERS: No hydroureteronephrosis. No suspicious renal mass. BOWEL: No dilated loops of bowel. PERITONEUM/RETROPERITONEUM: No ascites. LYMPH NODES: No abdominal lymphadenopathy. VESSELS: No aneurysm. ABDOMINAL WALL: Unremarkable. BONES: No suspicious osseous lesion.     Impression: Unchanged diffuse main pancreatic ductal dilatation up to 9 mm. No enhancing nodules. Management recommendation: Continued surgical oncology management. Unchanged intra and extrahepatic biliary ductal dilation with common bile duct up to 1.7 cm. Management and follow up recommendations for cystic pancreatic lesions are based on Institutional consensus and international evidence-based Kyoto guidelines for the management of intraductal papillary mucinous neoplasm of the pancreas. Pancreatology 24 (2024) 255-270. Workstation performed: [id number]     I personally reviewed and interpreted the above laboratory and imaging data.           no

## 2025-04-07 ENCOUNTER — APPOINTMENT (OUTPATIENT)
Dept: OBGYN | Facility: CLINIC | Age: 40
End: 2025-04-07

## 2025-04-09 NOTE — H&P ADULT - NSHPPHYSICALEXAM_GEN_ALL_CORE
Agree with note by COLLIN DAVISON  - non reactive with variable decelerations - patient advised to got to OB ED for further evaluation   PHYSICAL EXAM:  GENERAL: NAD, comfortable at bedside   HEAD:  Atraumatic, Normocephalic  EYES: EOMI, PERRL, conjunctiva and sclera clear  NECK: Supple, No JVD  CHEST/LUNG: Clear to auscultation bilaterally; No wheezes, rales or rhonchi  HEART: Regular rate and rhythm; No murmurs, rubs, or gallops, (+)S1, S2  ABDOMEN: Soft, Nontender, Nondistended; Normal Bowel sounds   EXTREMITIES:  2+ Peripheral Pulses, No clubbing, cyanosis, or edema  PSYCH: withdrawn, tearful at times, slow to respond to questions but does respond if you give her time, paranoid the room is bugged, lacks insight   NEUROLOGY: AAOx3, moving all extremities spontaneously    SKIN: No rashes or lesions

## 2025-05-11 NOTE — BH INPATIENT PSYCHIATRY PROGRESS NOTE - NSBHMETABOLIC_PSY_ALL_CORE_FT
Emergency General Surgery Progress Note  5/11/2025      Subjective:   No acute overnight events. Resting comfortably in bed this AM. States that she ate jello and crackers yesterday which she tolerated. She is passing more gas. No episodes of emesis. She is hungry this morning and would like to try some toast.    Objective:   Temp:  [98.6  F (37  C)-99.2  F (37.3  C)] 99.2  F (37.3  C)  Pulse:  [65-79] 65  Resp:  [16-18] 16  BP: (138-149)/() 149/103  SpO2:  [95 %-97 %] 95 %  I/O last 3 completed shifts:  In: 3521.67 [P.O.:1220; I.V.:2301.67]  Out: -     Gen: resting comfortably in bed, NAD  CV: normal heart rate, normotensive   Pulm: Nonlabored breathing on RA  Abd: Soft, appropriately tender, not distended. Midline incision closed with staples, well approximated, no drainage expressed or surrounding erythema. Laparoscopic incisions clean, dry, intact with improving ecchymosis. Remains soft, not tender to palpation.   Extremities: warm and well perfused, moves all extremities spontaneously. No peripheral edema  Neuro: awake and alert    Labs:  [facility]   Recent Labs   Lab 05/11/25  0537 05/10/25  0404 05/09/25  1432 05/09/25  0450 05/08/25  2142 05/08/25  0627 05/07/25  0609 05/06/25  1935 05/06/25  0611 05/06/25  0609 05/05/25  0623 05/05/25  0533   NA  --   --   --  141  --   --  139  --  139  --   --  141   POTASSIUM 3.8 4.0  --  3.7  3.6 3.2* 2.9* 3.5   < > 3.4  --   --  3.5   CHLORIDE  --   --   --  104  --   --  103  --  103  --   --  104   RAMBO  --   --   --  8.3*  --   --  8.2*  --  8.5*  --   --  8.5*   CO2  --   --   --  21*  --   --  21*  --  22  --   --  23   BUN  --   --   --  12.7  --   --  12.9  --  20.6  --   --  20.0   CR  --  0.58  --  0.59  --   --  0.66  --  0.81  --   --  0.84   GLC  --   --   --  88  --   --  81  --  95 92   < > 109*   MAG 1.8 1.8  --  2.1  --  2.0 1.8  --  2.1  --   --  2.1   PHOS 1.5* 2.4* 2.1* 1.8*  --  2.0* 2.1*   < > 1.9*  --   --  3.0    < > = values in this interval not  displayed.     CBC   Recent Labs   Lab 05/10/25  0404 05/07/25  0609 05/06/25  0611 05/05/25  0533 05/04/25  1631 05/04/25  1212   WBC  --  8.8 11.4* 11.5*  --  6.8   RBC  --  3.09* 3.18* 3.50*  --  3.23*   HGB  --  9.1* 9.2* 10.1* 11.4* 9.6*   MCV  --  91 88 89  --  90   MCH  --  29.4 28.9 28.9  --  29.7   MCHC  --  32.4 32.7 32.3  --  33.2   RDW  --  15.5* 15.7* 15.6*  --  15.3*    236 250 203  --  221       Imaging:   No new imaging     Assessment & Plan:  Nadira Perez is a 71 year old female with PMHx of HTN, stage IIIc inflammatory breast cancer s/p bilateral mastectomy, T2DM, peripheral neuropathy, DDD s/p spinal fusion L5-S1 who presented to the ED on 5/4 with abdominal pain, nausea, and retching. Imaging demonstrated incarcerated ventral hernia, prompting EGS involvement. On 5/4, patient underwent diagnostic laparoscopy converted to laparotomy with lysis of adhesions, small bowel resection, and primary closure of ventral hernia (no mesh).     Post-operatively, although patient with some return of bowel function (passing flatus), continued to have high NGT output and nausea. Nausea improving, NGT output no longer bilious as of 5/9 AM. NGT removed 5/9 and sips of clears. Tolerated with minimal nausea. Advanced to regular diet on 5/10. She is passing gas and tolerating regular diet.     Today's Changes:   - Regular diet, will add Ensure today to supplement her nutrition   - Decrease maintenance fluids to @ 50 mL/hr while increasing PO intake  - Pain Control: PO Tylenol or Rectal Tylenol q4h PRN, IV Robaxin 500mg q8h PRN, PO oxycodone 5mg q6h PRN. Lidocaine patches.    - PO Levothyroxine   - IV PPI ordered given PTA PPI   - PTA lisinopril and carvedilol at this time.  IV labetalol and hydralazine available PRN for elevated SBP >160.   - IS at bedside, encourage IS  - OOB, ambulate     This patient was seen and discussed with chief resident who discussed with staff    Susanne Tello, DO  General Surgery,  PGY-2   BMI: BMI (kg/m2): 19.2 (07-14-23 @ 07:35)  HbA1c: A1C with Estimated Average Glucose Result: 5.0 % (07-08-23 @ 11:45)    Glucose:   BP: --  Lipid Panel: Date/Time: 07-09-23 @ 10:15  Cholesterol, Serum: 190  Direct LDL: --  HDL Cholesterol, Serum: 58  Total Cholesterol/HDL Ration Measurement: --  Triglycerides, Serum: 58

## 2025-05-12 PROCEDURE — 90833 PSYTX W PT W E/M 30 MIN: CPT

## 2025-05-12 PROCEDURE — 99214 OFFICE O/P EST MOD 30 MIN: CPT

## 2025-06-24 NOTE — BH CONSULTATION LIAISON PROGRESS NOTE - NSBHMSEPERCEPT_PSY_A_CORE
RAMON WOODS 46y Female  MRN#: [id number]   CODE STATUS:____full____      SUBJECTIVE  Patient is a 46y old Female who presents with a chief complaint of Nausea and vomiting     (22 Jun 2025 14:51)  Currently admitted to medicine with the primary diagnosis of Intractable nausea and vomiting      Today is hospital day 4d, and this morning she states her epigastric pain is more controlled today and she denies n/v. Afebrile           OBJECTIVE  PAST MEDICAL & SURGICAL HISTORY  GERD (gastroesophageal reflux disease)    Migraine headache    Back pain    Anxiety    Kidney stones    Murmur  since age 17     6/18    Thyroid nodule    Hypothyroidism    Overactive bladder    History of cholecystectomy    History of lithotripsy      ALLERGIES:  citrus (Unknown)  Cipro (Rash)  Bananas (Unknown)  NSAIDs (Angioedema; Anaphylaxis; Hives)  eggs (Unknown)    MEDICATIONS:  STANDING MEDICATIONS  chlorhexidine 2% Cloths 1 Application(s) Topical <User Schedule>  citalopram 40 milliGRAM(s) Oral daily  enoxaparin Injectable 40 milliGRAM(s) SubCutaneous every 24 hours  levothyroxine 75 MICROGram(s) Oral daily  methadone    Tablet 10 milliGRAM(s) Oral four times a day  oxybutynin 5 milliGRAM(s) Oral two times a day  pantoprazole  Injectable 40 milliGRAM(s) IV Push two times a day  rosuvastatin 10 milliGRAM(s) Oral at bedtime  sodium chloride 0.9%. 1000 milliLiter(s) IV Continuous <Continuous>  sucralfate 1 Gram(s) Oral four times a day    PRN MEDICATIONS  acetaminophen     Tablet .. 650 milliGRAM(s) Oral every 6 hours PRN  HYDROmorphone  Injectable 0.5 milliGRAM(s) IV Push every 4 hours PRN  metoclopramide Injectable 5 milliGRAM(s) IV Push every 8 hours PRN  ondansetron Injectable 4 milliGRAM(s) IV Push every 8 hours PRN  oxyCODONE    IR 10 milliGRAM(s) Oral every 6 hours PRN      VITAL SIGNS: Last 24 Hours  T(C): 36.7 (24 Jun 2025 04:44), Max: 37 (23 Jun 2025 20:29)  T(F): 98 (24 Jun 2025 04:44), Max: 98.6 (23 Jun 2025 20:29)  HR: 68 (24 Jun 2025 04:44) (68 - 89)  BP: 111/73 (24 Jun 2025 04:44) (105/70 - 123/84)  BP(mean): --  RR: 18 (24 Jun 2025 04:44) (18 - 18)  SpO2: 96% (24 Jun 2025 04:44) (95% - 99%)    LABS:                        12.0   5.52  )-----------( 210      ( 24 Jun 2025 08:43 )             37.6     06-24    137  |  101  |  9[L]  ----------------------------<  89  4.3   |  21  |  0.7    Ca    9.7      24 Jun 2025 08:43    TPro  7.2  /  Alb  4.6  /  TBili  0.3  /  DBili  x   /  AST  39  /  ALT  134[H]  /  AlkPhos  159[H]  06-24    PT/INR - ( 23 Jun 2025 12:25 )   PT: 12.20 sec;   INR: 1.03 ratio         PTT - ( 23 Jun 2025 12:25 )  PTT:35.9 sec  Urinalysis Basic - ( 24 Jun 2025 08:43 )    Color: x / Appearance: x / SG: x / pH: x  Gluc: 89 mg/dL / Ketone: x  / Bili: x / Urobili: x   Blood: x / Protein: x / Nitrite: x   Leuk Esterase: x / RBC: x / WBC x   Sq Epi: x / Non Sq Epi: x / Bacteria: x            PHYSICAL EXAM:  PHYSICAL EXAM:  GENERAL: NAD, well-developed, AAOx3, crying from pain   HEENT:  Atraumatic, Normocephalic. EOMI, PERRLA, conjunctiva and sclera clear, No JVD  PULMONARY: Clear to auscultation bilaterally; No wheeze  CARDIOVASCULAR: Regular rate and rhythm; No murmurs, rubs, or gallops  GASTROINTESTINAL: Soft,  Nondistended; Bowel sounds present, tender midepigastric region , mild rebound, no gaurding   MUSCULOSKELETAL:  2+ Peripheral Pulses, No clubbing, cyanosis, or edema  NEUROLOGY: non-focal  SKIN: No rashes or lesions      46-year-old female with PMH of hypothyroidism, OAB, GERD, hiatal hernia, chronic pain (on Methadone), chronic gastritis who presents to ED c/o worsening epigastric pain with nausea and vomiting.     # Chronic epigastric pain w/ nausea and vomiting  -has with Dr López ERCP on tuesday to check for papillary stenosis  - CT A/P demonstrated no CT evidence for acute intra-abdominal or pelvic pathology.  lipase wnl   - RUQ US, shows intrahepatic ductal dilatation   - continue anti-emetics  - pain control changed to oxycodone 5mg q6 and Dilaudid prn   lfts trending up today to over 200s, GGT elevated 557, patient may have a cbd stone and patient is requiring pain medication   lfts trending down today   oxycodone 5mg for pain, 10mg for moderate pain and Dilaudid prn for severe/breakthrough   not tolerating clears and due to worsening abdominal pain ,  pt is  NPO for ercp today     - GI consulted   - continue PPI, Carafate   hep panel reviewed, hep C appears to equivocal will order viral load and discuss with GI   no fever and wbc wnl, so will hold off antibiotics   -as per GI, plan for ercp at [place] today   # Hx hypothyroid  - continue synthroid    # Hx Gerd  - continue ppi    # Chronic pain  - continue Methadone 10 mg po  q6h    # OAB  - continue oxybutynin      DVT/GI ppx    ---PB Handoff Note---    Pending/Follow up items (tests, labs, procedures, consults): LFTS, hepatitis C viral pcr   awaiting ercp today     Indication for continued inpatient management: ivf, pain control requiring iv meds, nausea medication   ercp today  , ivf while npo       Goals of Care note:     Family contact:  Dispo (home, SNF, etc):    Prepare for DC order [x] - updated TONYA is:   DC provider note prep:    -------------------------------Time spent-----------------------------------------------------------------------  Initial visit:             mins [ ] -- 55-74 mins [ ]  Subsequent visit: 50-79 mins[x ]    -- 35-49mins [ x]     --------------------------------# and complexity of problems---------------------------------------------  [ ] chronic illness with severe exacerbation , progression, treatment side effect  [ ] acute or chronic illness with threat to life or bodily function                         --------------------------------Complexity of data reviewed ----------------------------------------------  The Patients complexity of data reviewed  is Low [ ] Moderate [ x] High [ ] due to the following:   A:      Reviewed prior external records [ ]      Considering/ Ordered a unique test:  Labs [x ] Imaging [x ] Stress Test  [ ] Other: Specify [ ]      Reviewed each unique test result [x ]      Assessment requiring an independent historian [ ]  B:       Independent interpretation of:   X-Ray [x ] EKG [ ]  Other: Specify  [ ]  C:       Discussion of management of tests with clinician outside of my group [ ]    -------------------------------------Risk of Morbidity-------------------------------------------------------  The Patients risk of morbidity is Low [x ] Moderate [ ] High [ ] due to the following:   Mod:  Prescription Drug Management [ ]  Decision regarding elective or emergent: minor surgery [ ] major surgery [ ]  Decision regarding hospitalization or escalation of hospital-level care [ ]  SDOH: Hearing/Vision impaired [ ] Food insecurity [ ] Homelessness/unsafe condition [ ]   Financial strain [ ] un/underemployed [ ] Lack of Transport [ ]  High:  DNR or De-Escalation of care because of poor prognosis [ ]  Drug Therapy requiring intensive monitoring for toxicity [ ]  Parenteral controlled substance [ ]  ------------------------------------------------------------------------------------------------------------       Other No abnormalities

## 2025-07-01 ENCOUNTER — OUTPATIENT (OUTPATIENT)
Dept: OUTPATIENT SERVICES | Facility: HOSPITAL | Age: 40
LOS: 1 days | Discharge: HOME | End: 2025-07-01
Payer: MEDICAID

## 2025-07-01 PROCEDURE — 90792 PSYCH DIAG EVAL W/MED SRVCS: CPT

## 2025-07-08 PROCEDURE — 99214 OFFICE O/P EST MOD 30 MIN: CPT

## 2025-07-08 NOTE — BH DISCHARGE NOTE NURSING/SOCIAL WORK/PSYCH REHAB - NSDCPRGOAL_PSY_ALL_CORE
yes
The patient met her specified goal to identify 2 coping skills that assist with focus on reality for her psychotic symptoms goal. Progress was evidenced by an improved ability to tolerate safety planning and engaging in appropriate and supportive dialogue with peers during communal engagement. The patient attended 66% of the Psychiatric Rehabilitation groups throughout her stay. Patient completed her safety plan prior to discharge.

## 2025-07-09 NOTE — BH PATIENT PROFILE - WAS YOUR LAST COVID-19 VACCINE GREATER THAN OR EQUAL TO TWO MONTHS AGO?
Thank you for letting me care for you today - it was nice to meet you and I hope you feel better soon. Please follow up with your primary care provider. I have also placed a referral to Physical Medicine and Rehabilitation for follow up care. You can call [phone] ([phone]) to schedule. You can also schedule on the [facility] My[facility] chuck.  You can also schedule on the [facility] chuck.     I have sent in prescriptions for the following:   Mobic: Once a day. Do not take ibuprofen or aleve at the same time.   Voltaren Gel: You can rub this on the area that is causing you pain 4 times per day. You can rub this on the area that is causing you pain 4 times per day.  If you find this is helpful, you can also buy them over the counter.    Lidoderm patches: You can apply this to the area that is causing pain and leave it on for up to 12 hours. If you find this is helpful, you can also buy these over the counter.      Medication:   You can also take 500-1000mg of over-the-counter Tylenol (acetaminophen) every 6-8 hours. Do not take more than 3000mg of Tylenol per day.     Our goal at [facility] is to always give you outstanding care and exceptional service. You may receive a survey by mail or email in the next week about your experience in our ED. We would greatly appreciate you completing and returning the survey. Your feedback provides us with a way to recognize our staff who give very good care and it helps us learn how to improve when your experience was below our aspiration of excellence.     All the best,     Natalie Marshall, MPH, PA-C  Emergency Department Physician Assistant  [facility], [facility]        Yes

## 2025-07-14 PROCEDURE — 99214 OFFICE O/P EST MOD 30 MIN: CPT

## 2025-07-22 PROCEDURE — 99214 OFFICE O/P EST MOD 30 MIN: CPT

## 2025-07-24 DIAGNOSIS — F33.3 MAJOR DEPRESSIVE DISORDER, RECURRENT, SEVERE WITH PSYCHOTIC SYMPTOMS: ICD-10-CM

## 2025-07-28 PROCEDURE — 99214 OFFICE O/P EST MOD 30 MIN: CPT

## 2025-08-04 PROCEDURE — 99214 OFFICE O/P EST MOD 30 MIN: CPT

## 2025-08-08 PROCEDURE — 99214 OFFICE O/P EST MOD 30 MIN: CPT

## 2025-09-17 PROCEDURE — 90853 GROUP PSYCHOTHERAPY: CPT
